# Patient Record
Sex: FEMALE | Race: WHITE | Employment: OTHER | URBAN - NONMETROPOLITAN AREA
[De-identification: names, ages, dates, MRNs, and addresses within clinical notes are randomized per-mention and may not be internally consistent; named-entity substitution may affect disease eponyms.]

---

## 2017-01-10 ENCOUNTER — OFFICE VISIT (OUTPATIENT)
Dept: FAMILY MEDICINE CLINIC | Age: 57
End: 2017-01-10

## 2017-01-10 VITALS
OXYGEN SATURATION: 98 % | HEART RATE: 67 BPM | HEIGHT: 66 IN | DIASTOLIC BLOOD PRESSURE: 74 MMHG | WEIGHT: 194.8 LBS | TEMPERATURE: 98.1 F | BODY MASS INDEX: 31.31 KG/M2 | SYSTOLIC BLOOD PRESSURE: 108 MMHG | RESPIRATION RATE: 12 BRPM

## 2017-01-10 DIAGNOSIS — N30.01 ACUTE CYSTITIS WITH HEMATURIA: Primary | ICD-10-CM

## 2017-01-10 DIAGNOSIS — G71.00 MD (MUSCULAR DYSTROPHY) (HCC): ICD-10-CM

## 2017-01-10 DIAGNOSIS — R10.9 FLANK PAIN: ICD-10-CM

## 2017-01-10 DIAGNOSIS — M06.9 RHEUMATOID ARTHRITIS, INVOLVING UNSPECIFIED SITE, UNSPECIFIED RHEUMATOID FACTOR PRESENCE: ICD-10-CM

## 2017-01-10 DIAGNOSIS — R30.0 DYSURIA: ICD-10-CM

## 2017-01-10 LAB
BILIRUBIN, POC: NEGATIVE
BLOOD URINE, POC: NORMAL
CLARITY, POC: NORMAL
COLOR, POC: NORMAL
GLUCOSE URINE, POC: NEGATIVE
KETONES, POC: NEGATIVE
LEUKOCYTE EST, POC: NORMAL
NITRITE, POC: NEGATIVE
PH, POC: 7
PROTEIN, POC: NEGATIVE
SPECIFIC GRAVITY, POC: 1.01
UROBILINOGEN, POC: 0.2

## 2017-01-10 PROCEDURE — 81003 URINALYSIS AUTO W/O SCOPE: CPT | Performed by: FAMILY MEDICINE

## 2017-01-10 PROCEDURE — 99213 OFFICE O/P EST LOW 20 MIN: CPT | Performed by: FAMILY MEDICINE

## 2017-01-10 RX ORDER — NITROFURANTOIN 25; 75 MG/1; MG/1
100 CAPSULE ORAL 2 TIMES DAILY
Qty: 20 CAPSULE | Refills: 0 | Status: SHIPPED | OUTPATIENT
Start: 2017-01-10 | End: 2017-01-20

## 2017-01-10 ASSESSMENT — ENCOUNTER SYMPTOMS
DIARRHEA: 0
SHORTNESS OF BREATH: 0
ABDOMINAL PAIN: 0
CONSTIPATION: 0
SORE THROAT: 0
BACK PAIN: 0
RHINORRHEA: 0
WHEEZING: 0
VOMITING: 0
CHEST TIGHTNESS: 0
BLOOD IN STOOL: 0
COUGH: 0
EYE PAIN: 0
NAUSEA: 0

## 2017-01-13 ENCOUNTER — TELEPHONE (OUTPATIENT)
Dept: FAMILY MEDICINE CLINIC | Age: 57
End: 2017-01-13

## 2017-01-13 LAB — URINE CULTURE, ROUTINE: ABNORMAL

## 2017-01-24 DIAGNOSIS — G71.00 MD (MUSCULAR DYSTROPHY) (HCC): ICD-10-CM

## 2017-01-24 RX ORDER — HYDROCODONE BITARTRATE AND ACETAMINOPHEN 5; 325 MG/1; MG/1
1 TABLET ORAL EVERY 6 HOURS PRN
Qty: 100 TABLET | Refills: 0 | Status: SHIPPED | OUTPATIENT
Start: 2017-01-24 | End: 2017-02-24 | Stop reason: SDUPTHER

## 2017-01-31 ENCOUNTER — TELEPHONE (OUTPATIENT)
Dept: FAMILY MEDICINE CLINIC | Age: 57
End: 2017-01-31

## 2017-02-24 DIAGNOSIS — G71.00 MD (MUSCULAR DYSTROPHY) (HCC): ICD-10-CM

## 2017-02-24 RX ORDER — HYDROCODONE BITARTRATE AND ACETAMINOPHEN 5; 325 MG/1; MG/1
1 TABLET ORAL EVERY 6 HOURS PRN
Qty: 100 TABLET | Refills: 0 | Status: SHIPPED | OUTPATIENT
Start: 2017-02-24 | End: 2017-03-28 | Stop reason: SDUPTHER

## 2017-03-28 DIAGNOSIS — G71.00 MD (MUSCULAR DYSTROPHY) (HCC): ICD-10-CM

## 2017-03-28 RX ORDER — HYDROCODONE BITARTRATE AND ACETAMINOPHEN 5; 325 MG/1; MG/1
1 TABLET ORAL EVERY 6 HOURS PRN
Qty: 100 TABLET | Refills: 0 | Status: SHIPPED | OUTPATIENT
Start: 2017-03-28 | End: 2017-04-28 | Stop reason: SDUPTHER

## 2017-04-28 DIAGNOSIS — G71.00 MD (MUSCULAR DYSTROPHY) (HCC): ICD-10-CM

## 2017-04-28 RX ORDER — HYDROCODONE BITARTRATE AND ACETAMINOPHEN 5; 325 MG/1; MG/1
1 TABLET ORAL EVERY 6 HOURS PRN
Qty: 100 TABLET | Refills: 0 | Status: SHIPPED | OUTPATIENT
Start: 2017-04-28 | End: 2017-05-31 | Stop reason: SDUPTHER

## 2017-05-31 DIAGNOSIS — G71.00 MD (MUSCULAR DYSTROPHY) (HCC): ICD-10-CM

## 2017-05-31 RX ORDER — HYDROCODONE BITARTRATE AND ACETAMINOPHEN 5; 325 MG/1; MG/1
1 TABLET ORAL EVERY 6 HOURS PRN
Qty: 100 TABLET | Refills: 0 | Status: SHIPPED | OUTPATIENT
Start: 2017-05-31 | End: 2017-06-30 | Stop reason: SDUPTHER

## 2017-06-01 ENCOUNTER — OFFICE VISIT (OUTPATIENT)
Dept: FAMILY MEDICINE CLINIC | Age: 57
End: 2017-06-01

## 2017-06-01 VITALS
DIASTOLIC BLOOD PRESSURE: 64 MMHG | SYSTOLIC BLOOD PRESSURE: 108 MMHG | WEIGHT: 193.2 LBS | RESPIRATION RATE: 16 BRPM | BODY MASS INDEX: 31.66 KG/M2 | HEART RATE: 80 BPM

## 2017-06-01 DIAGNOSIS — R19.7 DIARRHEA OF PRESUMED INFECTIOUS ORIGIN: Primary | ICD-10-CM

## 2017-06-01 PROCEDURE — G8427 DOCREV CUR MEDS BY ELIG CLIN: HCPCS | Performed by: FAMILY MEDICINE

## 2017-06-01 PROCEDURE — 3017F COLORECTAL CA SCREEN DOC REV: CPT | Performed by: FAMILY MEDICINE

## 2017-06-01 PROCEDURE — G8417 CALC BMI ABV UP PARAM F/U: HCPCS | Performed by: FAMILY MEDICINE

## 2017-06-01 PROCEDURE — 3014F SCREEN MAMMO DOC REV: CPT | Performed by: FAMILY MEDICINE

## 2017-06-01 PROCEDURE — 1036F TOBACCO NON-USER: CPT | Performed by: FAMILY MEDICINE

## 2017-06-01 PROCEDURE — 99213 OFFICE O/P EST LOW 20 MIN: CPT | Performed by: FAMILY MEDICINE

## 2017-06-01 RX ORDER — LOPERAMIDE HYDROCHLORIDE 2 MG/1
2 CAPSULE ORAL 4 TIMES DAILY PRN
Qty: 30 CAPSULE | Refills: 0 | Status: SHIPPED | OUTPATIENT
Start: 2017-06-01 | End: 2017-10-18 | Stop reason: ALTCHOICE

## 2017-06-01 ASSESSMENT — ENCOUNTER SYMPTOMS
BLOATING: 1
FLATUS: 0
EYE PAIN: 0
SHORTNESS OF BREATH: 0
SORE THROAT: 0
NAUSEA: 0
CHEST TIGHTNESS: 0
VOMITING: 0
DIARRHEA: 1
ABDOMINAL PAIN: 1
BACK PAIN: 0
WHEEZING: 0
CONSTIPATION: 0
COUGH: 0
BLOOD IN STOOL: 0
RHINORRHEA: 0

## 2017-06-15 DIAGNOSIS — G71.00 MUSCULAR DYSTROPHY (HCC): ICD-10-CM

## 2017-06-15 RX ORDER — GABAPENTIN 300 MG/1
300 CAPSULE ORAL 2 TIMES DAILY
Qty: 60 CAPSULE | Refills: 5 | Status: SHIPPED | OUTPATIENT
Start: 2017-06-15 | End: 2017-10-18 | Stop reason: SDUPTHER

## 2017-06-30 DIAGNOSIS — G71.00 MD (MUSCULAR DYSTROPHY) (HCC): ICD-10-CM

## 2017-06-30 RX ORDER — HYDROCODONE BITARTRATE AND ACETAMINOPHEN 5; 325 MG/1; MG/1
1 TABLET ORAL EVERY 6 HOURS PRN
Qty: 100 TABLET | Refills: 0 | Status: SHIPPED | OUTPATIENT
Start: 2017-06-30 | End: 2017-07-31 | Stop reason: SDUPTHER

## 2017-07-31 DIAGNOSIS — G71.00 MD (MUSCULAR DYSTROPHY) (HCC): ICD-10-CM

## 2017-07-31 RX ORDER — HYDROCODONE BITARTRATE AND ACETAMINOPHEN 5; 325 MG/1; MG/1
1 TABLET ORAL EVERY 6 HOURS PRN
Qty: 100 TABLET | Refills: 0 | Status: SHIPPED | OUTPATIENT
Start: 2017-07-31 | End: 2017-08-30 | Stop reason: SDUPTHER

## 2017-08-30 DIAGNOSIS — G71.00 MD (MUSCULAR DYSTROPHY) (HCC): ICD-10-CM

## 2017-08-30 RX ORDER — HYDROCODONE BITARTRATE AND ACETAMINOPHEN 5; 325 MG/1; MG/1
1 TABLET ORAL EVERY 6 HOURS PRN
Qty: 100 TABLET | Refills: 0 | Status: SHIPPED | OUTPATIENT
Start: 2017-08-30 | End: 2017-09-28 | Stop reason: SDUPTHER

## 2017-09-28 DIAGNOSIS — G71.00 MD (MUSCULAR DYSTROPHY) (HCC): ICD-10-CM

## 2017-09-28 RX ORDER — HYDROCODONE BITARTRATE AND ACETAMINOPHEN 5; 325 MG/1; MG/1
1 TABLET ORAL EVERY 6 HOURS PRN
Qty: 100 TABLET | Refills: 0 | Status: SHIPPED | OUTPATIENT
Start: 2017-09-29 | End: 2017-11-01 | Stop reason: SDUPTHER

## 2017-10-18 ENCOUNTER — OFFICE VISIT (OUTPATIENT)
Dept: FAMILY MEDICINE CLINIC | Age: 57
End: 2017-10-18
Payer: MEDICARE

## 2017-10-18 VITALS
WEIGHT: 190 LBS | RESPIRATION RATE: 12 BRPM | HEART RATE: 62 BPM | HEIGHT: 65 IN | DIASTOLIC BLOOD PRESSURE: 82 MMHG | SYSTOLIC BLOOD PRESSURE: 112 MMHG | BODY MASS INDEX: 31.65 KG/M2

## 2017-10-18 DIAGNOSIS — Z23 NEED FOR PNEUMOCOCCAL VACCINATION: ICD-10-CM

## 2017-10-18 DIAGNOSIS — G71.00 MUSCULAR DYSTROPHY (HCC): Primary | ICD-10-CM

## 2017-10-18 DIAGNOSIS — Z23 NEED FOR INFLUENZA VACCINATION: ICD-10-CM

## 2017-10-18 DIAGNOSIS — M06.9 RHEUMATOID ARTHRITIS, INVOLVING UNSPECIFIED SITE, UNSPECIFIED RHEUMATOID FACTOR PRESENCE: ICD-10-CM

## 2017-10-18 DIAGNOSIS — G62.9 NEUROPATHY: ICD-10-CM

## 2017-10-18 PROCEDURE — 99214 OFFICE O/P EST MOD 30 MIN: CPT | Performed by: FAMILY MEDICINE

## 2017-10-18 PROCEDURE — G8484 FLU IMMUNIZE NO ADMIN: HCPCS | Performed by: FAMILY MEDICINE

## 2017-10-18 PROCEDURE — G8417 CALC BMI ABV UP PARAM F/U: HCPCS | Performed by: FAMILY MEDICINE

## 2017-10-18 PROCEDURE — 3014F SCREEN MAMMO DOC REV: CPT | Performed by: FAMILY MEDICINE

## 2017-10-18 PROCEDURE — G8427 DOCREV CUR MEDS BY ELIG CLIN: HCPCS | Performed by: FAMILY MEDICINE

## 2017-10-18 PROCEDURE — 90732 PPSV23 VACC 2 YRS+ SUBQ/IM: CPT | Performed by: FAMILY MEDICINE

## 2017-10-18 PROCEDURE — G0009 ADMIN PNEUMOCOCCAL VACCINE: HCPCS | Performed by: FAMILY MEDICINE

## 2017-10-18 PROCEDURE — G0008 ADMIN INFLUENZA VIRUS VAC: HCPCS | Performed by: FAMILY MEDICINE

## 2017-10-18 PROCEDURE — 90688 IIV4 VACCINE SPLT 0.5 ML IM: CPT | Performed by: FAMILY MEDICINE

## 2017-10-18 PROCEDURE — 3017F COLORECTAL CA SCREEN DOC REV: CPT | Performed by: FAMILY MEDICINE

## 2017-10-18 PROCEDURE — 1036F TOBACCO NON-USER: CPT | Performed by: FAMILY MEDICINE

## 2017-10-18 RX ORDER — FOLIC ACID 1 MG/1
1 TABLET ORAL
COMMUNITY
Start: 2017-09-19

## 2017-10-18 RX ORDER — GABAPENTIN 300 MG/1
300 CAPSULE ORAL 2 TIMES DAILY
Qty: 60 CAPSULE | Refills: 5 | Status: SHIPPED | OUTPATIENT
Start: 2017-10-18 | End: 2018-05-30 | Stop reason: SDUPTHER

## 2017-10-18 ASSESSMENT — ENCOUNTER SYMPTOMS
SORE THROAT: 0
CONSTIPATION: 0
DIARRHEA: 0
EYE PAIN: 0
ABDOMINAL PAIN: 0
NAUSEA: 0
WHEEZING: 0
VOMITING: 0
RHINORRHEA: 0
BLOOD IN STOOL: 0
COUGH: 0
SHORTNESS OF BREATH: 0
BACK PAIN: 0
CHEST TIGHTNESS: 0

## 2017-10-18 ASSESSMENT — PATIENT HEALTH QUESTIONNAIRE - PHQ9
2. FEELING DOWN, DEPRESSED OR HOPELESS: 0
SUM OF ALL RESPONSES TO PHQ9 QUESTIONS 1 & 2: 0
SUM OF ALL RESPONSES TO PHQ QUESTIONS 1-9: 0
1. LITTLE INTEREST OR PLEASURE IN DOING THINGS: 0

## 2017-10-18 NOTE — PROGRESS NOTES
After obtaining consent, and per orders of Dr. Telma Baldwin, injection of Influenza Vaccine 0.5mL given IM left deltoid by Zayra Wahl. Patient tolerated well. After obtaining consent, and per orders of Dr. Telma Baldwin, injection of [id number]--7.5mL given in Right deltoid by Zayra Whal. Patient tolerated well.
All patient questions answered. Pt voiced understanding. Controlled Substances Monitoring:     Attestation: The Prescription Monitoring Report for this patient was reviewed today. Gomez Beach MD)  Documentation: Possible medication side effects, risk of tolerance and/or dependence, and alternative treatments discussed., Obtaining appropriate analgesic effect of treatment., No signs of potential drug abuse or diversion identified.  Gomez Beach MD)

## 2017-11-01 DIAGNOSIS — G71.00 MD (MUSCULAR DYSTROPHY) (HCC): ICD-10-CM

## 2017-11-01 RX ORDER — HYDROCODONE BITARTRATE AND ACETAMINOPHEN 5; 325 MG/1; MG/1
1 TABLET ORAL EVERY 6 HOURS PRN
Qty: 100 TABLET | Refills: 0 | Status: SHIPPED | OUTPATIENT
Start: 2017-11-01 | End: 2017-11-30 | Stop reason: SDUPTHER

## 2017-11-27 ENCOUNTER — TELEPHONE (OUTPATIENT)
Dept: FAMILY MEDICINE CLINIC | Age: 57
End: 2017-11-27

## 2017-11-27 NOTE — TELEPHONE ENCOUNTER
Patient states that her psychiatrist, Dr Lewis Zuniga is retiring. She went to [facility] and they are setting her up with Dr. Holli Jones, but it will be at least January before they can get her in. She says she has enough Depakote refills for a few months, but wants to make sure that you will give her a refill if she needs one before she gets in with Dr Holli Jones.   Call her back at [phone]

## 2017-11-30 DIAGNOSIS — G71.00 MD (MUSCULAR DYSTROPHY) (HCC): ICD-10-CM

## 2017-11-30 RX ORDER — HYDROCODONE BITARTRATE AND ACETAMINOPHEN 5; 325 MG/1; MG/1
1 TABLET ORAL EVERY 6 HOURS PRN
Qty: 100 TABLET | Refills: 0 | Status: SHIPPED | OUTPATIENT
Start: 2017-12-01 | End: 2018-01-02 | Stop reason: SDUPTHER

## 2017-11-30 NOTE — TELEPHONE ENCOUNTER
ep'ed norco to pharm requested    Controlled Substances Monitoring:     Attestation: The Prescription Monitoring Report for this patient was reviewed today. El Alfredo MD)  Documentation: No signs of potential drug abuse or diversion identified.  El Alfredo MD)

## 2017-12-11 ENCOUNTER — OFFICE VISIT (OUTPATIENT)
Dept: PSYCHIATRY | Age: 57
End: 2017-12-11
Payer: MEDICARE

## 2017-12-11 VITALS
BODY MASS INDEX: 32.49 KG/M2 | HEART RATE: 72 BPM | HEIGHT: 65 IN | WEIGHT: 195 LBS | SYSTOLIC BLOOD PRESSURE: 132 MMHG | DIASTOLIC BLOOD PRESSURE: 82 MMHG

## 2017-12-11 DIAGNOSIS — F34.0 CYCLOTHYMIC DISORDER: Primary | ICD-10-CM

## 2017-12-11 DIAGNOSIS — R53.83 FATIGUE, UNSPECIFIED TYPE: ICD-10-CM

## 2017-12-11 DIAGNOSIS — Z51.81 THERAPEUTIC DRUG MONITORING: ICD-10-CM

## 2017-12-11 PROCEDURE — 90792 PSYCH DIAG EVAL W/MED SRVCS: CPT | Performed by: NURSE PRACTITIONER

## 2017-12-11 PROCEDURE — 1036F TOBACCO NON-USER: CPT | Performed by: NURSE PRACTITIONER

## 2017-12-11 RX ORDER — DIVALPROEX SODIUM 500 MG/1
500 TABLET, EXTENDED RELEASE ORAL NIGHTLY
Qty: 30 TABLET | Refills: 2 | Status: SHIPPED | OUTPATIENT
Start: 2017-12-11 | End: 2018-03-05 | Stop reason: SDUPTHER

## 2017-12-11 ASSESSMENT — PATIENT HEALTH QUESTIONNAIRE - PHQ9
7. TROUBLE CONCENTRATING ON THINGS, SUCH AS READING THE NEWSPAPER OR WATCHING TELEVISION: 0
4. FEELING TIRED OR HAVING LITTLE ENERGY: 0
8. MOVING OR SPEAKING SO SLOWLY THAT OTHER PEOPLE COULD HAVE NOTICED. OR THE OPPOSITE, BEING SO FIGETY OR RESTLESS THAT YOU HAVE BEEN MOVING AROUND A LOT MORE THAN USUAL: 0
SUM OF ALL RESPONSES TO PHQ9 QUESTIONS 1 & 2: 1
6. FEELING BAD ABOUT YOURSELF - OR THAT YOU ARE A FAILURE OR HAVE LET YOURSELF OR YOUR FAMILY DOWN: 0
SUM OF ALL RESPONSES TO PHQ QUESTIONS 1-9: 2
10. IF YOU CHECKED OFF ANY PROBLEMS, HOW DIFFICULT HAVE THESE PROBLEMS MADE IT FOR YOU TO DO YOUR WORK, TAKE CARE OF THINGS AT HOME, OR GET ALONG WITH OTHER PEOPLE: 0
1. LITTLE INTEREST OR PLEASURE IN DOING THINGS: 1
3. TROUBLE FALLING OR STAYING ASLEEP: 1
9. THOUGHTS THAT YOU WOULD BE BETTER OFF DEAD, OR OF HURTING YOURSELF: 0
2. FEELING DOWN, DEPRESSED OR HOPELESS: 0
5. POOR APPETITE OR OVEREATING: 0

## 2017-12-11 ASSESSMENT — ANXIETY QUESTIONNAIRES
1. FEELING NERVOUS, ANXIOUS, OR ON EDGE: 2-OVER HALF THE DAYS
7. FEELING AFRAID AS IF SOMETHING AWFUL MIGHT HAPPEN: 0-NOT AT ALL SURE
6. BECOMING EASILY ANNOYED OR IRRITABLE: 0-NOT AT ALL SURE
GAD7 TOTAL SCORE: 8
4. TROUBLE RELAXING: 2-OVER HALF THE DAYS
3. WORRYING TOO MUCH ABOUT DIFFERENT THINGS: 0-NOT AT ALL SURE
5. BEING SO RESTLESS THAT IT IS HARD TO SIT STILL: 2-OVER HALF THE DAYS
2. NOT BEING ABLE TO STOP OR CONTROL WORRYING: 2-OVER HALF THE DAYS

## 2017-12-11 NOTE — PATIENT INSTRUCTIONS
Check with rheumatologist if safe to take melatonin over the counter as needed for problems sleeping (insomnia type symptoms). Patient has been instructed to seek emergency help via the emergency and/or calling 911 should symptoms become severe, worsen, or with other concerning symptoms. Patient instructed to go immediately to the emergency room and/or call 911 with any suicidal or homicidal ideations or if audio/visual hallucinations develop. Patient given crisis center information. Patient Education        Learning About Mood Disorders  What are mood disorders? Mood disorders are medical problems that affect how you feel. They can impact your moods, thoughts, and actions. Mood disorders include:  · Depression. This causes you to feel sad or hopeless for much of the time. · Bipolar disorder. This causes extreme mood changes from manic episodes of very high energy to extreme lows of depression. · Seasonal affective disorder (SAD). This is a type of depression that affects you during the same season each year. Most often people experience SAD during the fall and winter months when days are shorter and there is less light. What are the symptoms? Depression  You may:  · Feel sad or hopeless nearly every day. · Lose interest in or not get pleasure from most daily activities. You feel this way nearly every day. · Have low energy, changes in your appetite, or changes in how well you sleep. · Have trouble concentrating. · Think about death and suicide. Keep the numbers for these national suicide hotlines: 5-946-552-TALK (3-121.846.9101) and 2-726-OVMESCO (8-757.148.4376). If you or someone you know talks about suicide or feeling hopeless, get help right away. Bipolar disorder  Symptoms depend on your mood swings. You may:  · Feel very happy, energetic, or on edge. · Feel like you need very little sleep. · Feel overly self-confident. · Do impulsive things, such as spending a lot of money.   · Feel sad or school. · Depression, anxiety, and other mental or emotional conditions. · Changes in your sleep habits or surroundings. This includes changes that happen where you sleep, such as noise, light, or sleeping in a different bed. It also includes changes in your sleep pattern, such as having jet lag or working a late shift. · Health problems, such as pain, breathing problems, and restless legs syndrome. · Lack of regular exercise. How can you help yourself? Here are some tips that may help you sleep more soundly and wake up feeling more refreshed. Your sleeping area  · Use your bedroom only for sleeping and sex. A bit of light reading may help you fall asleep. But if it doesn't, do your reading elsewhere in the house. Don't watch TV in bed. · Be sure your bed is big enough to stretch out comfortably, especially if you have a sleep partner. · Keep your bedroom quiet, dark, and cool. Use curtains, blinds, or a sleep mask to block out light. To block out noise, use earplugs, soothing music, or a \"white noise\" machine. Your evening and bedtime routine  · Create a relaxing bedtime routine. You might want to take a warm shower or bath, listen to soothing music, or drink a cup of noncaffeinated tea. · Go to bed at the same time every night. And get up at the same time every morning, even if you feel tired. What to avoid  · Limit caffeine (coffee, tea, caffeinated sodas) during the day, and don't have any for at least 4 to 6 hours before bedtime. · Don't drink alcohol before bedtime. Alcohol can cause you to wake up more often during the night. · Don't smoke or use tobacco, especially in the evening. Nicotine can keep you awake. · Don't take naps during the day, especially close to bedtime. · Don't lie in bed awake for too long.  If you can't fall asleep, or if you wake up in the middle of the night and can't get back to sleep within 15 minutes or so, get out of bed and go to another room until you feel

## 2017-12-11 NOTE — PROGRESS NOTES
feeling very hopeless, and feeling very worthless. Patient states she was also experiencing a lot of racing thoughts and waking frequently through the night. She always felt tired and had no motivation. She reports she had been on a vacation just prior to her admission but recalls not feeling well or enjoying her vacation. She also recalls her siblings were \"going through a lot and I was trying to be the rock and it just became too much. \"  She reports she has always worried about her siblings more than she should and believes this influenced her movements at the time of her admission. She states prior to her admission she took 4 prescription pills because \"I wanted to end it all\" but then reports she was very worried about what would happen to her siblings if she committed suicide so she presented for admission to the psychiatric unit. Patient states during her hospitalization and during the period after she was \"on all kinds of stuff. I was pretty doped up. \"  She does not recall experiencing any hallucinations prior to or during her hospitalization. She was hospitalized for 2 weeks and was ultimately weaned off of everything except the lithium, which she took for approximately 17 years. Patient states while she was on the lithium she did not feel that the medication was providing significant relief of her symptoms. However, she recalls consuming a significant amount of alcohol on a regular occasion while she was on the lithium. She states she did not think about any of the possible consequences of drinking the alcohol while on the lithium. The alcohol contributed to the ineffectiveness she perceived from the lithium. She had been under the care of Dr. Paula Bailey and Dr. Syed Riojas before she began seeing Dr. Vania Wiggins. She states when she began seeing Dr. Vania Wiggins or shortly prior to her initial visit with him she was changed from the lithium to the Depakote.   She has felt that the Depakote has been very beneficial in father would often yell, holler, and throat things. Her mother \"did everything in the home. She was the slave and father was the ranjit. \"  Patient states \"my mother was a good mother. She was very easy going. \"  Patient states she fell loved by her mother more than she felt well from her father. She states she was much closer to her girlfriends parents than her own parents. She was also very close to her aunt, who was only 5years older than patient. Patient states she and her aunt remained very close at this time. Patient states she attended a [employer] and did well at school. She got along well with others and had many friends. She always worked during her lolis and senior years of high school. Patient states overall \"life was not bad when I was a child. \"    Recently, patient states she has had some irritability on occasion and has also had some increased familial stressors. She feels that she has been able to manage these stressors and irritability well. She tries to walk at least 35-45 minutes every morning because it helps reduce her stressors and anxiety. She reports she does have increased worry and nervousness, but does not feel it is bothersome. She has not noticed any increase in depression. Patient states she is sleeping well. She is able to initiate sleep with ease but will wake once or twice throughout the night. She states it will take up to 1 hour time before she is able to return to sleep. She reports there are times when she questions if she may need a medication to help her sleep, but also recognizes her caffeine intake likely influences her sleep patterns. Patient states she drinks a 2 L of diet Coke each day. Patient denies suicidal ideation, intent, plan. No homicidal ideations, tach, plan. No audio or visual hallucinations.     HPI      PSYCHIATRIC HISTORY:    Patient has had prior care with the following:    [x] Psychiatrist (Dr. Uriel Sevilla, Dr. Sonja De Dios, Dr. Gustavo Spencer)    [] by Does not apply route Request parking placard due to medical conditions. Duration of 5 years. HYDROXYCHLOROQUINE (PLAQUENIL) 200 MG TABLET    Take 200 mg by mouth 2 times daily. METHOTREXATE (RHEUMATREX) 2.5 MG CHEMO TABLET    Take 2.5 mg by mouth once a week. 6 tabs once weekly    OMEPRAZOLE (PRILOSEC) 40 MG CAPSULE    Take 1 capsule by mouth daily. ALLERGIES:    Oxycontin [oxycodone hcl]; Percocet [oxycodone-acetaminophen]; Bactrim; and Sulfa antibiotics    REVIEW OF SYSTEMS:    ROS    The patient sees Armando Gann MD as her primary care provider. SPECIALISTS: rheumatologist and Dr. Hilda Feliz for GI    OBJECTIVE DATA     /82 (Site: Left Arm, Position: Sitting)   Pulse 72   Ht 5' 4.5\" (1.638 m)   Wt 195 lb (88.5 kg)   BMI 32.95 kg/m²     Physical Exam    Mental Status Evaluation:   Orientation: Alert, oriented, thought content appropriate   Mood:.  Slight anxiety but generally euthymic      Affect:  mood-congruent      Appearance:  age appropriate and casually dressed   Activity:  Within Normal Limits   Gait/Posture: Normal   Speech:  normal pitch, normal volume and clear, age appropriate, linear, easy to understand   Thought Process:  within normal limits   Thought Content:  within normal limits   Cognition:  grossly intact   Memory: intact   Insight:  good   Judgment: good   Suicidal Ideations: denies suicidal ideation   Homicidal Ideations: Negative for homicidal ideation      Medication Side Effects: absent       Attention Span attention span and concentration were age appropriate     Screenings Completed in This Encounter:     Anxiety and Depression:      LOUISA-7  Feeling nervious, anxious, or on edge: 2-Over half the days  Not able to stop or control worryin-Over half the days  Worrying too much about different things: 0-Not at all sure  Trouble relaxin-Over half the days  Being so restless that it's hard to sit still: 2-Over half the days  Becoming easily annoyed or irritable: 0-Not at all sure  Feeling afraid as if something awful might happen: 0-Not at all sure  LOUISA-7 Total Score: 8    PHQ-2 Over the past 2 weeks, how often have you been bothered by any of the following problems? Little interest or pleasure in doing things: Several days  Feeling down, depressed, or hopeless: Not at all  PHQ-2 Score: 1  PHQ-9 Over the past 2 weeks, how often have you been bothered by any of the following problems? Trouble falling or staying asleep, or sleeping too much: Several days  Feeling tired or having little energy: Not at all  Poor appetite or overeating: Not at all  Feeling bad about yourself - or that you are a failure or have let yourself or your family down: Not at all  Trouble concentrating on things, such as reading the newspaper or watching television: Not at all  Moving or speaking so slowly that other people could have noticed. Or the opposite - being so fidgety or restless that you have been moving around a lot more than usual: Not at all  Thoughts that you would be better off dead, or of hurting yourself in some way: Not at all  If you checked off any problems, how difficult have these problems made it for you to do your work, take care of things at home, or get along with other people?: Not difficult at all  PHQ-9 Completed?: Complete  PHQ-9 Total Score: 2     DIAGNOSIS AND ASSESSMENT DATA     DIAGNOSIS:   1. Cyclothymic disorder    2. Therapeutic drug monitoring    3. Fatigue, unspecified type        PLAN   Follow-up:  Return in about 3 months (around 3/11/2018), or if symptoms worsen or fail to improve, for follow-up and medication management.     Prescriptions for this encounter:  New Prescriptions    DIVALPROEX (DEPAKOTE ER) 500 MG EXTENDED RELEASE TABLET    Take 1 tablet by mouth nightly       Orders Placed This Encounter   Medications    divalproex (DEPAKOTE ER) 500 MG extended release tablet     Sig: Take 1 tablet by mouth nightly     Dispense:  30 tablet

## 2017-12-20 ENCOUNTER — TELEPHONE (OUTPATIENT)
Dept: FAMILY MEDICINE CLINIC | Age: 57
End: 2017-12-20

## 2017-12-20 NOTE — TELEPHONE ENCOUNTER
T/C Jacqueline Singh - states she is using more pads for her incontinence than what she is getting. A couple years ago Dr Efren Gamboa needed to write a RX for extra to get her by. (Attached is copy of RX)  For Prevail Pads, over quanity DX Incontinance #264/11. Fax to [facility] at [phone].

## 2018-01-02 DIAGNOSIS — G71.00 MD (MUSCULAR DYSTROPHY) (HCC): ICD-10-CM

## 2018-01-02 RX ORDER — HYDROCODONE BITARTRATE AND ACETAMINOPHEN 5; 325 MG/1; MG/1
1 TABLET ORAL EVERY 6 HOURS PRN
Qty: 100 TABLET | Refills: 0 | Status: SHIPPED | OUTPATIENT
Start: 2018-01-02 | End: 2018-01-31 | Stop reason: SDUPTHER

## 2018-01-02 NOTE — TELEPHONE ENCOUNTER
T/C Marli Dougherty - requesting refill of the [place] to DDD.      [address]  [phone]  Last visit 10/18/17  Last filled 17

## 2018-01-16 ENCOUNTER — TELEPHONE (OUTPATIENT)
Dept: FAMILY MEDICINE CLINIC | Age: 58
End: 2018-01-16

## 2018-01-16 NOTE — LETTER
[facility]  [address]  [facility] [address]  Phone: [phone]  Fax: [phone]    Antolin Howard MD        January 17, 2018      To whom it may concern:    Jose Juan Wilson is currently paying out of pocket for the following items:    Poise pads - $13.00 for 39 pads(1 pack) and uses 6 packs per month. SW bladder pads - $9.50 for 39 pads(1 pack) and uses 6 packs per month. Protective underwear - $9.50 for 18 pairs(1 pack) of underwear and uses 4 packs per month. Jose Juan Wilson spends approximately $173.00 per month on the above items. She will need all these items indefinitely due to her diagnosis of muscular dystrophy.       Sincerely,        Antolin Howard MD

## 2018-01-31 DIAGNOSIS — G71.00 MD (MUSCULAR DYSTROPHY) (HCC): ICD-10-CM

## 2018-01-31 RX ORDER — HYDROCODONE BITARTRATE AND ACETAMINOPHEN 5; 325 MG/1; MG/1
1 TABLET ORAL EVERY 6 HOURS PRN
Qty: 100 TABLET | Refills: 0 | Status: SHIPPED | OUTPATIENT
Start: 2018-02-01 | End: 2018-03-01 | Stop reason: SDUPTHER

## 2018-02-12 ENCOUNTER — HOSPITAL ENCOUNTER (OUTPATIENT)
Age: 58
Discharge: HOME | End: 2018-02-12
Payer: MEDICARE

## 2018-02-12 DIAGNOSIS — R53.83 FATIGUE, UNSPECIFIED TYPE: ICD-10-CM

## 2018-02-12 DIAGNOSIS — F34.0 CYCLOTHYMIC DISORDER: ICD-10-CM

## 2018-02-12 DIAGNOSIS — Z51.81 THERAPEUTIC DRUG MONITORING: ICD-10-CM

## 2018-02-12 LAB
ALBUMIN SERPL-MCNC: 4.4 G/DL (ref 3.5–5.1)
ALP BLD-CCNC: 81 U/L (ref 38–126)
ALP BLD-CCNC: 85 U/L (ref 38–126)
ALT SERPL-CCNC: 21 U/L (ref 11–66)
ANION GAP SERPL CALCULATED.3IONS-SCNC: 14 MEQ/L (ref 8–16)
ANISOCYTOSIS: ABNORMAL
AST SERPL-CCNC: 30 U/L (ref 5–40)
BASOPHILS # BLD: 1 %
BASOPHILS ABSOLUTE: 0 THOU/MM3 (ref 0–0.1)
BILIRUB SERPL-MCNC: 0.4 MG/DL (ref 0.3–1.2)
BUN BLDV-MCNC: 12 MG/DL (ref 7–22)
C-REACTIVE PROTEIN: 0.18 MG/DL (ref 0–1)
CALCIUM SERPL-MCNC: 9.8 MG/DL (ref 8.5–10.5)
CHLORIDE BLD-SCNC: 101 MEQ/L (ref 98–111)
CO2: 27 MEQ/L (ref 23–33)
CREAT SERPL-MCNC: 0.8 MG/DL (ref 0.4–1.2)
EOSINOPHIL # BLD: 0 %
EOSINOPHILS ABSOLUTE: 0 THOU/MM3 (ref 0–0.4)
GFR SERPL CREATININE-BSD FRML MDRD: 73 ML/MIN/1.73M2
GLUCOSE BLD-MCNC: 86 MG/DL (ref 70–108)
HCT VFR BLD CALC: 39.4 % (ref 37–47)
HEMOGLOBIN: 13 GM/DL (ref 12–16)
LYMPHOCYTES # BLD: 26.2 %
LYMPHOCYTES ABSOLUTE: 0.9 THOU/MM3 (ref 1–4.8)
MCH RBC QN AUTO: 32.6 PG (ref 27–31)
MCHC RBC AUTO-ENTMCNC: 32.9 GM/DL (ref 33–37)
MCV RBC AUTO: 98.9 FL (ref 81–99)
MONOCYTES # BLD: 5.3 %
MONOCYTES ABSOLUTE: 0.2 THOU/MM3 (ref 0.4–1.3)
NUCLEATED RED BLOOD CELLS: 0 /100 WBC
PDW BLD-RTO: 17.1 % (ref 11.5–14.5)
PLATELET # BLD: 183 THOU/MM3 (ref 130–400)
PMV BLD AUTO: 9.7 FL (ref 7.4–10.4)
POTASSIUM SERPL-SCNC: 4.8 MEQ/L (ref 3.5–5.2)
RBC # BLD: 3.98 MILL/MM3 (ref 4.2–5.4)
SEDIMENTATION RATE, ERYTHROCYTE: 2 MM/HR (ref 0–20)
SEG NEUTROPHILS: 67.5 %
SEGMENTED NEUTROPHILS ABSOLUTE COUNT: 2.3 THOU/MM3 (ref 1.8–7.7)
SODIUM BLD-SCNC: 142 MEQ/L (ref 135–145)
TOTAL PROTEIN: 6.7 G/DL (ref 6.1–8)
VALPROIC ACID LEVEL: 62.7 UG/ML (ref 50–100)
WBC # BLD: 3.4 THOU/MM3 (ref 4.8–10.8)

## 2018-02-12 PROCEDURE — 36415 COLL VENOUS BLD VENIPUNCTURE: CPT

## 2018-02-12 PROCEDURE — 80164 ASSAY DIPROPYLACETIC ACD TOT: CPT

## 2018-02-12 PROCEDURE — 86140 C-REACTIVE PROTEIN: CPT

## 2018-02-12 PROCEDURE — 85651 RBC SED RATE NONAUTOMATED: CPT

## 2018-02-12 PROCEDURE — 84075 ASSAY ALKALINE PHOSPHATASE: CPT

## 2018-02-12 PROCEDURE — 85025 COMPLETE CBC W/AUTO DIFF WBC: CPT

## 2018-02-12 PROCEDURE — 80053 COMPREHEN METABOLIC PANEL: CPT

## 2018-02-13 ENCOUNTER — TELEPHONE (OUTPATIENT)
Dept: PSYCHIATRY | Age: 58
End: 2018-02-13

## 2018-02-13 NOTE — PROGRESS NOTES
Patient notified and verbalized understanding.   Patient will follow up with PCP, labs have been forwarded to Dr. Janny Fried office per patient request.

## 2018-02-21 ENCOUNTER — OFFICE VISIT (OUTPATIENT)
Dept: FAMILY MEDICINE CLINIC | Age: 58
End: 2018-02-21
Payer: MEDICARE

## 2018-02-21 VITALS
RESPIRATION RATE: 12 BRPM | DIASTOLIC BLOOD PRESSURE: 70 MMHG | BODY MASS INDEX: 32.28 KG/M2 | HEART RATE: 62 BPM | WEIGHT: 191 LBS | SYSTOLIC BLOOD PRESSURE: 120 MMHG

## 2018-02-21 DIAGNOSIS — M05.9 RHEUMATOID ARTHRITIS WITH POSITIVE RHEUMATOID FACTOR, INVOLVING UNSPECIFIED SITE (HCC): ICD-10-CM

## 2018-02-21 DIAGNOSIS — G71.00 MUSCULAR DYSTROPHY (HCC): ICD-10-CM

## 2018-02-21 DIAGNOSIS — E66.9 CLASS 1 OBESITY WITHOUT SERIOUS COMORBIDITY WITH BODY MASS INDEX (BMI) OF 32.0 TO 32.9 IN ADULT, UNSPECIFIED OBESITY TYPE: ICD-10-CM

## 2018-02-21 DIAGNOSIS — I80.01 THROMBOPHLEBITIS OF SUPERFICIAL VEINS OF RIGHT LOWER EXTREMITY: Primary | ICD-10-CM

## 2018-02-21 PROCEDURE — G8417 CALC BMI ABV UP PARAM F/U: HCPCS | Performed by: FAMILY MEDICINE

## 2018-02-21 PROCEDURE — G8427 DOCREV CUR MEDS BY ELIG CLIN: HCPCS | Performed by: FAMILY MEDICINE

## 2018-02-21 PROCEDURE — 3017F COLORECTAL CA SCREEN DOC REV: CPT | Performed by: FAMILY MEDICINE

## 2018-02-21 PROCEDURE — 1036F TOBACCO NON-USER: CPT | Performed by: FAMILY MEDICINE

## 2018-02-21 PROCEDURE — G8484 FLU IMMUNIZE NO ADMIN: HCPCS | Performed by: FAMILY MEDICINE

## 2018-02-21 PROCEDURE — 3014F SCREEN MAMMO DOC REV: CPT | Performed by: FAMILY MEDICINE

## 2018-02-21 PROCEDURE — 99214 OFFICE O/P EST MOD 30 MIN: CPT | Performed by: FAMILY MEDICINE

## 2018-02-21 RX ORDER — PREDNISONE 20 MG/1
TABLET ORAL
Qty: 15 TABLET | Refills: 0 | Status: SHIPPED | OUTPATIENT
Start: 2018-02-21 | End: 2018-03-05 | Stop reason: ALTCHOICE

## 2018-02-21 ASSESSMENT — ENCOUNTER SYMPTOMS
VOMITING: 0
CONSTIPATION: 0
ABDOMINAL PAIN: 0
EYE PAIN: 0
SHORTNESS OF BREATH: 0
CHEST TIGHTNESS: 0
DIARRHEA: 0
COUGH: 0
BLOOD IN STOOL: 0
WHEEZING: 0
RHINORRHEA: 0
NAUSEA: 0
SORE THROAT: 0
BACK PAIN: 0

## 2018-02-21 NOTE — PATIENT INSTRUCTIONS
meals using beans and peas. Add garbanzo or kidney beans to salads. Make burritos and tacos with mashed madrigal beans or black beans. Where can you learn more? Go to [email or web address]://[email or web address]. org and sign in to your [employer] account. Enter V155 in the [facility] box to learn more about \"DASH Diet: Care Instructions. \"     If you do not have an account, please click on the \"Sign Up Now\" link. Current as of: September 21, 2016  Content Version: 11.5  © 8409-8213 [employer]. Care instructions adapted under license by [facility] [employer] [facility] ([facility]). If you have questions about a medical condition or this instruction, always ask your healthcare professional. [address] any warranty or liability for your use of this information. Patient Education        Superficial Thrombophlebitis: Care Instructions  Your Care Instructions  Superficial thrombophlebitis is inflammation in a vein where a blood clot has formed close to the surface of the skin. You may be able to feel the clot as a firm lump under the skin. The skin over the clot can become red, tender, and warm to the touch. Blood clots in veins close to the skin's surface usually are not serious and often can be treated at home. Sometimes superficial thrombophlebitis spreads to a deeper vein (deep vein thrombosis, or DVT). These deeper clots can be serious, even life-threatening. It is very important that you follow your doctor's instructions, keep all follow-up appointments, and watch for new or worsening symptoms of a clot. Follow-up care is a key part of your treatment and safety. Be sure to make and go to all appointments, and call your doctor if you are having problems. It's also a good idea to know your test results and keep a list of the medicines you take. How can you care for yourself at home? · Take your medicines exactly as prescribed. Call your doctor if you think you are having a problem with your medicine.

## 2018-02-21 NOTE — PROGRESS NOTES
There is no tenderness. There is no rebound and no guarding. Musculoskeletal: Normal range of motion. She exhibits no edema. Legs:  Lymphadenopathy:     She has no cervical adenopathy. Neurological: She is alert and oriented to person, place, and time. She has normal reflexes. No cranial nerve deficit. Skin: Skin is warm and dry. No rash noted. Psychiatric: She has a normal mood and affect. Nursing note and vitals reviewed. Assessment:      Right leg pain--superficial thrombophlebitis  RA  MD  Obesity        Plan:      Prednisone 20 mg 2 tabs daily x 5 days, then 1 daily x 5 days in AM and with food  Encouraged diet, exercise and weight loss. Dash diet        Quality & Risk Score Accuracy - MEDICARE ADVANTAGE    Visit Dx:  Rheumatoid arthritis with positive rheumatoid factor, involving unspecified site ([facility]  Stable based upon symptoms and exam. Continue current treatment plan and follow up at least yearly. Last edited 02/21/18 14:24 EST by MD Beatriz Meléndez received counseling on the following healthy behaviors: nutrition, exercise and medication adherence    Patient given educational materials on Nutrition and Exercise    I have instructed Beatriz Aldridge to complete a self tracking handout on Weights and instructed them to bring it with them to her next appointment. Discussed use, benefit, and side effects of prescribed medications. Barriers to medication compliance addressed. All patient questions answered. Pt voiced understanding.

## 2018-03-01 DIAGNOSIS — G71.00 MD (MUSCULAR DYSTROPHY) (HCC): ICD-10-CM

## 2018-03-01 RX ORDER — HYDROCODONE BITARTRATE AND ACETAMINOPHEN 5; 325 MG/1; MG/1
1 TABLET ORAL EVERY 6 HOURS PRN
Qty: 100 TABLET | Refills: 0 | Status: SHIPPED | OUTPATIENT
Start: 2018-03-01 | End: 2018-03-29 | Stop reason: SDUPTHER

## 2018-03-01 NOTE — TELEPHONE ENCOUNTER
Pt Venus Stevens has #7 left of Norco and requests refill at [facility]. Pls call pt at [phone] only if probs.   Last ov: 2/21/18 thrombophlebitis (which she said is doing better with #1 left of steroid)  Zip: [phone]

## 2018-03-05 ENCOUNTER — OFFICE VISIT (OUTPATIENT)
Dept: PSYCHIATRY | Age: 58
End: 2018-03-05
Payer: MEDICARE

## 2018-03-05 VITALS
HEIGHT: 65 IN | WEIGHT: 190 LBS | SYSTOLIC BLOOD PRESSURE: 131 MMHG | DIASTOLIC BLOOD PRESSURE: 74 MMHG | HEART RATE: 75 BPM | BODY MASS INDEX: 31.65 KG/M2

## 2018-03-05 DIAGNOSIS — Z51.81 THERAPEUTIC DRUG MONITORING: ICD-10-CM

## 2018-03-05 DIAGNOSIS — M06.9 RHEUMATOID ARTHRITIS, INVOLVING UNSPECIFIED SITE, UNSPECIFIED RHEUMATOID FACTOR PRESENCE: ICD-10-CM

## 2018-03-05 DIAGNOSIS — F34.0 CYCLOTHYMIC DISORDER: ICD-10-CM

## 2018-03-05 DIAGNOSIS — F34.0 CYCLOTHYMIA: Primary | ICD-10-CM

## 2018-03-05 DIAGNOSIS — E66.9 OBESITY (BMI 30-39.9): ICD-10-CM

## 2018-03-05 DIAGNOSIS — R53.83 OTHER FATIGUE: ICD-10-CM

## 2018-03-05 PROCEDURE — G8417 CALC BMI ABV UP PARAM F/U: HCPCS | Performed by: NURSE PRACTITIONER

## 2018-03-05 PROCEDURE — 1036F TOBACCO NON-USER: CPT | Performed by: NURSE PRACTITIONER

## 2018-03-05 PROCEDURE — 3014F SCREEN MAMMO DOC REV: CPT | Performed by: NURSE PRACTITIONER

## 2018-03-05 PROCEDURE — 99213 OFFICE O/P EST LOW 20 MIN: CPT | Performed by: NURSE PRACTITIONER

## 2018-03-05 PROCEDURE — 3017F COLORECTAL CA SCREEN DOC REV: CPT | Performed by: NURSE PRACTITIONER

## 2018-03-05 PROCEDURE — G8427 DOCREV CUR MEDS BY ELIG CLIN: HCPCS | Performed by: NURSE PRACTITIONER

## 2018-03-05 PROCEDURE — G8484 FLU IMMUNIZE NO ADMIN: HCPCS | Performed by: NURSE PRACTITIONER

## 2018-03-05 RX ORDER — DIVALPROEX SODIUM 500 MG/1
500 TABLET, EXTENDED RELEASE ORAL NIGHTLY
Qty: 30 TABLET | Refills: 2 | Status: SHIPPED | OUTPATIENT
Start: 2018-03-05 | End: 2018-06-05 | Stop reason: SDUPTHER
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
## 2018-03-09 ENCOUNTER — TELEPHONE (OUTPATIENT)
Dept: FAMILY MEDICINE CLINIC | Age: 58
End: 2018-03-09

## 2018-03-09 ENCOUNTER — HOSPITAL ENCOUNTER (OUTPATIENT)
Dept: GENERAL RADIOLOGY | Age: 58
Discharge: HOME | End: 2018-03-09
Payer: MEDICARE

## 2018-03-09 ENCOUNTER — HOSPITAL ENCOUNTER (OUTPATIENT)
Age: 58
Discharge: HOME | End: 2018-03-09
Payer: MEDICARE

## 2018-03-09 DIAGNOSIS — M25.561 ACUTE PAIN OF RIGHT KNEE: Primary | ICD-10-CM

## 2018-03-09 DIAGNOSIS — M25.561 ACUTE PAIN OF RIGHT KNEE: ICD-10-CM

## 2018-03-09 PROCEDURE — 73564 X-RAY EXAM KNEE 4 OR MORE: CPT

## 2018-03-09 NOTE — TELEPHONE ENCOUNTER
Patient reports that her right knee is still warm to the touch, painful and a little swollen. She is asking if she could have an order for an X ray. She would like to have it done at [facility].   Please call her back at [phone]

## 2018-03-09 NOTE — TELEPHONE ENCOUNTER
----- Message from Erasto López MD sent at 3/9/2018 11:59 AM EST -----  right knee films are good, no prosthetic issues. Could consider rechecking with ortho.

## 2018-03-29 DIAGNOSIS — G71.00 MD (MUSCULAR DYSTROPHY) (HCC): ICD-10-CM

## 2018-03-29 RX ORDER — HYDROCODONE BITARTRATE AND ACETAMINOPHEN 5; 325 MG/1; MG/1
1 TABLET ORAL EVERY 6 HOURS PRN
Qty: 100 TABLET | Refills: 0 | Status: SHIPPED | OUTPATIENT
Start: 2018-03-30 | End: 2018-04-30 | Stop reason: SDUPTHER

## 2018-03-29 NOTE — TELEPHONE ENCOUNTER
ep'ed norco to pharm requested    Controlled Substances Monitoring: The Prescription Monitoring Report for this patient was reviewed today. Antolin Howard MD)    No signs of potential drug abuse or diversion identified.  Antolin Howard MD)

## 2018-04-30 DIAGNOSIS — G71.00 MD (MUSCULAR DYSTROPHY) (HCC): ICD-10-CM

## 2018-04-30 RX ORDER — HYDROCODONE BITARTRATE AND ACETAMINOPHEN 5; 325 MG/1; MG/1
1 TABLET ORAL EVERY 6 HOURS PRN
Qty: 100 TABLET | Refills: 0 | Status: SHIPPED | OUTPATIENT
Start: 2018-05-01 | End: 2018-05-31 | Stop reason: SDUPTHER

## 2018-05-22 ENCOUNTER — TELEPHONE (OUTPATIENT)
Dept: FAMILY MEDICINE CLINIC | Age: 58
End: 2018-05-22

## 2018-05-22 DIAGNOSIS — B07.9 VIRAL WARTS, UNSPECIFIED TYPE: Primary | ICD-10-CM

## 2018-05-30 DIAGNOSIS — G71.00 MUSCULAR DYSTROPHY (HCC): ICD-10-CM

## 2018-05-30 RX ORDER — GABAPENTIN 300 MG/1
300 CAPSULE ORAL 2 TIMES DAILY
Qty: 60 CAPSULE | Refills: 5 | Status: SHIPPED | OUTPATIENT
Start: 2018-05-30 | End: 2018-12-19 | Stop reason: SDUPTHER

## 2018-05-31 DIAGNOSIS — G71.00 MD (MUSCULAR DYSTROPHY) (HCC): ICD-10-CM

## 2018-05-31 RX ORDER — HYDROCODONE BITARTRATE AND ACETAMINOPHEN 5; 325 MG/1; MG/1
1 TABLET ORAL EVERY 6 HOURS PRN
Qty: 100 TABLET | Refills: 0 | Status: SHIPPED | OUTPATIENT
Start: 2018-05-31 | End: 2018-06-29 | Stop reason: SDUPTHER

## 2018-06-05 ENCOUNTER — OFFICE VISIT (OUTPATIENT)
Dept: PSYCHIATRY | Age: 58
End: 2018-06-05
Payer: MEDICARE

## 2018-06-05 VITALS
BODY MASS INDEX: 31.82 KG/M2 | SYSTOLIC BLOOD PRESSURE: 126 MMHG | HEIGHT: 65 IN | HEART RATE: 72 BPM | WEIGHT: 191 LBS | DIASTOLIC BLOOD PRESSURE: 74 MMHG

## 2018-06-05 DIAGNOSIS — Z51.81 THERAPEUTIC DRUG MONITORING: ICD-10-CM

## 2018-06-05 DIAGNOSIS — F34.0 CYCLOTHYMIC DISORDER: Primary | ICD-10-CM

## 2018-06-05 PROCEDURE — 1036F TOBACCO NON-USER: CPT | Performed by: NURSE PRACTITIONER

## 2018-06-05 PROCEDURE — 99213 OFFICE O/P EST LOW 20 MIN: CPT | Performed by: NURSE PRACTITIONER

## 2018-06-05 PROCEDURE — G8427 DOCREV CUR MEDS BY ELIG CLIN: HCPCS | Performed by: NURSE PRACTITIONER

## 2018-06-05 PROCEDURE — 3017F COLORECTAL CA SCREEN DOC REV: CPT | Performed by: NURSE PRACTITIONER

## 2018-06-05 PROCEDURE — G8417 CALC BMI ABV UP PARAM F/U: HCPCS | Performed by: NURSE PRACTITIONER

## 2018-06-05 RX ORDER — DIVALPROEX SODIUM 500 MG/1
500 TABLET, EXTENDED RELEASE ORAL NIGHTLY
Qty: 30 TABLET | Refills: 2 | Status: SHIPPED | OUTPATIENT
Start: 2018-06-05 | End: 2018-09-11 | Stop reason: SDUPTHER

## 2018-06-29 DIAGNOSIS — G71.00 MD (MUSCULAR DYSTROPHY) (HCC): ICD-10-CM

## 2018-06-29 RX ORDER — HYDROCODONE BITARTRATE AND ACETAMINOPHEN 5; 325 MG/1; MG/1
1 TABLET ORAL EVERY 6 HOURS PRN
Qty: 100 TABLET | Refills: 0 | Status: SHIPPED | OUTPATIENT
Start: 2018-06-29 | End: 2018-07-31 | Stop reason: SDUPTHER

## 2018-07-31 DIAGNOSIS — G71.00 MD (MUSCULAR DYSTROPHY) (HCC): ICD-10-CM

## 2018-07-31 RX ORDER — HYDROCODONE BITARTRATE AND ACETAMINOPHEN 5; 325 MG/1; MG/1
1 TABLET ORAL EVERY 6 HOURS PRN
Qty: 100 TABLET | Refills: 0 | Status: SHIPPED | OUTPATIENT
Start: 2018-07-31 | End: 2018-08-30 | Stop reason: SDUPTHER

## 2018-07-31 NOTE — TELEPHONE ENCOUNTER
ep'ed norco to pharm requested    Controlled Substances Monitoring:     RX Monitoring 7/31/2018   Attestation The Prescription Monitoring Report for this patient was reviewed today. Documentation No signs of potential drug abuse or diversion identified.

## 2018-07-31 NOTE — TELEPHONE ENCOUNTER
T/C America Shipman asking for refill of the [place] to DDD.      [address] [address]  Last visit 18 - will need appt for next refill  Last filled 18

## 2018-08-01 ENCOUNTER — HOSPITAL ENCOUNTER (OUTPATIENT)
Age: 58
Discharge: HOME | End: 2018-08-01
Payer: MEDICARE

## 2018-08-01 LAB
ALP BLD-CCNC: 82 U/L (ref 38–126)
ALT SERPL-CCNC: 16 U/L (ref 11–66)
AST SERPL-CCNC: 25 U/L (ref 5–40)
BASOPHILS # BLD: 0.7 %
BASOPHILS ABSOLUTE: 0 THOU/MM3 (ref 0–0.1)
BUN BLDV-MCNC: 12 MG/DL (ref 7–22)
C-REACTIVE PROTEIN: 0.16 MG/DL (ref 0–1)
CREAT SERPL-MCNC: 1.2 MG/DL (ref 0.4–1.2)
EOSINOPHIL # BLD: 2.4 %
EOSINOPHILS ABSOLUTE: 0.1 THOU/MM3 (ref 0–0.4)
ERYTHROCYTE [DISTWIDTH] IN BLOOD BY AUTOMATED COUNT: 14 % (ref 11.5–14.5)
ERYTHROCYTE [DISTWIDTH] IN BLOOD BY AUTOMATED COUNT: 50.4 FL (ref 35–45)
GFR SERPL CREATININE-BSD FRML MDRD: 46 ML/MIN/1.73M2
HCT VFR BLD CALC: 39.8 % (ref 37–47)
HEMOGLOBIN: 13.1 GM/DL (ref 12–16)
IMMATURE GRANS (ABS): 0.02 THOU/MM3 (ref 0–0.07)
IMMATURE GRANULOCYTES: 0.7 %
LYMPHOCYTES # BLD: 35.2 %
LYMPHOCYTES ABSOLUTE: 1 THOU/MM3 (ref 1–4.8)
MCH RBC QN AUTO: 32.7 PG (ref 26–33)
MCHC RBC AUTO-ENTMCNC: 32.9 GM/DL (ref 32.2–35.5)
MCV RBC AUTO: 99.3 FL (ref 81–99)
MONOCYTES # BLD: 9.6 %
MONOCYTES ABSOLUTE: 0.3 THOU/MM3 (ref 0.4–1.3)
NUCLEATED RED BLOOD CELLS: 0 /100 WBC
PLATELET # BLD: 146 THOU/MM3 (ref 130–400)
PMV BLD AUTO: 10.8 FL (ref 9.4–12.4)
RBC # BLD: 4.01 MILL/MM3 (ref 4.2–5.4)
SEDIMENTATION RATE, ERYTHROCYTE: 5 MM/HR (ref 0–20)
SEG NEUTROPHILS: 51.4 %
SEGMENTED NEUTROPHILS ABSOLUTE COUNT: 1.5 THOU/MM3 (ref 1.8–7.7)
WBC # BLD: 2.9 THOU/MM3 (ref 4.8–10.8)

## 2018-08-01 PROCEDURE — 84460 ALANINE AMINO (ALT) (SGPT): CPT

## 2018-08-01 PROCEDURE — 84075 ASSAY ALKALINE PHOSPHATASE: CPT

## 2018-08-01 PROCEDURE — 84450 TRANSFERASE (AST) (SGOT): CPT

## 2018-08-01 PROCEDURE — 82565 ASSAY OF CREATININE: CPT

## 2018-08-01 PROCEDURE — 85025 COMPLETE CBC W/AUTO DIFF WBC: CPT

## 2018-08-01 PROCEDURE — 36415 COLL VENOUS BLD VENIPUNCTURE: CPT

## 2018-08-01 PROCEDURE — 84520 ASSAY OF UREA NITROGEN: CPT

## 2018-08-01 PROCEDURE — 86140 C-REACTIVE PROTEIN: CPT

## 2018-08-01 PROCEDURE — 85651 RBC SED RATE NONAUTOMATED: CPT

## 2018-08-15 DIAGNOSIS — F34.0 CYCLOTHYMIC DISORDER: ICD-10-CM

## 2018-08-15 RX ORDER — DIVALPROEX SODIUM 500 MG/1
500 TABLET, EXTENDED RELEASE ORAL NIGHTLY
Qty: 30 TABLET | Refills: 2 | OUTPATIENT
Start: 2018-08-15

## 2018-08-21 ENCOUNTER — OFFICE VISIT (OUTPATIENT)
Dept: FAMILY MEDICINE CLINIC | Age: 58
End: 2018-08-21
Payer: MEDICARE

## 2018-08-21 VITALS
DIASTOLIC BLOOD PRESSURE: 74 MMHG | RESPIRATION RATE: 12 BRPM | HEART RATE: 68 BPM | SYSTOLIC BLOOD PRESSURE: 120 MMHG | WEIGHT: 194.4 LBS | BODY MASS INDEX: 32.85 KG/M2

## 2018-08-21 DIAGNOSIS — N30.01 ACUTE CYSTITIS WITH HEMATURIA: Primary | ICD-10-CM

## 2018-08-21 DIAGNOSIS — R30.0 DYSURIA: ICD-10-CM

## 2018-08-21 LAB
BILIRUBIN, POC: NEGATIVE
BLOOD URINE, POC: NEGATIVE
CLARITY, POC: CLEAR
COLOR, POC: YELLOW
GLUCOSE URINE, POC: NEGATIVE
KETONES, POC: NEGATIVE
LEUKOCYTE EST, POC: NORMAL
NITRITE, POC: NEGATIVE
PH, POC: 5.5
PROTEIN, POC: NEGATIVE
SPECIFIC GRAVITY, POC: <=1.005
UROBILINOGEN, POC: NORMAL

## 2018-08-21 PROCEDURE — G8417 CALC BMI ABV UP PARAM F/U: HCPCS | Performed by: FAMILY MEDICINE

## 2018-08-21 PROCEDURE — 3017F COLORECTAL CA SCREEN DOC REV: CPT | Performed by: FAMILY MEDICINE

## 2018-08-21 PROCEDURE — 1036F TOBACCO NON-USER: CPT | Performed by: FAMILY MEDICINE

## 2018-08-21 PROCEDURE — 81003 URINALYSIS AUTO W/O SCOPE: CPT | Performed by: FAMILY MEDICINE

## 2018-08-21 PROCEDURE — 99213 OFFICE O/P EST LOW 20 MIN: CPT | Performed by: FAMILY MEDICINE

## 2018-08-21 PROCEDURE — G8427 DOCREV CUR MEDS BY ELIG CLIN: HCPCS | Performed by: FAMILY MEDICINE

## 2018-08-21 RX ORDER — NITROFURANTOIN 25; 75 MG/1; MG/1
100 CAPSULE ORAL 2 TIMES DAILY
Qty: 20 CAPSULE | Refills: 0 | Status: SHIPPED | OUTPATIENT
Start: 2018-08-21 | End: 2018-08-31

## 2018-08-21 ASSESSMENT — ENCOUNTER SYMPTOMS
BLOOD IN STOOL: 0
WHEEZING: 0
CONSTIPATION: 0
VOMITING: 0
ABDOMINAL PAIN: 0
NAUSEA: 0
BACK PAIN: 0
EYE PAIN: 0
CHEST TIGHTNESS: 0
RHINORRHEA: 0
COUGH: 0
SHORTNESS OF BREATH: 0
SORE THROAT: 0
DIARRHEA: 0

## 2018-08-21 NOTE — PROGRESS NOTES
Pt provided urine sample in office
rate, regular rhythm and normal heart sounds. Pulmonary/Chest: Breath sounds normal. She has no wheezes. She has no rales. Abdominal: Soft. Bowel sounds are normal. There is tenderness in the suprapubic area. There is no rebound and no guarding. Musculoskeletal: Normal range of motion. She exhibits no edema. Lymphadenopathy:     She has no cervical adenopathy. Neurological: She is alert and oriented to person, place, and time. She has normal reflexes. No cranial nerve deficit. Skin: Skin is warm and dry. No rash noted. Psychiatric: She has a normal mood and affect. Nursing note and vitals reviewed.     Results for orders placed or performed in visit on 08/21/18   POCT Urinalysis No Micro (Auto)   Result Value Ref Range    Color, UA yellow     Clarity, UA clear     Glucose, UA POC negative     Bilirubin, UA negative     Ketones, UA negative     Spec Grav, UA <=1.005     Blood, UA POC negative     pH, UA 5.5     Protein, UA POC negative     Urobilinogen, UA 0.2 E.U./dL     Leukocytes, UA small     Nitrite, UA negative        Assessment:      UTI with hematuria      Plan:      Urine culture  Macrobid 100 mg BID x 10 days        Devora Morgan MD

## 2018-08-23 ENCOUNTER — TELEPHONE (OUTPATIENT)
Dept: FAMILY MEDICINE CLINIC | Age: 58
End: 2018-08-23

## 2018-08-23 LAB
ORGANISM: ABNORMAL
URINE CULTURE, ROUTINE: ABNORMAL

## 2018-08-27 ENCOUNTER — TELEPHONE (OUTPATIENT)
Dept: FAMILY MEDICINE CLINIC | Age: 58
End: 2018-08-27

## 2018-08-27 NOTE — TELEPHONE ENCOUNTER
Pt left voicemail wanting a returned phone call. Danielito Byers Tried calling pt but no answer - phone ringing busy .

## 2018-08-27 NOTE — TELEPHONE ENCOUNTER
T/C Devin Carroll [phone] - states that she has E coli and is doing OK. Her said got sick over weekend with diarrhea and vomiting. She wanted to make sure this isn't something she could have given her.

## 2018-08-28 NOTE — TELEPHONE ENCOUNTER
Spoke with Gab Monroe and gave her the information. She said thank you, and that her medication is helping and she is doing much better.

## 2018-08-30 DIAGNOSIS — G71.00 MD (MUSCULAR DYSTROPHY) (HCC): ICD-10-CM

## 2018-08-30 RX ORDER — HYDROCODONE BITARTRATE AND ACETAMINOPHEN 5; 325 MG/1; MG/1
1 TABLET ORAL EVERY 6 HOURS PRN
Qty: 100 TABLET | Refills: 0 | Status: SHIPPED | OUTPATIENT
Start: 2018-08-30 | End: 2018-10-01 | Stop reason: SDUPTHER

## 2018-08-30 NOTE — TELEPHONE ENCOUNTER
Hettie Mercury needs refill of   Requested Prescriptions     Pending Prescriptions Disp Refills    HYDROcodone-acetaminophen (NORCO) 5-325 MG per tablet 100 tablet 0     Sig: Take 1 tablet by mouth every 6 hours as needed for Pain for up to 30 days. G71.0. Earliest Fill Date: 8/30/18       Last Filled on:  7/31/2018    Last Visit Date:  8/21/2018    Next Visit Date:    Visit date not found      Pt left voicemail requesting [place] to DDD. Rx pending.

## 2018-08-30 NOTE — TELEPHONE ENCOUNTER
ep'ed norco to pharm requested    Controlled Substances Monitoring:     RX Monitoring 8/30/2018   Attestation The Prescription Monitoring Report for this patient was reviewed today. Documentation No signs of potential drug abuse or diversion identified.

## 2018-09-11 ENCOUNTER — OFFICE VISIT (OUTPATIENT)
Dept: PSYCHIATRY | Age: 58
End: 2018-09-11
Payer: MEDICARE

## 2018-09-11 VITALS
WEIGHT: 192 LBS | SYSTOLIC BLOOD PRESSURE: 121 MMHG | BODY MASS INDEX: 32.45 KG/M2 | HEART RATE: 66 BPM | DIASTOLIC BLOOD PRESSURE: 66 MMHG

## 2018-09-11 DIAGNOSIS — F34.0 CYCLOTHYMIC DISORDER: ICD-10-CM

## 2018-09-11 PROCEDURE — 1036F TOBACCO NON-USER: CPT | Performed by: NURSE PRACTITIONER

## 2018-09-11 PROCEDURE — 99213 OFFICE O/P EST LOW 20 MIN: CPT | Performed by: NURSE PRACTITIONER

## 2018-09-11 PROCEDURE — 3017F COLORECTAL CA SCREEN DOC REV: CPT | Performed by: NURSE PRACTITIONER

## 2018-09-11 PROCEDURE — G8427 DOCREV CUR MEDS BY ELIG CLIN: HCPCS | Performed by: NURSE PRACTITIONER

## 2018-09-11 PROCEDURE — G8417 CALC BMI ABV UP PARAM F/U: HCPCS | Performed by: NURSE PRACTITIONER

## 2018-09-11 RX ORDER — DIVALPROEX SODIUM 500 MG/1
500 TABLET, EXTENDED RELEASE ORAL NIGHTLY
Qty: 30 TABLET | Refills: 2 | Status: SHIPPED | OUTPATIENT
Start: 2018-09-11 | End: 2018-12-17 | Stop reason: SDUPTHER
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
## 2018-10-01 DIAGNOSIS — G71.00 MD (MUSCULAR DYSTROPHY) (HCC): ICD-10-CM

## 2018-10-01 RX ORDER — HYDROCODONE BITARTRATE AND ACETAMINOPHEN 5; 325 MG/1; MG/1
1 TABLET ORAL EVERY 6 HOURS PRN
Qty: 100 TABLET | Refills: 0 | Status: SHIPPED | OUTPATIENT
Start: 2018-10-01 | End: 2018-10-31 | Stop reason: SDUPTHER

## 2018-10-01 NOTE — TELEPHONE ENCOUNTER
Patient called to request a refill of her Norco 5/325 mg. She was last seen in the office on 8/21/18. She uses [facility] and will check with them tomorrow.   Please call her back at [phone] only if any problems

## 2018-10-15 ENCOUNTER — OFFICE VISIT (OUTPATIENT)
Dept: FAMILY MEDICINE CLINIC | Age: 58
End: 2018-10-15
Payer: MEDICARE

## 2018-10-15 VITALS
BODY MASS INDEX: 32.52 KG/M2 | SYSTOLIC BLOOD PRESSURE: 130 MMHG | HEART RATE: 64 BPM | DIASTOLIC BLOOD PRESSURE: 82 MMHG | WEIGHT: 192.4 LBS | RESPIRATION RATE: 12 BRPM

## 2018-10-15 DIAGNOSIS — G71.00 MD (MUSCULAR DYSTROPHY) (HCC): ICD-10-CM

## 2018-10-15 DIAGNOSIS — Z23 NEED FOR INFLUENZA VACCINATION: ICD-10-CM

## 2018-10-15 DIAGNOSIS — E66.9 CLASS 1 OBESITY WITHOUT SERIOUS COMORBIDITY WITH BODY MASS INDEX (BMI) OF 32.0 TO 32.9 IN ADULT, UNSPECIFIED OBESITY TYPE: ICD-10-CM

## 2018-10-15 DIAGNOSIS — B07.8 OTHER VIRAL WARTS: ICD-10-CM

## 2018-10-15 DIAGNOSIS — R30.0 DYSURIA: Primary | ICD-10-CM

## 2018-10-15 LAB
BILIRUBIN, POC: NEGATIVE
BLOOD URINE, POC: NEGATIVE
CLARITY, POC: CLEAR
COLOR, POC: YELLOW
GLUCOSE URINE, POC: NEGATIVE
KETONES, POC: NEGATIVE
LEUKOCYTE EST, POC: NEGATIVE
NITRITE, POC: NEGATIVE
PH, POC: 6
PROTEIN, POC: NEGATIVE
SPECIFIC GRAVITY, POC: 1.01
UROBILINOGEN, POC: NORMAL

## 2018-10-15 PROCEDURE — G8427 DOCREV CUR MEDS BY ELIG CLIN: HCPCS | Performed by: FAMILY MEDICINE

## 2018-10-15 PROCEDURE — 3017F COLORECTAL CA SCREEN DOC REV: CPT | Performed by: FAMILY MEDICINE

## 2018-10-15 PROCEDURE — G8417 CALC BMI ABV UP PARAM F/U: HCPCS | Performed by: FAMILY MEDICINE

## 2018-10-15 PROCEDURE — G8482 FLU IMMUNIZE ORDER/ADMIN: HCPCS | Performed by: FAMILY MEDICINE

## 2018-10-15 PROCEDURE — 90688 IIV4 VACCINE SPLT 0.5 ML IM: CPT | Performed by: FAMILY MEDICINE

## 2018-10-15 PROCEDURE — 1036F TOBACCO NON-USER: CPT | Performed by: FAMILY MEDICINE

## 2018-10-15 PROCEDURE — 99214 OFFICE O/P EST MOD 30 MIN: CPT | Performed by: FAMILY MEDICINE

## 2018-10-15 PROCEDURE — G0008 ADMIN INFLUENZA VIRUS VAC: HCPCS | Performed by: FAMILY MEDICINE

## 2018-10-15 PROCEDURE — 81003 URINALYSIS AUTO W/O SCOPE: CPT | Performed by: FAMILY MEDICINE

## 2018-10-15 ASSESSMENT — ENCOUNTER SYMPTOMS
BACK PAIN: 0
RHINORRHEA: 0
DIARRHEA: 0
EYE PAIN: 0
ABDOMINAL PAIN: 0
BLOOD IN STOOL: 0
VOMITING: 0
SHORTNESS OF BREATH: 0
CHEST TIGHTNESS: 0
NAUSEA: 0
WHEEZING: 0
SORE THROAT: 0
CONSTIPATION: 0
COUGH: 0

## 2018-10-15 ASSESSMENT — PATIENT HEALTH QUESTIONNAIRE - PHQ9
SUM OF ALL RESPONSES TO PHQ9 QUESTIONS 1 & 2: 0
1. LITTLE INTEREST OR PLEASURE IN DOING THINGS: 0
2. FEELING DOWN, DEPRESSED OR HOPELESS: 0
SUM OF ALL RESPONSES TO PHQ QUESTIONS 1-9: 0
SUM OF ALL RESPONSES TO PHQ QUESTIONS 1-9: 0

## 2018-10-15 NOTE — PATIENT INSTRUCTIONS
For example:  ¨ You have blood or pus in your urine. ¨ You have chills or body aches. ¨ It hurts worse to urinate. ¨ You have groin or belly pain. ¨ You have pain in your back just below your rib cage (the flank area).    Watch closely for changes in your health, and be sure to contact your doctor if you have any problems. Where can you learn more? Go to https://[employer].testhub. org and sign in to your [employer] account. Enter I517 in the [employer] box to learn more about \"Painful Urination (Dysuria): Care Instructions. \"     If you do not have an account, please click on the \"Sign Up Now\" link. Current as of: May 12, 2017  Content Version: 11.7  © 0826-9971 [employer]. Care instructions adapted under license by [facility] ([facility]). If you have questions about a medical condition or this instruction, always ask your healthcare professional. [address] any warranty or liability for your use of this information. Patient Education        DASH Diet: Care Instructions  Your Care Instructions    The DASH diet is an eating plan that can help lower your blood pressure. DASH stands for Dietary Approaches to Stop Hypertension. Hypertension is high blood pressure. The DASH diet focuses on eating foods that are high in calcium, potassium, and magnesium. These nutrients can lower blood pressure. The foods that are highest in these nutrients are fruits, vegetables, low-fat dairy products, nuts, seeds, and legumes. But taking calcium, potassium, and magnesium supplements instead of eating foods that are high in those nutrients does not have the same effect. The DASH diet also includes whole grains, fish, and poultry. The DASH diet is one of several lifestyle changes your doctor may recommend to lower your high blood pressure. Your doctor may also want you to decrease the amount of sodium in your diet.  Lowering sodium while following the DASH diet can lower blood

## 2018-10-15 NOTE — PROGRESS NOTES
After obtaining consent, and per orders of Dr. Gabriel Srivastava, injection of Influenza Vaccine 0.5mL given IM right deltoid by Elisha Stephens. Patient tolerated well.
She has no wheezes. She has no rales. Abdominal: Soft. Bowel sounds are normal. There is tenderness in the suprapubic area. There is no rebound and no guarding. Musculoskeletal: Normal range of motion. She exhibits no edema. Hands:  Lymphadenopathy:     She has no cervical adenopathy. Neurological: She is alert and oriented to person, place, and time. She has normal reflexes. No cranial nerve deficit. Skin: Skin is warm and dry. No rash noted. Psychiatric: She has a normal mood and affect. Nursing note and vitals reviewed. Results for orders placed or performed in visit on 10/15/18   POCT Urinalysis No Micro (Auto)   Result Value Ref Range    Color, UA yellow     Clarity, UA clear     Glucose, UA POC negative     Bilirubin, UA negative     Ketones, UA negative     Spec Grav, UA 1.010     Blood, UA POC negative     pH, UA 6.0     Protein, UA POC negative     Urobilinogen, UA 0.2 E.U./dL     Leukocytes, UA negative     Nitrite, UA negative        Assessment:      Dysuria  Wart of the hand  Obesity  MD      Plan:      Continue water and cranberry, no antibiotics are indicated. Refer to [facility] for the warts  Encouraged diet, exercise and weight loss. Dash diet  Reviewed health maintenance  Flu shot    Maryam Roland received counseling on the following healthy behaviors: nutrition, exercise and medication adherence    Patient given educational materials on Nutrition and Exercise    I have instructed Maryam Roland to complete a self tracking handout on [employer] and instructed them to bring it with them to her next appointment. Discussed use, benefit, and side effects of prescribed medications. Barriers to medication compliance addressed. All patient questions answered. Pt voiced understanding.            Juancarlos Jack MD

## 2018-10-25 DIAGNOSIS — M06.9 RHEUMATOID ARTHRITIS, INVOLVING UNSPECIFIED SITE, UNSPECIFIED RHEUMATOID FACTOR PRESENCE: Primary | ICD-10-CM

## 2018-10-25 RX ORDER — HYDROXYCHLOROQUINE SULFATE 200 MG/1
200 TABLET, FILM COATED ORAL 2 TIMES DAILY
Qty: 60 TABLET | Refills: 5 | Status: SHIPPED | OUTPATIENT
Start: 2018-10-25

## 2018-10-25 NOTE — TELEPHONE ENCOUNTER
Marilu Medal needs refill of   Requested Prescriptions     Pending Prescriptions Disp Refills    hydroxychloroquine (PLAQUENIL) 200 MG tablet       Sig: Take 1 tablet by mouth 2 times daily       Last Filled on:  Used to get from physician in [place], no longer with that physician    Last Visit Date:  10/15/2018    Next Visit Date:    Visit date not found

## 2018-10-31 DIAGNOSIS — G71.00 MD (MUSCULAR DYSTROPHY) (HCC): ICD-10-CM

## 2018-10-31 RX ORDER — HYDROCODONE BITARTRATE AND ACETAMINOPHEN 5; 325 MG/1; MG/1
1 TABLET ORAL EVERY 6 HOURS PRN
Qty: 100 TABLET | Refills: 0 | Status: SHIPPED | OUTPATIENT
Start: 2018-10-31 | End: 2018-11-29 | Stop reason: SDUPTHER

## 2018-10-31 NOTE — TELEPHONE ENCOUNTER
Patient called to request a refill of her Norco 5/325 mg. She was last seen here in the office on 10/15/18. She uses [facility].   Please call her back at [phone] only if any problems

## 2018-11-05 ENCOUNTER — OFFICE VISIT (OUTPATIENT)
Dept: FAMILY MEDICINE CLINIC | Age: 58
End: 2018-11-05
Payer: MEDICARE

## 2018-11-05 VITALS
RESPIRATION RATE: 14 BRPM | SYSTOLIC BLOOD PRESSURE: 98 MMHG | BODY MASS INDEX: 31.84 KG/M2 | DIASTOLIC BLOOD PRESSURE: 64 MMHG | WEIGHT: 188.4 LBS | HEART RATE: 68 BPM

## 2018-11-05 DIAGNOSIS — I80.01 SUPERFICIAL THROMBOPHLEBITIS OF RIGHT LEG: Primary | ICD-10-CM

## 2018-11-05 PROCEDURE — 99213 OFFICE O/P EST LOW 20 MIN: CPT | Performed by: FAMILY MEDICINE

## 2018-11-05 PROCEDURE — 1036F TOBACCO NON-USER: CPT | Performed by: FAMILY MEDICINE

## 2018-11-05 PROCEDURE — G8417 CALC BMI ABV UP PARAM F/U: HCPCS | Performed by: FAMILY MEDICINE

## 2018-11-05 PROCEDURE — G8427 DOCREV CUR MEDS BY ELIG CLIN: HCPCS | Performed by: FAMILY MEDICINE

## 2018-11-05 PROCEDURE — 3017F COLORECTAL CA SCREEN DOC REV: CPT | Performed by: FAMILY MEDICINE

## 2018-11-05 PROCEDURE — G8482 FLU IMMUNIZE ORDER/ADMIN: HCPCS | Performed by: FAMILY MEDICINE

## 2018-11-05 RX ORDER — KETOROLAC TROMETHAMINE 10 MG/1
10 TABLET, FILM COATED ORAL
Qty: 15 TABLET | Refills: 0 | Status: SHIPPED | OUTPATIENT
Start: 2018-11-05 | End: 2018-12-17 | Stop reason: ALTCHOICE

## 2018-11-05 ASSESSMENT — ENCOUNTER SYMPTOMS
RHINORRHEA: 0
BACK PAIN: 0
SHORTNESS OF BREATH: 0
BLOOD IN STOOL: 0
EYE PAIN: 0
ABDOMINAL PAIN: 0
SORE THROAT: 0
DIARRHEA: 0
COUGH: 0
CHEST TIGHTNESS: 0
CONSTIPATION: 0
VOMITING: 0
NAUSEA: 0
WHEEZING: 0

## 2018-11-14 ENCOUNTER — OFFICE VISIT (OUTPATIENT)
Dept: DERMATOLOGY | Age: 58
End: 2018-11-14
Payer: MEDICARE

## 2018-11-14 VITALS — WEIGHT: 196.6 LBS | BODY MASS INDEX: 33.23 KG/M2

## 2018-11-14 DIAGNOSIS — B97.89 LOCALIZED VIRAL SKIN INFECTION: ICD-10-CM

## 2018-11-14 DIAGNOSIS — B07.9 VIRAL WARTS, UNSPECIFIED TYPE: Primary | ICD-10-CM

## 2018-11-14 DIAGNOSIS — L08.9 LOCALIZED VIRAL SKIN INFECTION: ICD-10-CM

## 2018-11-14 PROCEDURE — 99202 OFFICE O/P NEW SF 15 MIN: CPT | Performed by: DERMATOLOGY

## 2018-11-14 NOTE — PROGRESS NOTES
Large solitary lesion; discussed wide spectrum of treatment options   Discused options not avail in office currenlty due to temporary space ([place] bleomycoin, DNCP/squaric acid)  Discussed options currently avail including shave debulking/Cryo and curettage-Discused high risk of scar and recurrence w this option  Recommend Wartpeel (5FU/Sal Acid) Propietary compound tx; risks benefits reviewd. Nightly application, then wash off in AM.  Would expect 2-3 mo of treatment. Explained medicaiton not covered by insurance ($70-80 from [employer] [address]). Pt elects to review wartpeel option. Literature provided to pt; Will call office if elects this treatment    F/u per pt readiness to start treatment. Clarisa Santoro MD FAAD   Board-Certified Dermatologist          I , Mary Gale, am scribing for and in the presence of Francisca Colvin MD, 11/14/2018 at 8:49 AM.    Dora Barbosa MD, personally performed the services described in this documentation as scribed by Mayra Kaur  in my presence, and it is both accurate and complete.        Clarisa Santoro MD FAAD   Board-Certified Dermatologist

## 2018-11-29 DIAGNOSIS — G71.00 MD (MUSCULAR DYSTROPHY) (HCC): ICD-10-CM

## 2018-11-29 RX ORDER — HYDROCODONE BITARTRATE AND ACETAMINOPHEN 5; 325 MG/1; MG/1
1 TABLET ORAL EVERY 6 HOURS PRN
Qty: 100 TABLET | Refills: 0 | Status: SHIPPED | OUTPATIENT
Start: 2018-11-29 | End: 2019-01-02 | Stop reason: SDUPTHER

## 2018-12-10 ENCOUNTER — HOSPITAL ENCOUNTER (OUTPATIENT)
Age: 58
Discharge: HOME | End: 2018-12-10
Payer: MEDICARE

## 2018-12-10 DIAGNOSIS — E66.9 OBESITY (BMI 30-39.9): ICD-10-CM

## 2018-12-10 DIAGNOSIS — R53.83 OTHER FATIGUE: ICD-10-CM

## 2018-12-10 DIAGNOSIS — Z51.81 THERAPEUTIC DRUG MONITORING: ICD-10-CM

## 2018-12-10 DIAGNOSIS — M06.9 RHEUMATOID ARTHRITIS, INVOLVING UNSPECIFIED SITE, UNSPECIFIED RHEUMATOID FACTOR PRESENCE: ICD-10-CM

## 2018-12-10 DIAGNOSIS — F34.0 CYCLOTHYMIA: ICD-10-CM

## 2018-12-10 LAB
T4 FREE: 1.47 NG/DL (ref 0.93–1.76)
TSH SERPL DL<=0.05 MIU/L-ACNC: 0.77 UIU/ML (ref 0.4–4.2)
VALPROIC ACID LEVEL: 44.3 UG/ML (ref 50–100)

## 2018-12-10 PROCEDURE — 84439 ASSAY OF FREE THYROXINE: CPT

## 2018-12-10 PROCEDURE — 36415 COLL VENOUS BLD VENIPUNCTURE: CPT

## 2018-12-10 PROCEDURE — 80164 ASSAY DIPROPYLACETIC ACD TOT: CPT

## 2018-12-10 PROCEDURE — 84443 ASSAY THYROID STIM HORMONE: CPT

## 2018-12-17 ENCOUNTER — OFFICE VISIT (OUTPATIENT)
Dept: PSYCHIATRY | Age: 58
End: 2018-12-17
Payer: MEDICARE

## 2018-12-17 VITALS
HEART RATE: 65 BPM | DIASTOLIC BLOOD PRESSURE: 70 MMHG | SYSTOLIC BLOOD PRESSURE: 113 MMHG | BODY MASS INDEX: 33.12 KG/M2 | HEIGHT: 64 IN | WEIGHT: 194 LBS

## 2018-12-17 DIAGNOSIS — F34.0 CYCLOTHYMIC DISORDER: Primary | ICD-10-CM

## 2018-12-17 DIAGNOSIS — E66.9 CLASS 1 OBESITY WITHOUT SERIOUS COMORBIDITY WITH BODY MASS INDEX (BMI) OF 33.0 TO 33.9 IN ADULT, UNSPECIFIED OBESITY TYPE: ICD-10-CM

## 2018-12-17 DIAGNOSIS — Z51.81 THERAPEUTIC DRUG MONITORING: ICD-10-CM

## 2018-12-17 PROCEDURE — 3017F COLORECTAL CA SCREEN DOC REV: CPT | Performed by: NURSE PRACTITIONER

## 2018-12-17 PROCEDURE — G8417 CALC BMI ABV UP PARAM F/U: HCPCS | Performed by: NURSE PRACTITIONER

## 2018-12-17 PROCEDURE — G8482 FLU IMMUNIZE ORDER/ADMIN: HCPCS | Performed by: NURSE PRACTITIONER

## 2018-12-17 PROCEDURE — 1036F TOBACCO NON-USER: CPT | Performed by: NURSE PRACTITIONER

## 2018-12-17 PROCEDURE — G8427 DOCREV CUR MEDS BY ELIG CLIN: HCPCS | Performed by: NURSE PRACTITIONER

## 2018-12-17 PROCEDURE — 99213 OFFICE O/P EST LOW 20 MIN: CPT | Performed by: NURSE PRACTITIONER

## 2018-12-17 RX ORDER — DIVALPROEX SODIUM 500 MG/1
500 TABLET, EXTENDED RELEASE ORAL NIGHTLY
Qty: 30 TABLET | Refills: 2 | Status: SHIPPED | OUTPATIENT
Start: 2018-12-17 | End: 2019-03-18 | Stop reason: SDUPTHER
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
## 2018-12-19 DIAGNOSIS — G71.00 MUSCULAR DYSTROPHY (HCC): ICD-10-CM

## 2018-12-19 RX ORDER — GABAPENTIN 300 MG/1
CAPSULE ORAL
Qty: 60 CAPSULE | Refills: 5 | Status: SHIPPED | OUTPATIENT
Start: 2018-12-19 | End: 2018-12-19 | Stop reason: SDUPTHER

## 2018-12-19 RX ORDER — GABAPENTIN 300 MG/1
CAPSULE ORAL
Qty: 60 CAPSULE | Refills: 5 | Status: SHIPPED | OUTPATIENT
Start: 2018-12-19 | End: 2020-01-30 | Stop reason: SDUPTHER

## 2019-01-02 DIAGNOSIS — G71.00 MD (MUSCULAR DYSTROPHY) (HCC): ICD-10-CM

## 2019-01-02 RX ORDER — HYDROCODONE BITARTRATE AND ACETAMINOPHEN 5; 325 MG/1; MG/1
1 TABLET ORAL EVERY 6 HOURS PRN
Qty: 100 TABLET | Refills: 0 | Status: SHIPPED | OUTPATIENT
Start: 2019-01-02 | End: 2019-01-31 | Stop reason: SDUPTHER

## 2019-01-31 DIAGNOSIS — G71.00 MD (MUSCULAR DYSTROPHY) (HCC): ICD-10-CM

## 2019-01-31 RX ORDER — HYDROCODONE BITARTRATE AND ACETAMINOPHEN 5; 325 MG/1; MG/1
1 TABLET ORAL EVERY 6 HOURS PRN
Qty: 100 TABLET | Refills: 0 | Status: SHIPPED | OUTPATIENT
Start: 2019-01-31 | End: 2019-03-05 | Stop reason: SDUPTHER

## 2019-02-27 ENCOUNTER — HOSPITAL ENCOUNTER (OUTPATIENT)
Age: 59
Discharge: HOME | End: 2019-02-27
Payer: MEDICARE

## 2019-02-27 DIAGNOSIS — Z51.81 THERAPEUTIC DRUG MONITORING: ICD-10-CM

## 2019-02-27 DIAGNOSIS — F34.0 CYCLOTHYMIC DISORDER: ICD-10-CM

## 2019-02-27 DIAGNOSIS — E66.9 CLASS 1 OBESITY WITHOUT SERIOUS COMORBIDITY WITH BODY MASS INDEX (BMI) OF 33.0 TO 33.9 IN ADULT, UNSPECIFIED OBESITY TYPE: ICD-10-CM

## 2019-02-27 LAB
ALBUMIN SERPL-MCNC: 4.3 G/DL (ref 3.5–5.1)
ALP BLD-CCNC: 88 U/L (ref 38–126)
ALT SERPL-CCNC: 18 U/L (ref 11–66)
ANION GAP SERPL CALCULATED.3IONS-SCNC: 10 MEQ/L (ref 8–16)
AST SERPL-CCNC: 31 U/L (ref 5–40)
BASOPHILS # BLD: 1 %
BASOPHILS ABSOLUTE: 0 THOU/MM3 (ref 0–0.1)
BILIRUB SERPL-MCNC: 0.3 MG/DL (ref 0.3–1.2)
BUN BLDV-MCNC: 13 MG/DL (ref 7–22)
CALCIUM SERPL-MCNC: 9.6 MG/DL (ref 8.5–10.5)
CHLORIDE BLD-SCNC: 99 MEQ/L (ref 98–111)
CHOLESTEROL, TOTAL: 165 MG/DL (ref 100–199)
CO2: 29 MEQ/L (ref 23–33)
CREAT SERPL-MCNC: 1 MG/DL (ref 0.4–1.2)
EOSINOPHIL # BLD: 2 %
EOSINOPHILS ABSOLUTE: 0.1 THOU/MM3 (ref 0–0.4)
ERYTHROCYTE [DISTWIDTH] IN BLOOD BY AUTOMATED COUNT: 13.6 % (ref 11.5–14.5)
ERYTHROCYTE [DISTWIDTH] IN BLOOD BY AUTOMATED COUNT: 48 FL (ref 35–45)
FOLATE: 8.6 NG/ML (ref 4.8–24.2)
GFR SERPL CREATININE-BSD FRML MDRD: 57 ML/MIN/1.73M2
GLUCOSE BLD-MCNC: 79 MG/DL (ref 70–108)
HCT VFR BLD CALC: 40.9 % (ref 37–47)
HDLC SERPL-MCNC: 74 MG/DL
HEMOGLOBIN: 12.9 GM/DL (ref 12–16)
IMMATURE GRANS (ABS): 0.01 THOU/MM3 (ref 0–0.07)
IMMATURE GRANULOCYTES: 0.2 %
LDL CHOLESTEROL CALCULATED: 79 MG/DL
LYMPHOCYTES # BLD: 31.9 %
LYMPHOCYTES ABSOLUTE: 1.3 THOU/MM3 (ref 1–4.8)
MCH RBC QN AUTO: 31.2 PG (ref 26–33)
MCHC RBC AUTO-ENTMCNC: 31.5 GM/DL (ref 32.2–35.5)
MCV RBC AUTO: 99 FL (ref 81–99)
MONOCYTES # BLD: 10.9 %
MONOCYTES ABSOLUTE: 0.4 THOU/MM3 (ref 0.4–1.3)
NUCLEATED RED BLOOD CELLS: 0 /100 WBC
PLATELET # BLD: 123 THOU/MM3 (ref 130–400)
PMV BLD AUTO: 11.8 FL (ref 9.4–12.4)
POTASSIUM SERPL-SCNC: 4.4 MEQ/L (ref 3.5–5.2)
RBC # BLD: 4.13 MILL/MM3 (ref 4.2–5.4)
SEG NEUTROPHILS: 54 %
SEGMENTED NEUTROPHILS ABSOLUTE COUNT: 2.2 THOU/MM3 (ref 1.8–7.7)
SODIUM BLD-SCNC: 138 MEQ/L (ref 135–145)
TOTAL PROTEIN: 6.8 G/DL (ref 6.1–8)
TRIGL SERPL-MCNC: 59 MG/DL (ref 0–199)
VALPROIC ACID LEVEL: 71 UG/ML (ref 50–100)
VITAMIN B-12: 259 PG/ML (ref 211–911)
WBC # BLD: 4 THOU/MM3 (ref 4.8–10.8)

## 2019-02-27 PROCEDURE — 80164 ASSAY DIPROPYLACETIC ACD TOT: CPT

## 2019-02-27 PROCEDURE — 36415 COLL VENOUS BLD VENIPUNCTURE: CPT

## 2019-02-27 PROCEDURE — 82746 ASSAY OF FOLIC ACID SERUM: CPT

## 2019-02-27 PROCEDURE — 85025 COMPLETE CBC W/AUTO DIFF WBC: CPT

## 2019-02-27 PROCEDURE — 82607 VITAMIN B-12: CPT

## 2019-02-27 PROCEDURE — 80053 COMPREHEN METABOLIC PANEL: CPT

## 2019-02-27 PROCEDURE — 80061 LIPID PANEL: CPT

## 2019-03-05 ENCOUNTER — OFFICE VISIT (OUTPATIENT)
Dept: FAMILY MEDICINE CLINIC | Age: 59
End: 2019-03-05
Payer: MEDICARE

## 2019-03-05 VITALS
WEIGHT: 191.8 LBS | DIASTOLIC BLOOD PRESSURE: 84 MMHG | RESPIRATION RATE: 16 BRPM | BODY MASS INDEX: 32.92 KG/M2 | SYSTOLIC BLOOD PRESSURE: 126 MMHG | HEART RATE: 80 BPM

## 2019-03-05 DIAGNOSIS — Z12.39 SCREENING FOR BREAST CANCER: ICD-10-CM

## 2019-03-05 DIAGNOSIS — N18.30 CHRONIC KIDNEY DISEASE, STAGE III (MODERATE) (HCC): ICD-10-CM

## 2019-03-05 DIAGNOSIS — M06.9 RHEUMATOID ARTHRITIS, INVOLVING UNSPECIFIED SITE, UNSPECIFIED RHEUMATOID FACTOR PRESENCE: ICD-10-CM

## 2019-03-05 DIAGNOSIS — G71.00 MD (MUSCULAR DYSTROPHY) (HCC): Primary | ICD-10-CM

## 2019-03-05 PROCEDURE — G8482 FLU IMMUNIZE ORDER/ADMIN: HCPCS | Performed by: FAMILY MEDICINE

## 2019-03-05 PROCEDURE — 3017F COLORECTAL CA SCREEN DOC REV: CPT | Performed by: FAMILY MEDICINE

## 2019-03-05 PROCEDURE — G8427 DOCREV CUR MEDS BY ELIG CLIN: HCPCS | Performed by: FAMILY MEDICINE

## 2019-03-05 PROCEDURE — 99213 OFFICE O/P EST LOW 20 MIN: CPT | Performed by: FAMILY MEDICINE

## 2019-03-05 PROCEDURE — G8417 CALC BMI ABV UP PARAM F/U: HCPCS | Performed by: FAMILY MEDICINE

## 2019-03-05 PROCEDURE — 1036F TOBACCO NON-USER: CPT | Performed by: FAMILY MEDICINE

## 2019-03-05 RX ORDER — HYDROCODONE BITARTRATE AND ACETAMINOPHEN 5; 325 MG/1; MG/1
1 TABLET ORAL EVERY 6 HOURS PRN
Qty: 100 TABLET | Refills: 0 | Status: SHIPPED | OUTPATIENT
Start: 2019-03-05 | End: 2019-04-02 | Stop reason: SDUPTHER

## 2019-03-05 RX ORDER — HYDROCODONE BITARTRATE AND ACETAMINOPHEN 5; 325 MG/1; MG/1
1 TABLET ORAL EVERY 6 HOURS PRN
COMMUNITY
End: 2019-03-05 | Stop reason: SDUPTHER

## 2019-03-05 ASSESSMENT — ENCOUNTER SYMPTOMS
BLOOD IN STOOL: 0
COUGH: 0
BACK PAIN: 0
RHINORRHEA: 0
EYE PAIN: 0
SORE THROAT: 0
ABDOMINAL PAIN: 0
DIARRHEA: 0
VOMITING: 0
CONSTIPATION: 0
CHEST TIGHTNESS: 0
SHORTNESS OF BREATH: 0
NAUSEA: 0
WHEEZING: 0

## 2019-03-11 ENCOUNTER — HOSPITAL ENCOUNTER (OUTPATIENT)
Dept: MAMMOGRAPHY | Age: 59
Discharge: HOME | End: 2019-03-11
Payer: MEDICARE

## 2019-03-11 DIAGNOSIS — Z12.39 SCREENING FOR BREAST CANCER: ICD-10-CM

## 2019-03-11 PROCEDURE — 77063 BREAST TOMOSYNTHESIS BI: CPT

## 2019-03-18 ENCOUNTER — OFFICE VISIT (OUTPATIENT)
Dept: PSYCHIATRY | Age: 59
End: 2019-03-18
Payer: MEDICARE

## 2019-03-18 VITALS — WEIGHT: 191 LBS | BODY MASS INDEX: 32.61 KG/M2 | HEIGHT: 64 IN

## 2019-03-18 DIAGNOSIS — F34.0 CYCLOTHYMIC DISORDER: Primary | ICD-10-CM

## 2019-03-18 DIAGNOSIS — Z51.81 THERAPEUTIC DRUG MONITORING: ICD-10-CM

## 2019-03-18 PROCEDURE — 99213 OFFICE O/P EST LOW 20 MIN: CPT | Performed by: NURSE PRACTITIONER

## 2019-03-18 PROCEDURE — G8427 DOCREV CUR MEDS BY ELIG CLIN: HCPCS | Performed by: NURSE PRACTITIONER

## 2019-03-18 PROCEDURE — G8482 FLU IMMUNIZE ORDER/ADMIN: HCPCS | Performed by: NURSE PRACTITIONER

## 2019-03-18 PROCEDURE — 3017F COLORECTAL CA SCREEN DOC REV: CPT | Performed by: NURSE PRACTITIONER

## 2019-03-18 PROCEDURE — 1036F TOBACCO NON-USER: CPT | Performed by: NURSE PRACTITIONER

## 2019-03-18 PROCEDURE — G8417 CALC BMI ABV UP PARAM F/U: HCPCS | Performed by: NURSE PRACTITIONER

## 2019-03-18 RX ORDER — DIVALPROEX SODIUM 500 MG/1
500 TABLET, EXTENDED RELEASE ORAL NIGHTLY
Qty: 30 TABLET | Refills: 2 | Status: SHIPPED | OUTPATIENT
Start: 2019-03-18 | End: 2019-06-25 | Stop reason: SDUPTHER
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
## 2019-03-26 ENCOUNTER — TELEPHONE (OUTPATIENT)
Dept: DERMATOLOGY | Age: 59
End: 2019-03-26

## 2019-04-02 DIAGNOSIS — G71.00 MD (MUSCULAR DYSTROPHY) (HCC): ICD-10-CM

## 2019-04-02 DIAGNOSIS — M06.9 RHEUMATOID ARTHRITIS, INVOLVING UNSPECIFIED SITE, UNSPECIFIED RHEUMATOID FACTOR PRESENCE: ICD-10-CM

## 2019-04-02 RX ORDER — HYDROCODONE BITARTRATE AND ACETAMINOPHEN 5; 325 MG/1; MG/1
1 TABLET ORAL EVERY 6 HOURS PRN
Qty: 100 TABLET | Refills: 0 | Status: SHIPPED | OUTPATIENT
Start: 2019-04-03 | End: 2019-05-02 | Stop reason: SDUPTHER

## 2019-04-02 NOTE — TELEPHONE ENCOUNTER
ep'ed norco to pharm requested      Controlled Substances Monitoring:     RX Monitoring 4/2/2019   Attestation The Prescription Monitoring Report for this patient was reviewed today.    Chronic Pain Routine Monitoring No signs of potential drug abuse or diversion identified: otherwise, see note documentation

## 2019-04-02 NOTE — TELEPHONE ENCOUNTER
T/C Jenny Doctor asking for refill of the [place] to [facility]. Will check pharm on Wed.     [address]  [address] [phone]  Last visit and refill 3/5/19

## 2019-04-05 ENCOUNTER — TELEPHONE (OUTPATIENT)
Dept: FAMILY MEDICINE CLINIC | Age: 59
End: 2019-04-05

## 2019-05-02 DIAGNOSIS — M06.9 RHEUMATOID ARTHRITIS, INVOLVING UNSPECIFIED SITE, UNSPECIFIED RHEUMATOID FACTOR PRESENCE: ICD-10-CM

## 2019-05-02 DIAGNOSIS — G71.00 MD (MUSCULAR DYSTROPHY) (HCC): ICD-10-CM

## 2019-05-02 RX ORDER — HYDROCODONE BITARTRATE AND ACETAMINOPHEN 5; 325 MG/1; MG/1
1 TABLET ORAL EVERY 6 HOURS PRN
Qty: 100 TABLET | Refills: 0 | Status: SHIPPED | OUTPATIENT
Start: 2019-05-02 | End: 2019-06-03 | Stop reason: SDUPTHER

## 2019-05-02 NOTE — TELEPHONE ENCOUNTER
ep'ed norco to pharm requested    Controlled Substances Monitoring:     RX Monitoring 5/2/2019   Attestation The Prescription Monitoring Report for this patient was reviewed today.    Chronic Pain Routine Monitoring No signs of potential drug abuse or diversion identified: otherwise, see note documentation

## 2019-05-02 NOTE — TELEPHONE ENCOUNTER
Pat Bias needs refill of   Requested Prescriptions     Pending Prescriptions Disp Refills    HYDROcodone-acetaminophen (NORCO) 5-325 MG per tablet 100 tablet 0     Sig: Take 1 tablet by mouth every 6 hours as needed for Pain for up to 30 days.  G71.0      requests rx to [facility]  Last Filled on:  4/3/19 #100/0    Last Visit Date:  3/5/2019    Next Visit Date:    Visit date not found

## 2019-05-29 ENCOUNTER — OFFICE VISIT (OUTPATIENT)
Dept: DERMATOLOGY | Age: 59
End: 2019-05-29
Payer: MEDICARE

## 2019-05-29 VITALS — BODY MASS INDEX: 32.61 KG/M2 | WEIGHT: 190 LBS

## 2019-05-29 DIAGNOSIS — B07.9 VIRAL WARTS, UNSPECIFIED TYPE: Primary | ICD-10-CM

## 2019-05-29 PROCEDURE — 17110 DESTRUCTION B9 LES UP TO 14: CPT | Performed by: DERMATOLOGY

## 2019-05-29 NOTE — PATIENT INSTRUCTIONS
After 1 week, nightly soak wart in warm water, file it down (with a nail file) (Use new file each time) until the dead white skin is off. Then apply medicine and wart tape.

## 2019-06-03 DIAGNOSIS — G71.00 MD (MUSCULAR DYSTROPHY) (HCC): ICD-10-CM

## 2019-06-03 DIAGNOSIS — M06.9 RHEUMATOID ARTHRITIS, INVOLVING UNSPECIFIED SITE, UNSPECIFIED RHEUMATOID FACTOR PRESENCE: ICD-10-CM

## 2019-06-03 RX ORDER — HYDROCODONE BITARTRATE AND ACETAMINOPHEN 5; 325 MG/1; MG/1
1 TABLET ORAL EVERY 6 HOURS PRN
Qty: 100 TABLET | Refills: 0 | Status: SHIPPED | OUTPATIENT
Start: 2019-06-03 | End: 2019-07-03 | Stop reason: SDUPTHER

## 2019-06-03 NOTE — TELEPHONE ENCOUNTER
Patient called to request a refill of her Norco 5/325 mg. She was last seen in the office on 3/05/19. She uses [facility].   Please call her back at [phone] only if any problems

## 2019-06-03 NOTE — TELEPHONE ENCOUNTER
ep'ed norco to pharm requested      Controlled Substances Monitoring:     RX Monitoring 6/3/2019   Attestation The Prescription Monitoring Report for this patient was reviewed today.    Chronic Pain Routine Monitoring No signs of potential drug abuse or diversion identified: otherwise, see note documentation

## 2019-06-06 ENCOUNTER — OFFICE VISIT (OUTPATIENT)
Dept: FAMILY MEDICINE CLINIC | Age: 59
End: 2019-06-06
Payer: MEDICARE

## 2019-06-06 VITALS
WEIGHT: 193.4 LBS | DIASTOLIC BLOOD PRESSURE: 68 MMHG | HEIGHT: 64 IN | BODY MASS INDEX: 33.02 KG/M2 | RESPIRATION RATE: 12 BRPM | HEART RATE: 60 BPM | SYSTOLIC BLOOD PRESSURE: 110 MMHG

## 2019-06-06 DIAGNOSIS — Z11.4 ENCOUNTER FOR SCREENING FOR HIV: ICD-10-CM

## 2019-06-06 DIAGNOSIS — Z11.4 SCREENING FOR HIV WITHOUT PRESENCE OF RISK FACTORS: ICD-10-CM

## 2019-06-06 DIAGNOSIS — Z11.59 ENCOUNTER FOR HEPATITIS C SCREENING TEST FOR LOW RISK PATIENT: ICD-10-CM

## 2019-06-06 DIAGNOSIS — Z00.00 ROUTINE GENERAL MEDICAL EXAMINATION AT A HEALTH CARE FACILITY: ICD-10-CM

## 2019-06-06 DIAGNOSIS — R30.0 DYSURIA: Primary | ICD-10-CM

## 2019-06-06 DIAGNOSIS — Z71.89 COUNSELING FOR LIVING WILL: ICD-10-CM

## 2019-06-06 LAB — HEPATITIS C ANTIBODY: NEGATIVE

## 2019-06-06 PROCEDURE — G8427 DOCREV CUR MEDS BY ELIG CLIN: HCPCS | Performed by: NURSE PRACTITIONER

## 2019-06-06 PROCEDURE — G0438 PPPS, INITIAL VISIT: HCPCS | Performed by: NURSE PRACTITIONER

## 2019-06-06 PROCEDURE — 99213 OFFICE O/P EST LOW 20 MIN: CPT | Performed by: NURSE PRACTITIONER

## 2019-06-06 PROCEDURE — 81003 URINALYSIS AUTO W/O SCOPE: CPT | Performed by: NURSE PRACTITIONER

## 2019-06-06 PROCEDURE — 1036F TOBACCO NON-USER: CPT | Performed by: NURSE PRACTITIONER

## 2019-06-06 PROCEDURE — 3017F COLORECTAL CA SCREEN DOC REV: CPT | Performed by: NURSE PRACTITIONER

## 2019-06-06 PROCEDURE — G8417 CALC BMI ABV UP PARAM F/U: HCPCS | Performed by: NURSE PRACTITIONER

## 2019-06-06 ASSESSMENT — ENCOUNTER SYMPTOMS
COLOR CHANGE: 0
SINUS PRESSURE: 0
EYE ITCHING: 0
DIARRHEA: 0
ABDOMINAL PAIN: 0
CONSTIPATION: 1
BACK PAIN: 1
SHORTNESS OF BREATH: 0
WHEEZING: 0
BLOOD IN STOOL: 0
EYE DISCHARGE: 0
EYE PAIN: 0
COUGH: 0

## 2019-06-06 ASSESSMENT — PATIENT HEALTH QUESTIONNAIRE - PHQ9
SUM OF ALL RESPONSES TO PHQ QUESTIONS 1-9: 0
SUM OF ALL RESPONSES TO PHQ QUESTIONS 1-9: 0

## 2019-06-06 ASSESSMENT — LIFESTYLE VARIABLES
HOW OFTEN DO YOU HAVE SIX OR MORE DRINKS ON ONE OCCASION: 0
HOW MANY STANDARD DRINKS CONTAINING ALCOHOL DO YOU HAVE ON A TYPICAL DAY: 0
AUDIT-C TOTAL SCORE: 1
HOW OFTEN DO YOU HAVE A DRINK CONTAINING ALCOHOL: 1

## 2019-06-06 ASSESSMENT — ANXIETY QUESTIONNAIRES: GAD7 TOTAL SCORE: 2

## 2019-06-06 NOTE — PROGRESS NOTES
Medicare Annual Wellness Visit  Name: Janette Krishna Date: 2019   MRN: [id number] Sex: Female   Age: 60 y.o. Ethnicity: Non-/Non    : 1960 Race: Guilherme Hood is here for Medicare AWV and Dysuria (frequency x1 week)    Screenings for behavioral, psychosocial and functional/safety risks, and cognitive dysfunction are all negative except as indicated below. These results, as well as other patient data from the [address] form, are documented in Flowsheets linked to this Encounter. Allergies   Allergen Reactions    Oxycontin [Oxycodone Hcl] Other (See Comments)     \"too strong - mental confusion\"    Percocet [Oxycodone-Acetaminophen] Other (See Comments)     \"too strong. I don't like them. \"    Bactrim Rash    Sulfa Antibiotics Rash   OT  Prior to Visit Medications    Medication Sig Taking? Authorizing Provider   HYDROcodone-acetaminophen (NORCO) 5-325 MG per tablet Take 1 tablet by mouth every 6 hours as needed for Pain for up to 30 days. G71.0 Yes Ralph Macias MD   divalproex (DEPAKOTE ER) 500 MG extended release tablet Take 1 tablet by mouth nightly Yes LUBA Diana - CNP   gabapentin (NEURONTIN) 300 MG capsule Take 1 capsule by mouth 2 times daily, G71.00. Yes Ralph Macias MD   hydroxychloroquine (PLAQUENIL) 200 MG tablet Take 1 tablet by mouth 2 times daily Yes Ralph Macias MD   folic acid (FOLVITE) 1 MG tablet Take 1 mg by mouth Yes Historical Provider, MD   Handicap Placard MISC by Does not apply route Request parking placard due to medical conditions. Duration of 5 years. Yes Ralph Macias MD   omeprazole (PRILOSEC) 40 MG capsule Take 1 capsule by mouth daily. Patient taking differently: Take 20 mg by mouth daily. Yes Ralph Macias MD   methotrexate (RHEUMATREX) 2.5 MG chemo tablet Take 2.5 mg by mouth once a week.  6 tabs once weekly Yes Historical Provider, MD AQUINO ER) 500 MG extended release tablet Take 1 tablet by mouth nightly Yes LUBA Sherman - CNP   gabapentin (NEURONTIN) 300 MG capsule Take 1 capsule by mouth 2 times daily, G71.00. Yes Kitty Carreon MD   hydroxychloroquine (PLAQUENIL) 200 MG tablet Take 1 tablet by mouth 2 times daily Yes Kitty Carreon MD   folic acid (FOLVITE) 1 MG tablet Take 1 mg by mouth Yes Historical Provider, MD   Handicap Placard MISC by Does not apply route Request parking placard due to medical conditions. Duration of 5 years. Yes Kitty Carreon MD   omeprazole (PRILOSEC) 40 MG capsule Take 1 capsule by mouth daily. Patient taking differently: Take 20 mg by mouth daily. Yes Kitty Carreon MD   methotrexate (RHEUMATREX) 2.5 MG chemo tablet Take 2.5 mg by mouth once a week. 6 tabs once weekly Yes Historical Provider, MD      Diagnosis Date    Depression     Lymphedema 2008    both legs    MD (muscular dystrophy) 2008    Dr. Antonella Correa at [facility] - no lung involvement per patient    Neuropathy     Obesity (BMI 30-39. 9)     Rheumatoid arthritis(714.0) 2008    Dr. Juana Bernheim [facility] Superficial thrombophlebitis 03/2018    right lower extremity    Venous insufficiency     h/o SVT - 3-4 in the past (has never been on Coumadin)     Past Surgical History:   Procedure Laterality Date    HYSTERECTOMY  2002    JOINT REPLACEMENT Left 2008    knee    JOINT REPLACEMENT Right 09/10/2013    right    TOTAL KNEE ARTHROPLASTY Left 6/08    TOTAL KNEE ARTHROPLASTY Right 09/10/2013    VARICOSE VEIN SURGERY Right 0    laser       Family History   Problem Relation Age of Onset    Cancer Mother         lung    Dementia Father     Other Father         vericose veins    Emphysema Father     Heart Disease Father     Bipolar Disorder Sister     Bipolar Disorder Brother        CareTeam (Including outside providers/suppliers regularly involved in providing care):   Patient Care Team:  Kitty Carreon MD as PCP - General (Family Medicine)  Kitty Carreon MD as PCP - [facility]    Hearing/Vision:  Hearing/Vision  Do you or your family notice any trouble with your hearing?: (!) Yes  Do you have difficulty driving, watching TV, or doing any of your daily activities because of your eyesight?: No  Have you had an eye exam within the past year?: Yes  Hearing/Vision Interventions:  · Hearing concerns:  patient declines any further evaluation/treatment for hearing issues    ADL:  ADLs  In the past 7 days, did you need help from others to perform any of the following everyday activities? Eating, dressing, grooming, bathing, toileting, or walking/balance?: (!) Grooming, Bathing  In the past 7 days, did you need help from others to take care of any of the following? Laundry, housekeeping, banking/finances, shopping, telephone use, food preparation, transportation, or taking medications?: [facility], Housekeeping, [employer], Shopping, Food Preparation, Transportation  ADL Interventions:  · Patient declines any further evaluation/treatment for this issue. Has home nurses 4 days per week. Personalized Preventive Plan   Current Health Maintenance Status  Immunization History   Administered Date(s) Administered    Influenza Virus Vaccine 10/29/2015    Influenza, Maureen Barn, 3 Years and older, IM (Fluzone 3 yrs and older or Afluria 5 yrs and older) 11/14/2016, 10/18/2017, 10/15/2018    Pneumococcal Polysaccharide ([id number]) 10/18/2017        Health Maintenance   Topic Date Due    Hepatitis C screen  1960    HIV screen  02/02/1974    DTaP/Tdap/Td vaccine (1 - Tdap) 02/02/1978    Cervical cancer screen  02/02/1980    Shingles Vaccine (1 of 2) 02/02/2009    Breast cancer screen  03/11/2021    Lipid screen  02/27/2024    Colon cancer screen colonoscopy  10/29/2024    Flu vaccine  Completed    Pneumococcal 0-64 years Vaccine  Aged Out     Recommendations for [employer] Due: see orders and patient instructions/AVS.  .   Recommended screening schedule for the next 5-10 years is provided to the patient in written form: see Patient Instructions/AVS.      Advance Care Planning   Advanced Care Planning: Discussed the patients choices for care and treatment in case of a health event that adversely affects decision-making abilities. Also discussed the patients long-term treatment options. Reviewed with the patient the [address] [place] [address] Declaration forms  Reviewed the process of designating a competent adult as an Agent (or -in-fact) that could take make health care decisions for the patient if incompetent. Patient was asked to complete the declaration forms, either acknowledge the forms by a public notary or an eligible witness and provide a signed copy to the practice office. Time spent (minutes): 15    Obesity Counseling: Assessed behavioral health risks and factors affecting choice of behavior. Suggested weight control approaches, including dietary changes behavioral modification and follow up plan. Provided educational and support documentation. Time spent (minutes): 15        [address]  [facility] [address]  Dept: [phone]  Dept Fax: [phone]: [phone]     Visit Date:  6/6/2019    Provider: LUBA Merritt CNP        Patient:  Nkechi Collins  YOB: 1960    HPI:     Chief Complaint   Patient presents with    Medicare AWV    Dysuria     frequency x1 week       HPI  Back pain and gaseous distention for 1 week. No dysuria, or painful urination. Reports frequency. No fever. Patient wears briefs, was concerned she had a uti. Reports regular bowel movements, soft but not diarrhea. Currently no blood seen in urine. Nkechi Collins is being seen today for urinary symptoms. Body mass index is 33.62 kg/m². reviewed with patient.     Medications    Current Outpatient Medications:     HYDROcodone-acetaminophen (NORCO) 5-325 MG per tablet, Take 1 tablet by mouth every 6 hours as needed for Pain for up to 30 days. G71.0, Disp: 100 tablet, Rfl: 0    divalproex (DEPAKOTE ER) 500 MG extended release tablet, Take 1 tablet by mouth nightly, Disp: 30 tablet, Rfl: 2    gabapentin (NEURONTIN) 300 MG capsule, Take 1 capsule by mouth 2 times daily, G71.00., Disp: 60 capsule, Rfl: 5    hydroxychloroquine (PLAQUENIL) 200 MG tablet, Take 1 tablet by mouth 2 times daily, Disp: 60 tablet, Rfl: 5    folic acid (FOLVITE) 1 MG tablet, Take 1 mg by mouth, Disp: , Rfl:     Handicap Placard MISC, by Does not apply route Request parking placard due to medical conditions. Duration of 5 years. , Disp: 1 each, Rfl: 0    omeprazole (PRILOSEC) 40 MG capsule, Take 1 capsule by mouth daily. (Patient taking differently: Take 20 mg by mouth daily.), Disp: 30 capsule, Rfl: 3    methotrexate (RHEUMATREX) 2.5 MG chemo tablet, Take 2.5 mg by mouth once a week. 6 tabs once weekly, Disp: , Rfl:     Allergies:  is allergic to oxycontin [oxycodone hcl]; percocet [oxycodone-acetaminophen]; bactrim; and sulfa antibiotics. Past Medical History   has a past medical history of Depression, Lymphedema, MD (muscular dystrophy), Neuropathy, Obesity (BMI 30-39.9), Rheumatoid arthritis(714.0), Superficial thrombophlebitis, and Venous insufficiency. Subjective:      Review of Systems   Constitutional: Positive for activity change and fatigue. Negative for chills and fever. HENT: Negative for congestion, ear pain, sinus pressure and sneezing. Eyes: Negative for pain, discharge and itching. Respiratory: Negative for cough, shortness of breath and wheezing. Cardiovascular: Negative for chest pain, palpitations and leg swelling. Gastrointestinal: Positive for constipation (at times). Negative for abdominal pain, blood in stool and diarrhea. Endocrine: Negative for cold intolerance, polydipsia, polyphagia and polyuria.    Genitourinary: Positive for flank pain (right side, at times) and urgency. Negative for difficulty urinating and hematuria. Musculoskeletal: Positive for back pain (lower back pain). Negative for arthralgias and neck pain. Skin: Negative for color change, pallor and rash. Allergic/Immunologic: Negative for environmental allergies, food allergies and immunocompromised state. Neurological: Positive for headaches (was having earlier in the week). Negative for dizziness, light-headedness and numbness. Hematological: Negative for adenopathy. Does not bruise/bleed easily. Psychiatric/Behavioral: Negative for agitation and confusion. The patient is not nervous/anxious. Objective:     /68   Pulse 60   Resp 12   Ht 5' 3.6\" (1.615 m)   Wt 193 lb 6.4 oz (87.7 kg)   BMI 33.62 kg/m²      Physical Exam   Constitutional: She is oriented to person, place, and time. She appears well-developed and well-nourished. HENT:   Head: Normocephalic. Right Ear: External ear normal.   Left Ear: External ear normal.   Eyes: Pupils are equal, round, and reactive to light. Conjunctivae are normal. Right eye exhibits no discharge. Left eye exhibits no discharge. Neck: Normal range of motion. No JVD present. Cardiovascular: Normal rate, regular rhythm, normal heart sounds and intact distal pulses. No murmur heard. Pulmonary/Chest: Effort normal and breath sounds normal. No stridor. She has no wheezes. Abdominal: Soft. Bowel sounds are normal. She exhibits no distension. There is no tenderness. Musculoskeletal: Normal range of motion. She exhibits edema (BLE). She exhibits no deformity. Right shoulder: Normal.        Left shoulder: Normal.   Neurological: She is alert and oriented to person, place, and time. Coordination normal.   Skin: Skin is warm and dry. Capillary refill takes less than 2 seconds. No rash noted. Psychiatric: She has a normal mood and affect.  Her behavior is normal. Judgment and thought content normal.   Nursing note and vitals reviewed. Assessment/Plan:      Linda Carter was seen today for medicare awv and dysuria. Diagnoses and all orders for this visit:    Dysuria  -     POCT Urinalysis No Micro (Auto)    Encounter for screening for HIV  -     Cancel: HIV-1 and HIV-2 Antibodies; Future    Encounter for hepatitis C screening test for low risk patient  -     Hepatitis C Antibody; Future  -     Hepatitis C Antibody    Counseling for living will  -     [address]    Screening for HIV without presence of risk factors  -     HIV Screen; Future  -     HIV Screen    Routine general medical examination at a health care facility          - Patient believes symptoms related to stress or recent increased activity        - If symptoms worsen or do not improve, patient to call office    Return if symptoms worsen or fail to improve, for Medicare Annual Wellness Visit in 1 year. Patient given educational materials - see patient instructions. Discussed use, benefit, and side effects of prescribed medications. All patient questions answered. Pt voiced understanding. Reviewed health maintenance.        Electronically signed by LUBA Martinez CNP on 6/6/2019 at 1:51 PM

## 2019-06-06 NOTE — PATIENT INSTRUCTIONS
Patient Education        Painful Urination (Dysuria): Care Instructions  Your Care Instructions  Burning pain with urination (dysuria) is a common symptom of a urinary tract infection or other urinary problems. The bladder may become inflamed. This can cause pain when the bladder fills and empties. You may also feel pain if the tube that carries urine from the bladder to the outside of the body (urethra) gets irritated or infected. Sexually transmitted infections (STIs) also may cause pain when you urinate. Sometimes the pain can be caused by things other than an infection. The urethra can be irritated by soaps, perfumes, or foreign objects in the urethra. Kidney stones can cause pain when they pass through the urethra. The cause may be hard to find. You may need tests. Treatment for painful urination depends on the cause. Follow-up care is a key part of your treatment and safety. Be sure to make and go to all appointments, and call your doctor if you are having problems. It's also a good idea to know your test results and keep a list of the medicines you take. How can you care for yourself at home? · Drink extra water for the next day or two. This will help make the urine less concentrated. (If you have kidney, heart, or liver disease and have to limit fluids, talk with your doctor before you increase the amount of fluids you drink.)  · Avoid drinks that are carbonated or have caffeine. They can irritate the bladder. · Urinate often. Try to empty your bladder each time. For women:  · Urinate right after you have sex. · After going to the bathroom, wipe from front to back. · Avoid douches, bubble baths, and feminine hygiene sprays. And avoid other feminine hygiene products that have deodorants. When should you call for help? Call your doctor now or seek immediate medical care if:    · You have new symptoms, such as fever, nausea, or vomiting.     · You have new or worse symptoms of a urinary problem. Everyday Guide\" from [employer]. Call 8-588.516.4200 or search [employer] online. · You need 3356-2693 mg of calcium and 1032-8968 IU of vitamin D per day. It is possible to meet your calcium requirement with diet alone, but a vitamin D supplement is usually necessary to meet this goal.  · When exposed to the sun, use a sunscreen that protects against both UVA and UVB radiation with an SPF of 30 or greater. Reapply every 2 to 3 hours or after sweating, drying off with a towel, or swimming. · Always wear a seat belt when traveling in a car. Always wear a helmet when riding a bicycle or motorcycle.

## 2019-06-08 LAB — HIV-2 AB: NEGATIVE

## 2019-06-10 ENCOUNTER — TELEPHONE (OUTPATIENT)
Dept: FAMILY MEDICINE CLINIC | Age: 59
End: 2019-06-10

## 2019-06-10 NOTE — TELEPHONE ENCOUNTER
----- Message from LUBA Cuba CNP sent at 6/7/2019 10:19 AM EDT -----  Hepatitis screening was negative. Still awaiting results of HIV screening which likely will not be back until Monday.

## 2019-06-12 ENCOUNTER — TELEPHONE (OUTPATIENT)
Dept: SPIRITUAL SERVICES | Facility: CLINIC | Age: 59
End: 2019-06-12

## 2019-06-12 NOTE — TELEPHONE ENCOUNTER
Patient was a referral from her CNP at [facility] for the completion of POA paperwork.  followed-up to schedule an appointment for the completion of documents, if desired. No answer. No voicemail.  will follow-up to schedule an appointment.

## 2019-06-17 ENCOUNTER — TELEPHONE (OUTPATIENT)
Dept: SPIRITUAL SERVICES | Facility: CLINIC | Age: 59
End: 2019-06-17

## 2019-06-18 ENCOUNTER — HOSPITAL ENCOUNTER (OUTPATIENT)
Age: 59
Discharge: HOME | End: 2019-06-18
Payer: MEDICARE

## 2019-06-18 ENCOUNTER — TELEPHONE (OUTPATIENT)
Dept: SPIRITUAL SERVICES | Facility: CLINIC | Age: 59
End: 2019-06-18

## 2019-06-18 LAB
ALP BLD-CCNC: 113 U/L (ref 38–126)
ALT SERPL-CCNC: 12 U/L (ref 11–66)
AST SERPL-CCNC: 22 U/L (ref 5–40)
BASOPHILS # BLD: 0.8 %
BASOPHILS ABSOLUTE: 0 THOU/MM3 (ref 0–0.1)
BUN BLDV-MCNC: 22 MG/DL (ref 7–22)
C-REACTIVE PROTEIN: 0.15 MG/DL (ref 0–1)
CREAT SERPL-MCNC: 1 MG/DL (ref 0.4–1.2)
EOSINOPHIL # BLD: 1.9 %
EOSINOPHILS ABSOLUTE: 0.1 THOU/MM3 (ref 0–0.4)
ERYTHROCYTE [DISTWIDTH] IN BLOOD BY AUTOMATED COUNT: 14.1 % (ref 11.5–14.5)
ERYTHROCYTE [DISTWIDTH] IN BLOOD BY AUTOMATED COUNT: 52.6 FL (ref 35–45)
GFR SERPL CREATININE-BSD FRML MDRD: 56 ML/MIN/1.73M2
HCT VFR BLD CALC: 39.4 % (ref 37–47)
HEMOGLOBIN: 12.4 GM/DL (ref 12–16)
IMMATURE GRANS (ABS): 0.02 THOU/MM3 (ref 0–0.07)
IMMATURE GRANULOCYTES: 0.6 %
LYMPHOCYTES # BLD: 25.9 %
LYMPHOCYTES ABSOLUTE: 0.9 THOU/MM3 (ref 1–4.8)
MCH RBC QN AUTO: 32 PG (ref 26–33)
MCHC RBC AUTO-ENTMCNC: 31.5 GM/DL (ref 32.2–35.5)
MCV RBC AUTO: 101.8 FL (ref 81–99)
MONOCYTES # BLD: 14.8 %
MONOCYTES ABSOLUTE: 0.5 THOU/MM3 (ref 0.4–1.3)
NUCLEATED RED BLOOD CELLS: 0 /100 WBC
PLATELET # BLD: 128 THOU/MM3 (ref 130–400)
PMV BLD AUTO: 12.1 FL (ref 9.4–12.4)
RBC # BLD: 3.87 MILL/MM3 (ref 4.2–5.4)
SEDIMENTATION RATE, ERYTHROCYTE: 3 MM/HR (ref 0–20)
SEG NEUTROPHILS: 56 %
SEGMENTED NEUTROPHILS ABSOLUTE COUNT: 2 THOU/MM3 (ref 1.8–7.7)
WBC # BLD: 3.6 THOU/MM3 (ref 4.8–10.8)

## 2019-06-18 PROCEDURE — 36415 COLL VENOUS BLD VENIPUNCTURE: CPT

## 2019-06-18 PROCEDURE — 84520 ASSAY OF UREA NITROGEN: CPT

## 2019-06-18 PROCEDURE — 85651 RBC SED RATE NONAUTOMATED: CPT

## 2019-06-18 PROCEDURE — 86140 C-REACTIVE PROTEIN: CPT

## 2019-06-18 PROCEDURE — 84460 ALANINE AMINO (ALT) (SGPT): CPT

## 2019-06-18 PROCEDURE — 85025 COMPLETE CBC W/AUTO DIFF WBC: CPT

## 2019-06-18 PROCEDURE — 82565 ASSAY OF CREATININE: CPT

## 2019-06-18 PROCEDURE — 84450 TRANSFERASE (AST) (SGOT): CPT

## 2019-06-18 PROCEDURE — 84075 ASSAY ALKALINE PHOSPHATASE: CPT

## 2019-06-18 NOTE — TELEPHONE ENCOUNTER
Patient was a referral from her PCP for the completion of Advance Directives documents.  provided a follow-up telephone call (3X attempt) to schedule an appointment for the completion of documents, if desired. Patient expressed her desire to speak with her friend, Nidhi Liao, who would serve as her Healthcare Power of , prior to scheduling an appointment and ask her to join her in a schedule appointment. She will call back to schedule an appointment after contacting Renee

## 2019-06-20 ENCOUNTER — TELEPHONE (OUTPATIENT)
Dept: SPIRITUAL SERVICES | Facility: CLINIC | Age: 59
End: 2019-06-20

## 2019-06-20 NOTE — TELEPHONE ENCOUNTER
Patient is a referral from her PCP for the completion of Advance Directives documents.  made a follow-up telephone call to schedule an appointment for the completion of her [address] [facility] document on 6/21 at [place] [address] at 10:00.

## 2019-06-25 ENCOUNTER — OFFICE VISIT (OUTPATIENT)
Dept: PSYCHIATRY | Age: 59
End: 2019-06-25
Payer: MEDICARE

## 2019-06-25 VITALS — BODY MASS INDEX: 34.31 KG/M2 | HEIGHT: 64 IN | WEIGHT: 201 LBS

## 2019-06-25 DIAGNOSIS — F34.0 CYCLOTHYMIC DISORDER: Primary | ICD-10-CM

## 2019-06-25 PROCEDURE — 99213 OFFICE O/P EST LOW 20 MIN: CPT | Performed by: NURSE PRACTITIONER

## 2019-06-25 PROCEDURE — G8417 CALC BMI ABV UP PARAM F/U: HCPCS | Performed by: NURSE PRACTITIONER

## 2019-06-25 PROCEDURE — 3017F COLORECTAL CA SCREEN DOC REV: CPT | Performed by: NURSE PRACTITIONER

## 2019-06-25 PROCEDURE — 1036F TOBACCO NON-USER: CPT | Performed by: NURSE PRACTITIONER

## 2019-06-25 PROCEDURE — G8428 CUR MEDS NOT DOCUMENT: HCPCS | Performed by: NURSE PRACTITIONER

## 2019-06-25 RX ORDER — DIVALPROEX SODIUM 500 MG/1
500 TABLET, EXTENDED RELEASE ORAL NIGHTLY
Qty: 30 TABLET | Refills: 2 | Status: SHIPPED | OUTPATIENT
Start: 2019-06-25 | End: 2019-09-23 | Stop reason: SDUPTHER
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
## 2019-07-03 DIAGNOSIS — M06.9 RHEUMATOID ARTHRITIS, INVOLVING UNSPECIFIED SITE, UNSPECIFIED RHEUMATOID FACTOR PRESENCE: ICD-10-CM

## 2019-07-03 DIAGNOSIS — G71.00 MD (MUSCULAR DYSTROPHY) (HCC): ICD-10-CM

## 2019-07-03 RX ORDER — HYDROCODONE BITARTRATE AND ACETAMINOPHEN 5; 325 MG/1; MG/1
1 TABLET ORAL EVERY 6 HOURS PRN
Qty: 100 TABLET | Refills: 0 | Status: SHIPPED | OUTPATIENT
Start: 2019-07-03 | End: 2019-08-05 | Stop reason: SDUPTHER

## 2019-07-15 ENCOUNTER — HOSPITAL ENCOUNTER (OUTPATIENT)
Age: 59
Discharge: HOME | End: 2019-07-15
Payer: MEDICARE

## 2019-07-15 ENCOUNTER — HOSPITAL ENCOUNTER (OUTPATIENT)
Dept: GENERAL RADIOLOGY | Age: 59
Discharge: HOME | End: 2019-07-15
Payer: MEDICARE

## 2019-07-15 DIAGNOSIS — L40.50 PSORIATIC ARTHRITIS (HCC): ICD-10-CM

## 2019-07-15 PROCEDURE — 73610 X-RAY EXAM OF ANKLE: CPT

## 2019-07-22 ENCOUNTER — HOSPITAL ENCOUNTER (OUTPATIENT)
Age: 59
Discharge: HOME | End: 2019-07-22
Payer: MEDICARE

## 2019-08-05 DIAGNOSIS — M06.9 RHEUMATOID ARTHRITIS, INVOLVING UNSPECIFIED SITE, UNSPECIFIED RHEUMATOID FACTOR PRESENCE: ICD-10-CM

## 2019-08-05 DIAGNOSIS — G71.00 MD (MUSCULAR DYSTROPHY) (HCC): ICD-10-CM

## 2019-08-05 RX ORDER — HYDROCODONE BITARTRATE AND ACETAMINOPHEN 5; 325 MG/1; MG/1
1 TABLET ORAL EVERY 6 HOURS PRN
Qty: 100 TABLET | Refills: 0 | Status: SHIPPED | OUTPATIENT
Start: 2019-08-05 | End: 2019-09-03 | Stop reason: SDUPTHER

## 2019-08-05 NOTE — TELEPHONE ENCOUNTER
ep'ed norco to pharm requested      Controlled Substance Monitoring:    Acute and Chronic Pain Monitoring:   RX Monitoring 8/5/2019   Attestation -   Periodic Controlled Substance Monitoring No signs of potential drug abuse or diversion identified.

## 2019-09-03 DIAGNOSIS — M06.9 RHEUMATOID ARTHRITIS, INVOLVING UNSPECIFIED SITE, UNSPECIFIED RHEUMATOID FACTOR PRESENCE: ICD-10-CM

## 2019-09-03 DIAGNOSIS — G71.00 MD (MUSCULAR DYSTROPHY) (HCC): ICD-10-CM

## 2019-09-03 RX ORDER — HYDROCODONE BITARTRATE AND ACETAMINOPHEN 5; 325 MG/1; MG/1
1 TABLET ORAL EVERY 6 HOURS PRN
Qty: 100 TABLET | Refills: 0 | Status: SHIPPED | OUTPATIENT
Start: 2019-09-03 | End: 2019-10-01 | Stop reason: SDUPTHER

## 2019-09-09 ENCOUNTER — HOSPITAL ENCOUNTER (OUTPATIENT)
Age: 59
Discharge: HOME | End: 2019-09-09
Payer: MEDICARE

## 2019-09-09 DIAGNOSIS — Z51.81 THERAPEUTIC DRUG MONITORING: ICD-10-CM

## 2019-09-09 LAB — VALPROIC ACID LEVEL: 59.9 UG/ML (ref 50–100)

## 2019-09-09 PROCEDURE — 80164 ASSAY DIPROPYLACETIC ACD TOT: CPT

## 2019-09-09 PROCEDURE — 36415 COLL VENOUS BLD VENIPUNCTURE: CPT

## 2019-09-23 ENCOUNTER — OFFICE VISIT (OUTPATIENT)
Dept: PSYCHIATRY | Age: 59
End: 2019-09-23
Payer: MEDICARE

## 2019-09-23 DIAGNOSIS — F34.0 CYCLOTHYMIC DISORDER: ICD-10-CM

## 2019-09-23 PROCEDURE — G8427 DOCREV CUR MEDS BY ELIG CLIN: HCPCS | Performed by: NURSE PRACTITIONER

## 2019-09-23 PROCEDURE — G8417 CALC BMI ABV UP PARAM F/U: HCPCS | Performed by: NURSE PRACTITIONER

## 2019-09-23 PROCEDURE — 1036F TOBACCO NON-USER: CPT | Performed by: NURSE PRACTITIONER

## 2019-09-23 PROCEDURE — 99213 OFFICE O/P EST LOW 20 MIN: CPT | Performed by: NURSE PRACTITIONER

## 2019-09-23 PROCEDURE — 3017F COLORECTAL CA SCREEN DOC REV: CPT | Performed by: NURSE PRACTITIONER

## 2019-09-23 RX ORDER — DIVALPROEX SODIUM 500 MG/1
500 TABLET, EXTENDED RELEASE ORAL NIGHTLY
Qty: 30 TABLET | Refills: 2 | Status: SHIPPED | OUTPATIENT
Start: 2019-09-23 | End: 2019-12-16 | Stop reason: SDUPTHER

## 2019-09-23 NOTE — PROGRESS NOTES
[facility] PROFESSIONAL SERVS  [facility]  [address] [facility]  [address]  Dept: [phone]  Dept Fax: [phone]  Loc: [phone]    Visit Date: 9/23/2019    SUBJECTIVE DATA     CHIEF COMPLAINT:    Chief Complaint   Patient presents with   [address] Problem    Medication Refill    Follow-up       History obtained from: patient    HISTORY OF PRESENT ILLNESS:    Jorje Reeder is a 60 y.o. female who presents to the office for follow-up on her reports of irritability, mood swings, and depression. Patient states she is doing very well. States she is sleeping much better. Reports her mood is stable and doing very well. She is tolerating her medication without any side effects. Patient states she has had a very busy and enjoyable summer. She has kept busy with various weddings, family activities, and parties. She reports she attended a parade in her hometown yesterday and had an enjoyable time. She and her sister getting along better. Patient reports she continues to have home health aides coming to her home on a regular basis. Finds her services to be very beneficial.  States she gets along well with the aids. Patient is requesting refills of her medication. Denies suicidal ideations, intent, plan. No homicidal ideations, intent, plan. No audiovisual hallucinations.     HPI    Adverse reactions from psychotropic medications:  None      Current Psychiatric Review of Systems         Joanne or Hypomania:  no     Panic Attacks:  no     Phobias:  no     Obsessions and Compulsions:  no     Body or Vocal Tics:  no     Hallucinations:  no     Delusions:  no    SOCIAL HISTORY:  Patient was born in [place], [place] and raised by her biological parents      Social History     Socioeconomic History    Marital status: Single     Spouse name: Not on file    Number of children: 0    Years of education: Not on file    Highest education level: Not on file   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 SUBSTANCE USE:    1. Marijuana: first use at age 11. Last use was around age 20. States she would use a couple of times per week. 2. Alcohol: first use at age 11. Patient states she still drinks on the weekends, but \"I really try to watch. \" Patient states her rheumatologist has recommended she limit her alcohol intake. States she was a \"social alcoholic. I don't think I was full blown because I always went to work. \" States at the max she was drinking every weekend. She would drink roughly a 12 pack of beer. She denies use of liquor. States at this time she will drink 1 or 2 beers (12 oz size) once on the weekend, but does not drink every weekend. FAMILY HISTORY:   Family History   Problem Relation Age of Onset   [employer] Cancer Mother         lung    Dementia Father     Other Father         vericose veins    Emphysema Father     Heart Disease Father     Bipolar Disorder Sister     Bipolar Disorder Brother        Psychiatric Family History  Bipolar disorder in a brother and sister; dementia in her father    PAST MEDICAL HISTORY:    Past Medical History:   Diagnosis Date    Depression     Lymphedema 2008    both legs    MD (muscular dystrophy) ([facility] [address] 2008    Dr. Shanti Guaman at [address] [place] [address] - no lung involvement per patient    Neuropathy     Obesity (BMI 30-39. 9)     Rheumatoid arthritis(714.0) 2008    Dr. Mortimer Gerlach [facility] Superficial thrombophlebitis 03/2018    right lower extremity    Venous insufficiency     h/o SVT - 3-4 in the past (has never been on Coumadin)       PAST SURGICAL HISTORY:    Past Surgical History:   Procedure Laterality Date    HYSTERECTOMY  2002    JOINT REPLACEMENT Left 2008    knee    JOINT REPLACEMENT Right 09/10/2013    right    TOTAL KNEE ARTHROPLASTY Left 6/08    TOTAL KNEE ARTHROPLASTY Right 09/10/2013    VARICOSE VEIN SURGERY Right 1998    laser       PREVIOUS MEDICATIONS:  Previous Medications    FOLIC ACID (FOLVITE) 1 MG TABLET    Take 1 mg by mouth GABAPENTIN (NEURONTIN) 300 MG CAPSULE    Take 1 capsule by mouth 2 times daily, G71.00. HANDICAP PLACARD MISC    by Does not apply route Request parking placard due to medical conditions. Duration of 5 years. HYDROCODONE-ACETAMINOPHEN (NORCO) 5-325 MG PER TABLET    Take 1 tablet by mouth every 6 hours as needed for Pain for up to 30 days. G71.0    HYDROXYCHLOROQUINE (PLAQUENIL) 200 MG TABLET    Take 1 tablet by mouth 2 times daily    METHOTREXATE (RHEUMATREX) 2.5 MG CHEMO TABLET    Take 2.5 mg by mouth once a week. 6 tabs once weekly    OMEPRAZOLE (PRILOSEC) 40 MG CAPSULE    Take 1 capsule by mouth daily. ALLERGIES:    Oxycontin [oxycodone hcl]; Percocet [oxycodone-acetaminophen]; Bactrim; and Sulfa antibiotics    REVIEW OF SYSTEMS:    ROS    The patient sees Cheryle Buck MD as her primary care provider. SPECIALISTS: rheumatologist and Dr. Katheryn Vinson for GI    OBJECTIVE DATA     There were no vitals taken for this visit. Results for orders placed or performed during the hospital encounter of 09/09/19   Valproic acid level, total   Result Value Ref Range    Valproic Acid Lvl 59.9 50.0 - 100.0 ug/mL       Physical Exam    Mental Status Evaluation:   Orientation: Alert, oriented, thought content appropriate   Mood:.  Euthymic      Affect:  mood-congruent      Appearance:  age appropriate and casually dressed   Activity:  Within Normal Limits   Gait/Posture: Normal posture; patient walks with wheeled walker   Speech:  normal pitch, normal volume and clear, age appropriate, linear, easy to understand   Thought Process:  within normal limits   Thought Content:  within normal limits   Cognition:  grossly intact   Memory: intact   Insight:  good   Judgment: good   Suicidal Ideations: denies suicidal ideation   Homicidal Ideations: Negative for homicidal ideation      Medication Side Effects: absent       Attention Span attention span and concentration were age appropriate     Screenings Completed in This

## 2019-10-01 ENCOUNTER — OFFICE VISIT (OUTPATIENT)
Dept: FAMILY MEDICINE CLINIC | Age: 59
End: 2019-10-01
Payer: MEDICARE

## 2019-10-01 VITALS
BODY MASS INDEX: 33.64 KG/M2 | HEART RATE: 76 BPM | RESPIRATION RATE: 14 BRPM | WEIGHT: 196 LBS | DIASTOLIC BLOOD PRESSURE: 68 MMHG | SYSTOLIC BLOOD PRESSURE: 120 MMHG

## 2019-10-01 DIAGNOSIS — G71.00 MD (MUSCULAR DYSTROPHY) (HCC): ICD-10-CM

## 2019-10-01 DIAGNOSIS — M06.9 RHEUMATOID ARTHRITIS, INVOLVING UNSPECIFIED SITE, UNSPECIFIED RHEUMATOID FACTOR PRESENCE: ICD-10-CM

## 2019-10-01 DIAGNOSIS — M54.6 ACUTE RIGHT-SIDED THORACIC BACK PAIN: Primary | ICD-10-CM

## 2019-10-01 DIAGNOSIS — Z23 NEED FOR INFLUENZA VACCINATION: ICD-10-CM

## 2019-10-01 PROBLEM — F32.A DEPRESSION: Status: ACTIVE | Noted: 2019-10-01

## 2019-10-01 PROBLEM — R60.0 EDEMA OF LOWER EXTREMITY: Status: ACTIVE | Noted: 2019-10-01

## 2019-10-01 PROBLEM — G71.09 CONGENITAL MUSCULAR DYSTROPHY (HCC): Status: ACTIVE | Noted: 2019-10-01

## 2019-10-01 PROBLEM — M21.41 ACQUIRED PES PLANUS OF BOTH FEET: Status: ACTIVE | Noted: 2019-10-01

## 2019-10-01 PROBLEM — M21.42 ACQUIRED PES PLANUS OF BOTH FEET: Status: ACTIVE | Noted: 2019-10-01

## 2019-10-01 PROBLEM — K21.9 GERD (GASTROESOPHAGEAL REFLUX DISEASE): Status: ACTIVE | Noted: 2019-10-01

## 2019-10-01 PROCEDURE — G8427 DOCREV CUR MEDS BY ELIG CLIN: HCPCS | Performed by: FAMILY MEDICINE

## 2019-10-01 PROCEDURE — G8417 CALC BMI ABV UP PARAM F/U: HCPCS | Performed by: FAMILY MEDICINE

## 2019-10-01 PROCEDURE — G8482 FLU IMMUNIZE ORDER/ADMIN: HCPCS | Performed by: FAMILY MEDICINE

## 2019-10-01 PROCEDURE — 99213 OFFICE O/P EST LOW 20 MIN: CPT | Performed by: FAMILY MEDICINE

## 2019-10-01 PROCEDURE — G0008 ADMIN INFLUENZA VIRUS VAC: HCPCS | Performed by: FAMILY MEDICINE

## 2019-10-01 PROCEDURE — 3017F COLORECTAL CA SCREEN DOC REV: CPT | Performed by: FAMILY MEDICINE

## 2019-10-01 PROCEDURE — 90686 IIV4 VACC NO PRSV 0.5 ML IM: CPT | Performed by: FAMILY MEDICINE

## 2019-10-01 PROCEDURE — 1036F TOBACCO NON-USER: CPT | Performed by: FAMILY MEDICINE

## 2019-10-01 RX ORDER — PREDNISONE 20 MG/1
TABLET ORAL
Qty: 15 TABLET | Refills: 0 | Status: SHIPPED | OUTPATIENT
Start: 2019-10-01 | End: 2019-12-16

## 2019-10-01 RX ORDER — HYDROCODONE BITARTRATE AND ACETAMINOPHEN 5; 325 MG/1; MG/1
1 TABLET ORAL EVERY 6 HOURS PRN
Qty: 100 TABLET | Refills: 0 | Status: SHIPPED | OUTPATIENT
Start: 2019-10-01 | End: 2019-11-01 | Stop reason: SDUPTHER

## 2019-10-01 ASSESSMENT — ENCOUNTER SYMPTOMS
ABDOMINAL PAIN: 0
COUGH: 0
SHORTNESS OF BREATH: 0
CHEST TIGHTNESS: 0
SORE THROAT: 0
EYE PAIN: 0
WHEEZING: 0
BLOOD IN STOOL: 0
CONSTIPATION: 0
BACK PAIN: 1
DIARRHEA: 0
VOMITING: 0
RHINORRHEA: 0
NAUSEA: 0

## 2019-10-12 ENCOUNTER — TELEPHONE (OUTPATIENT)
Dept: FAMILY MEDICINE CLINIC | Age: 59
End: 2019-10-12

## 2019-11-01 DIAGNOSIS — G71.00 MD (MUSCULAR DYSTROPHY) (HCC): ICD-10-CM

## 2019-11-01 DIAGNOSIS — M06.9 RHEUMATOID ARTHRITIS, INVOLVING UNSPECIFIED SITE, UNSPECIFIED RHEUMATOID FACTOR PRESENCE: ICD-10-CM

## 2019-11-01 RX ORDER — HYDROCODONE BITARTRATE AND ACETAMINOPHEN 5; 325 MG/1; MG/1
1 TABLET ORAL EVERY 6 HOURS PRN
Qty: 100 TABLET | Refills: 0 | Status: SHIPPED | OUTPATIENT
Start: 2019-11-01 | End: 2019-12-02 | Stop reason: SDUPTHER

## 2019-12-02 DIAGNOSIS — G71.00 MD (MUSCULAR DYSTROPHY) (HCC): ICD-10-CM

## 2019-12-02 DIAGNOSIS — M06.9 RHEUMATOID ARTHRITIS, INVOLVING UNSPECIFIED SITE, UNSPECIFIED RHEUMATOID FACTOR PRESENCE: ICD-10-CM

## 2019-12-02 RX ORDER — HYDROCODONE BITARTRATE AND ACETAMINOPHEN 5; 325 MG/1; MG/1
1 TABLET ORAL EVERY 6 HOURS PRN
Qty: 100 TABLET | Refills: 0 | Status: SHIPPED | OUTPATIENT
Start: 2019-12-02 | End: 2020-01-02 | Stop reason: SDUPTHER

## 2019-12-16 ENCOUNTER — OFFICE VISIT (OUTPATIENT)
Dept: PSYCHIATRY | Age: 59
End: 2019-12-16
Payer: MEDICARE

## 2019-12-16 DIAGNOSIS — Z51.81 THERAPEUTIC DRUG MONITORING: ICD-10-CM

## 2019-12-16 DIAGNOSIS — F34.0 CYCLOTHYMIC DISORDER: Primary | ICD-10-CM

## 2019-12-16 PROCEDURE — 99213 OFFICE O/P EST LOW 20 MIN: CPT | Performed by: NURSE PRACTITIONER

## 2019-12-16 PROCEDURE — 3017F COLORECTAL CA SCREEN DOC REV: CPT | Performed by: NURSE PRACTITIONER

## 2019-12-16 PROCEDURE — G8482 FLU IMMUNIZE ORDER/ADMIN: HCPCS | Performed by: NURSE PRACTITIONER

## 2019-12-16 PROCEDURE — 1036F TOBACCO NON-USER: CPT | Performed by: NURSE PRACTITIONER

## 2019-12-16 PROCEDURE — G8417 CALC BMI ABV UP PARAM F/U: HCPCS | Performed by: NURSE PRACTITIONER

## 2019-12-16 PROCEDURE — G8427 DOCREV CUR MEDS BY ELIG CLIN: HCPCS | Performed by: NURSE PRACTITIONER

## 2019-12-16 RX ORDER — DIVALPROEX SODIUM 500 MG/1
500 TABLET, EXTENDED RELEASE ORAL NIGHTLY
Qty: 30 TABLET | Refills: 2 | Status: SHIPPED | OUTPATIENT
Start: 2019-12-16 | End: 2020-04-15 | Stop reason: SDUPTHER

## 2020-01-02 RX ORDER — HYDROCODONE BITARTRATE AND ACETAMINOPHEN 5; 325 MG/1; MG/1
1 TABLET ORAL EVERY 6 HOURS PRN
Qty: 100 TABLET | Refills: 0 | Status: SHIPPED | OUTPATIENT
Start: 2020-01-02 | End: 2020-01-31 | Stop reason: SDUPTHER

## 2020-01-02 NOTE — TELEPHONE ENCOUNTER
Medication ep'ed to requested pharmacy. PDMP Monitoring:    Last PDMP Dawkins Coreen as Reviewed [facility]):  Review User Review Instant Review Result   FRANKSANDY STEVEN 1/2/2020  2:30 PM Reviewed PDMP [1]     [unfilled]  Urine Drug Screenings (1 yr)     No resulted procedures found. Medication Contract and Consent for Opioid Use Documents Filed      No documents found              Controlled Substance Monitoring:    Acute and Chronic Pain Monitoring:   RX Monitoring 1/2/2020   Attestation -   Periodic Controlled Substance Monitoring No signs of potential drug abuse or diversion identified.;Obtaining appropriate analgesic effect of treatment.

## 2020-01-20 ENCOUNTER — TELEPHONE (OUTPATIENT)
Dept: FAMILY MEDICINE CLINIC | Age: 60
End: 2020-01-20

## 2020-01-20 RX ORDER — AMOXICILLIN 500 MG/1
500 CAPSULE ORAL 3 TIMES DAILY
Qty: 30 CAPSULE | Refills: 0 | Status: SHIPPED | OUTPATIENT
Start: 2020-01-20 | End: 2020-01-30

## 2020-01-30 RX ORDER — GABAPENTIN 300 MG/1
CAPSULE ORAL
Qty: 60 CAPSULE | Refills: 0 | OUTPATIENT
Start: 2020-01-30

## 2020-01-30 RX ORDER — GABAPENTIN 300 MG/1
CAPSULE ORAL
Qty: 60 CAPSULE | Refills: 5 | Status: SHIPPED | OUTPATIENT
Start: 2020-01-30 | End: 2020-09-02 | Stop reason: SDUPTHER

## 2020-01-30 NOTE — TELEPHONE ENCOUNTER
ep'ed neurontin to pharm requested    Controlled Substance Monitoring:    Acute and Chronic Pain Monitoring:   RX Monitoring 1/30/2020   Attestation -   Periodic Controlled Substance Monitoring No signs of potential drug abuse or diversion identified.

## 2020-01-30 NOTE — TELEPHONE ENCOUNTER
Ed Sake needs refill of   Requested Prescriptions     Pending Prescriptions Disp Refills    gabapentin (NEURONTIN) 300 MG capsule [Pharmacy Med Name: GABAPENTIN 300 MG CAPSULE] 60 capsule 0     Sig: Take 1 capsule by mouth 2 times daily       Last Filled on:  12/19/18 #60/5    Last Visit Date:  10/1/2019    Next Visit Date:    Visit date not found

## 2020-01-31 RX ORDER — HYDROCODONE BITARTRATE AND ACETAMINOPHEN 5; 325 MG/1; MG/1
1 TABLET ORAL EVERY 6 HOURS PRN
Qty: 100 TABLET | Refills: 0 | Status: SHIPPED | OUTPATIENT
Start: 2020-02-01 | End: 2020-03-03 | Stop reason: SDUPTHER

## 2020-01-31 NOTE — TELEPHONE ENCOUNTER
ep'ed norco to pharm requested    Controlled Substance Monitoring:    Acute and Chronic Pain Monitoring:   RX Monitoring 1/31/2020   Attestation -   Periodic Controlled Substance Monitoring No signs of potential drug abuse or diversion identified.

## 2020-03-02 ENCOUNTER — HOSPITAL ENCOUNTER (OUTPATIENT)
Age: 60
Discharge: HOME | End: 2020-03-02
Payer: MEDICARE

## 2020-03-02 LAB
ALP BLD-CCNC: 80 U/L (ref 38–126)
ALT SERPL-CCNC: 15 U/L (ref 11–66)
AST SERPL-CCNC: 28 U/L (ref 5–40)
BASOPHILS # BLD: 0.8 %
BASOPHILS ABSOLUTE: 0 THOU/MM3 (ref 0–0.1)
BUN BLDV-MCNC: 11 MG/DL (ref 7–22)
C-REACTIVE PROTEIN: 0.07 MG/DL (ref 0–1)
CREAT SERPL-MCNC: 1.1 MG/DL (ref 0.4–1.2)
EOSINOPHIL # BLD: 1.5 %
EOSINOPHILS ABSOLUTE: 0.1 THOU/MM3 (ref 0–0.4)
ERYTHROCYTE [DISTWIDTH] IN BLOOD BY AUTOMATED COUNT: 14.2 % (ref 11.5–14.5)
ERYTHROCYTE [DISTWIDTH] IN BLOOD BY AUTOMATED COUNT: 51.8 FL (ref 35–45)
GFR SERPL CREATININE-BSD FRML MDRD: 50 ML/MIN/1.73M2
HCT VFR BLD CALC: 42.1 % (ref 37–47)
HEMOGLOBIN: 13.3 GM/DL (ref 12–16)
IMMATURE GRANS (ABS): 0.02 THOU/MM3 (ref 0–0.07)
IMMATURE GRANULOCYTES: 0.5 %
LYMPHOCYTES # BLD: 32.6 %
LYMPHOCYTES ABSOLUTE: 1.3 THOU/MM3 (ref 1–4.8)
MCH RBC QN AUTO: 31.7 PG (ref 26–33)
MCHC RBC AUTO-ENTMCNC: 31.6 GM/DL (ref 32.2–35.5)
MCV RBC AUTO: 100.2 FL (ref 81–99)
MONOCYTES # BLD: 8.8 %
MONOCYTES ABSOLUTE: 0.4 THOU/MM3 (ref 0.4–1.3)
NUCLEATED RED BLOOD CELLS: 0 /100 WBC
PLATELET # BLD: 166 THOU/MM3 (ref 130–400)
PMV BLD AUTO: 11.7 FL (ref 9.4–12.4)
RBC # BLD: 4.2 MILL/MM3 (ref 4.2–5.4)
SEDIMENTATION RATE, ERYTHROCYTE: 1 MM/HR (ref 0–20)
SEG NEUTROPHILS: 55.8 %
SEGMENTED NEUTROPHILS ABSOLUTE COUNT: 2.2 THOU/MM3 (ref 1.8–7.7)
WBC # BLD: 4 THOU/MM3 (ref 4.8–10.8)

## 2020-03-02 PROCEDURE — 84460 ALANINE AMINO (ALT) (SGPT): CPT

## 2020-03-02 PROCEDURE — 82565 ASSAY OF CREATININE: CPT

## 2020-03-02 PROCEDURE — 36415 COLL VENOUS BLD VENIPUNCTURE: CPT

## 2020-03-02 PROCEDURE — 85025 COMPLETE CBC W/AUTO DIFF WBC: CPT

## 2020-03-02 PROCEDURE — 86140 C-REACTIVE PROTEIN: CPT

## 2020-03-02 PROCEDURE — 84520 ASSAY OF UREA NITROGEN: CPT

## 2020-03-02 PROCEDURE — 84075 ASSAY ALKALINE PHOSPHATASE: CPT

## 2020-03-02 PROCEDURE — 85651 RBC SED RATE NONAUTOMATED: CPT

## 2020-03-02 PROCEDURE — 84450 TRANSFERASE (AST) (SGOT): CPT

## 2020-03-03 ENCOUNTER — OFFICE VISIT (OUTPATIENT)
Dept: FAMILY MEDICINE CLINIC | Age: 60
End: 2020-03-03
Payer: MEDICARE

## 2020-03-03 VITALS
DIASTOLIC BLOOD PRESSURE: 82 MMHG | BODY MASS INDEX: 33.13 KG/M2 | RESPIRATION RATE: 14 BRPM | SYSTOLIC BLOOD PRESSURE: 132 MMHG | WEIGHT: 193 LBS | HEART RATE: 74 BPM

## 2020-03-03 PROCEDURE — 1036F TOBACCO NON-USER: CPT | Performed by: FAMILY MEDICINE

## 2020-03-03 PROCEDURE — G8417 CALC BMI ABV UP PARAM F/U: HCPCS | Performed by: FAMILY MEDICINE

## 2020-03-03 PROCEDURE — G8427 DOCREV CUR MEDS BY ELIG CLIN: HCPCS | Performed by: FAMILY MEDICINE

## 2020-03-03 PROCEDURE — G8482 FLU IMMUNIZE ORDER/ADMIN: HCPCS | Performed by: FAMILY MEDICINE

## 2020-03-03 PROCEDURE — 99213 OFFICE O/P EST LOW 20 MIN: CPT | Performed by: FAMILY MEDICINE

## 2020-03-03 PROCEDURE — 3017F COLORECTAL CA SCREEN DOC REV: CPT | Performed by: FAMILY MEDICINE

## 2020-03-03 RX ORDER — HYDROCODONE BITARTRATE AND ACETAMINOPHEN 5; 325 MG/1; MG/1
1 TABLET ORAL EVERY 6 HOURS PRN
Qty: 100 TABLET | Refills: 0 | Status: SHIPPED | OUTPATIENT
Start: 2020-03-03 | End: 2020-03-31 | Stop reason: SDUPTHER

## 2020-03-03 ASSESSMENT — ENCOUNTER SYMPTOMS
CHEST TIGHTNESS: 0
VOMITING: 0
SORE THROAT: 0
COUGH: 0
WHEEZING: 0
DIARRHEA: 0
CONSTIPATION: 0
NAUSEA: 0
SHORTNESS OF BREATH: 0
RHINORRHEA: 0
EYE PAIN: 0
BACK PAIN: 0
ABDOMINAL PAIN: 0
BLOOD IN STOOL: 0

## 2020-03-03 NOTE — TELEPHONE ENCOUNTER
ep'ed norco to pharm requested      Controlled Substance Monitoring:    Acute and Chronic Pain Monitoring:   RX Monitoring 3/3/2020   Attestation -   Periodic Controlled Substance Monitoring No signs of potential drug abuse or diversion identified.

## 2020-03-03 NOTE — PROGRESS NOTES
Subjective:      Patient ID: Madelin Soto is a 63 y.o. female. HPI  Pt here for a check up. Reviewed BMI of 33. Encouraged diet, exercise and weight loss. Noticed today the left upper leg is bruised and tender. Used ice. Single, non smoker, pmh reviewed. Review of Systems   Constitutional: Negative for chills, fatigue, fever and unexpected weight change. HENT: Negative for congestion, ear pain, rhinorrhea and sore throat. Eyes: Negative for pain and visual disturbance. Respiratory: Negative for cough, chest tightness, shortness of breath and wheezing. Cardiovascular: Negative for chest pain and palpitations. Gastrointestinal: Negative for abdominal pain, blood in stool, constipation, diarrhea, nausea and vomiting. Genitourinary: Negative for difficulty urinating, frequency, hematuria and urgency. Musculoskeletal: Negative for back pain, joint swelling, myalgias and neck pain. Left leg pain   Skin: Negative for rash. Neurological: Negative for dizziness and headaches. Hematological: Negative for adenopathy. Does not bruise/bleed easily. Psychiatric/Behavioral: Negative for behavioral problems and sleep disturbance. The patient is not nervous/anxious. Objective:   Physical Exam  Vitals signs and nursing note reviewed. Constitutional:       Appearance: She is well-developed. HENT:      Head: Normocephalic and atraumatic. Right Ear: External ear normal.      Left Ear: External ear normal.      Nose: Nose normal.      Mouth/Throat:      Mouth: Mucous membranes are moist.   Eyes:      Pupils: Pupils are equal, round, and reactive to light. Neck:      Musculoskeletal: Neck supple. Thyroid: No thyromegaly. Cardiovascular:      Rate and Rhythm: Normal rate and regular rhythm. Heart sounds: Normal heart sounds. Pulmonary:      Breath sounds: Normal breath sounds. No wheezing or rales.    Abdominal:      General: Bowel sounds are normal.      Palpations:

## 2020-03-03 NOTE — PATIENT INSTRUCTIONS
Patient Education        Superficial Thrombophlebitis: Care Instructions  Your Care Instructions  Superficial thrombophlebitis is inflammation in a vein where a blood clot has formed close to the surface of the skin. You may be able to feel the clot as a firm lump under the skin. The skin over the clot can become red, tender, and warm to the touch. Blood clots in veins close to the skin's surface usually are not serious and often can be treated at home. Sometimes superficial thrombophlebitis spreads to a deeper vein (deep vein thrombosis, or DVT). These deeper clots can be serious, even life-threatening. It is very important that you follow your doctor's instructions, keep all follow-up appointments, and watch for new or worsening symptoms of a clot. Follow-up care is a key part of your treatment and safety. Be sure to make and go to all appointments, and call your doctor if you are having problems. It's also a good idea to know your test results and keep a list of the medicines you take. How can you care for yourself at home? · Take your medicines exactly as prescribed. Call your doctor if you think you are having a problem with your medicine. You will get more details on the specific medicines your doctor prescribes. · Prop up the sore leg or arm on a pillow anytime you sit or lie down. Try to keep it above the level of your heart. To prevent thrombophlebitis  · Exercise. Keep blood moving in your legs to keep new clots from forming. · Get up out of bed as soon as possible after an illness or surgery. · Do not smoke. If you need help quitting, talk to your doctor about stop-smoking programs and medicines. These can increase your chances of quitting for good. · Ask your doctor about compression stockings. These may help prevent blood clots from forming in your legs. But there are different types of stockings, and they need to fit right. So your doctor will recommend what you need.   When should you call for help? Call 911 anytime you think you may need emergency care. For example, call if:    · You have sudden chest pain and shortness of breath, or you cough up blood.     · You vomit blood or what looks like coffee grounds.     · You pass maroon or very bloody stools.     · You passed out (lost consciousness).    Call your doctor now or seek immediate medical care if:    · You have signs of a blood clot, such as:  ? Pain in your calf, back of the knee, thigh, or groin. ? Redness and swelling in your leg or groin.     · You notice a new hard, red, or tender area in your leg.     · Your stools are black and tarlike or have streaks of blood.    Watch closely for changes in your health, and be sure to contact your doctor if:    · You do not get better as expected. Where can you learn more? Go to https://Zelnaspecatieeb.[employer]. org and sign in to your hybris account. Enter [phone] in the [employer] box to learn more about \"Superficial Thrombophlebitis: Care Instructions. \"     If you do not have an account, please click on the \"Sign Up Now\" link. Current as of: July 14, 2019  Content Version: 12.3  © 5063-6219 Healthwise, Incorporated. Care instructions adapted under license by [facility] ([facility]). If you have questions about a medical condition or this instruction, always ask your healthcare professional. [place] [address] any warranty or liability for your use of this information.

## 2020-03-31 RX ORDER — HYDROCODONE BITARTRATE AND ACETAMINOPHEN 5; 325 MG/1; MG/1
1 TABLET ORAL EVERY 6 HOURS PRN
Qty: 100 TABLET | Refills: 0 | Status: SHIPPED | OUTPATIENT
Start: 2020-04-01 | End: 2020-04-30 | Stop reason: SDUPTHER

## 2020-03-31 NOTE — TELEPHONE ENCOUNTER
Ephriam Erp called requesting a refill on the following medications:  Requested Prescriptions     Pending Prescriptions Disp Refills    HYDROcodone-acetaminophen (NORCO) 5-325 MG per tablet 100 tablet 0     Sig: Take 1 tablet by mouth every 6 hours as needed for Pain for up to 30 days.  G71.0     Pharmacy verified:  [facility]      Date of last visit:  3/3/2020  Date of next visit (if applicable): Visit date not found

## 2020-03-31 NOTE — TELEPHONE ENCOUNTER
ep'ed norco to pharm requested      Controlled Substance Monitoring:    Acute and Chronic Pain Monitoring:   RX Monitoring 3/31/2020   Attestation -   Periodic Controlled Substance Monitoring No signs of potential drug abuse or diversion identified.

## 2020-04-15 ENCOUNTER — VIRTUAL VISIT (OUTPATIENT)
Dept: PSYCHIATRY | Age: 60
End: 2020-04-15
Payer: MEDICARE

## 2020-04-15 PROCEDURE — 99443 PR PHYS/QHP TELEPHONE EVALUATION 21-30 MIN: CPT | Performed by: NURSE PRACTITIONER

## 2020-04-15 RX ORDER — DIVALPROEX SODIUM 500 MG/1
500 TABLET, EXTENDED RELEASE ORAL NIGHTLY
Qty: 30 TABLET | Refills: 2 | Status: SHIPPED | OUTPATIENT
Start: 2020-04-15 | End: 2020-07-13 | Stop reason: SDUPTHER
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
## 2020-04-17 ENCOUNTER — TELEPHONE (OUTPATIENT)
Dept: FAMILY MEDICINE CLINIC | Age: 60
End: 2020-04-17

## 2020-04-17 NOTE — TELEPHONE ENCOUNTER
Overall sounds like viral illness unless she has UTI. Can she get a sample to [facility]? ? Looks like did a VV with psychiatry on the 04/15? ?

## 2020-04-17 NOTE — TELEPHONE ENCOUNTER
Patient called and stated that she has not felt well since Tuesday. She has headaches off & on, she does have a light headache today, she has diarrhea but it comes and goes, she feels bloated, crampy and has gas, she feels hot/chilled. No cough, shortness of breath or fever. She has frequent urination and some burning when she urinates. She's wondering if she has a UTI. She cannot do a VV or evisit and does not drive. Please advise. [facility] if needed.

## 2020-04-18 ENCOUNTER — HOSPITAL ENCOUNTER (EMERGENCY)
Age: 60
Discharge: HOME | End: 2020-04-18
Payer: MEDICARE

## 2020-04-18 VITALS
HEIGHT: 64 IN | OXYGEN SATURATION: 97 % | DIASTOLIC BLOOD PRESSURE: 84 MMHG | BODY MASS INDEX: 32.44 KG/M2 | TEMPERATURE: 97.7 F | WEIGHT: 190 LBS | HEART RATE: 77 BPM | RESPIRATION RATE: 16 BRPM | SYSTOLIC BLOOD PRESSURE: 153 MMHG

## 2020-04-18 LAB
BILIRUBIN URINE: NEGATIVE
BLOOD, URINE: NEGATIVE
CHARACTER, URINE: CLEAR
COLOR: YELLOW
GLUCOSE, URINE: NEGATIVE MG/DL
KETONES, URINE: NEGATIVE
LEUKOCYTES, UA: ABNORMAL
NITRITE, URINE: POSITIVE
PH UA: 6.5 (ref 5–9)
PROTEIN UA: NEGATIVE MG/DL
REFLEX TO URINE C & S: ABNORMAL
SPECIFIC GRAVITY UA: 1.01 (ref 1–1.03)
UROBILINOGEN, URINE: 0.2 EU/DL (ref 0–1)

## 2020-04-18 PROCEDURE — 87086 URINE CULTURE/COLONY COUNT: CPT

## 2020-04-18 PROCEDURE — 81003 URINALYSIS AUTO W/O SCOPE: CPT

## 2020-04-18 PROCEDURE — 99214 OFFICE O/P EST MOD 30 MIN: CPT | Performed by: NURSE PRACTITIONER

## 2020-04-18 PROCEDURE — 87186 SC STD MICRODIL/AGAR DIL: CPT

## 2020-04-18 PROCEDURE — 99215 OFFICE O/P EST HI 40 MIN: CPT

## 2020-04-18 PROCEDURE — 87077 CULTURE AEROBIC IDENTIFY: CPT

## 2020-04-18 RX ORDER — CEFDINIR 300 MG/1
300 CAPSULE ORAL 2 TIMES DAILY
Qty: 14 CAPSULE | Refills: 0 | Status: SHIPPED | OUTPATIENT
Start: 2020-04-18 | End: 2020-04-25

## 2020-04-18 RX ORDER — GREEN TEA/HOODIA GORDONII 315-12.5MG
1 CAPSULE ORAL DAILY
Qty: 30 TABLET | Refills: 0 | Status: SHIPPED | OUTPATIENT
Start: 2020-04-18 | End: 2020-05-18

## 2020-04-18 ASSESSMENT — ENCOUNTER SYMPTOMS
NAUSEA: 0
SORE THROAT: 0
DIARRHEA: 1
VOMITING: 0
CONSTIPATION: 0
COUGH: 0
ABDOMINAL PAIN: 0
SHORTNESS OF BREATH: 0

## 2020-04-18 ASSESSMENT — PAIN DESCRIPTION - LOCATION: LOCATION: ABDOMEN

## 2020-04-18 ASSESSMENT — PAIN SCALES - GENERAL: PAINLEVEL_OUTOF10: 4

## 2020-04-18 NOTE — ED PROVIDER NOTES
Behavior: Behavior normal.         DIAGNOSTIC RESULTS     Labs:  Results for orders placed or performed during the hospital encounter of 04/18/20   UA without Microscopic Reflex C&S   Result Value Ref Range    Glucose, Urine Negative NEGATIVE mg/dl    Bilirubin Urine Negative NEGATIVE    Ketones, Urine Negative NEGATIVE    Specific Gravity, UA 1.010 1.002 - 1.03    Blood, Urine Negative NEGATIVE    pH, UA 6.50 5.0 - 9.0    Protein, UA Negative NEGATIVE mg/dl    Urobilinogen, Urine 0.20 0.0 - 1.0 eu/dl    Nitrite, Urine Positive (A) NEGATIVE    LEUKOCYTES, UA Moderate (A) NEGATIVE    Color, UA Yellow STRAW-YELL    Character, Urine Clear CLEAR-SL C    REFLEX TO URINE C & S INDICATED        IMAGING:    No orders to display         EKG:  none    URGENT CARE COURSE:     Vitals:    04/18/20 0958 04/18/20 0959   BP:  (!) 153/84   Pulse: 77    Resp: 16    Temp: 97.7 °F (36.5 °C)    TempSrc: Temporal    SpO2: 97%    Weight: 190 lb (86.2 kg)    Height: 5' 4\" (1.626 m)        Medications - No data to display         PROCEDURES:  None    FINAL IMPRESSION      1. Acute cystitis without hematuria    2. Sinus congestion    3. Diarrhea, unspecified type          DISPOSITION/ PLAN       Urine sample is consistent with a urinary tract infection at this time. I discussed with the patient that she may have some mild sinus congestion and she is advised use over-the-counter Coricidin as needed. She will be placed on oral antibiotics for the urinary tract infection is advised to drink plenty of fluids and remain hydrated. Patient will be given a prescription of probiotics for the diarrhea . Did discuss with the patient that if her diarrhea does not improve over the next 5 to 7 days she must contact her PCP for follow-up and she is agreeable to plan as discussed.     PATIENT REFERRED TO:  Galo Briseno MD  [address] [facility] [address]      DISCHARGE MEDICATIONS:  New Prescriptions    CEFDINIR (OMNICEF) 300 MG CAPSULE Take 1 capsule by mouth 2 times daily for 7 days    PROBIOTIC ACIDOPHILUS (FLORANEX) TABS    Take 1 tablet by mouth daily       Discontinued Medications    No medications on file       Current Discharge Medication List          LUBA Montiel CNP    (Please note that portions of this note were completed with a voice recognition program. Efforts were made to edit the dictations but occasionally words are mis-transcribed.)          LUBA Montiel CNP  04/18/20 1028

## 2020-04-20 ENCOUNTER — TELEPHONE (OUTPATIENT)
Dept: FAMILY MEDICINE CLINIC | Age: 60
End: 2020-04-20

## 2020-04-20 LAB
ORGANISM: ABNORMAL
URINE CULTURE REFLEX: ABNORMAL

## 2020-04-30 RX ORDER — HYDROCODONE BITARTRATE AND ACETAMINOPHEN 5; 325 MG/1; MG/1
1 TABLET ORAL EVERY 6 HOURS PRN
Qty: 100 TABLET | Refills: 0 | Status: SHIPPED | OUTPATIENT
Start: 2020-04-30 | End: 2020-06-01 | Stop reason: SDUPTHER

## 2020-04-30 NOTE — TELEPHONE ENCOUNTER
Keven Fitzpatrick called requesting a refill on the following medications:  Requested Prescriptions     Pending Prescriptions Disp Refills    HYDROcodone-acetaminophen (NORCO) 5-325 MG per tablet 100 tablet 0     Sig: Take 1 tablet by mouth every 6 hours as needed for Pain for up to 30 days. G71.0     Pharmacy verified: ddd  . pv      Date of last visit: 3/3  Date of next visit (if applicable): Visit date not found

## 2020-06-01 RX ORDER — HYDROCODONE BITARTRATE AND ACETAMINOPHEN 5; 325 MG/1; MG/1
1 TABLET ORAL EVERY 6 HOURS PRN
Qty: 100 TABLET | Refills: 0 | Status: SHIPPED | OUTPATIENT
Start: 2020-06-01 | End: 2020-07-02 | Stop reason: SDUPTHER

## 2020-06-01 NOTE — TELEPHONE ENCOUNTER
Patient called to request a refill of her Norco 5/325 mg. She was last seen in the office on 3/03/20. She uses [facility].   Call her back at [phone] only if any problems

## 2020-07-02 RX ORDER — HYDROCODONE BITARTRATE AND ACETAMINOPHEN 5; 325 MG/1; MG/1
1 TABLET ORAL EVERY 6 HOURS PRN
Qty: 100 TABLET | Refills: 0 | Status: SHIPPED | OUTPATIENT
Start: 2020-07-02 | End: 2020-07-31 | Stop reason: SDUPTHER

## 2020-07-02 NOTE — TELEPHONE ENCOUNTER
ep'ed norco to pharm requested    Controlled Substance Monitoring:    Acute and Chronic Pain Monitoring:   RX Monitoring 7/2/2020   Attestation -   Periodic Controlled Substance Monitoring No signs of potential drug abuse or diversion identified.

## 2020-07-02 NOTE — TELEPHONE ENCOUNTER
Hansel Fer needs refill of   Requested Prescriptions     Pending Prescriptions Disp Refills    HYDROcodone-acetaminophen (NORCO) 5-325 MG per tablet 100 tablet 0     Sig: Take 1 tablet by mouth every 6 hours as needed for Pain for up to 30 days. G71.0       Last Filled on:  6/1/20    Last Visit Date: 3/3/2020 thrombophlebitis    Next Visit Date:   Visit date not found    Pt has #4 left. Pls call pt at [phone] only if probs.     Zip: [phone]

## 2020-07-13 ENCOUNTER — VIRTUAL VISIT (OUTPATIENT)
Dept: PSYCHIATRY | Age: 60
End: 2020-07-13
Payer: MEDICARE

## 2020-07-13 PROCEDURE — 99442 PR PHYS/QHP TELEPHONE EVALUATION 11-20 MIN: CPT | Performed by: NURSE PRACTITIONER

## 2020-07-13 RX ORDER — DIVALPROEX SODIUM 500 MG/1
500 TABLET, EXTENDED RELEASE ORAL NIGHTLY
Qty: 30 TABLET | Refills: 2 | Status: SHIPPED | OUTPATIENT
Start: 2020-07-13 | End: 2020-09-29 | Stop reason: SDUPTHER
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
## 2020-07-29 ENCOUNTER — HOSPITAL ENCOUNTER (OUTPATIENT)
Age: 60
Discharge: HOME | End: 2020-07-29
Payer: MEDICARE

## 2020-07-29 DIAGNOSIS — F34.0 CYCLOTHYMIC DISORDER: ICD-10-CM

## 2020-07-29 DIAGNOSIS — Z51.81 THERAPEUTIC DRUG MONITORING: ICD-10-CM

## 2020-07-29 LAB — VALPROIC ACID LEVEL: 61.5 UG/ML (ref 50–100)

## 2020-07-29 PROCEDURE — 80164 ASSAY DIPROPYLACETIC ACD TOT: CPT

## 2020-07-29 PROCEDURE — 36415 COLL VENOUS BLD VENIPUNCTURE: CPT

## 2020-07-31 RX ORDER — HYDROCODONE BITARTRATE AND ACETAMINOPHEN 5; 325 MG/1; MG/1
1 TABLET ORAL EVERY 6 HOURS PRN
Qty: 100 TABLET | Refills: 0 | Status: SHIPPED | OUTPATIENT
Start: 2020-07-31 | End: 2020-09-02 | Stop reason: SDUPTHER

## 2020-07-31 NOTE — TELEPHONE ENCOUNTER
Patient called to request a refill of Norco 5/325 mg. She was last seen on 3/03/20. She uses [facility].   Call her back at [phone] only if any problems

## 2020-08-17 ENCOUNTER — TELEPHONE (OUTPATIENT)
Dept: PSYCHIATRY | Age: 60
End: 2020-08-17

## 2020-08-17 NOTE — TELEPHONE ENCOUNTER
Freida Samayoa called back into the office stating that she has not tried the Hydroxyzine (Vistaril); she was concerned about the warning with not taking it with her Norco; in which she said that she would try it and if it did not work that she would call back into the office. She had a friend with her at the time of her phone conversation in which she was given the information as well directed by Freida Samayoa. She said that she would try to get the medication at [address] [place] [address] either tomorrow or on Wednesday when she was told that this provider was not in the office for the remainder of the day. Please advise on dosing for Hydroxyzine?

## 2020-08-18 RX ORDER — HYDROXYZINE PAMOATE 25 MG/1
25 CAPSULE ORAL 3 TIMES DAILY PRN
Qty: 30 CAPSULE | Refills: 0 | Status: SHIPPED | OUTPATIENT
Start: 2020-08-18 | End: 2020-09-10 | Stop reason: SDUPTHER

## 2020-09-02 ENCOUNTER — OFFICE VISIT (OUTPATIENT)
Dept: FAMILY MEDICINE CLINIC | Age: 60
End: 2020-09-02
Payer: MEDICARE

## 2020-09-02 VITALS
HEART RATE: 64 BPM | SYSTOLIC BLOOD PRESSURE: 132 MMHG | WEIGHT: 184.6 LBS | DIASTOLIC BLOOD PRESSURE: 80 MMHG | RESPIRATION RATE: 12 BRPM | BODY MASS INDEX: 30.75 KG/M2 | TEMPERATURE: 97.7 F | HEIGHT: 65 IN

## 2020-09-02 PROCEDURE — G8427 DOCREV CUR MEDS BY ELIG CLIN: HCPCS | Performed by: FAMILY MEDICINE

## 2020-09-02 PROCEDURE — 99213 OFFICE O/P EST LOW 20 MIN: CPT | Performed by: FAMILY MEDICINE

## 2020-09-02 PROCEDURE — 1036F TOBACCO NON-USER: CPT | Performed by: FAMILY MEDICINE

## 2020-09-02 PROCEDURE — G8417 CALC BMI ABV UP PARAM F/U: HCPCS | Performed by: FAMILY MEDICINE

## 2020-09-02 PROCEDURE — G0439 PPPS, SUBSEQ VISIT: HCPCS | Performed by: FAMILY MEDICINE

## 2020-09-02 PROCEDURE — 3017F COLORECTAL CA SCREEN DOC REV: CPT | Performed by: FAMILY MEDICINE

## 2020-09-02 RX ORDER — HYDROCODONE BITARTRATE AND ACETAMINOPHEN 5; 325 MG/1; MG/1
1 TABLET ORAL EVERY 6 HOURS PRN
Qty: 100 TABLET | Refills: 0 | Status: SHIPPED | OUTPATIENT
Start: 2020-09-02 | End: 2020-10-05 | Stop reason: SDUPTHER

## 2020-09-02 RX ORDER — GABAPENTIN 300 MG/1
CAPSULE ORAL
Qty: 60 CAPSULE | Refills: 11 | Status: SHIPPED | OUTPATIENT
Start: 2020-09-02 | End: 2021-09-30

## 2020-09-02 ASSESSMENT — ENCOUNTER SYMPTOMS
EYE PAIN: 0
DIARRHEA: 0
BACK PAIN: 0
NAUSEA: 0
VOMITING: 0
CHEST TIGHTNESS: 0
SHORTNESS OF BREATH: 0
SORE THROAT: 0
COUGH: 0
CONSTIPATION: 0
BLOOD IN STOOL: 0
WHEEZING: 0
ABDOMINAL PAIN: 0
RHINORRHEA: 0

## 2020-09-02 ASSESSMENT — PATIENT HEALTH QUESTIONNAIRE - PHQ9
SUM OF ALL RESPONSES TO PHQ QUESTIONS 1-9: 2
SUM OF ALL RESPONSES TO PHQ QUESTIONS 1-9: 2

## 2020-09-02 NOTE — PATIENT INSTRUCTIONS
Personalized Preventive Plan for Calin Munroe - 9/2/2020  Medicare offers a range of preventive health benefits. Some of the tests and screenings are paid in full while other may be subject to a deductible, co-insurance, and/or copay. Some of these benefits include a comprehensive review of your medical history including lifestyle, illnesses that may run in your family, and various assessments and screenings as appropriate. After reviewing your medical record and screening and assessments performed today your provider may have ordered immunizations, labs, imaging, and/or referrals for you. A list of these orders (if applicable) as well as your Preventive Care list are included within your After Visit Summary for your review. Other Preventive Recommendations:    · A preventive eye exam performed by an eye specialist is recommended every 1-2 years to screen for glaucoma; cataracts, macular degeneration, and other eye disorders. · A preventive dental visit is recommended every 6 months. · Try to get at least 150 minutes of exercise per week or 10,000 steps per day on a pedometer . · Order or download the FREE \"Exercise & Physical Activity: Your Everyday Guide\" from [employer]. Call 8-391.121.1659 or search [employer] online. · You need 6210-5112 mg of calcium and 7466-1264 IU of vitamin D per day. It is possible to meet your calcium requirement with diet alone, but a vitamin D supplement is usually necessary to meet this goal.  · When exposed to the sun, use a sunscreen that protects against both UVA and UVB radiation with an SPF of 30 or greater. Reapply every 2 to 3 hours or after sweating, drying off with a towel, or swimming. · Always wear a seat belt when traveling in a car. Always wear a helmet when riding a bicycle or motorcycle.   Patient Education        Well Visit, Women 48 to 72: Care Instructions  Your Care Instructions     Physical exams can help you stay healthy. Your doctor has checked your overall health and may have suggested ways to take good care of yourself. He or she also may have recommended tests. At home, you can help prevent illness with healthy eating, regular exercise, and other steps. Follow-up care is a key part of your treatment and safety. Be sure to make and go to all appointments, and call your doctor if you are having problems. It's also a good idea to know your test results and keep a list of the medicines you take. How can you care for yourself at home? Reach and stay at a healthy weight. This will lower your risk for many problems, such as obesity, diabetes, heart disease, and high blood pressure. Get at least 30 minutes of exercise on most days of the week. Walking is a good choice. You also may want to do other activities, such as running, swimming, cycling, or playing tennis or team sports. Do not smoke. Smoking can make health problems worse. If you need help quitting, talk to your doctor about stop-smoking programs and medicines. These can increase your chances of quitting for good. Protect your skin from too much sun. When you're outdoors from 10 a.m. to 4 p.m., stay in the shade or cover up with clothing and a hat with a wide brim. Wear sunglasses that block UV rays. Even when it's cloudy, put broad-spectrum sunscreen (SPF 30 or higher) on any exposed skin. See a dentist one or two times a year for checkups and to have your teeth cleaned. Wear a seat belt in the car. Follow your doctor's advice about when to have certain tests. These tests can spot problems early. Cholesterol. Your doctor will tell you how often to have this done based on your age, family history, or other things that can increase your risk for heart attack and stroke. Blood pressure. Have your blood pressure checked during a routine doctor visit.  Your doctor will tell you how often to check your blood pressure based on your age, your blood pressure results, and other factors. Mammogram. Ask your doctor how often you should have a mammogram, which is an X-ray of your breasts. A mammogram can spot breast cancer before it can be felt and when it is easiest to treat. Pap test and pelvic exam. Ask your doctor how often you should have a Pap test. You may not need to have a Pap test as often as you used to. Vision. Have your eyes checked every year or two or as often as your doctor suggests. Some experts recommend that you have yearly exams for glaucoma and other age-related eye problems starting at age 48. Hearing. Tell your doctor if you notice any change in your hearing. You can have tests to find out how well you hear. Diabetes. Ask your doctor whether you should have tests for diabetes. Colorectal cancer. Your risk for colorectal cancer gets higher as you get older. Some experts say that adults should start regular screening at age 48 and stop at age 76. Others say to start before age 48 or continue after age 76. Talk with your doctor about your risk and when to start and stop screening. Thyroid disease. Talk to your doctor about whether to have your thyroid checked as part of a regular physical exam. Women have an increased chance of a thyroid problem. Osteoporosis. You should begin tests for bone density at age 72. If you are younger than 72, ask your doctor whether you have factors that may increase your risk for this disease. You may want to have this test before age 72. Heart attack and stroke risk. At least every 4 to 6 years, you should have your risk for heart attack and stroke assessed. Your doctor uses factors such as your age, blood pressure, cholesterol, and whether you smoke or have diabetes to show what your risk for a heart attack or stroke is over the next 10 years. When should you call for help? Watch closely for changes in your health, and be sure to contact your doctor if you have any problems or symptoms that concern you.   Where can you learn more? Go to https://chpepiceweb.healthTheravance. org and sign in to your [employer] account. Enter V853 in the InterValve box to learn more about \"Well Visit, Women 50 to 72: Care Instructions. \"     If you do not have an account, please click on the \"Sign Up Now\" link. Current as of: August 22, 2019               Content Version: 12.5  © 2006-2020 [employer]. Care instructions adapted under license by [facility] ([facility]). If you have questions about a medical condition or this instruction, always ask your healthcare professional. [place] [address] any warranty or liability for your use of this information. Patient Education        Depression Treatment: Care Instructions  Your Care Instructions     Depression is a condition that affects the way you feel, think, and act. It causes symptoms such as low energy, loss of interest in daily activities, and sadness or grouchiness that goes on for a long time. Depression is very common and affects men and women of all ages. Depression is a medical illness caused by changes in the natural chemicals in your brain. It is not a character flaw, and it does not mean that you are a bad or weak person. It does not mean that you are going crazy. It is important to know that depression can be treated. Medicines, counseling, and self-care can all help. Many people do not get help because they are embarrassed or think that they will get over the depression on their own. But some people do not get better without treatment. Follow-up care is a key part of your treatment and safety. Be sure to make and go to all appointments, and call your doctor if you are having problems. It's also a good idea to know your test results and keep a list of the medicines you take. How can you care for yourself at home? Learn about antidepressant medicines  Antidepressant medicines can improve or end the symptoms of depression.  You may need to take the medicine for at least 6 months, and often longer. Keep taking your medicine even if you feel better. If you stop taking it too soon, your symptoms may come back or get worse. You may start to feel better within 1 to 3 weeks of taking antidepressant medicine. But it can take as many as 6 to 8 weeks to see more improvement. Talk to your doctor if you have problems with your medicine or if you do not notice any improvement after 3 weeks. Antidepressants can make you feel tired, dizzy, or nervous. Some people have dry mouth, constipation, headaches, sexual problems, an upset stomach, or diarrhea. Many of these side effects are mild and go away on their own after you take the medicine for a few weeks. Some may last longer. Talk to your doctor if side effects bother you too much. You might be able to try a different medicine. If you are pregnant or breastfeeding, talk to your doctor about what medicines you can take. Learn about counseling  In many cases, counseling can work as well as medicines to treat mild to moderate depression. Counseling is done by licensed mental health providers, such as psychologists, social workers, and some types of nurses. It can be done in one-on-one sessions or in a group setting. Many people find group sessions helpful. Cognitive-behavioral therapy is a type of counseling. In this treatment therapy, you learn how to see and change unhelpful thinking styles that may be adding to your depression. Counseling and medicines often work well when used together. To manage depression  Be physically active. Getting 30 minutes of exercise each day is good for your body and your mind. Begin slowly if it is hard for you to get started. If you already exercise, keep it up. Plan something pleasant for yourself every day. Include activities that you have enjoyed in the past.  Get enough sleep. Talk to your doctor if you have problems sleeping. Eat a balanced diet.  If you do not feel hungry, eat small snacks rather than large meals. Do not drink alcohol, use illegal drugs, or take medicines that your doctor has not prescribed for you. They may interfere with your treatment. Spend time with family and friends. It may help to speak openly about your depression with people you trust.  Take your medicines exactly as prescribed. Call your doctor if you think you are having a problem with your medicine. Do not make major life decisions while you are depressed. Depression may change the way you think. You will be able to make better decisions after you feel better. Think positively. Challenge negative thoughts with statements such as \"I am hopeful\"; \"Things will get better\"; and \"I can ask for the help I need. \" Write down these statements and read them often, even if you don't believe them yet. Be patient with yourself. It took time for your depression to develop, and it will take time for your symptoms to improve. Do not take on too much or be too hard on yourself. Learn all you can about depression from written and online materials. Check out behavioral health classes to learn more about dealing with depression. Keep the numbers for these national suicide hotlines: 8-085-141-TALK (7-460.169.7951) and 9-313-HFHHMMM (2-250.347.8230). If you or someone you know talks about suicide or feeling hopeless, get help right away. When should you call for help? [id number] anytime you think you may need emergency care. For example, call if:  You feel you cannot stop from hurting yourself or someone else. Call your doctor now or seek immediate medical care if:  You hear voices. You feel much more depressed. Watch closely for changes in your health, and be sure to contact your doctor if:  You are having problems with your depression medicine. You are not getting better as expected. Where can you learn more? Go to https://grant.Nallatech. org and sign in to your WoowUp account.  Enter N409 in the They can increase your anxiety level and cause sleep problems. Learn and do relaxation techniques. See below for more about these techniques. Engage your mind. Get out and do something you enjoy. Go to a funny movie, or take a walk or hike. Plan your day. Having too much or too little to do can make you anxious. Keep a record of your symptoms. Discuss your fears with a good friend or family member, or join a support group for people with similar problems. Talking to others sometimes relieves stress. Get involved in social groups, or volunteer to help others. Being alone sometimes makes things seem worse than they are. Get at least 30 minutes of exercise on most days of the week to relieve stress. Walking is a good choice. You also may want to do other activities, such as running, swimming, cycling, or playing tennis or team sports. Relaxation techniques  Do relaxation exercises 10 to 20 minutes a day. You can play soothing, relaxing music while you do them, if you wish. Tell others in your house that you are going to do your relaxation exercises. Ask them not to disturb you. Find a comfortable place, away from all distractions and noise. Lie down on your back, or sit with your back straight. Focus on your breathing. Make it slow and steady. Breathe in through your nose. Breathe out through either your nose or mouth. Breathe deeply, filling up the area between your navel and your rib cage. Breathe so that your belly goes up and down. Do not hold your breath. Breathe like this for 5 to 10 minutes. Notice the feeling of calmness throughout your whole body. As you continue to breathe slowly and deeply, relax by doing the following for another 5 to 10 minutes:  Tighten and relax each muscle group in your body. You can begin at your toes and work your way up to your head. Imagine your muscle groups relaxing and becoming heavy. Empty your mind of all thoughts.   Let yourself relax more and more deeply. Become aware of the state of calmness that surrounds you. When your relaxation time is over, you can bring yourself back to alertness by moving your fingers and toes and then your hands and feet and then stretching and moving your entire body. Sometimes people fall asleep during relaxation, but they usually wake up shortly afterward. Always give yourself time to return to full alertness before you drive a car or do anything that might cause an accident if you are not fully alert. Never play a relaxation tape while you drive a car. When should you call for help? OIAU817 anytime you think you may need emergency care. For example, call if:  You feel you cannot stop from hurting yourself or someone else. Keep the numbers for these national suicide hotlines: 3-710-623-TALK (3-750.623.4679) and 2-615-XRUBPJJ (7-953.981.1457). If you or someone you know talks about suicide or feeling hopeless, get help right away. Watch closely for changes in your health, and be sure to contact your doctor if:  You have anxiety or fear that affects your life. You have symptoms of anxiety that are new or different from those you had before. Where can you learn more? Go to https://Zoomeb.Kaai. org and sign in to your Infusionsoft account. Enter P754 in the beSUCCESS box to learn more about \"Anxiety Disorder: Care Instructions. \"     If you do not have an account, please click on the \"Sign Up Now\" link. Current as of: January 31, 2020               Content Version: 12.5  © 2006-2020 Healthwise, Incorporated. Care instructions adapted under license by [facility] ([facility]). If you have questions about a medical condition or this instruction, always ask your healthcare professional. [place] [address] any warranty or liability for your use of this information.

## 2020-09-02 NOTE — PROGRESS NOTES
rales.   Abdominal:      General: Bowel sounds are normal.      Palpations: Abdomen is soft. Tenderness: There is no abdominal tenderness. There is no guarding or rebound. Musculoskeletal: Normal range of motion. Lymphadenopathy:      Cervical: No cervical adenopathy. Skin:     General: Skin is warm and dry. Findings: No rash. Neurological:      Mental Status: She is alert and oriented to person, place, and time. Cranial Nerves: No cranial nerve deficit. Deep Tendon Reflexes: Reflexes are normal and symmetric. Health Maintenance   Topic Date Due    DTaP/Tdap/Td vaccine (1 - Tdap) 1978    Cervical cancer screen  1980    Shingles Vaccine (1 of 2) 2009    Annual Wellness Visit (AWV)  2019    Flu vaccine (1) 2020    Breast cancer screen  2021    Lipid screen  2024    Colon cancer screen colonoscopy  10/29/2024    Hepatitis C screen  Completed    HIV screen  Completed    Hepatitis A vaccine  Aged Out    Hepatitis B vaccine  Aged Out    Hib vaccine  Aged Out    Meningococcal (ACWY) vaccine  Aged Out    Pneumococcal 0-64 years Vaccine  Aged Out       Assessment:      Depression  Anxiety  MD      Plan:      Refill meds  Monitor depression, sees psychiatry, may need meds adjusted. Encouraged diet, exercise and weight loss. Healthy diet  Reviewed health maintenance  Mammogram      Controlled Substance Monitoring:    Acute and Chronic Pain Monitoring:   RX Monitoring 2020   Attestation -   Periodic Controlled Substance Monitoring Possible medication side effects, risk of tolerance/dependence & alternative treatments discussed. ;No signs of potential drug abuse or diversion identified.;Obtaining appropriate analgesic effect of treatment. Gini Taylor MD   Medicare Annual Wellness Visit  Name: Yasmine Course Date: 2020   MRN: [id number] Sex: Female   Age: 63 y.o.  Ethnicity: Non-/Non    : 1960 Race: Nellie Alexandre is here for Medicare AWV (medication refill)    Screenings for behavioral, psychosocial and functional/safety risks, and cognitive dysfunction are all negative except as indicated below. These results, as well as other patient data from the [address] form, are documented in Flowsheets linked to this Encounter. Allergies   Allergen Reactions    Oxycontin [Oxycodone Hcl] Other (See Comments)     \"too strong - mental confusion\"    Percocet [Oxycodone-Acetaminophen] Other (See Comments)     \"too strong. I don't like them. \"    Bactrim Rash    Sulfa Antibiotics Rash       Prior to Visit Medications    Medication Sig Taking? Authorizing Provider   hydrOXYzine (VISTARIL) 25 MG capsule Take 1 capsule by mouth 3 times daily as needed for Anxiety Yes LUBA Tejeda CNP   divalproex (DEPAKOTE ER) 500 MG extended release tablet Take 1 tablet by mouth nightly Yes LUBA Tejeda CNP   gabapentin (NEURONTIN) 300 MG capsule Take 1 capsule by mouth 2 times daily, G71.00 Yes Farooq Garzon MD   hydroxychloroquine (PLAQUENIL) 200 MG tablet Take 1 tablet by mouth 2 times daily Yes Farooq Garzon MD   folic acid (FOLVITE) 1 MG tablet Take 1 mg by mouth Yes Historical Provider, MD   Handicap Placard MISC by Does not apply route Request parking placard due to medical conditions. Duration of 5 years. Yes Farooq Garzon MD   omeprazole (PRILOSEC) 40 MG capsule Take 1 capsule by mouth daily. Patient taking differently: Take 20 mg by mouth daily. Yes Farooq Garzon MD   methotrexate (RHEUMATREX) 2.5 MG chemo tablet Take 2.5 mg by mouth once a week. 6 tabs once weekly Yes Historical Provider, MD       Past Medical History:   Diagnosis Date    Depression     Lymphedema 2008    both legs    MD (muscular dystrophy) ([facility] [address] 2008    Dr. Skyler Matos at [facility] - no lung involvement per patient    Neuropathy     Obesity (BMI 30-39. 9)     Rheumatoid arthritis(714.0) 2008    Dr. Jamil Charles Superficial thrombophlebitis 03/2018    right lower extremity    Venous insufficiency     h/o SVT - 3-4 in the past (has never been on Coumadin)       Past Surgical History:   Procedure Laterality Date    COLONOSCOPY      HYSTERECTOMY  2002    JOINT REPLACEMENT Left 2008    knee    JOINT REPLACEMENT Right 09/10/2013    right    TOTAL KNEE ARTHROPLASTY Left 6/08    TOTAL KNEE ARTHROPLASTY Right 09/10/2013    VARICOSE VEIN SURGERY Right 0    laser       Family History   Problem Relation Age of Onset    Cancer Mother         lung    Dementia Father     Other Father         vericose veins    Emphysema Father     Heart Disease Father     Bipolar Disorder Sister     Bipolar Disorder Brother        CareTeam (Including outside providers/suppliers regularly involved in providing care):   Patient Care Team:  Misty Lemon MD as PCP - General (Family Medicine)  Misty Lemon MD as PCP - [facility] Empaneled Provider    Wt Readings from Last 3 Encounters:   09/02/20 184 lb 9.6 oz (83.7 kg)   04/18/20 190 lb (86.2 kg)   03/03/20 193 lb (87.5 kg)     Vitals:    09/02/20 1505   BP: 132/80   Pulse: 64   Resp: 12   Temp: 97.7 °F (36.5 °C)   TempSrc: Infrared   Weight: 184 lb 9.6 oz (83.7 kg)   Height: 5' 5.1\" (1.654 m)     Body mass index is 30.62 kg/m². Based upon direct observation of the patient, evaluation of cognition reveals recent and remote memory intact. Patient's complete Health Risk Assessment and screening values have been reviewed and are found in Flowsheets. The following problems were reviewed today and where indicated follow up appointments were made and/or referrals ordered. Positive Risk Factor Screenings with Interventions:     General Health:  General  In general, how would you say your health is?: Good  In the past 7 days, have you experienced any of the following?  New or Increased Pain, New or Increased Fatigue, Loneliness, Social Isolation, Stress or Anger?: (!) Loneliness  Do you get the social and emotional support that you need?: Yes  Do you have a Living Will?: Yes  General Health Risk Interventions:  · Loneliness: recent relationship breakup    Health Habits/Nutrition:  Health Habits/Nutrition  Do you exercise for at least 20 minutes 2-3 times per week?: Yes  Have you lost any weight without trying in the past 3 months?: (!) Yes  Do you eat fewer than 2 meals per day?: No  Have you seen a dentist within the past year?: (!) No  Body mass index is 30.62 kg/m². Health Habits/Nutrition Interventions:  · Nutritional issues:  educational materials for healthy, well-balanced diet provided  · Dental exam overdue:  patient encouraged to make appointment with his/her dentist    ADL:  ADLs  In the past 7 days, did you need help from others to perform any of the following everyday activities? Eating, dressing, grooming, bathing, toileting, or walking/balance?: (!) Bathing  In the past 7 days, did you need help from others to take care of any of the following? Laundry, housekeeping, banking/finances, shopping, telephone use, food preparation, transportation, or taking medications?: [employer], Housekeeping, Shopping, Transportation  ADL Interventions:  · home health aide 12 hours per week already established.     Personalized Preventive Plan   Current Health Maintenance Status  Immunization History   Administered Date(s) Administered    Influenza Virus Vaccine 10/29/2015    Influenza, [place] Race, IM, (6 mo and older Fluzone, Flulaval, Fluarix and 3 yrs and older Afluria) 11/14/2016, 10/18/2017, 10/15/2018    Influenza, Quadv, IM, PF (6 mo and older Fluzone, Flulaval, Fluarix, and 3 yrs and older Afluria) 10/01/2019    Pneumococcal Polysaccharide ([id number]) 10/18/2017        Health Maintenance   Topic Date Due    DTaP/Tdap/Td vaccine (1 - Tdap) 02/02/1978    Cervical cancer screen  02/02/1980    Shingles Vaccine (1 of 2) 02/02/2009    Annual Wellness Visit (AWV)  05/29/2019    Flu vaccine (1) 09/01/2020    Breast cancer screen  03/11/2021    Lipid screen  02/27/2024    Colon cancer screen colonoscopy  10/29/2024    Hepatitis C screen  Completed    HIV screen  Completed    Hepatitis A vaccine  Aged Out    Hepatitis B vaccine  Aged Out    Hib vaccine  Aged Out    Meningococcal (ACWY) vaccine  Aged Out    Pneumococcal 0-64 years Vaccine  Aged Out     Recommendations for Tele[employer]ner Due: see orders and patient instructions/AVS.  . Recommended screening schedule for the next 5-10 years is provided to the patient in written form: see Patient Instructions/AVS.    There are no diagnoses linked to this encounter.

## 2020-09-08 NOTE — TELEPHONE ENCOUNTER
Eleazar called into the office stating that she needs a refill on her Hydroxyzine 25mg;#30 with 0 refills;last with a start date of 08/18/20; records indicate that the prescription states that she can take up to 3 times daily. She states that she has about 5 left. She states that she is going camping with some of her girlfriends and she is leaving tomorrow; she is aware that this provider is out of the office this week. Her last completed appt was on 07/13/20 and she is scheduled to return on 09/29/20. Medication is pending your approval for the #30 with 0 refills.

## 2020-09-10 RX ORDER — HYDROXYZINE PAMOATE 25 MG/1
25 CAPSULE ORAL 3 TIMES DAILY PRN
Qty: 30 CAPSULE | Refills: 0 | Status: SHIPPED | OUTPATIENT
Start: 2020-09-10 | End: 2020-09-29 | Stop reason: SDUPTHER

## 2020-09-28 ENCOUNTER — HOSPITAL ENCOUNTER (OUTPATIENT)
Age: 60
Discharge: HOME | End: 2020-09-28
Payer: MEDICARE

## 2020-09-28 LAB
ALP BLD-CCNC: 65 U/L (ref 38–126)
ALT SERPL-CCNC: 13 U/L (ref 11–66)
AST SERPL-CCNC: 22 U/L (ref 5–40)
BASOPHILS # BLD: 0.8 %
BASOPHILS ABSOLUTE: 0 THOU/MM3 (ref 0–0.1)
BUN BLDV-MCNC: 11 MG/DL (ref 7–22)
C-REACTIVE PROTEIN: 0.09 MG/DL (ref 0–1)
CREAT SERPL-MCNC: 1 MG/DL (ref 0.4–1.2)
EOSINOPHIL # BLD: 1.8 %
EOSINOPHILS ABSOLUTE: 0.1 THOU/MM3 (ref 0–0.4)
ERYTHROCYTE [DISTWIDTH] IN BLOOD BY AUTOMATED COUNT: 13.2 % (ref 11.5–14.5)
ERYTHROCYTE [DISTWIDTH] IN BLOOD BY AUTOMATED COUNT: 48.7 FL (ref 35–45)
GFR SERPL CREATININE-BSD FRML MDRD: 56 ML/MIN/1.73M2
HCT VFR BLD CALC: 41.5 % (ref 37–47)
HEMOGLOBIN: 13.1 GM/DL (ref 12–16)
IMMATURE GRANS (ABS): 0.02 THOU/MM3 (ref 0–0.07)
IMMATURE GRANULOCYTES: 0.5 %
LYMPHOCYTES # BLD: 27 %
LYMPHOCYTES ABSOLUTE: 1.1 THOU/MM3 (ref 1–4.8)
MCH RBC QN AUTO: 31.6 PG (ref 26–33)
MCHC RBC AUTO-ENTMCNC: 31.6 GM/DL (ref 32.2–35.5)
MCV RBC AUTO: 100 FL (ref 81–99)
MONOCYTES # BLD: 12.5 %
MONOCYTES ABSOLUTE: 0.5 THOU/MM3 (ref 0.4–1.3)
NUCLEATED RED BLOOD CELLS: 0 /100 WBC
PLATELET # BLD: 143 THOU/MM3 (ref 130–400)
PMV BLD AUTO: 11.3 FL (ref 9.4–12.4)
RBC # BLD: 4.15 MILL/MM3 (ref 4.2–5.4)
SEDIMENTATION RATE, ERYTHROCYTE: 4 MM/HR (ref 0–20)
SEG NEUTROPHILS: 57.4 %
SEGMENTED NEUTROPHILS ABSOLUTE COUNT: 2.2 THOU/MM3 (ref 1.8–7.7)
WBC # BLD: 3.9 THOU/MM3 (ref 4.8–10.8)

## 2020-09-28 PROCEDURE — 86140 C-REACTIVE PROTEIN: CPT

## 2020-09-28 PROCEDURE — 84520 ASSAY OF UREA NITROGEN: CPT

## 2020-09-28 PROCEDURE — 84460 ALANINE AMINO (ALT) (SGPT): CPT

## 2020-09-28 PROCEDURE — 85651 RBC SED RATE NONAUTOMATED: CPT

## 2020-09-28 PROCEDURE — 82565 ASSAY OF CREATININE: CPT

## 2020-09-28 PROCEDURE — 36415 COLL VENOUS BLD VENIPUNCTURE: CPT

## 2020-09-28 PROCEDURE — 84450 TRANSFERASE (AST) (SGOT): CPT

## 2020-09-28 PROCEDURE — 84075 ASSAY ALKALINE PHOSPHATASE: CPT

## 2020-09-28 PROCEDURE — 85025 COMPLETE CBC W/AUTO DIFF WBC: CPT

## 2020-09-29 ENCOUNTER — OFFICE VISIT (OUTPATIENT)
Dept: PSYCHIATRY | Age: 60
End: 2020-09-29
Payer: MEDICARE

## 2020-09-29 VITALS — HEIGHT: 65 IN | WEIGHT: 187 LBS | BODY MASS INDEX: 31.16 KG/M2

## 2020-09-29 PROCEDURE — G8427 DOCREV CUR MEDS BY ELIG CLIN: HCPCS | Performed by: NURSE PRACTITIONER

## 2020-09-29 PROCEDURE — 3017F COLORECTAL CA SCREEN DOC REV: CPT | Performed by: NURSE PRACTITIONER

## 2020-09-29 PROCEDURE — G8417 CALC BMI ABV UP PARAM F/U: HCPCS | Performed by: NURSE PRACTITIONER

## 2020-09-29 PROCEDURE — 99213 OFFICE O/P EST LOW 20 MIN: CPT | Performed by: NURSE PRACTITIONER

## 2020-09-29 PROCEDURE — 90833 PSYTX W PT W E/M 30 MIN: CPT | Performed by: NURSE PRACTITIONER

## 2020-09-29 PROCEDURE — 1036F TOBACCO NON-USER: CPT | Performed by: NURSE PRACTITIONER

## 2020-09-29 RX ORDER — HYDROXYZINE PAMOATE 25 MG/1
25 CAPSULE ORAL 3 TIMES DAILY PRN
Qty: 30 CAPSULE | Refills: 2 | Status: SHIPPED | OUTPATIENT
Start: 2020-09-29 | End: 2020-12-14 | Stop reason: SDUPTHER

## 2020-09-29 RX ORDER — DIVALPROEX SODIUM 500 MG/1
500 TABLET, EXTENDED RELEASE ORAL NIGHTLY
Qty: 30 TABLET | Refills: 2 | Status: SHIPPED | OUTPATIENT
Start: 2020-09-29 | End: 2020-12-14 | Stop reason: SDUPTHER
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
## 2020-10-05 RX ORDER — HYDROCODONE BITARTRATE AND ACETAMINOPHEN 5; 325 MG/1; MG/1
1 TABLET ORAL EVERY 6 HOURS PRN
Qty: 100 TABLET | Refills: 0 | Status: SHIPPED | OUTPATIENT
Start: 2020-10-05 | End: 2020-11-03 | Stop reason: SDUPTHER

## 2020-10-05 NOTE — TELEPHONE ENCOUNTER
Patient called to request a refill of Norco. I could not pend the medication because the diagnosis needs changed. She was last seen in the office on 9/02/20 for a Medicare wellness. She uses [facility].   Please call her back at [phone] of any problems

## 2020-10-05 NOTE — TELEPHONE ENCOUNTER
ep'ed norco to pharm requested    Controlled Substance Monitoring:    Acute and Chronic Pain Monitoring:   RX Monitoring 10/5/2020   Attestation -   Periodic Controlled Substance Monitoring No signs of potential drug abuse or diversion identified.

## 2020-11-03 RX ORDER — HYDROCODONE BITARTRATE AND ACETAMINOPHEN 5; 325 MG/1; MG/1
1 TABLET ORAL EVERY 6 HOURS PRN
Qty: 100 TABLET | Refills: 0 | Status: SHIPPED | OUTPATIENT
Start: 2020-11-04 | End: 2020-12-02 | Stop reason: SDUPTHER

## 2020-11-03 NOTE — TELEPHONE ENCOUNTER
ep'ed norco to pharm requested      Controlled Substance Monitoring:    Acute and Chronic Pain Monitoring:   RX Monitoring 11/3/2020   Attestation -   Periodic Controlled Substance Monitoring No signs of potential drug abuse or diversion identified.

## 2020-11-23 ENCOUNTER — HOSPITAL ENCOUNTER (OUTPATIENT)
Age: 60
Discharge: HOME | End: 2020-11-23
Payer: MEDICARE

## 2020-11-23 DIAGNOSIS — Z51.81 THERAPEUTIC DRUG MONITORING: ICD-10-CM

## 2020-11-23 DIAGNOSIS — F34.0 CYCLOTHYMIC DISORDER: ICD-10-CM

## 2020-11-23 LAB
ALBUMIN SERPL-MCNC: 4.4 G/DL (ref 3.5–5.1)
ALP BLD-CCNC: 60 U/L (ref 38–126)
ALT SERPL-CCNC: 11 U/L (ref 11–66)
ANION GAP SERPL CALCULATED.3IONS-SCNC: 11 MEQ/L (ref 8–16)
AST SERPL-CCNC: 23 U/L (ref 5–40)
BASOPHILS # BLD: 1.3 %
BASOPHILS ABSOLUTE: 0 THOU/MM3 (ref 0–0.1)
BILIRUB SERPL-MCNC: 0.4 MG/DL (ref 0.3–1.2)
BUN BLDV-MCNC: 12 MG/DL (ref 7–22)
CALCIUM SERPL-MCNC: 10.1 MG/DL (ref 8.5–10.5)
CHLORIDE BLD-SCNC: 99 MEQ/L (ref 98–111)
CO2: 28 MEQ/L (ref 23–33)
CREAT SERPL-MCNC: 1.1 MG/DL (ref 0.4–1.2)
EOSINOPHIL # BLD: 1.3 %
EOSINOPHILS ABSOLUTE: 0 THOU/MM3 (ref 0–0.4)
ERYTHROCYTE [DISTWIDTH] IN BLOOD BY AUTOMATED COUNT: 14 % (ref 11.5–14.5)
ERYTHROCYTE [DISTWIDTH] IN BLOOD BY AUTOMATED COUNT: 50.3 FL (ref 35–45)
FOLATE: > 20 NG/ML (ref 4.8–24.2)
GFR SERPL CREATININE-BSD FRML MDRD: 50 ML/MIN/1.73M2
GLUCOSE BLD-MCNC: 84 MG/DL (ref 70–108)
HCT VFR BLD CALC: 41.8 % (ref 37–47)
HEMOGLOBIN: 13.1 GM/DL (ref 12–16)
IMMATURE GRANS (ABS): 0 THOU/MM3 (ref 0–0.07)
IMMATURE GRANULOCYTES: 0 %
LYMPHOCYTES # BLD: 31.3 %
LYMPHOCYTES ABSOLUTE: 0.9 THOU/MM3 (ref 1–4.8)
MCH RBC QN AUTO: 31.6 PG (ref 26–33)
MCHC RBC AUTO-ENTMCNC: 31.3 GM/DL (ref 32.2–35.5)
MCV RBC AUTO: 100.7 FL (ref 81–99)
MONOCYTES # BLD: 11.4 %
MONOCYTES ABSOLUTE: 0.3 THOU/MM3 (ref 0.4–1.3)
NUCLEATED RED BLOOD CELLS: 0 /100 WBC
PLATELET # BLD: 154 THOU/MM3 (ref 130–400)
PMV BLD AUTO: 11.5 FL (ref 9.4–12.4)
POTASSIUM SERPL-SCNC: 4.6 MEQ/L (ref 3.5–5.2)
RBC # BLD: 4.15 MILL/MM3 (ref 4.2–5.4)
SEG NEUTROPHILS: 54.7 %
SEGMENTED NEUTROPHILS ABSOLUTE COUNT: 1.6 THOU/MM3 (ref 1.8–7.7)
SODIUM BLD-SCNC: 138 MEQ/L (ref 135–145)
TOTAL PROTEIN: 6.7 G/DL (ref 6.1–8)
VALPROIC ACID LEVEL: 56.6 UG/ML (ref 50–100)
VITAMIN B-12: 209 PG/ML (ref 211–911)
WBC # BLD: 3 THOU/MM3 (ref 4.8–10.8)

## 2020-11-23 PROCEDURE — 82607 VITAMIN B-12: CPT

## 2020-11-23 PROCEDURE — 80164 ASSAY DIPROPYLACETIC ACD TOT: CPT

## 2020-11-23 PROCEDURE — 36415 COLL VENOUS BLD VENIPUNCTURE: CPT

## 2020-11-23 PROCEDURE — 82746 ASSAY OF FOLIC ACID SERUM: CPT

## 2020-11-23 PROCEDURE — 80053 COMPREHEN METABOLIC PANEL: CPT

## 2020-11-23 PROCEDURE — 85025 COMPLETE CBC W/AUTO DIFF WBC: CPT

## 2020-12-02 RX ORDER — HYDROCODONE BITARTRATE AND ACETAMINOPHEN 5; 325 MG/1; MG/1
1 TABLET ORAL EVERY 6 HOURS PRN
Qty: 100 TABLET | Refills: 0 | Status: SHIPPED | OUTPATIENT
Start: 2020-12-02 | End: 2021-01-04 | Stop reason: SDUPTHER

## 2020-12-02 NOTE — TELEPHONE ENCOUNTER
Ep'ed norco to pharm requested      Controlled Substance Monitoring:    Acute and Chronic Pain Monitoring:   RX Monitoring 12/2/2020   Attestation -   Periodic Controlled Substance Monitoring No signs of potential drug abuse or diversion identified.

## 2020-12-07 ENCOUNTER — OFFICE VISIT (OUTPATIENT)
Dept: FAMILY MEDICINE CLINIC | Age: 60
End: 2020-12-07
Payer: MEDICARE

## 2020-12-07 VITALS
WEIGHT: 188 LBS | HEART RATE: 88 BPM | RESPIRATION RATE: 14 BRPM | DIASTOLIC BLOOD PRESSURE: 74 MMHG | SYSTOLIC BLOOD PRESSURE: 124 MMHG | TEMPERATURE: 97 F | BODY MASS INDEX: 31.19 KG/M2

## 2020-12-07 LAB
BILIRUBIN URINE: NEGATIVE
BLOOD URINE, POC: NEGATIVE
CHARACTER, URINE: CLEAR
COLOR, URINE: YELLOW
GLUCOSE URINE: NEGATIVE MG/DL
KETONES, URINE: NEGATIVE
LEUKOCYTE CLUMPS, URINE: NEGATIVE
NITRITE, URINE: NEGATIVE
PH, URINE: 5.5 (ref 5–9)
PROTEIN, URINE: NEGATIVE MG/DL
SPECIFIC GRAVITY, URINE: <= 1.005 (ref 1–1.03)
UROBILINOGEN, URINE: 0.2 EU/DL (ref 0–1)

## 2020-12-07 PROCEDURE — 81003 URINALYSIS AUTO W/O SCOPE: CPT | Performed by: FAMILY MEDICINE

## 2020-12-07 PROCEDURE — 99214 OFFICE O/P EST MOD 30 MIN: CPT | Performed by: FAMILY MEDICINE

## 2020-12-07 PROCEDURE — G8427 DOCREV CUR MEDS BY ELIG CLIN: HCPCS | Performed by: FAMILY MEDICINE

## 2020-12-07 PROCEDURE — G8484 FLU IMMUNIZE NO ADMIN: HCPCS | Performed by: FAMILY MEDICINE

## 2020-12-07 PROCEDURE — 1036F TOBACCO NON-USER: CPT | Performed by: FAMILY MEDICINE

## 2020-12-07 PROCEDURE — G8417 CALC BMI ABV UP PARAM F/U: HCPCS | Performed by: FAMILY MEDICINE

## 2020-12-07 PROCEDURE — 3017F COLORECTAL CA SCREEN DOC REV: CPT | Performed by: FAMILY MEDICINE

## 2020-12-07 RX ORDER — AMOXICILLIN AND CLAVULANATE POTASSIUM 875; 125 MG/1; MG/1
1 TABLET, FILM COATED ORAL 2 TIMES DAILY
Qty: 20 TABLET | Refills: 0 | Status: SHIPPED | OUTPATIENT
Start: 2020-12-07 | End: 2020-12-17

## 2020-12-07 ASSESSMENT — ENCOUNTER SYMPTOMS
DIARRHEA: 0
BACK PAIN: 0
SINUS PRESSURE: 1
SORE THROAT: 0
NAUSEA: 0
COUGH: 0
ABDOMINAL PAIN: 0
CHEST TIGHTNESS: 0
EYE PAIN: 0
VOMITING: 0
WHEEZING: 0
BLOOD IN STOOL: 0
RHINORRHEA: 0
CONSTIPATION: 0
SHORTNESS OF BREATH: 0

## 2020-12-07 NOTE — PROGRESS NOTES
Subjective:      Patient ID: Sesar Motley is a 63 y.o. female. HPI  Pt here for a check up. Reviewed BMI of 31. Encouraged diet, exercise and weight loss. Reviewed health maintenance. Reviewed recent labs. Mild low B12, rest labs are stable. Headaches, dizziness, wonders about sinus. Single, non smoker, pmh reviewed. Review of Systems   Constitutional: Negative for chills, fatigue, fever and unexpected weight change. HENT: Positive for sinus pressure. Negative for congestion, ear pain, rhinorrhea and sore throat. Eyes: Negative for pain and visual disturbance. Respiratory: Negative for cough, chest tightness, shortness of breath and wheezing. Cardiovascular: Negative for chest pain and palpitations. Gastrointestinal: Negative for abdominal pain, blood in stool, constipation, diarrhea, nausea and vomiting. Genitourinary: Negative for difficulty urinating, frequency, hematuria and urgency. Musculoskeletal: Negative for back pain, joint swelling, myalgias and neck pain. Skin: Negative for rash. Neurological: Positive for dizziness and headaches. Hematological: Negative for adenopathy. Does not bruise/bleed easily. Psychiatric/Behavioral: Negative for behavioral problems and sleep disturbance. The patient is not nervous/anxious. Objective:   Physical Exam  Vitals signs and nursing note reviewed. Constitutional:       Appearance: She is well-developed. HENT:      Head: Normocephalic and atraumatic. Right Ear: External ear normal.      Left Ear: External ear normal.      Nose: Nose normal.      Mouth/Throat:      Mouth: Mucous membranes are moist.   Eyes:      Pupils: Pupils are equal, round, and reactive to light. Neck:      Musculoskeletal: Neck supple. Thyroid: No thyromegaly. Vascular: No carotid bruit. Cardiovascular:      Rate and Rhythm: Normal rate and regular rhythm. Heart sounds: Normal heart sounds.    Pulmonary:      Breath sounds: Normal breath sounds. No wheezing or rales. Abdominal:      General: Bowel sounds are normal.      Palpations: Abdomen is soft. Tenderness: There is no abdominal tenderness. There is no guarding or rebound. Musculoskeletal: Normal range of motion. Lymphadenopathy:      Cervical: No cervical adenopathy. Skin:     General: Skin is warm and dry. Findings: No rash. Neurological:      Mental Status: She is alert and oriented to person, place, and time. Cranial Nerves: No cranial nerve deficit. Deep Tendon Reflexes: Reflexes are normal and symmetric. Health Maintenance   Topic Date Due    DTaP/Tdap/Td vaccine (1 - Tdap) 02/02/1978    Cervical cancer screen  02/02/1980    Shingles Vaccine (1 of 2) 02/02/2009    Flu vaccine (1) 09/01/2020    Breast cancer screen  03/11/2021    Annual Wellness Visit (AWV)  09/03/2021    Lipid screen  02/27/2024    Colon cancer screen colonoscopy  10/29/2024    Hepatitis C screen  Completed    HIV screen  Completed    Hepatitis A vaccine  Aged Out    Hepatitis B vaccine  Aged Out    Hib vaccine  Aged Out    Meningococcal (ACWY) vaccine  Aged Out    Pneumococcal 0-64 years Vaccine  Aged Out     Results for orders placed or performed in visit on 12/07/20   POCT Urinalysis No Micro (Auto)   Result Value Ref Range    Glucose, Ur Negative NEGATIVE mg/dl    Bilirubin Urine Negative     Ketones, Urine Negative NEGATIVE    Specific Gravity, Urine <= 1.005 1.002 - 1.030    Blood, UA POC Negative NEGATIVE    pH, Urine 5.50 5.0 - 9.0    Protein, Urine Negative NEGATIVE mg/dl    Urobilinogen, Urine 0.20 0.0 - 1.0 eu/dl    Nitrite, Urine Negative NEGATIVE    Leukocyte Clumps, Urine Negative NEGATIVE    Color, Urine Yellow YELLOW-STRAW    Character, Urine Clear [id number]       Assessment:      B12 deficiency  MD  Dysuria  Acute bacterial sinusitis      Plan:      Add 1000 mcg daily  Continue present meds  Augmentin 875 mg BID x 10 days  Urine is clear.

## 2020-12-09 NOTE — TELEPHONE ENCOUNTER
Spoke with Chaparrita Banuelos who requested a letter so she could have a  dog in her apartment complex.  Letter completed and sent to [address]
97.4

## 2020-12-14 ENCOUNTER — VIRTUAL VISIT (OUTPATIENT)
Dept: PSYCHIATRY | Age: 60
End: 2020-12-14
Payer: MEDICARE

## 2020-12-14 PROCEDURE — 99443 PR PHYS/QHP TELEPHONE EVALUATION 21-30 MIN: CPT | Performed by: NURSE PRACTITIONER

## 2020-12-14 RX ORDER — HYDROXYZINE PAMOATE 25 MG/1
25 CAPSULE ORAL 3 TIMES DAILY PRN
Qty: 30 CAPSULE | Refills: 2 | Status: SHIPPED | OUTPATIENT
Start: 2020-12-14 | End: 2021-03-08 | Stop reason: SDUPTHER

## 2020-12-14 RX ORDER — DIVALPROEX SODIUM 500 MG/1
500 TABLET, EXTENDED RELEASE ORAL NIGHTLY
Qty: 30 TABLET | Refills: 2 | Status: SHIPPED | OUTPATIENT
Start: 2020-12-14 | End: 2021-03-08 | Stop reason: SDUPTHER

## 2020-12-14 NOTE — PROGRESS NOTES
[facility] PROFESSIONAL SERVS  [facility]  [address] [facility]  [address]  Dept: [phone]  Dept Fax: [phone]: [phone]    Visit Date: 12/14/2020    TELEPSYCHIATRY VISIT -- Audio ONLY (During GPQUA- public health emergency)     This session was conducted as a telepsychiatry visit due to one or more of the following COVID-19 risk factors being present in this patient:  · The patient is aged 61 or older  · The patient reports chronic health problems  · The patient reports immune suppressed or immune compromised status  · The patient reports being at risk or potentially exposed to the virus     Sesar Motley is a 63 y.o. female being evaluated by a Virtual Visit (audio only visit) encounter to address concerns as mentioned  below. A caregiver was present when appropriate. Pursuant to the emergency declaration under the [address] [place] [address] and the [employer] Act, this Virtual Visit was conducted with patient's (and/or legal guardian's) consent, to reduce the patient's risk of exposure to COVID-19 and provide necessary medical care. The patient (and/or legal guardian) has also been advised to contact this office for worsening conditions or problems, and seek emergency medical treatment and/or call 911 if deemed necessary. Services were provided through a telephone discussion virtually to substitute for in-person clinic visit. Patient and provider were located at their individual homes. SUBJECTIVE DATA     CHIEF COMPLAINT:    Chief Complaint   Patient presents with   [address] Problem    Follow-up       History obtained from: patient    HISTORY OF PRESENT ILLNESS:    Sesar Motley is a 63 y.o. female who presents via telephone call for follow-up on her reports of irritability, mood swings, and depression.  She requested telephone call in place of office visit or virtual video visit due to inability to access video visit. Doing well overall    Her boyfriend passed away on Oct. 31, 2020  -he had cancer and was very ill  -she sent a sympathy card to his family  -she did call him and leave him a message but she was unable to see him before his death  -states \"that was pretty sad\"    Misses walking  -states she is afraid to go outside because of the weather    Watching a lot of movies  -enjoys watching the movies  -states \"I'm a movie buff. I enjoy it. \"    Has some good friends she relies on    Followed up with PCP regarding abnormal labs  -he suggested no changes with her depression medication per her report    Denies suicidal ideations, intent, plan. No homicidal ideations, intent, plan. No audiovisual hallucinations.       HPI    Adverse reactions from psychotropic medications:  None      Current Psychiatric Review of Systems         Joanne or Hypomania:  no     Panic Attacks:  no     Phobias:  no     Obsessions and Compulsions:  no     Body or Vocal Tics:  no     Hallucinations:  no     Delusions:  no    SOCIAL HISTORY:  Patient was born in [facility] and raised by her biological parents      Social History     Socioeconomic History    Marital status: Single     Spouse name: Not on file    Number of children: 0    Years of education: Not on file    Highest education level: Not on file   Occupational History    Not on file   Social Needs    Financial resource strain: Not on file    Food insecurity     Worry: Not on file     Inability: Not on file   [employer] needs     Medical: Not on file     Non-medical: Not on file   Tobacco Use    Smoking status: Never Smoker    Smokeless tobacco: Never Used   Substance and Sexual Activity    Alcohol use: No    Drug use: No    Sexual activity: Never   Lifestyle    Physical activity     Days per week: Not on file     Minutes per session: Not on file    Stress: Not on file   Relationships    Social Reason    divalproex (DEPAKOTE ER) 500 MG extended release tablet REORDER    hydrOXYzine (VISTARIL) 25 MG capsule REORDER       Additional orders:  Orders Placed This Encounter   Procedures    CBC Auto Differential     Patient is doing very well with her medications. Reporting mood is well controlled and denies any side effects. Patient will continue her current medications without changes. Have labs completed as ordered. Patient encouraged to actively participate in individual psychotherapy. Patient is encouraged to use deep breathing, meditation, guided imagery, muscle relaxation, calming music, and journaling. Risks, potential side effects, possible drug-drug interactions, benefits and alternate treatments discussed in detail. All questions answered. Patient stated understanding and is agreeable to treatment plan. Patient has been instructed to seek emergency help via the emergency and/or calling 911 should symptoms become severe, worsen, or with other concerning symptoms. Patient instructed to go immediately to the emergency room and/or call 911 with any suicidal or homicidal ideations or if audio/visual hallucinations develop  Patient stated understanding and agrees. Patient given crisis center information. I spent 25 minutes with patient on the telephone. Provider Signature:  Electronically signed by BENJA Hester      **This report has been created using voice recognition software. It may contain minor errors which are inherent in voice recognition technology. **

## 2021-01-04 DIAGNOSIS — G71.00 MD (MUSCULAR DYSTROPHY) (HCC): ICD-10-CM

## 2021-01-04 RX ORDER — HYDROCODONE BITARTRATE AND ACETAMINOPHEN 5; 325 MG/1; MG/1
1 TABLET ORAL EVERY 6 HOURS PRN
Qty: 100 TABLET | Refills: 0 | Status: SHIPPED | OUTPATIENT
Start: 2021-01-04 | End: 2021-02-03 | Stop reason: SDUPTHER

## 2021-01-04 NOTE — TELEPHONE ENCOUNTER
Patient called to request a refill of Norco.  She was last seen in the office on 12/07/20. She uses [facility].   Call her back at [phone] only if any problems

## 2021-01-04 NOTE — TELEPHONE ENCOUNTER
ep'ed norco to pharm requested      Controlled Substance Monitoring:    Acute and Chronic Pain Monitoring:   RX Monitoring 1/4/2021   Attestation -   Periodic Controlled Substance Monitoring No signs of potential drug abuse or diversion identified.

## 2021-01-13 ENCOUNTER — HOSPITAL ENCOUNTER (OUTPATIENT)
Age: 61
Discharge: HOME | End: 2021-01-13
Payer: MEDICARE

## 2021-01-13 LAB
ALP BLD-CCNC: 66 U/L (ref 38–126)
ALT SERPL-CCNC: 15 U/L (ref 11–66)
AST SERPL-CCNC: 22 U/L (ref 5–40)
BASOPHILS # BLD: 0.8 %
BASOPHILS ABSOLUTE: 0 THOU/MM3 (ref 0–0.1)
BUN BLDV-MCNC: 15 MG/DL (ref 7–22)
C-REACTIVE PROTEIN: < 0.03 MG/DL (ref 0–1)
CREAT SERPL-MCNC: 0.9 MG/DL (ref 0.4–1.2)
EOSINOPHIL # BLD: 1.4 %
EOSINOPHILS ABSOLUTE: 0.1 THOU/MM3 (ref 0–0.4)
ERYTHROCYTE [DISTWIDTH] IN BLOOD BY AUTOMATED COUNT: 14.1 % (ref 11.5–14.5)
ERYTHROCYTE [DISTWIDTH] IN BLOOD BY AUTOMATED COUNT: 53.2 FL (ref 35–45)
GFR SERPL CREATININE-BSD FRML MDRD: 63 ML/MIN/1.73M2
HCT VFR BLD CALC: 42.3 % (ref 37–47)
HEMOGLOBIN: 13.4 GM/DL (ref 12–16)
IMMATURE GRANS (ABS): 0.02 THOU/MM3 (ref 0–0.07)
IMMATURE GRANULOCYTES: 0.4 %
LYMPHOCYTES # BLD: 26.9 %
LYMPHOCYTES ABSOLUTE: 1.3 THOU/MM3 (ref 1–4.8)
MCH RBC QN AUTO: 32.2 PG (ref 26–33)
MCHC RBC AUTO-ENTMCNC: 31.7 GM/DL (ref 32.2–35.5)
MCV RBC AUTO: 101.7 FL (ref 81–99)
MONOCYTES # BLD: 7.9 %
MONOCYTES ABSOLUTE: 0.4 THOU/MM3 (ref 0.4–1.3)
NUCLEATED RED BLOOD CELLS: 0 /100 WBC
PLATELET # BLD: 148 THOU/MM3 (ref 130–400)
PMV BLD AUTO: 12.1 FL (ref 9.4–12.4)
RBC # BLD: 4.16 MILL/MM3 (ref 4.2–5.4)
SEDIMENTATION RATE, ERYTHROCYTE: 5 MM/HR (ref 0–20)
SEG NEUTROPHILS: 62.6 %
SEGMENTED NEUTROPHILS ABSOLUTE COUNT: 3.1 THOU/MM3 (ref 1.8–7.7)
WBC # BLD: 4.9 THOU/MM3 (ref 4.8–10.8)

## 2021-01-13 PROCEDURE — 85651 RBC SED RATE NONAUTOMATED: CPT

## 2021-01-13 PROCEDURE — 36415 COLL VENOUS BLD VENIPUNCTURE: CPT

## 2021-01-13 PROCEDURE — 84520 ASSAY OF UREA NITROGEN: CPT

## 2021-01-13 PROCEDURE — 85025 COMPLETE CBC W/AUTO DIFF WBC: CPT

## 2021-01-13 PROCEDURE — 82565 ASSAY OF CREATININE: CPT

## 2021-01-13 PROCEDURE — 84460 ALANINE AMINO (ALT) (SGPT): CPT

## 2021-01-13 PROCEDURE — 86140 C-REACTIVE PROTEIN: CPT

## 2021-01-13 PROCEDURE — 84075 ASSAY ALKALINE PHOSPHATASE: CPT

## 2021-01-13 PROCEDURE — 84450 TRANSFERASE (AST) (SGOT): CPT

## 2021-01-20 ENCOUNTER — TELEPHONE (OUTPATIENT)
Dept: FAMILY MEDICINE CLINIC | Age: 61
End: 2021-01-20

## 2021-01-20 NOTE — LETTER
Getachew Schreiber MD  [address] [facility] [address]  Phone: [phone]  Fax: [phone]        January 20, 2021     [address] [facility] [address] [place] [address]      Dear Ron Oh:    Our records indicate that you are due for a mammogram.    In the [place], one in nine women will develop breast cancer during their lifetime. While there is no way to prevent breast cancer, early detection provides the best opportunity for curing it. For women over the age of 36, the [address] [place] [address] Ave recommends a yearly clinical breast exam and a yearly mammogram. These practices have saved thousands of lives. We need your help to ensure that you are receiving optimal medical care. Please make an appointment for a mammogram at your earliest convenience.     Sincerely,        Getachew Schreiber MD

## 2021-02-03 DIAGNOSIS — G71.00 MD (MUSCULAR DYSTROPHY) (HCC): ICD-10-CM

## 2021-02-03 RX ORDER — HYDROCODONE BITARTRATE AND ACETAMINOPHEN 5; 325 MG/1; MG/1
1 TABLET ORAL EVERY 6 HOURS PRN
Qty: 100 TABLET | Refills: 0 | Status: SHIPPED | OUTPATIENT
Start: 2021-02-03 | End: 2021-03-01 | Stop reason: SDUPTHER

## 2021-02-03 NOTE — TELEPHONE ENCOUNTER
Patient called to request a refill of Norco .  Her last appt was 12/07/20. She uses [facility].   Call her back at [phone] if any problems

## 2021-02-03 NOTE — TELEPHONE ENCOUNTER
ep'ed norco to pharm requested      Controlled Substance Monitoring:    Acute and Chronic Pain Monitoring:   RX Monitoring 2/3/2021   Attestation -   Periodic Controlled Substance Monitoring No signs of potential drug abuse or diversion identified.

## 2021-03-01 ENCOUNTER — OFFICE VISIT (OUTPATIENT)
Dept: FAMILY MEDICINE CLINIC | Age: 61
End: 2021-03-01
Payer: MEDICARE

## 2021-03-01 VITALS
HEART RATE: 74 BPM | TEMPERATURE: 96 F | SYSTOLIC BLOOD PRESSURE: 132 MMHG | RESPIRATION RATE: 14 BRPM | WEIGHT: 186 LBS | BODY MASS INDEX: 30.86 KG/M2 | DIASTOLIC BLOOD PRESSURE: 70 MMHG

## 2021-03-01 DIAGNOSIS — N30.00 ACUTE CYSTITIS WITHOUT HEMATURIA: Primary | ICD-10-CM

## 2021-03-01 DIAGNOSIS — G71.00 MD (MUSCULAR DYSTROPHY) (HCC): ICD-10-CM

## 2021-03-01 DIAGNOSIS — R39.9 UTI SYMPTOMS: ICD-10-CM

## 2021-03-01 DIAGNOSIS — M06.9 RHEUMATOID ARTHRITIS, INVOLVING UNSPECIFIED SITE, UNSPECIFIED WHETHER RHEUMATOID FACTOR PRESENT (HCC): ICD-10-CM

## 2021-03-01 DIAGNOSIS — F32.1 CURRENT MODERATE EPISODE OF MAJOR DEPRESSIVE DISORDER WITHOUT PRIOR EPISODE (HCC): ICD-10-CM

## 2021-03-01 LAB
BILIRUBIN, POC: NEGATIVE
BLOOD URINE, POC: NEGATIVE
CLARITY, POC: CLEAR
COLOR, POC: YELLOW
GLUCOSE URINE, POC: NEGATIVE
KETONES, POC: NEGATIVE
LEUKOCYTE EST, POC: NEGATIVE
NITRITE, POC: NORMAL
PH, POC: 5.5
PROTEIN, POC: NEGATIVE
SPECIFIC GRAVITY, POC: 1.01
UROBILINOGEN, POC: NORMAL

## 2021-03-01 PROCEDURE — G8417 CALC BMI ABV UP PARAM F/U: HCPCS | Performed by: FAMILY MEDICINE

## 2021-03-01 PROCEDURE — 81003 URINALYSIS AUTO W/O SCOPE: CPT | Performed by: FAMILY MEDICINE

## 2021-03-01 PROCEDURE — 1036F TOBACCO NON-USER: CPT | Performed by: FAMILY MEDICINE

## 2021-03-01 PROCEDURE — G8427 DOCREV CUR MEDS BY ELIG CLIN: HCPCS | Performed by: FAMILY MEDICINE

## 2021-03-01 PROCEDURE — 3017F COLORECTAL CA SCREEN DOC REV: CPT | Performed by: FAMILY MEDICINE

## 2021-03-01 PROCEDURE — G8484 FLU IMMUNIZE NO ADMIN: HCPCS | Performed by: FAMILY MEDICINE

## 2021-03-01 PROCEDURE — 99213 OFFICE O/P EST LOW 20 MIN: CPT | Performed by: FAMILY MEDICINE

## 2021-03-01 RX ORDER — HYDROCODONE BITARTRATE AND ACETAMINOPHEN 5; 325 MG/1; MG/1
1 TABLET ORAL EVERY 6 HOURS PRN
Qty: 100 TABLET | Refills: 0 | Status: SHIPPED | OUTPATIENT
Start: 2021-03-05 | End: 2021-04-05 | Stop reason: SDUPTHER

## 2021-03-01 RX ORDER — NITROFURANTOIN 25; 75 MG/1; MG/1
100 CAPSULE ORAL 2 TIMES DAILY
Qty: 20 CAPSULE | Refills: 0 | Status: SHIPPED
Start: 2021-03-01 | End: 2021-03-03 | Stop reason: ALTCHOICE

## 2021-03-01 ASSESSMENT — ENCOUNTER SYMPTOMS
NAUSEA: 0
ABDOMINAL PAIN: 1
EYE PAIN: 0
RHINORRHEA: 0
BLOOD IN STOOL: 0
VOMITING: 0
DIARRHEA: 0
WHEEZING: 0
BACK PAIN: 0
CHEST TIGHTNESS: 0
SHORTNESS OF BREATH: 0
CONSTIPATION: 0
COUGH: 0
SORE THROAT: 0

## 2021-03-01 NOTE — PROGRESS NOTES
Av Lincoln (:  1960) is a 57 y.o. female,Established patient, here for evaluation of the following chief complaint(s):  Urinary Tract Infection (burning, bloating )      ASSESSMENT/PLAN:  1. Acute cystitis without hematuria  -     Culture, Urine  -     nitrofurantoin, macrocrystal-monohydrate, (MACROBID) 100 MG capsule; Take 1 capsule by mouth 2 times daily for 10 days, Disp-20 capsule, R-0Normal  2. UTI symptoms  -     POCT Urinalysis No Micro (Auto)  -     Culture, Urine  -     nitrofurantoin, macrocrystal-monohydrate, (MACROBID) 100 MG capsule; Take 1 capsule by mouth 2 times daily for 10 days, Disp-20 capsule, R-0Normal  3. MD (muscular dystrophy) ([facility]  -     HYDROcodone-acetaminophen (NORCO) 5-325 MG per tablet; Take 1 tablet by mouth every 6 hours as needed for Pain for up to 30 days. G71.0, Disp-100 tablet, R-0Normal  4. Rheumatoid arthritis, involving unspecified site, unspecified whether rheumatoid factor present ([facility] [address] [facility]   -stable, sees rheumatology  5. Current moderate episode of major depressive disorder without prior episode ([address]   -stable on depakote.  -call if not improving    No follow-ups on file. SUBJECTIVE/OBJECTIVE:  HPI  Pt here for a check up. Reviewed BMI of 31. Encouraged diet, exercise and weight loss. 5 days of UTI sxs. Progressively worsening. Suprapubic pressure. Single, non smoker, pmh reviewed. Review of Systems   Constitutional: Negative for chills, fatigue, fever and unexpected weight change. HENT: Negative for congestion, ear pain, rhinorrhea and sore throat. Eyes: Negative for pain and visual disturbance. Respiratory: Negative for cough, chest tightness, shortness of breath and wheezing. Cardiovascular: Negative for chest pain and palpitations. Gastrointestinal: Positive for abdominal pain. Negative for blood in stool, constipation, diarrhea, nausea and vomiting. Genitourinary: Positive for difficulty urinating.  Negative for frequency, hematuria and urgency. Musculoskeletal: Negative for back pain, joint swelling, myalgias and neck pain. Skin: Negative for rash. Neurological: Negative for dizziness and headaches. Hematological: Negative for adenopathy. Does not bruise/bleed easily. Psychiatric/Behavioral: Negative for behavioral problems and sleep disturbance. The patient is not nervous/anxious. Physical Exam  Vitals signs and nursing note reviewed. Constitutional:       Appearance: She is well-developed. HENT:      Head: Normocephalic and atraumatic. Right Ear: External ear normal.      Left Ear: External ear normal.      Nose: Nose normal.      Mouth/Throat:      Mouth: Mucous membranes are moist.   Eyes:      Pupils: Pupils are equal, round, and reactive to light. Neck:      Musculoskeletal: Neck supple. Thyroid: No thyromegaly. Cardiovascular:      Rate and Rhythm: Normal rate and regular rhythm. Heart sounds: Normal heart sounds. Pulmonary:      Breath sounds: Normal breath sounds. No wheezing or rales. Abdominal:      General: Bowel sounds are normal.      Palpations: Abdomen is soft. Tenderness: There is abdominal tenderness in the suprapubic area. There is no guarding or rebound. Musculoskeletal: Normal range of motion. Lymphadenopathy:      Cervical: No cervical adenopathy. Skin:     General: Skin is warm and dry. Findings: No rash. Neurological:      Mental Status: She is alert and oriented to person, place, and time. Cranial Nerves: No cranial nerve deficit. Deep Tendon Reflexes: Reflexes are normal and symmetric.        Results for orders placed or performed in visit on 03/01/21   POCT Urinalysis No Micro (Auto)   Result Value Ref Range    Color, UA yellow     Clarity, UA clear     Glucose, UA POC negative     Bilirubin, UA negative     Ketones, UA negative     Spec Grav, UA 1.010     Blood, UA POC negative     pH, UA 5.5     Protein, UA POC negative     Urobilinogen, UA 0.2E.U./dL     Leukocytes, UA negative     Nitrite, UA moderate                  An electronic signature was used to authenticate this note.     --Amber Barros MD

## 2021-03-01 NOTE — PATIENT INSTRUCTIONS
Patient Education        Urinary Tract Infection in Women: Care Instructions  Your Care Instructions     A urinary tract infection, or UTI, is a general term for an infection anywhere between the kidneys and the urethra (where urine comes out). Most UTIs are bladder infections. They often cause pain or burning when you urinate. UTIs are caused by bacteria and can be cured with antibiotics. Be sure to complete your treatment so that the infection goes away. Follow-up care is a key part of your treatment and safety. Be sure to make and go to all appointments, and call your doctor if you are having problems. It's also a good idea to know your test results and keep a list of the medicines you take. How can you care for yourself at home? · Take your antibiotics as directed. Do not stop taking them just because you feel better. You need to take the full course of antibiotics. · Drink extra water and other fluids for the next day or two. This may help wash out the bacteria that are causing the infection. (If you have kidney, heart, or liver disease and have to limit fluids, talk with your doctor before you increase your fluid intake.)  · Avoid drinks that are carbonated or have caffeine. They can irritate the bladder. · Urinate often. Try to empty your bladder each time. · To relieve pain, take a hot bath or lay a heating pad set on low over your lower belly or genital area. Never go to sleep with a heating pad in place. To prevent UTIs  · Drink plenty of water each day. This helps you urinate often, which clears bacteria from your system. (If you have kidney, heart, or liver disease and have to limit fluids, talk with your doctor before you increase your fluid intake.)  · Urinate when you need to. · Urinate right after you have sex. · Change sanitary pads often. · Avoid douches, bubble baths, feminine hygiene sprays, and other feminine hygiene products that have deodorants.   · After going to the bathroom, wipe from front to back. When should you call for help? Call your doctor now or seek immediate medical care if:    · Symptoms such as fever, chills, nausea, or vomiting get worse or appear for the first time.     · You have new pain in your back just below your rib cage. This is called flank pain.     · There is new blood or pus in your urine.     · You have any problems with your antibiotic medicine. Watch closely for changes in your health, and be sure to contact your doctor if:    · You are not getting better after taking an antibiotic for 2 days.     · Your symptoms go away but then come back. Where can you learn more? Go to https://Key [email or web address] [email or web address].Telller. org and sign in to your YapStone account. Enter N015 in the Nivela box to learn more about \"Urinary Tract Infection in Women: Care Instructions. \"     If you do not have an account, please click on the \"Sign Up Now\" link. Current as of: June 29, 2020               Content Version: 12.6  © 2006-2020 [employer]. Care instructions adapted under license by [facility] ([facility]). If you have questions about a medical condition or this instruction, always ask your healthcare professional. [place] [address] any warranty or liability for your use of this information. 100% of the time

## 2021-03-02 LAB
ORGANISM: ABNORMAL
URINE CULTURE, ROUTINE: ABNORMAL

## 2021-03-03 ENCOUNTER — TELEPHONE (OUTPATIENT)
Dept: FAMILY MEDICINE CLINIC | Age: 61
End: 2021-03-03

## 2021-03-03 DIAGNOSIS — B96.89 UTI DUE TO KLEBSIELLA SPECIES: Primary | ICD-10-CM

## 2021-03-03 DIAGNOSIS — N39.0 UTI DUE TO KLEBSIELLA SPECIES: Primary | ICD-10-CM

## 2021-03-03 RX ORDER — AMOXICILLIN AND CLAVULANATE POTASSIUM 875; 125 MG/1; MG/1
1 TABLET, FILM COATED ORAL 2 TIMES DAILY
Qty: 20 TABLET | Refills: 0 | Status: SHIPPED | OUTPATIENT
Start: 2021-03-03 | End: 2021-03-13

## 2021-03-03 RX ORDER — CIPROFLOXACIN 500 MG/1
500 TABLET, FILM COATED ORAL 2 TIMES DAILY
Qty: 20 TABLET | Refills: 0 | Status: SHIPPED | OUTPATIENT
Start: 2021-03-03 | End: 2021-03-03 | Stop reason: ALTCHOICE

## 2021-03-03 NOTE — TELEPHONE ENCOUNTER
----- Message from Mackey Duane, MD sent at 3/3/2021  6:56 AM EST -----  Urine is growing klebsiella. Macrobid is intermediate. Recommend stop macrobid, most sensitive to cipro,  Will switch to that and send in. To [facility].

## 2021-03-03 NOTE — TELEPHONE ENCOUNTER
Venkatesh Hester from [facility] left VM regarding cipro rx, it is triggering a contraindication with her plaquenil, called to ask if still ok to fill.

## 2021-03-08 ENCOUNTER — VIRTUAL VISIT (OUTPATIENT)
Dept: PSYCHIATRY | Age: 61
End: 2021-03-08
Payer: MEDICARE

## 2021-03-08 ENCOUNTER — TELEPHONE (OUTPATIENT)
Dept: FAMILY MEDICINE CLINIC | Age: 61
End: 2021-03-08

## 2021-03-08 DIAGNOSIS — B37.31 VAGINAL CANDIDIASIS: Primary | ICD-10-CM

## 2021-03-08 DIAGNOSIS — F34.0 CYCLOTHYMIC DISORDER: Primary | ICD-10-CM

## 2021-03-08 DIAGNOSIS — Z51.81 THERAPEUTIC DRUG MONITORING: ICD-10-CM

## 2021-03-08 PROCEDURE — 99214 OFFICE O/P EST MOD 30 MIN: CPT | Performed by: NURSE PRACTITIONER

## 2021-03-08 PROCEDURE — 90833 PSYTX W PT W E/M 30 MIN: CPT | Performed by: NURSE PRACTITIONER

## 2021-03-08 PROCEDURE — G8417 CALC BMI ABV UP PARAM F/U: HCPCS | Performed by: NURSE PRACTITIONER

## 2021-03-08 PROCEDURE — G8427 DOCREV CUR MEDS BY ELIG CLIN: HCPCS | Performed by: NURSE PRACTITIONER

## 2021-03-08 PROCEDURE — G8484 FLU IMMUNIZE NO ADMIN: HCPCS | Performed by: NURSE PRACTITIONER

## 2021-03-08 PROCEDURE — 3017F COLORECTAL CA SCREEN DOC REV: CPT | Performed by: NURSE PRACTITIONER

## 2021-03-08 PROCEDURE — 1036F TOBACCO NON-USER: CPT | Performed by: NURSE PRACTITIONER

## 2021-03-08 RX ORDER — FLUCONAZOLE 150 MG/1
TABLET ORAL
Qty: 2 TABLET | Refills: 0 | Status: SHIPPED | OUTPATIENT
Start: 2021-03-08 | End: 2021-03-09

## 2021-03-08 RX ORDER — HYDROXYZINE PAMOATE 25 MG/1
25 CAPSULE ORAL 3 TIMES DAILY PRN
Qty: 30 CAPSULE | Refills: 2 | Status: SHIPPED | OUTPATIENT
Start: 2021-03-08 | End: 2021-06-10 | Stop reason: SDUPTHER

## 2021-03-08 RX ORDER — DIVALPROEX SODIUM 500 MG/1
500 TABLET, EXTENDED RELEASE ORAL NIGHTLY
Qty: 30 TABLET | Refills: 2 | Status: SHIPPED | OUTPATIENT
Start: 2021-03-08 | End: 2021-06-10 | Stop reason: SDUPTHER
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
## 2021-03-08 NOTE — TELEPHONE ENCOUNTER
UTI symptoms clearing well but thinks antibiotic given at 3/1/21 visit for cystitis is causing yeast vaginitis because she has been itching there all weekend (has been using OTC Miconazole 7 cream which is making it worse). Wants Rx oral med for the yeast at [facility]. Pls advise. Pls call pt at [phone] to advise.

## 2021-03-31 ENCOUNTER — OFFICE VISIT (OUTPATIENT)
Dept: FAMILY MEDICINE CLINIC | Age: 61
End: 2021-03-31
Payer: MEDICARE

## 2021-03-31 VITALS
DIASTOLIC BLOOD PRESSURE: 66 MMHG | OXYGEN SATURATION: 98 % | BODY MASS INDEX: 30.82 KG/M2 | WEIGHT: 185.8 LBS | SYSTOLIC BLOOD PRESSURE: 112 MMHG | TEMPERATURE: 97.2 F | HEART RATE: 68 BPM | RESPIRATION RATE: 16 BRPM

## 2021-03-31 DIAGNOSIS — B37.31 VAGINAL CANDIDIASIS: ICD-10-CM

## 2021-03-31 DIAGNOSIS — N30.00 ACUTE CYSTITIS WITHOUT HEMATURIA: Primary | ICD-10-CM

## 2021-03-31 DIAGNOSIS — R39.9 UTI SYMPTOMS: ICD-10-CM

## 2021-03-31 LAB
BILIRUBIN URINE: NEGATIVE
BLOOD URINE, POC: NEGATIVE
CHARACTER, URINE: CLEAR
COLOR, URINE: ABNORMAL
GLUCOSE URINE: NEGATIVE MG/DL
KETONES, URINE: NEGATIVE
LEUKOCYTE CLUMPS, URINE: ABNORMAL
NITRITE, URINE: NEGATIVE
PH, URINE: 5 (ref 5–9)
PROTEIN, URINE: NEGATIVE MG/DL
SPECIFIC GRAVITY, URINE: <= 1.005 (ref 1–1.03)
UROBILINOGEN, URINE: 0.2 EU/DL (ref 0–1)

## 2021-03-31 PROCEDURE — G8427 DOCREV CUR MEDS BY ELIG CLIN: HCPCS | Performed by: FAMILY MEDICINE

## 2021-03-31 PROCEDURE — 1036F TOBACCO NON-USER: CPT | Performed by: FAMILY MEDICINE

## 2021-03-31 PROCEDURE — 99213 OFFICE O/P EST LOW 20 MIN: CPT | Performed by: FAMILY MEDICINE

## 2021-03-31 PROCEDURE — 81003 URINALYSIS AUTO W/O SCOPE: CPT | Performed by: FAMILY MEDICINE

## 2021-03-31 PROCEDURE — G8484 FLU IMMUNIZE NO ADMIN: HCPCS | Performed by: FAMILY MEDICINE

## 2021-03-31 PROCEDURE — G8417 CALC BMI ABV UP PARAM F/U: HCPCS | Performed by: FAMILY MEDICINE

## 2021-03-31 PROCEDURE — 3017F COLORECTAL CA SCREEN DOC REV: CPT | Performed by: FAMILY MEDICINE

## 2021-03-31 RX ORDER — FLUCONAZOLE 150 MG/1
TABLET ORAL
Qty: 2 TABLET | Refills: 0 | Status: SHIPPED | OUTPATIENT
Start: 2021-03-31 | End: 2021-04-01

## 2021-03-31 RX ORDER — CEFUROXIME AXETIL 500 MG/1
500 TABLET ORAL 2 TIMES DAILY
Qty: 20 TABLET | Refills: 0 | Status: SHIPPED | OUTPATIENT
Start: 2021-03-31 | End: 2021-04-10

## 2021-03-31 ASSESSMENT — ENCOUNTER SYMPTOMS
BLOOD IN STOOL: 0
SORE THROAT: 0
NAUSEA: 0
VOMITING: 0
BACK PAIN: 1
SHORTNESS OF BREATH: 0
COUGH: 0
WHEEZING: 0
ABDOMINAL PAIN: 1
CONSTIPATION: 0
DIARRHEA: 0
CHEST TIGHTNESS: 0
RHINORRHEA: 0
EYE PAIN: 0
ABDOMINAL DISTENTION: 1

## 2021-03-31 NOTE — PROGRESS NOTES
Fausto Lambertolic (:  1960) is a 57 y.o. female,Established patient, here for evaluation of the following chief complaint(s):  Urinary Tract Infection (bloated, cramping, back pain, headache, chills)      ASSESSMENT/PLAN:  1. Acute cystitis without hematuria  -     cefUROXime (CEFTIN) 500 MG tablet; Take 1 tablet by mouth 2 times daily for 10 days, Disp-20 tablet, R-0Normal  2. UTI symptoms  -     POCT Urinalysis No Micro (Auto)  -     cefUROXime (CEFTIN) 500 MG tablet; Take 1 tablet by mouth 2 times daily for 10 days, Disp-20 tablet, R-0Normal  3. Vaginal candidiasis  -     fluconazole (DIFLUCAN) 150 MG tablet; Take 1 tablet weekly x 2., Disp-2 tablet, R-0Normal  -call if not improving. No follow-ups on file. SUBJECTIVE/OBJECTIVE:  HPI  Pt here for a check up. Reviewed BMI of 31. Encouraged diet, exercise and weight loss. 3 days of bloated, cramping, back pain, headaches, chills. Finished augmentin. Had klebsiella UTI beginning of March. Single, non smoker, pmh reviewed. Review of Systems   Constitutional: Positive for chills. Negative for fatigue, fever and unexpected weight change. HENT: Negative for congestion, ear pain, rhinorrhea and sore throat. Eyes: Negative for pain and visual disturbance. Respiratory: Negative for cough, chest tightness, shortness of breath and wheezing. Cardiovascular: Negative for chest pain and palpitations. Gastrointestinal: Positive for abdominal distention and abdominal pain. Negative for blood in stool, constipation, diarrhea, nausea and vomiting. Genitourinary: Negative for difficulty urinating, frequency, hematuria and urgency. Musculoskeletal: Positive for back pain. Negative for joint swelling, myalgias and neck pain. Skin: Negative for rash. Neurological: Positive for headaches. Negative for dizziness. Hematological: Negative for adenopathy. Does not bruise/bleed easily.    Psychiatric/Behavioral: Negative for behavioral problems and sleep disturbance. The patient is not nervous/anxious. Physical Exam  Vitals signs and nursing note reviewed. Constitutional:       Appearance: She is well-developed. HENT:      Head: Normocephalic and atraumatic. Right Ear: External ear normal.      Left Ear: External ear normal.      Nose: Nose normal.      Mouth/Throat:      Mouth: Mucous membranes are moist.   Eyes:      Pupils: Pupils are equal, round, and reactive to light. Neck:      Musculoskeletal: Neck supple. Thyroid: No thyromegaly. Cardiovascular:      Rate and Rhythm: Normal rate and regular rhythm. Heart sounds: Normal heart sounds. Pulmonary:      Breath sounds: Normal breath sounds. No wheezing or rales. Abdominal:      General: Bowel sounds are normal.      Palpations: Abdomen is soft. Tenderness: There is abdominal tenderness in the suprapubic area. There is no right CVA tenderness, left CVA tenderness, guarding or rebound. Musculoskeletal: Normal range of motion. Lymphadenopathy:      Cervical: No cervical adenopathy. Skin:     General: Skin is warm and dry. Findings: No rash. Neurological:      Mental Status: She is alert and oriented to person, place, and time. Cranial Nerves: No cranial nerve deficit. Deep Tendon Reflexes: Reflexes are normal and symmetric.        Results for orders placed or performed in visit on 03/31/21   POCT Urinalysis No Micro (Auto)   Result Value Ref Range    Glucose, Ur Negative NEGATIVE mg/dl    Bilirubin Urine Negative     Ketones, Urine Negative NEGATIVE    Specific Gravity, Urine <= 1.005 1.002 - 1.030    Blood, UA POC Negative NEGATIVE    pH, Urine 5.00 5.0 - 9.0    Protein, Urine Negative NEGATIVE mg/dl    Urobilinogen, Urine 0.20 0.0 - 1.0 eu/dl    Nitrite, Urine Negative NEGATIVE    Leukocyte Clumps, Urine Trace (A) NEGATIVE    Color, Urine Light yellow [id number]    Character, Urine Clear [id number]               An electronic signature was used to authenticate this note.     --Meliton Walter MD

## 2021-03-31 NOTE — PATIENT INSTRUCTIONS
live cultures. It has bacteria called lactobacillus. It may help prevent some types of yeast infections. · Keep your skin clean and dry. Put powder on moist places. · If you are using a cream or suppository to treat a vaginal yeast infection, don't use condoms or a diaphragm. Use a different type of birth control. · Eat a healthy diet and get regular exercise. This will help keep your immune system strong. When should you call for help? Watch closely for changes in your health, and be sure to contact your doctor if:    · You do not get better as expected. Where can you learn more? Go to [email or web address]://[email or web address].[email or web address] [email or web address] and sign in to your [employer]omatrica account. Enter S027 in the "[employer] box to learn more about \"Candidiasis: Care Instructions. \"     If you do not have an account, please click on the \"Sign Up Now\" link. Current as of: July 17, 2020               Content Version: 12.8  © 2006-2021 Healthwise, [facility]. Care instructions adapted under license by [facility] ([facility]). If you have questions about a medical condition or this instruction, always ask your healthcare professional. [place] [address] any warranty or liability for your use of this information.

## 2021-04-05 DIAGNOSIS — G71.00 MD (MUSCULAR DYSTROPHY) (HCC): ICD-10-CM

## 2021-04-05 RX ORDER — HYDROCODONE BITARTRATE AND ACETAMINOPHEN 5; 325 MG/1; MG/1
1 TABLET ORAL EVERY 6 HOURS PRN
Qty: 100 TABLET | Refills: 0 | Status: SHIPPED | OUTPATIENT
Start: 2021-04-05 | End: 2021-05-04 | Stop reason: SDUPTHER

## 2021-04-05 NOTE — TELEPHONE ENCOUNTER
ep'ed norco to pharm requested    Controlled Substance Monitoring:    Acute and Chronic Pain Monitoring:   RX Monitoring 4/5/2021   Attestation -   Periodic Controlled Substance Monitoring No signs of potential drug abuse or diversion identified.

## 2021-04-09 ENCOUNTER — TELEPHONE (OUTPATIENT)
Dept: FAMILY MEDICINE CLINIC | Age: 61
End: 2021-04-09

## 2021-04-09 DIAGNOSIS — G71.00 MD (MUSCULAR DYSTROPHY) (HCC): ICD-10-CM

## 2021-04-09 NOTE — TELEPHONE ENCOUNTER
Patient called to request a Rx for a new parking placard. She would like to have it faxed to [facility] [address] [facility] at [phone].   Call Amy Cardona back at [phone] to let her know

## 2021-04-12 NOTE — TELEPHONE ENCOUNTER
Pt called back. Staff at [facility] is telling her she needs a new one this month. Radha Harris

## 2021-04-14 ENCOUNTER — TELEPHONE (OUTPATIENT)
Dept: FAMILY MEDICINE CLINIC | Age: 61
End: 2021-04-14

## 2021-04-26 ENCOUNTER — HOSPITAL ENCOUNTER (OUTPATIENT)
Dept: MAMMOGRAPHY | Age: 61
Discharge: HOME | End: 2021-04-26
Payer: MEDICARE

## 2021-04-26 DIAGNOSIS — Z12.39 BREAST SCREENING: ICD-10-CM

## 2021-04-26 PROCEDURE — 77067 SCR MAMMO BI INCL CAD: CPT

## 2021-05-04 DIAGNOSIS — G71.00 MD (MUSCULAR DYSTROPHY) (HCC): ICD-10-CM

## 2021-05-04 RX ORDER — HYDROCODONE BITARTRATE AND ACETAMINOPHEN 5; 325 MG/1; MG/1
1 TABLET ORAL EVERY 6 HOURS PRN
Qty: 100 TABLET | Refills: 0 | Status: SHIPPED | OUTPATIENT
Start: 2021-05-05 | End: 2021-06-02 | Stop reason: SDUPTHER

## 2021-05-04 NOTE — TELEPHONE ENCOUNTER
ep'ed norco to pharm requested      Controlled Substance Monitoring:    Acute and Chronic Pain Monitoring:   RX Monitoring 5/4/2021   Attestation -   Periodic Controlled Substance Monitoring No signs of potential drug abuse or diversion identified.

## 2021-05-04 NOTE — TELEPHONE ENCOUNTER
Patient called to request a refill of Norco.  She was last seen in the office on 3/31/21. She uses [facility].   Call her back at [phone] if any problems

## 2021-06-02 DIAGNOSIS — G71.00 MD (MUSCULAR DYSTROPHY) (HCC): ICD-10-CM

## 2021-06-02 RX ORDER — HYDROCODONE BITARTRATE AND ACETAMINOPHEN 5; 325 MG/1; MG/1
1 TABLET ORAL EVERY 6 HOURS PRN
Qty: 100 TABLET | Refills: 0 | Status: SHIPPED | OUTPATIENT
Start: 2021-06-02 | End: 2021-07-01 | Stop reason: SDUPTHER

## 2021-06-02 NOTE — TELEPHONE ENCOUNTER
ep'ed norco to pharm requested      Controlled Substance Monitoring:    Acute and Chronic Pain Monitoring:   RX Monitoring 6/2/2021   Attestation -   Periodic Controlled Substance Monitoring No signs of potential drug abuse or diversion identified.

## 2021-06-02 NOTE — TELEPHONE ENCOUNTER
Tally Kind needs refill of   Requested Prescriptions     Pending Prescriptions Disp Refills    HYDROcodone-acetaminophen (NORCO) 5-325 MG per tablet 100 tablet 0     Sig: Take 1 tablet by mouth every 6 hours as needed for Pain for up to 30 days.  G71.0       Last Filled on:  5/5/21     Last Visit Date:  3/31/2021    Next Visit Date:    Visit date not found

## 2021-06-07 ENCOUNTER — HOSPITAL ENCOUNTER (OUTPATIENT)
Age: 61
Discharge: HOME | End: 2021-06-07
Payer: MEDICARE

## 2021-06-07 DIAGNOSIS — F34.0 CYCLOTHYMIC DISORDER: ICD-10-CM

## 2021-06-07 DIAGNOSIS — Z51.81 THERAPEUTIC DRUG MONITORING: ICD-10-CM

## 2021-06-07 LAB — VALPROIC ACID LEVEL: 54.3 UG/ML (ref 50–100)

## 2021-06-07 PROCEDURE — 80164 ASSAY DIPROPYLACETIC ACD TOT: CPT

## 2021-06-07 PROCEDURE — 36415 COLL VENOUS BLD VENIPUNCTURE: CPT

## 2021-06-10 ENCOUNTER — OFFICE VISIT (OUTPATIENT)
Dept: PSYCHIATRY | Age: 61
End: 2021-06-10
Payer: MEDICARE

## 2021-06-10 DIAGNOSIS — F34.0 CYCLOTHYMIC DISORDER: Primary | ICD-10-CM

## 2021-06-10 PROCEDURE — G8417 CALC BMI ABV UP PARAM F/U: HCPCS | Performed by: NURSE PRACTITIONER

## 2021-06-10 PROCEDURE — 1036F TOBACCO NON-USER: CPT | Performed by: NURSE PRACTITIONER

## 2021-06-10 PROCEDURE — G8427 DOCREV CUR MEDS BY ELIG CLIN: HCPCS | Performed by: NURSE PRACTITIONER

## 2021-06-10 PROCEDURE — 3017F COLORECTAL CA SCREEN DOC REV: CPT | Performed by: NURSE PRACTITIONER

## 2021-06-10 PROCEDURE — 99214 OFFICE O/P EST MOD 30 MIN: CPT | Performed by: NURSE PRACTITIONER

## 2021-06-10 RX ORDER — DIVALPROEX SODIUM 500 MG/1
500 TABLET, EXTENDED RELEASE ORAL NIGHTLY
Qty: 30 TABLET | Refills: 2 | Status: SHIPPED | OUTPATIENT
Start: 2021-06-10 | End: 2021-09-09 | Stop reason: SDUPTHER

## 2021-06-10 RX ORDER — HYDROXYZINE PAMOATE 25 MG/1
25 CAPSULE ORAL 3 TIMES DAILY PRN
Qty: 30 CAPSULE | Refills: 2 | Status: SHIPPED | OUTPATIENT
Start: 2021-06-10 | End: 2021-09-09 | Stop reason: SDUPTHER
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
## 2021-07-01 DIAGNOSIS — G71.00 MD (MUSCULAR DYSTROPHY) (HCC): ICD-10-CM

## 2021-07-01 RX ORDER — HYDROCODONE BITARTRATE AND ACETAMINOPHEN 5; 325 MG/1; MG/1
1 TABLET ORAL EVERY 6 HOURS PRN
Qty: 100 TABLET | Refills: 0 | Status: SHIPPED | OUTPATIENT
Start: 2021-07-01 | End: 2021-08-02 | Stop reason: SDUPTHER

## 2021-07-01 NOTE — TELEPHONE ENCOUNTER
Medication ep'ed to requested pharmacy. PDMP Monitoring:    Last PDMP Anupam Neighbours as Reviewed [facility]):  Review User Review Instant Review Result   FRANKSANDY STEVEN 7/1/2021  9:30 AM Reviewed PDMP [1]     [unfilled]  Urine Drug Screenings (1 yr)    No resulted procedures found.        Medication Contract and Consent for Opioid Use Documents Filed      No documents found

## 2021-07-01 NOTE — TELEPHONE ENCOUNTER
Deepthi Marie needs refill of   Requested Prescriptions     Pending Prescriptions Disp Refills    HYDROcodone-acetaminophen (NORCO) 5-325 MG per tablet 100 tablet 0     Sig: Take 1 tablet by mouth every 6 hours as needed for Pain for up to 30 days.  G71.0       Last Filled on:  06/02/2021    Last Visit Date:  3/31/2021    Next Visit Date:    Visit date not found

## 2021-08-02 DIAGNOSIS — G71.00 MD (MUSCULAR DYSTROPHY) (HCC): ICD-10-CM

## 2021-08-02 RX ORDER — HYDROCODONE BITARTRATE AND ACETAMINOPHEN 5; 325 MG/1; MG/1
1 TABLET ORAL EVERY 6 HOURS PRN
Qty: 100 TABLET | Refills: 0 | Status: SHIPPED | OUTPATIENT
Start: 2021-08-02 | End: 2021-09-02 | Stop reason: SDUPTHER

## 2021-08-02 NOTE — TELEPHONE ENCOUNTER
Jose Peabody needs refill of   Requested Prescriptions     Pending Prescriptions Disp Refills    HYDROcodone-acetaminophen (NORCO) 5-325 MG per tablet 100 tablet 0     Sig: Take 1 tablet by mouth every 6 hours as needed for Pain for up to 30 days. G71.0       Last Filled on:  7/1/21    Last Visit Date: 3/31/2021 UTI    Next Visit Date:  Visit date not found    Pt has #2-3 left.   Pls call pt at [place] [phone]    Zip: [phone]

## 2021-08-02 NOTE — TELEPHONE ENCOUNTER
ep'ed norco to pharm requested    Controlled Substance Monitoring:    Acute and Chronic Pain Monitoring:   RX Monitoring 8/2/2021   Attestation -   Periodic Controlled Substance Monitoring No signs of potential drug abuse or diversion identified.

## 2021-08-14 NOTE — TELEPHONE ENCOUNTER
Jason Roth called into the office stating that she is experienced some increased anxiety for the past 3 weeks she said that \"something happened and I have a lot going on in my life. \" She would not go into detail about the things going on. She reports that she has been doing well for a long time and that she just feels she needs a medication to help with her anxiety at this time. She said that she only takes 1 pill from this office and its Depakote; she said that she takes it to manage her depression. She repeated mutliple times that she has been doing well for a long time; but she just needs something a little extra to help her during this time; she also requested to speak with this provider directly to talk her through something if she is unable to prescribe a anxiety medication. She said that she is not in active counseling but she does not a  whom she has had for 3 years; she enjoys her. Please advise. She does not return until 09/29/20. Speaking Coherently

## 2021-09-02 DIAGNOSIS — G71.00 MD (MUSCULAR DYSTROPHY) (HCC): ICD-10-CM

## 2021-09-02 RX ORDER — HYDROCODONE BITARTRATE AND ACETAMINOPHEN 5; 325 MG/1; MG/1
1 TABLET ORAL EVERY 6 HOURS PRN
Qty: 100 TABLET | Refills: 0 | Status: SHIPPED | OUTPATIENT
Start: 2021-09-02 | End: 2021-09-30 | Stop reason: SDUPTHER

## 2021-09-02 NOTE — TELEPHONE ENCOUNTER
ep'ed norco to pharm requested      Controlled Substance Monitoring:    Acute and Chronic Pain Monitoring:   RX Monitoring 9/2/2021   Attestation -   Periodic Controlled Substance Monitoring No signs of potential drug abuse or diversion identified.

## 2021-09-02 NOTE — TELEPHONE ENCOUNTER
Gilmar Rousseau needs refill of   Requested Prescriptions     Pending Prescriptions Disp Refills    HYDROcodone-acetaminophen (NORCO) 5-325 MG per tablet 100 tablet 0     Sig: Take 1 tablet by mouth every 6 hours as needed for Pain for up to 30 days. G71.0       Last Filled on:  8/2/21    Last Visit Date: 3/31/2021 cystitis    Next Visit Date:  Visit date not found    Pt has #3 left. Pls call pt at [phone] only if probs.     Zip: [phone]

## 2021-09-09 ENCOUNTER — OFFICE VISIT (OUTPATIENT)
Dept: PSYCHIATRY | Age: 61
End: 2021-09-09
Payer: MEDICARE

## 2021-09-09 DIAGNOSIS — F34.0 CYCLOTHYMIC DISORDER: Primary | ICD-10-CM

## 2021-09-09 DIAGNOSIS — Z51.81 THERAPEUTIC DRUG MONITORING: ICD-10-CM

## 2021-09-09 PROCEDURE — G8417 CALC BMI ABV UP PARAM F/U: HCPCS | Performed by: NURSE PRACTITIONER

## 2021-09-09 PROCEDURE — 1036F TOBACCO NON-USER: CPT | Performed by: NURSE PRACTITIONER

## 2021-09-09 PROCEDURE — 99214 OFFICE O/P EST MOD 30 MIN: CPT | Performed by: NURSE PRACTITIONER

## 2021-09-09 PROCEDURE — 3017F COLORECTAL CA SCREEN DOC REV: CPT | Performed by: NURSE PRACTITIONER

## 2021-09-09 PROCEDURE — G8427 DOCREV CUR MEDS BY ELIG CLIN: HCPCS | Performed by: NURSE PRACTITIONER

## 2021-09-09 RX ORDER — DIVALPROEX SODIUM 500 MG/1
500 TABLET, EXTENDED RELEASE ORAL NIGHTLY
Qty: 30 TABLET | Refills: 2 | Status: SHIPPED | OUTPATIENT
Start: 2021-09-09 | End: 2021-12-09 | Stop reason: SDUPTHER

## 2021-09-09 RX ORDER — HYDROXYZINE PAMOATE 25 MG/1
25 CAPSULE ORAL 3 TIMES DAILY PRN
Qty: 30 CAPSULE | Refills: 2 | Status: SHIPPED | OUTPATIENT
Start: 2021-09-09 | End: 2021-12-09 | Stop reason: SDUPTHER
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
## 2021-09-30 DIAGNOSIS — G71.00 MUSCULAR DYSTROPHY (HCC): ICD-10-CM

## 2021-09-30 DIAGNOSIS — G71.00 MD (MUSCULAR DYSTROPHY) (HCC): ICD-10-CM

## 2021-09-30 RX ORDER — GABAPENTIN 300 MG/1
CAPSULE ORAL
Qty: 60 CAPSULE | Refills: 0 | Status: SHIPPED | OUTPATIENT
Start: 2021-09-30 | End: 2021-10-04 | Stop reason: SDUPTHER

## 2021-09-30 RX ORDER — HYDROCODONE BITARTRATE AND ACETAMINOPHEN 5; 325 MG/1; MG/1
1 TABLET ORAL EVERY 6 HOURS PRN
Qty: 100 TABLET | Refills: 0 | Status: SHIPPED | OUTPATIENT
Start: 2021-09-30 | End: 2021-11-01 | Stop reason: SDUPTHER

## 2021-09-30 NOTE — TELEPHONE ENCOUNTER
Patient has an appt for her AWV on Monday 10/04/21, but she will be out of Norco and Gabapentin before then and can't come in tomorrow for her appt. Can she get a short term supply? She uses [facility].   Call her back at [phone] only if any problems

## 2021-09-30 NOTE — TELEPHONE ENCOUNTER
ep'ed requested meds to pharm requested      Controlled Substance Monitoring:    Acute and Chronic Pain Monitoring:   RX Monitoring 9/30/2021   Attestation -   Periodic Controlled Substance Monitoring No signs of potential drug abuse or diversion identified.

## 2021-10-04 ENCOUNTER — OFFICE VISIT (OUTPATIENT)
Dept: FAMILY MEDICINE CLINIC | Age: 61
End: 2021-10-04
Payer: MEDICARE

## 2021-10-04 VITALS
BODY MASS INDEX: 31.16 KG/M2 | TEMPERATURE: 97 F | SYSTOLIC BLOOD PRESSURE: 120 MMHG | DIASTOLIC BLOOD PRESSURE: 70 MMHG | HEART RATE: 71 BPM | HEIGHT: 65 IN | OXYGEN SATURATION: 95 % | WEIGHT: 187 LBS | RESPIRATION RATE: 18 BRPM

## 2021-10-04 DIAGNOSIS — Z00.00 ROUTINE GENERAL MEDICAL EXAMINATION AT A HEALTH CARE FACILITY: Primary | ICD-10-CM

## 2021-10-04 DIAGNOSIS — Z23 NEED FOR INFLUENZA VACCINATION: ICD-10-CM

## 2021-10-04 DIAGNOSIS — G71.00 MUSCULAR DYSTROPHY (HCC): ICD-10-CM

## 2021-10-04 PROCEDURE — G0439 PPPS, SUBSEQ VISIT: HCPCS | Performed by: FAMILY MEDICINE

## 2021-10-04 PROCEDURE — 90674 CCIIV4 VAC NO PRSV 0.5 ML IM: CPT | Performed by: FAMILY MEDICINE

## 2021-10-04 PROCEDURE — G8482 FLU IMMUNIZE ORDER/ADMIN: HCPCS | Performed by: FAMILY MEDICINE

## 2021-10-04 PROCEDURE — G0008 ADMIN INFLUENZA VIRUS VAC: HCPCS | Performed by: FAMILY MEDICINE

## 2021-10-04 PROCEDURE — 3017F COLORECTAL CA SCREEN DOC REV: CPT | Performed by: FAMILY MEDICINE

## 2021-10-04 RX ORDER — GABAPENTIN 300 MG/1
CAPSULE ORAL
Qty: 60 CAPSULE | Refills: 11 | Status: SHIPPED | OUTPATIENT
Start: 2021-10-04 | End: 2022-10-26 | Stop reason: SDUPTHER

## 2021-10-04 SDOH — ECONOMIC STABILITY: FOOD INSECURITY: WITHIN THE PAST 12 MONTHS, YOU WORRIED THAT YOUR FOOD WOULD RUN OUT BEFORE YOU GOT MONEY TO BUY MORE.: NEVER TRUE

## 2021-10-04 SDOH — ECONOMIC STABILITY: FOOD INSECURITY: WITHIN THE PAST 12 MONTHS, THE FOOD YOU BOUGHT JUST DIDN'T LAST AND YOU DIDN'T HAVE MONEY TO GET MORE.: NEVER TRUE

## 2021-10-04 ASSESSMENT — PATIENT HEALTH QUESTIONNAIRE - PHQ9
SUM OF ALL RESPONSES TO PHQ QUESTIONS 1-9: 0
SUM OF ALL RESPONSES TO PHQ9 QUESTIONS 1 & 2: 0
2. FEELING DOWN, DEPRESSED OR HOPELESS: 0
1. LITTLE INTEREST OR PLEASURE IN DOING THINGS: 0

## 2021-10-04 ASSESSMENT — SOCIAL DETERMINANTS OF HEALTH (SDOH): HOW HARD IS IT FOR YOU TO PAY FOR THE VERY BASICS LIKE FOOD, HOUSING, MEDICAL CARE, AND HEATING?: NOT HARD AT ALL

## 2021-10-04 ASSESSMENT — LIFESTYLE VARIABLES: HOW OFTEN DO YOU HAVE A DRINK CONTAINING ALCOHOL: 0

## 2021-10-04 NOTE — PROGRESS NOTES
Medicare Annual Wellness Visit  Name: Ladi Hancock Date: 10/4/2021   MRN: [id number] Sex: Female   Age: 57 y.o. Ethnicity: Non- / Non    : 1960 Race: White (non-)      Nano Valdes is here for Medicare AWV    Screenings for behavioral, psychosocial and functional/safety risks, and cognitive dysfunction are all negative except as indicated below. These results, as well as other patient data from the [address] form, are documented in Flowsheets linked to this Encounter. Allergies   Allergen Reactions    Oxycontin [Oxycodone Hcl] Other (See Comments)     \"too strong - mental confusion\"    Percocet [Oxycodone-Acetaminophen] Other (See Comments)     \"too strong. I don't like them. \"    Bactrim Rash    Sulfa Antibiotics Rash         Prior to Visit Medications    Medication Sig Taking? Authorizing Provider   gabapentin (NEURONTIN) 300 MG capsule Take 1 capsule by mouth 2 times daily, G71.00 Yes Ciro Bravo MD   [facility]. Devices (WALKER) MISC Rollator, rolling walker with seats, brakes,  Basket. G71.00 Yes Ciro Bravo MD   HYDROcodone-acetaminophen (NORCO) 5-325 MG per tablet Take 1 tablet by mouth every 6 hours as needed for Pain for up to 30 days. G71.00 Yes Ciro Bravo MD   divalproex (DEPAKOTE ER) 500 MG extended release tablet Take 1 tablet by mouth nightly Yes LUBA Hahn CNP   hydrOXYzine (VISTARIL) 25 MG capsule Take 1 capsule by mouth 3 times daily as needed for Anxiety Yes LUBA Hahn CNP   Handicap Placard MISC by Does not apply route Request parking placard due to medical conditions. Duration of 5 years. Yes Ciro Bravo MD   hydroxychloroquine (PLAQUENIL) 200 MG tablet Take 1 tablet by mouth 2 times daily Yes Ciro Bravo MD   folic acid (FOLVITE) 1 MG tablet Take 1 mg by mouth Yes Historical Provider, MD   omeprazole (PRILOSEC) 40 MG capsule Take 1 capsule by mouth daily.   Patient taking differently: Take 20 mg by mouth daily. Yes Toi Miller MD   methotrexate (RHEUMATREX) 2.5 MG chemo tablet Take 2.5 mg by mouth once a week. 6 tabs once weekly Yes Historical Provider, MD         Past Medical History:   Diagnosis Date    B12 deficiency 12/2020    Depression     Lymphedema 2008    both legs    MD (muscular dystrophy) ([facility] [address] 2008    [facility]re [facility] - no lung involvement per patient    Neuropathy     Obesity (BMI 30-39. 9)     Rheumatoid arthritis(714.0) 2008    Dr. Renee Munroe [facility] Superficial thrombophlebitis 03/2018    right lower extremity    Venous insufficiency     h/o SVT - 3-4 in the past (has never been on Coumadin)       Past Surgical History:   Procedure Laterality Date    COLONOSCOPY      HYSTERECTOMY  2002    JOINT REPLACEMENT Left 2008    knee    JOINT REPLACEMENT Right 09/10/2013    right    TOTAL KNEE ARTHROPLASTY Left 6/08    TOTAL KNEE ARTHROPLASTY Right 09/10/2013    VARICOSE VEIN SURGERY Right 0    laser         Family History   Problem Relation Age of Onset    Cancer Mother         lung    Dementia Father     Other Father         vericose veins    Emphysema Father     Heart Disease Father     Bipolar Disorder Sister     Bipolar Disorder Brother        CareTeam (Including outside providers/suppliers regularly involved in providing care):   Patient Care Team:  Toi Miller MD as PCP - General (Family Medicine)  Toi Miller MD as PCP - [facility] Empaneled Provider    Wt Readings from Last 3 Encounters:   10/04/21 187 lb (84.8 kg)   03/31/21 185 lb 12.8 oz (84.3 kg)   03/01/21 186 lb (84.4 kg)     Vitals:    10/04/21 1523   BP: 120/70   Pulse: 71   Resp: 18   Temp: 97 °F (36.1 °C)   TempSrc: Infrared   SpO2: 95%   Weight: 187 lb (84.8 kg)   Height: 5' 4.8\" (1.646 m)     Body mass index is 31.31 kg/m². Based upon direct observation of the patient, evaluation of cognition reveals recent and remote memory intact.     General Appearance: alert and oriented to person, place and time, well developed and well- nourished, in no acute distress  Skin: warm and dry, no rash or erythema  Head: normocephalic and atraumatic  Eyes: pupils equal, round, and reactive to light, extraocular eye movements intact, conjunctivae normal  ENT: tympanic membrane, external ear and ear canal normal bilaterally, nose without deformity, nasal mucosa and turbinates normal without polyps  Neck: supple and non-tender without mass, no thyromegaly or thyroid nodules, no cervical lymphadenopathy  Pulmonary/Chest: clear to auscultation bilaterally- no wheezes, rales or rhonchi, normal air movement, no respiratory distress  Cardiovascular: normal rate, regular rhythm, normal S1 and S2, no murmurs, rubs, clicks, or gallops, distal pulses intact, no carotid bruits  Abdomen: soft, non-tender, non-distended, normal bowel sounds, no masses or organomegaly  Extremities: no cyanosis, clubbing or edema  Musculoskeletal: normal range of motion, no joint swelling, deformity or tenderness  Neurologic: reflexes normal and symmetric, no cranial nerve deficit, gait, coordination and speech normal    Patient's complete Health Risk Assessment and screening values have been reviewed and are found in Flowsheets. The following problems were reviewed today and where indicated follow up appointments were made and/or referrals ordered.     Positive Risk Factor Screenings with Interventions:           Health Habits/Nutrition:  Health Habits/Nutrition  Do you exercise for at least 20 minutes 2-3 times per week?: Yes  Have you lost any weight without trying in the past 3 months?: No  Do you eat only one meal per day?: No  Have you seen the dentist within the past year?: (!) No  Body mass index: (!) 31.31  Health Habits/Nutrition Interventions:  · Nutritional issues:  educational materials for healthy, well-balanced diet provided  · Dental exam overdue:  patient encouraged to make appointment with his/her dentist    Hearing/Vision:  No exam data present  Hearing/Vision  Do you or your family notice any trouble with your hearing that hasn't been managed with hearing aids?: No  Do you have difficulty driving, watching TV, or doing any of your daily activities because of your eyesight?: (!) Yes  Have you had an eye exam within the past year?: (!) No  Hearing/Vision Interventions:  · Vision concerns:  patient encouraged to make appointment with his/her eye specialist    Safety:  Safety  Do you have working smoke detectors?: Yes  Have all throw rugs been removed or fastened?: Yes  Do you have non-slip mats or surfaces in all bathtubs/showers?: Yes  Do all of your stairways have a railing or banister?: (!) No (one floor home)  Are your doorways, halls and stairs free of clutter?: Yes  Do you always fasten your seatbelt when you are in a car?: Yes  Safety Interventions:  · Home safety tips provided    ADL:  ADLs  In the past 7 days, did you need help from others to perform any of the following everyday activities? Eating, dressing, grooming, bathing, toileting, or walking/balance?: (!) Bathing, Dressing, Walking/Balance, Grooming  In the past 7 days, did you need help from others to take care of any of the following?  Laundry, housekeeping, banking/finances, shopping, telephone use, food preparation, transportation, or taking medications?: [employer], Housekeeping, [employer], Shopping, Food Preparation, Transportation  ADL Interventions:  · [facility] services    Personalized Preventive Plan   Current Health Maintenance Status  Immunization History   Administered Date(s) Administered    COVID-19, Moderna, PF, 100mcg/0.5mL 05/24/2021    Influenza Virus Vaccine 10/29/2015    Influenza, MDCK Quadv, IM, PF (Flucelvax 2 yrs and older) 10/04/2021    Influenza, Quadv, IM, (6 mo and older Fluzone, Flulaval, Fluarix and 3 yrs and older Afluria) 11/14/2016, 10/18/2017, 10/15/2018    Influenza, Quadv, IM, PF (6 mo and older Fluzone, Flulaval, Fluarix, and 3 yrs and older Afluria) 10/01/2019    Pneumococcal Polysaccharide ([id number]) 10/18/2017        Health Maintenance   Topic Date Due    DTaP/Tdap/Td vaccine (1 - Tdap) Never done    Shingles Vaccine (1 of 2) Never done   [employer] Visit (AWV)  Never done    COVID-19 Vaccine (2 - Moderna 2-dose series) 06/21/2021    Breast cancer screen  04/26/2023    Lipid screen  02/27/2024    Colon cancer screen colonoscopy  10/29/2024    Flu vaccine  Completed    Hepatitis C screen  Completed    HIV screen  Completed    Hepatitis A vaccine  Aged Out    Hepatitis B vaccine  Aged Out    Hib vaccine  Aged Out    Meningococcal (ACWY) vaccine  Aged Out    Pneumococcal 0-64 years Vaccine  Aged Out     Recommendations for [employer] Due: see orders and patient instructions/AVS.  . Recommended screening schedule for the next 5-10 years is provided to the patient in written form: see Patient Instructions/AVS.    [facility] was seen today for medicare awv. Diagnoses and all orders for this visit:    Routine general medical examination at a health care facility    Muscular dystrophy ([facility] [address]  -     gabapentin (NEURONTIN) 300 MG capsule; Take 1 capsule by mouth 2 times daily, G71.00  -     Misc. Devices (WALKER) MISC; Rollator, rolling walker with seats, brakes,  Basket. G71.00    Need for influenza vaccination  -     INFLUENZA, MDCK QUADV, 2 YRS AND OLDER, IM, PF, PREFILL SYR OR SDV, 0.5ML (FLUCELVAX QUADV, PF)            Controlled Substance Monitoring:    Acute and Chronic Pain Monitoring:   RX Monitoring 10/4/2021   Attestation -   Periodic Controlled Substance Monitoring Possible medication side effects, risk of tolerance/dependence & alternative treatments discussed. ;No signs of potential drug abuse or diversion identified.;Obtaining appropriate analgesic effect of treatment.            Bhargav Headley requires the assistance of a standard walker to successfully complete daily living tasks such as: bathing, toileting, dressing and grooming. A standard walker is necessary due to the patient's impaired ambulation and mobility restrictions, and can ambulate only by using a walker instead of a lesser assistive device, such as a cane or crutch.       Electronically signed by Asya Putnam MD on 10/4/2021 at 4:57 PM

## 2021-10-04 NOTE — PATIENT INSTRUCTIONS
Personalized Preventive Plan for Ernestine Amaya - 10/4/2021  Medicare offers a range of preventive health benefits. Some of the tests and screenings are paid in full while other may be subject to a deductible, co-insurance, and/or copay. Some of these benefits include a comprehensive review of your medical history including lifestyle, illnesses that may run in your family, and various assessments and screenings as appropriate. After reviewing your medical record and screening and assessments performed today your provider may have ordered immunizations, labs, imaging, and/or referrals for you. A list of these orders (if applicable) as well as your Preventive Care list are included within your After Visit Summary for your review. Other Preventive Recommendations:    · A preventive eye exam performed by an eye specialist is recommended every 1-2 years to screen for glaucoma; cataracts, macular degeneration, and other eye disorders. · A preventive dental visit is recommended every 6 months. · Try to get at least 150 minutes of exercise per week or 10,000 steps per day on a pedometer . · Order or download the FREE \"Exercise & Physical Activity: Your Everyday Guide\" from [employer]. Call 5-233.755.5254 or search [employer] online. · You need 5423-5477 mg of calcium and 3033-3586 IU of vitamin D per day. It is possible to meet your calcium requirement with diet alone, but a vitamin D supplement is usually necessary to meet this goal.  · When exposed to the sun, use a sunscreen that protects against both UVA and UVB radiation with an SPF of 30 or greater. Reapply every 2 to 3 hours or after sweating, drying off with a towel, or swimming. · Always wear a seat belt when traveling in a car. Always wear a helmet when riding a bicycle or motorcycle. Patient Education        Well Visit, Women 48 to 72: Care Instructions  Overview     Well visits can help you stay healthy.

## 2021-10-19 ENCOUNTER — TELEPHONE (OUTPATIENT)
Dept: FAMILY MEDICINE CLINIC | Age: 61
End: 2021-10-19

## 2021-10-19 NOTE — TELEPHONE ENCOUNTER
Received certificate of medical necessity for incontinence supplies. Pt states she uses the protective underwear at night, 3-4 per night. She changes frequently to avoid UTI. Pt uses pads, 5-7 daily, changes frequently to avoid UTI. Pt uses chux under herself at night and while sitting glo to leaks.     Form faxed to [address] [phone]

## 2021-11-01 DIAGNOSIS — G71.00 MD (MUSCULAR DYSTROPHY) (HCC): ICD-10-CM

## 2021-11-01 RX ORDER — HYDROCODONE BITARTRATE AND ACETAMINOPHEN 5; 325 MG/1; MG/1
1 TABLET ORAL EVERY 6 HOURS PRN
Qty: 100 TABLET | Refills: 0 | Status: SHIPPED | OUTPATIENT
Start: 2021-11-01 | End: 2021-12-01 | Stop reason: SDUPTHER

## 2021-11-01 NOTE — TELEPHONE ENCOUNTER
ep'ed norco to pharm requested    Controlled Substance Monitoring:    Acute and Chronic Pain Monitoring:   RX Monitoring 11/1/2021   Attestation -   Periodic Controlled Substance Monitoring No signs of potential drug abuse or diversion identified.

## 2021-12-01 DIAGNOSIS — G71.00 MD (MUSCULAR DYSTROPHY) (HCC): ICD-10-CM

## 2021-12-01 RX ORDER — HYDROCODONE BITARTRATE AND ACETAMINOPHEN 5; 325 MG/1; MG/1
1 TABLET ORAL EVERY 6 HOURS PRN
Qty: 100 TABLET | Refills: 0 | Status: SHIPPED | OUTPATIENT
Start: 2021-12-01 | End: 2021-12-30 | Stop reason: SDUPTHER

## 2021-12-01 NOTE — TELEPHONE ENCOUNTER
ep'ed norco to pharm requested      Controlled Substance Monitoring:    Acute and Chronic Pain Monitoring:   RX Monitoring 12/1/2021   Attestation -   Periodic Controlled Substance Monitoring No signs of potential drug abuse or diversion identified.

## 2021-12-09 ENCOUNTER — OFFICE VISIT (OUTPATIENT)
Dept: PSYCHIATRY | Age: 61
End: 2021-12-09
Payer: MEDICARE

## 2021-12-09 DIAGNOSIS — F34.0 CYCLOTHYMIC DISORDER: ICD-10-CM

## 2021-12-09 PROCEDURE — 1036F TOBACCO NON-USER: CPT | Performed by: NURSE PRACTITIONER

## 2021-12-09 PROCEDURE — 99213 OFFICE O/P EST LOW 20 MIN: CPT | Performed by: NURSE PRACTITIONER

## 2021-12-09 PROCEDURE — G8427 DOCREV CUR MEDS BY ELIG CLIN: HCPCS | Performed by: NURSE PRACTITIONER

## 2021-12-09 PROCEDURE — 3017F COLORECTAL CA SCREEN DOC REV: CPT | Performed by: NURSE PRACTITIONER

## 2021-12-09 PROCEDURE — G8482 FLU IMMUNIZE ORDER/ADMIN: HCPCS | Performed by: NURSE PRACTITIONER

## 2021-12-09 PROCEDURE — G8417 CALC BMI ABV UP PARAM F/U: HCPCS | Performed by: NURSE PRACTITIONER

## 2021-12-09 RX ORDER — HYDROXYZINE PAMOATE 25 MG/1
25 CAPSULE ORAL 3 TIMES DAILY PRN
Qty: 30 CAPSULE | Refills: 5 | Status: SHIPPED | OUTPATIENT
Start: 2021-12-09 | End: 2022-01-02

## 2021-12-09 RX ORDER — DIVALPROEX SODIUM 500 MG/1
500 TABLET, EXTENDED RELEASE ORAL NIGHTLY
Qty: 30 TABLET | Refills: 5 | Status: SHIPPED | OUTPATIENT
Start: 2021-12-09 | End: 2022-06-09 | Stop reason: SDUPTHER
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
## 2021-12-13 ENCOUNTER — TELEPHONE (OUTPATIENT)
Dept: FAMILY MEDICINE CLINIC | Age: 61
End: 2021-12-13

## 2021-12-13 NOTE — TELEPHONE ENCOUNTER
Pt is requesting a letter be faxed as she is having issues as she states the receipts have to be separate and she was having issues with other items on the receipts as she has to send someone else to buy them. The fax # is [phone]. The patient can be reached at [phone].

## 2021-12-13 NOTE — TELEPHONE ENCOUNTER
Received fax requesting new letter stating how much incontinent supplies cost Pt. She now has medicaid and we completed form in October so her insurance will cover them. How would she like us to proceed?

## 2021-12-14 ENCOUNTER — HOSPITAL ENCOUNTER (OUTPATIENT)
Age: 61
Discharge: HOME | End: 2021-12-14
Payer: MEDICARE

## 2021-12-14 ENCOUNTER — VIRTUAL VISIT (OUTPATIENT)
Dept: FAMILY MEDICINE CLINIC | Age: 61
End: 2021-12-14
Payer: MEDICARE

## 2021-12-14 DIAGNOSIS — U07.1 COVID-19: Primary | ICD-10-CM

## 2021-12-14 DIAGNOSIS — N18.30 STAGE 3 CHRONIC KIDNEY DISEASE, UNSPECIFIED WHETHER STAGE 3A OR 3B CKD (HCC): ICD-10-CM

## 2021-12-14 DIAGNOSIS — R68.83 CHILLS: ICD-10-CM

## 2021-12-14 DIAGNOSIS — U07.1 COVID-19: ICD-10-CM

## 2021-12-14 PROCEDURE — 99442 PR PHYS/QHP TELEPHONE EVALUATION 11-20 MIN: CPT | Performed by: FAMILY MEDICINE

## 2021-12-14 PROCEDURE — 87636 SARSCOV2 & INF A&B AMP PRB: CPT

## 2021-12-14 ASSESSMENT — PATIENT HEALTH QUESTIONNAIRE - PHQ9
1. LITTLE INTEREST OR PLEASURE IN DOING THINGS: 0
SUM OF ALL RESPONSES TO PHQ9 QUESTIONS 1 & 2: 0
SUM OF ALL RESPONSES TO PHQ QUESTIONS 1-9: 0
SUM OF ALL RESPONSES TO PHQ QUESTIONS 1-9: 0
2. FEELING DOWN, DEPRESSED OR HOPELESS: 0
SUM OF ALL RESPONSES TO PHQ QUESTIONS 1-9: 0

## 2021-12-14 NOTE — PROGRESS NOTES
Toño Garg is a 57 y.o. female evaluated via telephone on 12/14/2021. Consent:  She and/or health care decision maker is aware that that she may receive a bill for this telephone service, depending on her insurance coverage, and has provided verbal consent to proceed: Yes      Documentation:  I communicated with the patient and/or health care decision maker about positive home test for covid. Details of this discussion including any medical advice provided: Seen today due to what she thought was a sinus infection. Fatigue and chills. Her HHA gave her a home test and was positive. She is questioning this and wants to do a PCR test.  Recommend quarantine til results are known. I affirm this is a Patient Initiated Episode with a Patient who has not had a related appointment within my department in the past 7 days or scheduled within the next 24 hours. Patient identification was verified at the start of the visit: Yes    Total Time: minutes: 11-20 minutes    1. COVID-19  -quarantine til results are known, can use vit c, d, zinc  - COVID-19; Future    2. Stage 3 chronic kidney disease, unspecified whether stage 3a or 3b CKD (HCC)  -stable,   Lab Results   Component Value Date     11/23/2020    K 4.6 11/23/2020    CL 99 11/23/2020    CO2 28 11/23/2020    BUN 15 01/13/2021    CREATININE 0.9 01/13/2021    GLUCOSE 84 11/23/2020    CALCIUM 10.1 11/23/2020    PROT 6.7 11/23/2020    LABALBU 4.4 11/23/2020    BILITOT 0.4 11/23/2020    ALKPHOS 66 01/13/2021    AST 22 01/13/2021    ALT 15 01/13/2021    LABGLOM 63 (A) 01/13/2021           3. Chills    - COVID-19; Future      The visit was conducted pursuant to the emergency declaration under the [address] [place] [address], [address] [place] [address] authority and the [employer] General Act. Patient identification was verified, and a caregiver was present when appropriate.  The patient was located in a

## 2021-12-14 NOTE — PATIENT INSTRUCTIONS
Patient Education        Learning About Coronavirus [phone])  What is coronavirus (COVID-19)? COVID-19 is a disease caused by a type of coronavirus. This illness was first found in December 2019. It has since spread worldwide. Coronaviruses are a large group of viruses. They cause the common cold. They also cause more serious illnesses like Middle East respiratory syndrome (MERS) and severe acute respiratory syndrome (SARS). COVID-19 is caused by a novel coronavirus. That means it's a new type that has not been seen in people before. What are the symptoms? COVID-19 symptoms may include:  · Fever. · Cough. · Trouble breathing. · Chills or repeated shaking with chills. · Muscle and body aches. · Headache. · Sore throat. · New loss of taste or smell. · Vomiting. · Diarrhea. In severe cases, COVID-19 can cause pneumonia and make it hard to breathe without help from a machine. It can cause death. How is it diagnosed? COVID-19 is diagnosed with a viral test. This may also be called a PCR test or antigen test. It looks for evidence of the virus in your breathing passages or lungs (respiratory system). The test is most often done on a sample from the nose, throat, or lungs. It's sometimes done on a sample of saliva. One way a sample is collected is by putting a long swab into the back of your nose. How is it treated? Mild cases of COVID-19 can be treated at home. Serious cases need treatment in the hospital. Treatment may include medicines to reduce symptoms, plus breathing support such as oxygen therapy or a ventilator. Some people may be placed on their belly to help their oxygen levels. Treatments that may help people who have COVID-19 include:  Antiviral medicines. These medicines treat viral infections. Remdesivir is an example. Immune-based therapy. These medicines help the immune system fight COVID-19. Examples include monoclonal antibodies. Blood thinners.    These medicines help prevent blood clots. People with severe illness are at risk for blood clots. How can you protect yourself and others? The best way to protect yourself from getting sick is to:  · Get vaccinated. · Avoid sick people. · If you are not fully vaccinated:  ? Wear a mask if you have to go to public areas. ? Avoid crowds and try to stay at least 6 feet away from other people. · Cover your mouth with a tissue when you cough or sneeze. · Wash your hands often, especially after you cough or sneeze. Use soap and water, and scrub for at least 20 seconds. If soap and water aren't available, use an alcohol-based hand . · Avoid touching your mouth, nose, and eyes. To help avoid spreading the virus to others:  · Get vaccinated. · Cover your mouth with a tissue when you cough or sneeze. · Wash your hands often, especially after you cough or sneeze. Use soap and water, and scrub for at least 20 seconds. If soap and water aren't available, use an alcohol-based hand . · If you have been exposed to the virus and are not fully vaccinated:  ? Stay home. Don't go to school, work, or public areas. And don't use public transportation, ride-shares, or taxis unless you have no choice. ? Wear a mask if you have to go to public areas, like the pharmacy. · If you're sick:  ? Leave your home only if you need to get medical care. But call the doctor's office first so they know you're coming. And wear a mask. ? Wear a mask whenever you're around other people. ? Limit contact with pets and people in your home. If possible, stay in a separate bedroom and use a separate bathroom. ? Clean and disinfect your home every day. Use household  and disinfectant wipes or sprays. Take special care to clean things that you touch with your hands. How can you self-isolate when you have COVID-19? If you have COVID-19, there are things you can do to help avoid spreading the virus to others.   · Limit contact with people in your home. If possible, stay in a separate bedroom and use a separate bathroom. · Wear a mask when you are around other people. · If you have to leave home, avoid crowds and try to stay at least 6 feet away from other people. · Avoid contact with pets and other animals. · Cover your mouth and nose with a tissue when you cough or sneeze. Then throw it in the trash right away. · Wash your hands often, especially after you cough or sneeze. Use soap and water, and scrub for at least 20 seconds. If soap and water aren't available, use an alcohol-based hand . · Don't share personal household items. These include bedding, towels, cups and glasses, and eating utensils. · [address] in the warmest water allowed for the fabric type, and dry it completely. It's okay to wash other people's laundry with yours. · Clean and disinfect your home. Use household  and disinfectant wipes or sprays. When should you call for help? Call 911 anytime you think you may need emergency care. For example, call if you have life-threatening symptoms, such as:    · You have severe trouble breathing. (You can't talk at all.)     · You have constant chest pain or pressure.     · You are severely dizzy or lightheaded.     · You are confused or can't think clearly.     · You have pale, gray, or blue-colored skin or lips.     · You pass out (lose consciousness) or are very hard to wake up. Call your doctor now or seek immediate medical care if:    · You have moderate trouble breathing. (You can't speak a full sentence.)     · You are coughing up blood (more than about 1 teaspoon).     · You have signs of low blood pressure. These include feeling lightheaded; being too weak to stand; and having cold, pale, clammy skin.    Watch closely for changes in your health, and be sure to contact your doctor if:    · Your symptoms get worse.     · You are not getting better as expected.     · You have new or worse symptoms of anxiety, depression, nightmares, or flashbacks. Call before you go to the doctor's office. Follow their instructions. And wear a mask. Current as of: July 1, 2021               Content Version: 13.0  © 2006-2021 Healthwise, Incorporated. Care instructions adapted under license by [facility] [place] ([facility]). If you have questions about a medical condition or this instruction, always ask your healthcare professional. [place] [address] any warranty or liability for your use of this information.

## 2021-12-15 ENCOUNTER — TELEPHONE (OUTPATIENT)
Dept: FAMILY MEDICINE CLINIC | Age: 61
End: 2021-12-15

## 2021-12-15 LAB
INFLUENZA A: NOT DETECTED
INFLUENZA B: NOT DETECTED
SARS-COV-2 RNA, RT PCR: DETECTED

## 2021-12-15 NOTE — TELEPHONE ENCOUNTER
----- Message from Aleksandra Browne MD sent at 12/15/2021  1:04 PM EST -----  Notify covid is positive. Quarantine for 10 days from start of sxs. How is she doing?

## 2021-12-15 NOTE — TELEPHONE ENCOUNTER
Patient notified of results. She is feeling ok, \"head is plugged up\". Advised her to take vit , d and zinc for immune boosters and rest and fluids. Notified her to go to ED if sx worsen or has any sob.  Pt verbalized understanding

## 2021-12-30 DIAGNOSIS — G71.00 MD (MUSCULAR DYSTROPHY) (HCC): ICD-10-CM

## 2021-12-30 RX ORDER — HYDROCODONE BITARTRATE AND ACETAMINOPHEN 5; 325 MG/1; MG/1
1 TABLET ORAL EVERY 6 HOURS PRN
Qty: 100 TABLET | Refills: 0 | Status: SHIPPED | OUTPATIENT
Start: 2021-12-30 | End: 2022-01-29

## 2021-12-30 NOTE — TELEPHONE ENCOUNTER
ep'ed norco to pharm requested    Controlled Substance Monitoring:    Acute and Chronic Pain Monitoring:   RX Monitoring 12/30/2021   Attestation -   Periodic Controlled Substance Monitoring No signs of potential drug abuse or diversion identified.

## 2021-12-30 NOTE — TELEPHONE ENCOUNTER
----- Message from Peyton Martinez sent at 12/30/2021  4:07 PM EST -----  Subject: Refill Request    QUESTIONS  Name of Medication? HYDROcodone-acetaminophen (NORCO) 5-325 MG per tablet  Patient-reported dosage and instructions? 5-325 mg take 1 tablet every 6   hrs for pain as needed  How many days do you have left? 6  Preferred Pharmacy? [facility] phone number (if available)? [phone]  ---------------------------------------------------------------------------  --------------  CALL BACK INFO  What is the best way for the office to contact you? OK to leave message on   voicemail  Preferred Call Back Phone Number?  [phone]

## 2021-12-30 NOTE — TELEPHONE ENCOUNTER
Armando Aaron needs refill of   Requested Prescriptions      No prescriptions requested or ordered in this encounter       Last Filled on:  12/1/2021    Last Visit Date:  12/14/2021    Next Visit Date:    Visit date not found

## 2022-01-02 ENCOUNTER — HOSPITAL ENCOUNTER (EMERGENCY)
Age: 62
Discharge: HOME | End: 2022-01-02
Attending: EMERGENCY MEDICINE
Payer: MEDICARE

## 2022-01-02 ENCOUNTER — HOSPITAL ENCOUNTER (OUTPATIENT)
Dept: INTERVENTIONAL RADIOLOGY/VASCULAR | Age: 62
Discharge: HOME | End: 2022-01-02
Payer: MEDICARE

## 2022-01-02 VITALS
DIASTOLIC BLOOD PRESSURE: 72 MMHG | SYSTOLIC BLOOD PRESSURE: 158 MMHG | TEMPERATURE: 96.3 F | BODY MASS INDEX: 31.92 KG/M2 | RESPIRATION RATE: 16 BRPM | OXYGEN SATURATION: 97 % | HEART RATE: 75 BPM | HEIGHT: 64 IN | WEIGHT: 187 LBS

## 2022-01-02 DIAGNOSIS — M79.604 RIGHT LEG PAIN: ICD-10-CM

## 2022-01-02 DIAGNOSIS — I80.01 THROMBOPHLEBITIS OF SUPERFICIAL VEINS OF RIGHT LOWER EXTREMITY: ICD-10-CM

## 2022-01-02 DIAGNOSIS — M79.604 RIGHT LEG PAIN: Primary | ICD-10-CM

## 2022-01-02 LAB
ALBUMIN SERPL-MCNC: 3.7 GM/DL (ref 3.4–5)
ALP BLD-CCNC: 72 U/L (ref 46–116)
ALT SERPL-CCNC: 21 U/L (ref 14–63)
ANION GAP: 7 MEQ/L (ref 8–16)
AST SERPL-CCNC: 27 U/L (ref 15–37)
BASOPHILS # BLD: 0.4 % (ref 0–3)
BILIRUB SERPL-MCNC: 0.3 MG/DL (ref 0.2–1)
BUN BLDV-MCNC: 17 MG/DL (ref 7–18)
CHLORIDE BLD-SCNC: 103 MEQ/L (ref 98–107)
CO2: 31 MEQ/L (ref 21–32)
CREAT SERPL-MCNC: 1.2 MG/DL (ref 0.6–1.3)
EOSINOPHILS RELATIVE PERCENT: 0.8 % (ref 0–4)
GFR, ESTIMATED: 48 ML/MIN/1.73M2
GLUCOSE BLD-MCNC: 92 MG/DL (ref 74–106)
HCT VFR BLD CALC: 37.7 % (ref 37–47)
HEMOGLOBIN: 12.3 GM/DL (ref 12–16)
LYMPHOCYTES # BLD: 25.8 % (ref 15–47)
MCH RBC QN AUTO: 32.5 PG (ref 27–31)
MCHC RBC AUTO-ENTMCNC: 32.7 GM/DL (ref 33–37)
MCV RBC AUTO: 99.4 FL (ref 81–99)
MONOCYTES: 5.2 % (ref 0–12)
PDW BLD-RTO: 13.3 % (ref 11.5–14.5)
PLATELET # BLD: 177 THOU/MM3 (ref 130–400)
PMV BLD AUTO: 8.1 FL (ref 7.4–10.4)
POC CALCIUM: 9.3 MG/DL (ref 8.5–10.1)
POTASSIUM SERPL-SCNC: 4.7 MEQ/L (ref 3.5–5.1)
RBC # BLD: 3.79 MILL/MM3 (ref 4.2–5.4)
SEGS: 67.8 % (ref 43–75)
SODIUM BLD-SCNC: 141 MEQ/L (ref 136–145)
TOTAL PROTEIN: 6.8 GM/DL (ref 6.4–8.2)
WBC # BLD: 4.8 THOU/MM3 (ref 4.8–10.8)

## 2022-01-02 PROCEDURE — 93971 EXTREMITY STUDY: CPT

## 2022-01-02 PROCEDURE — 85025 COMPLETE CBC W/AUTO DIFF WBC: CPT

## 2022-01-02 PROCEDURE — 80053 COMPREHEN METABOLIC PANEL: CPT

## 2022-01-02 PROCEDURE — 99284 EMERGENCY DEPT VISIT MOD MDM: CPT

## 2022-01-02 PROCEDURE — 36415 COLL VENOUS BLD VENIPUNCTURE: CPT

## 2022-01-02 RX ORDER — DOXYCYCLINE HYCLATE 100 MG
100 TABLET ORAL 2 TIMES DAILY
Qty: 20 TABLET | Refills: 0 | Status: SHIPPED | OUTPATIENT
Start: 2022-01-02 | End: 2022-01-12

## 2022-01-02 RX ORDER — MULTIVIT WITH MINERALS/LUTEIN
250 TABLET ORAL DAILY
COMMUNITY
End: 2022-05-13 | Stop reason: ALTCHOICE

## 2022-01-02 RX ORDER — ASPIRIN 81 MG/1
81 TABLET, CHEWABLE ORAL DAILY
Qty: 30 TABLET | Refills: 0 | Status: SHIPPED | OUTPATIENT
Start: 2022-01-02 | End: 2022-10-06

## 2022-01-02 ASSESSMENT — PAIN DESCRIPTION - ORIENTATION
ORIENTATION: RIGHT;LOWER
ORIENTATION: RIGHT;LOWER

## 2022-01-02 ASSESSMENT — PAIN DESCRIPTION - LOCATION
LOCATION: LEG
LOCATION: LEG

## 2022-01-02 ASSESSMENT — PAIN SCALES - GENERAL
PAINLEVEL_OUTOF10: 5
PAINLEVEL_OUTOF10: 8

## 2022-01-02 NOTE — ED NOTES
Pt alert and oriented. Respirations regular and easy. Pt to go to [facility] for vascular study Negin Hew from vascular notified and  at main ED notified that the pt will be coming in and that vascular tech will need notified). Discharge instructions reviewed. States understanding. Pt discharged in satisfactory condition. Friend w/ pt will drive her and stay with her until resulted.         Chapin Car RN  01/02/22 3086

## 2022-01-03 ENCOUNTER — TELEPHONE (OUTPATIENT)
Dept: FAMILY MEDICINE CLINIC | Age: 62
End: 2022-01-03

## 2022-01-03 ASSESSMENT — ENCOUNTER SYMPTOMS
RHINORRHEA: 0
ABDOMINAL PAIN: 0
SHORTNESS OF BREATH: 0
COUGH: 0
NAUSEA: 0

## 2022-01-03 NOTE — LETTER
.. [address]  Phone: [phone]  Fax: [phone]    January 3, 2022    [address] [facility] [address]    Dear Harley Sequeira,    Thank you for choosing our [facility] on 1/2/22. Dr. Keyshawn Valdes wanted to make sure that you understand your discharge instructions and that you were able to fill any prescriptions that may have been ordered for you. Please contact the office at the above phone number if advised you to follow up with Dr. Keyshawn Valdes, or if you have any further questions or needs. Also, did you know -                             [facility]) practices can often offer you an appointment on the same day that you call for acute issues. *We have some [facility] offices that offer Walk-in appointments; check our website for availability in your community, www. [email or web address].      *Evisits are now available for patients through [address] If you do not have MyChart and are interested, please contact the office and a staff member may assist you or go to [email or web address].     Sincerely,     Keyshawn Valdes MD and your [facility]
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
## 2022-01-10 ENCOUNTER — OFFICE VISIT (OUTPATIENT)
Dept: FAMILY MEDICINE CLINIC | Age: 62
End: 2022-01-10
Payer: MEDICARE

## 2022-01-10 VITALS
BODY MASS INDEX: 31.65 KG/M2 | HEART RATE: 78 BPM | DIASTOLIC BLOOD PRESSURE: 78 MMHG | WEIGHT: 184.4 LBS | OXYGEN SATURATION: 94 % | RESPIRATION RATE: 18 BRPM | TEMPERATURE: 97.1 F | SYSTOLIC BLOOD PRESSURE: 126 MMHG

## 2022-01-10 DIAGNOSIS — M06.9 RHEUMATOID ARTHRITIS, INVOLVING UNSPECIFIED SITE, UNSPECIFIED WHETHER RHEUMATOID FACTOR PRESENT (HCC): ICD-10-CM

## 2022-01-10 DIAGNOSIS — I80.01 THROMBOPHLEBITIS OF LEG, RIGHT, SUPERFICIAL: Primary | ICD-10-CM

## 2022-01-10 DIAGNOSIS — G71.00 MUSCULAR DYSTROPHY (HCC): ICD-10-CM

## 2022-01-10 DIAGNOSIS — N18.30 STAGE 3 CHRONIC KIDNEY DISEASE, UNSPECIFIED WHETHER STAGE 3A OR 3B CKD (HCC): ICD-10-CM

## 2022-01-10 DIAGNOSIS — F32.1 CURRENT MODERATE EPISODE OF MAJOR DEPRESSIVE DISORDER WITHOUT PRIOR EPISODE (HCC): ICD-10-CM

## 2022-01-10 PROCEDURE — 3017F COLORECTAL CA SCREEN DOC REV: CPT | Performed by: FAMILY MEDICINE

## 2022-01-10 PROCEDURE — 99214 OFFICE O/P EST MOD 30 MIN: CPT | Performed by: FAMILY MEDICINE

## 2022-01-10 PROCEDURE — G8482 FLU IMMUNIZE ORDER/ADMIN: HCPCS | Performed by: FAMILY MEDICINE

## 2022-01-10 PROCEDURE — G8417 CALC BMI ABV UP PARAM F/U: HCPCS | Performed by: FAMILY MEDICINE

## 2022-01-10 PROCEDURE — 1036F TOBACCO NON-USER: CPT | Performed by: FAMILY MEDICINE

## 2022-01-10 PROCEDURE — G8427 DOCREV CUR MEDS BY ELIG CLIN: HCPCS | Performed by: FAMILY MEDICINE

## 2022-01-10 RX ORDER — CHOLECALCIFEROL (VITAMIN D3) 125 MCG
CAPSULE ORAL
COMMUNITY
End: 2022-08-08 | Stop reason: ALTCHOICE

## 2022-01-10 ASSESSMENT — ENCOUNTER SYMPTOMS
SHORTNESS OF BREATH: 0
EYE PAIN: 0
BLOOD IN STOOL: 0
COUGH: 0
SORE THROAT: 0
NAUSEA: 0
BACK PAIN: 0
DIARRHEA: 0
VOMITING: 0
WHEEZING: 0
RHINORRHEA: 0
ABDOMINAL PAIN: 0
CHEST TIGHTNESS: 0
CONSTIPATION: 0

## 2022-01-10 NOTE — PATIENT INSTRUCTIONS
Patient Education        Superficial Thrombophlebitis: Care Instructions  Your Care Instructions  Superficial thrombophlebitis is inflammation in a vein where a blood clot has formed close to the surface of the skin. You may be able to feel the clot as a firm lump under the skin. The skin over the clot can become red, tender, and warm to the touch. Blood clots in veins close to the skin's surface usually are not serious and often can be treated at home. Sometimes superficial thrombophlebitis spreads to a deeper vein (deep vein thrombosis, or DVT). These deeper clots can be serious, even life-threatening. It is very important that you follow your doctor's instructions, keep all follow-up appointments, and watch for new or worsening symptoms of a clot. Follow-up care is a key part of your treatment and safety. Be sure to make and go to all appointments, and call your doctor if you are having problems. It's also a good idea to know your test results and keep a list of the medicines you take. How can you care for yourself at home? · Take your medicines exactly as prescribed. Call your doctor if you think you are having a problem with your medicine. You will get more details on the specific medicines your doctor prescribes. · Prop up the sore leg or arm on a pillow anytime you sit or lie down. Try to keep it above the level of your heart. To prevent thrombophlebitis  · Exercise. Keep blood moving in your legs to keep new clots from forming. · Get up out of bed as soon as possible after an illness or surgery. · Do not smoke. If you need help quitting, talk to your doctor about stop-smoking programs and medicines. These can increase your chances of quitting for good. · Ask your doctor about compression stockings. These may help prevent blood clots from forming in your legs. But there are different types of stockings, and they need to fit right. So your doctor will recommend what you need.   When should you call for help? Call 911 anytime you think you may need emergency care. For example, call if:    · You have sudden chest pain and shortness of breath, or you cough up blood.     · You vomit blood or what looks like coffee grounds.     · You pass maroon or very bloody stools.     · You passed out (lost consciousness). Call your doctor now or seek immediate medical care if:    · You have signs of a blood clot, such as:  ? Pain in your calf, back of the knee, thigh, or groin. ? Redness and swelling in your leg or groin.     · You notice a new hard, red, or tender area in your leg.     · Your stools are black and tarlike or have streaks of blood. Watch closely for changes in your health, and be sure to contact your doctor if:    · You do not get better as expected. Where can you learn more? Go to https://[employer]eb.TrialPay. org and sign in to your [employer] account. Enter [phone] in the [employer] box to learn more about \"Superficial Thrombophlebitis: Care Instructions. \"     If you do not have an account, please click on the \"Sign Up Now\" link. Current as of: July 6, 2021               Content Version: 13.1  © 2006-2021 Healthwise, Incorporated. Care instructions adapted under license by [facility] ([facility]). If you have questions about a medical condition or this instruction, always ask your healthcare professional. [place] [address] any warranty or liability for your use of this information.

## 2022-01-10 NOTE — PROGRESS NOTES
Flo Goldmann (:  1960) is a 57 y.o. female,Established patient, here for evaluation of the following chief complaint(s):  Leg Pain (blot clot follow up, ear clogged up)         ASSESSMENT/PLAN:  1. Thrombophlebitis of leg, right, superficial  -improving, finish doxy, add moist heat 20 minutes 2-3 times daily  2. Muscular dystrophy ([facility] [address]  -stable, uses walker  3. Rheumatoid arthritis, involving unspecified site, unspecified whether rheumatoid factor present ([facility] [address]  -stable, sees rheumatology  4. Current moderate episode of major depressive disorder without prior episode (HCC)  -stable, sees psychiatry  5. Stage 3 chronic kidney disease, unspecified whether stage 3a or 3b CKD ([facility])  -stable,   Lab Results   Component Value Date     2022    K 4.7 2022     2022    CO2 31 2022    BUN 17 2022    CREATININE 1.2 2022    GLUCOSE 92 2022    CALCIUM 10.1 2020    PROT 6.8 2022    LABALBU 3.7 2022    BILITOT 0.3 2022    ALKPHOS 72 2022    AST 27 2022    ALT 21 2022    LABGLOM 63 (A) 2021           No follow-ups on file. Subjective   SUBJECTIVE/OBJECTIVE:  HPI   Patient here today for a check up. Reviewed BMI of 32. Encouraged diet, exercise and weight loss. Follow up UC for right leg pain. Superficial right leg thrombosis. Negative doppler for DVT. On doxycycline. Also left ear clogged up. Single, non smoker, pmh reviewed. Review of Systems   Constitutional: Negative for chills, fatigue, fever and unexpected weight change. HENT: Positive for hearing loss. Negative for congestion, ear pain, rhinorrhea and sore throat. Eyes: Negative for pain and visual disturbance. Respiratory: Negative for cough, chest tightness, shortness of breath and wheezing. Cardiovascular: Negative for chest pain and palpitations.    Gastrointestinal: Negative for abdominal pain, blood in stool, constipation, diarrhea,

## 2022-01-13 NOTE — PROGRESS NOTES
w 15 Blade and then tx with Cryotherapy x2 (Ln2x2)    Wait one week, then perform nightly warm soaks, filing w nail file, Apply wart peel, and occlude w tape. Recommend additional 1-2 week treatments. No further f/u needed at present time. Pt to f/u Dr. Joya Duggan for all future derm needs. Calixto Salinas MD FAAD   Board-Certified Dermatologist          I Veronica CMA, am scribing for and in the presence of Hermila Hernández MD, 5/29/2019 at 9:36 AM.    ICalixto MD, personally performed the services described in this documentation as scribed by Adelia Meigs  in my presence, and it is both accurate and complete.
11-Jan-2022 14:39

## 2022-01-31 DIAGNOSIS — G71.00 MD (MUSCULAR DYSTROPHY) (HCC): ICD-10-CM

## 2022-01-31 RX ORDER — HYDROCODONE BITARTRATE AND ACETAMINOPHEN 5; 325 MG/1; MG/1
1 TABLET ORAL EVERY 6 HOURS PRN
Qty: 100 TABLET | Refills: 0 | Status: SHIPPED | OUTPATIENT
Start: 2022-01-31 | End: 2022-03-02 | Stop reason: SDUPTHER

## 2022-01-31 NOTE — TELEPHONE ENCOUNTER
ep'ed norco to pharm requested      Controlled Substance Monitoring:    Acute and Chronic Pain Monitoring:   RX Monitoring 1/31/2022   Attestation -   Periodic Controlled Substance Monitoring No signs of potential drug abuse or diversion identified.

## 2022-01-31 NOTE — TELEPHONE ENCOUNTER
Kaylee Reusing needs refill of   Requested Prescriptions     Pending Prescriptions Disp Refills    HYDROcodone-acetaminophen (NORCO) 5-325 MG per tablet 100 tablet 0     Sig: Take 1 tablet by mouth every 6 hours as needed for Pain for up to 30 days.  G71.00       Last Filled on:  12/1/21    Last Visit Date:  1/10/2022    Next Visit Date:    Visit date not found

## 2022-03-02 DIAGNOSIS — G71.00 MD (MUSCULAR DYSTROPHY) (HCC): ICD-10-CM

## 2022-03-02 RX ORDER — HYDROCODONE BITARTRATE AND ACETAMINOPHEN 5; 325 MG/1; MG/1
1 TABLET ORAL EVERY 6 HOURS PRN
Qty: 100 TABLET | Refills: 0 | Status: SHIPPED | OUTPATIENT
Start: 2022-03-02 | End: 2022-03-31 | Stop reason: SDUPTHER

## 2022-03-02 NOTE — TELEPHONE ENCOUNTER
Alka Mark needs refill of   Requested Prescriptions     Pending Prescriptions Disp Refills    HYDROcodone-acetaminophen (NORCO) 5-325 MG per tablet 100 tablet 0     Sig: Take 1 tablet by mouth every 6 hours as needed for Pain for up to 30 days. G71.00       Last Filled on: 1/31/22    Last Visit Date: 1/10/2022 R leg thrombophlebitis    Next Visit Date:  Visit date not found    Pt has #4 left. Pls call pt at [phone] only if probs.     Zip: [id number]

## 2022-03-02 NOTE — TELEPHONE ENCOUNTER
ep'ed norco to pharm requested    Controlled Substance Monitoring:    Acute and Chronic Pain Monitoring:   RX Monitoring 3/2/2022   Attestation -   Periodic Controlled Substance Monitoring No signs of potential drug abuse or diversion identified.

## 2022-03-31 DIAGNOSIS — G71.00 MD (MUSCULAR DYSTROPHY) (HCC): ICD-10-CM

## 2022-03-31 RX ORDER — HYDROCODONE BITARTRATE AND ACETAMINOPHEN 5; 325 MG/1; MG/1
1 TABLET ORAL EVERY 6 HOURS PRN
Qty: 100 TABLET | Refills: 0 | Status: SHIPPED | OUTPATIENT
Start: 2022-03-31 | End: 2022-05-03 | Stop reason: SDUPTHER

## 2022-03-31 NOTE — TELEPHONE ENCOUNTER
Adrianna Mendieta needs refill of   Requested Prescriptions     Pending Prescriptions Disp Refills    HYDROcodone-acetaminophen (NORCO) 5-325 MG per tablet 100 tablet 0     Sig: Take 1 tablet by mouth every 6 hours as needed for Pain for up to 30 days.  G71.00       Last Filled on:  03/02/2022    Last Visit Date:  1/10/2022    Next Visit Date:    Visit date not found

## 2022-03-31 NOTE — TELEPHONE ENCOUNTER
ep'ed norco to pharm requested    Controlled Substance Monitoring:    Acute and Chronic Pain Monitoring:   RX Monitoring 3/31/2022   Attestation -   Periodic Controlled Substance Monitoring No signs of potential drug abuse or diversion identified.

## 2022-04-25 ENCOUNTER — OFFICE VISIT (OUTPATIENT)
Dept: FAMILY MEDICINE CLINIC | Age: 62
End: 2022-04-25
Payer: MEDICARE

## 2022-04-25 VITALS
RESPIRATION RATE: 18 BRPM | TEMPERATURE: 97.4 F | WEIGHT: 179.4 LBS | DIASTOLIC BLOOD PRESSURE: 66 MMHG | OXYGEN SATURATION: 95 % | SYSTOLIC BLOOD PRESSURE: 124 MMHG | BODY MASS INDEX: 30.79 KG/M2 | HEART RATE: 73 BPM

## 2022-04-25 DIAGNOSIS — E53.8 VITAMIN B 12 DEFICIENCY: ICD-10-CM

## 2022-04-25 DIAGNOSIS — E55.9 VITAMIN D DEFICIENCY: ICD-10-CM

## 2022-04-25 DIAGNOSIS — L03.116 LEFT LEG CELLULITIS: Primary | ICD-10-CM

## 2022-04-25 DIAGNOSIS — N18.31 STAGE 3A CHRONIC KIDNEY DISEASE (HCC): ICD-10-CM

## 2022-04-25 PROBLEM — N18.30 CHRONIC RENAL DISEASE, STAGE III (HCC): Status: ACTIVE | Noted: 2022-04-25

## 2022-04-25 LAB
FOLATE: > 20 NG/ML (ref 4.8–24.2)
VITAMIN B-12: 352 PG/ML (ref 211–911)
VITAMIN D 25-HYDROXY: 56 NG/ML (ref 30–100)

## 2022-04-25 PROCEDURE — G8417 CALC BMI ABV UP PARAM F/U: HCPCS | Performed by: FAMILY MEDICINE

## 2022-04-25 PROCEDURE — 99214 OFFICE O/P EST MOD 30 MIN: CPT | Performed by: FAMILY MEDICINE

## 2022-04-25 PROCEDURE — 1036F TOBACCO NON-USER: CPT | Performed by: FAMILY MEDICINE

## 2022-04-25 PROCEDURE — G8427 DOCREV CUR MEDS BY ELIG CLIN: HCPCS | Performed by: FAMILY MEDICINE

## 2022-04-25 PROCEDURE — 3017F COLORECTAL CA SCREEN DOC REV: CPT | Performed by: FAMILY MEDICINE

## 2022-04-25 RX ORDER — DOXYCYCLINE HYCLATE 100 MG
100 TABLET ORAL 2 TIMES DAILY
Qty: 20 TABLET | Refills: 0 | Status: SHIPPED | OUTPATIENT
Start: 2022-04-25 | End: 2022-05-05

## 2022-04-25 ASSESSMENT — ENCOUNTER SYMPTOMS
RHINORRHEA: 0
SHORTNESS OF BREATH: 0
SORE THROAT: 0
CHEST TIGHTNESS: 0
ABDOMINAL PAIN: 0
WHEEZING: 0
NAUSEA: 0
BLOOD IN STOOL: 0
EYE PAIN: 0
DIARRHEA: 0
VOMITING: 0
BACK PAIN: 0
COUGH: 0
CONSTIPATION: 0

## 2022-04-25 NOTE — PROGRESS NOTES
Deepthi Marie (:  1960) is a 60 y.o. female,Established patient, here for evaluation of the following chief complaint(s): Ankle Pain (left ankle, fatigue, feels very tired)         ASSESSMENT/PLAN:  1. Left leg cellulitis  -     doxycycline hyclate (VIBRA-TABS) 100 MG tablet; Take 1 tablet by mouth 2 times daily for 10 days, Disp-20 tablet, R-0Normal  -monitor sxs, call if not improving    2. Stage 3a chronic kidney disease (HCC)  -stable, avoid nephrotoxic meds,   Lab Results   Component Value Date     2022    K 4.7 2022     2022    CO2 31 2022    BUN 17 2022    CREATININE 1.2 2022    GLUCOSE 92 2022    CALCIUM 10.1 2020    PROT 6.8 2022    LABALBU 3.7 2022    BILITOT 0.3 2022    ALKPHOS 72 2022    AST 27 2022    ALT 21 2022    LABGLOM 63 (A) 2021         3. Vitamin D deficiency  -     Vitamin D 25 Hydroxy; Future  -stable on replacement, recheck level  4. Vitamin B 12 deficiency  -     Vitamin B12 & Folate; Future  -stable on replacement, recheck level    No follow-ups on file. Subjective   SUBJECTIVE/OBJECTIVE:  HPI   Patient here today for a check up. Reviewed BMI of 31. Encouraged diet, exercise and weight loss. 4 to 5 days of left lower leg pain. Bumped it on a jeep. Painful, red, swollen. Single, non smoker, pmh reviewed. Review of Systems   Constitutional: Negative for chills, fatigue, fever and unexpected weight change. HENT: Negative for congestion, ear pain, rhinorrhea and sore throat. Eyes: Negative for pain and visual disturbance. Respiratory: Negative for cough, chest tightness, shortness of breath and wheezing. Cardiovascular: Negative for chest pain and palpitations. Gastrointestinal: Negative for abdominal pain, blood in stool, constipation, diarrhea, nausea and vomiting. Genitourinary: Negative for difficulty urinating, frequency, hematuria and urgency. Musculoskeletal: Negative for back pain, joint swelling, myalgias and neck pain. Left ankle pain, lower leg pain   Skin: Negative for rash. Neurological: Negative for dizziness and headaches. Hematological: Negative for adenopathy. Does not bruise/bleed easily. Psychiatric/Behavioral: Negative for behavioral problems and sleep disturbance. The patient is not nervous/anxious. Objective   Physical Exam  Vitals and nursing note reviewed. Constitutional:       Appearance: She is well-developed. HENT:      Head: Normocephalic and atraumatic. Right Ear: External ear normal.      Left Ear: External ear normal.      Nose: Nose normal.      Mouth/Throat:      Mouth: Mucous membranes are moist.   Eyes:      Pupils: Pupils are equal, round, and reactive to light. Neck:      Thyroid: No thyromegaly. Cardiovascular:      Rate and Rhythm: Normal rate and regular rhythm. Heart sounds: Normal heart sounds. Pulmonary:      Breath sounds: Normal breath sounds. No wheezing or rales. Abdominal:      General: Bowel sounds are normal.      Palpations: Abdomen is soft. Tenderness: There is no abdominal tenderness. There is no guarding or rebound. Musculoskeletal:         General: Normal range of motion. Cervical back: Neck supple. Legs:    Lymphadenopathy:      Cervical: No cervical adenopathy. Skin:     General: Skin is warm and dry. Findings: No rash. Neurological:      Mental Status: She is alert and oriented to person, place, and time. Cranial Nerves: No cranial nerve deficit. Deep Tendon Reflexes: Reflexes are normal and symmetric. An electronic signature was used to authenticate this note.     --Jenny Silverio MD

## 2022-04-25 NOTE — PATIENT INSTRUCTIONS
Patient Education        Cellulitis: Care Instructions  Your Care Instructions     Cellulitis is a skin infection caused by bacteria, most often strep or staph. It often occurs after a break in the skin from a scrape, cut, bite, orpuncture, or after a rash. Cellulitis may be treated without doing tests to find out what caused it. But your doctor may do tests, if needed, to look for a specific bacteria, like methicillin-resistant Staphylococcus aureus (MRSA). The doctor has checked you carefully, but problems can develop later. If you notice any problems or new symptoms, get medical treatment right away. Follow-up care is a key part of your treatment and safety. Be sure to make and go to all appointments, and call your doctor if you are having problems. It's also a good idea to know your test results and keep alist of the medicines you take. How can you care for yourself at home?  Take your antibiotics as directed. Do not stop taking them just because you feel better. You need to take the full course of antibiotics.  Prop up the infected area on pillows to reduce pain and swelling. Try to keep the area above the level of your heart as often as you can.  If your doctor told you how to care for your wound, follow your doctor's instructions. If you did not get instructions, follow this general advice:  ? Wash the wound with clean water 2 times a day. Don't use hydrogen peroxide or alcohol, which can slow healing. ? You may cover the wound with a thin layer of petroleum jelly, such as Vaseline, and a nonstick bandage. ? Apply more petroleum jelly and replace the bandage as needed.  Be safe with medicines. Take pain medicines exactly as directed. ? If the doctor gave you a prescription medicine for pain, take it as prescribed. ? If you are not taking a prescription pain medicine, ask your doctor if you can take an over-the-counter medicine.   To prevent cellulitis in the future   Try to prevent cuts, scrapes, or other injuries to your skin. Cellulitis most often occurs where there is a break in the skin.  If you get a scrape, cut, mild burn, or bite, wash the wound with clean water as soon as you can to help avoid infection. Don't use hydrogen peroxide or alcohol, which can slow healing.  If you have swelling in your legs (edema), support stockings and good skin care may help prevent leg sores and cellulitis.  Take care of your feet, especially if you have diabetes or other conditions that increase the risk of infection. Wear shoes and socks. Do not go barefoot. If you have athlete's foot or other skin problems on your feet, talk to your doctor about how to treat them. When should you call for help? Call your doctor now or seek immediate medical care if:     You have signs that your infection is getting worse, such as:  ? Increased pain, swelling, warmth, or redness. ? Red streaks leading from the area. ? Pus draining from the area. ? A fever.      You get a rash. Watch closely for changes in your health, and be sure to contact your doctor if:     You do not get better as expected. Where can you learn more? Go to https://[employer].Covercake. org and sign in to your [employer] account. Enter H437 in the [facility] box to learn more about \"Cellulitis: Care Instructions. \"     If you do not have an account, please click on the \"Sign Up Now\" link. Current as of: November 15, 2021               Content Version: 13.2  © 2006-2022 Healthwise, Incorporated. Care instructions adapted under license by [facility] ([facility]). If you have questions about a medical condition or this instruction, always ask your healthcare professional. [place] [address] any warranty or liability for your use of this information.

## 2022-04-26 ENCOUNTER — TELEPHONE (OUTPATIENT)
Dept: FAMILY MEDICINE CLINIC | Age: 62
End: 2022-04-26

## 2022-04-26 NOTE — TELEPHONE ENCOUNTER
Aroldo Matthews, nurse from [facility] professionals was out to see patient today. She is concerned about the patient having a DVT. She stated the patient told her that she hit that leg around 1 month ago and her symptoms didn't show up until 4-5 days ago. Aroldo Matthews states she can see the vein bulging. She stated it doesn't seem to be presenting as cellulitis. She went over signs and symptoms of a DVT with patient and patient is in agreement to go to ER if her symptoms change. Nurse Aroldo Matthews would like to know if patient should be sent for DVT workup, blood work or go to ER? Patient's symptoms have not changed from yesterdays appt. Please advise.

## 2022-04-26 NOTE — TELEPHONE ENCOUNTER
Pt notified, she states she doesn't feel like it is DVT, it doesn't feel the same as last one. She is going to wait and see how she feels, if symptoms worsen she will go to ED otherwise she will call the office with an update later this week.

## 2022-04-26 NOTE — TELEPHONE ENCOUNTER
----- Message from Antonina Kramer MD sent at 4/26/2022  6:51 AM EDT -----  B12 level is back to normal range. Vit D level is also good.

## 2022-04-29 ENCOUNTER — HOSPITAL ENCOUNTER (OUTPATIENT)
Dept: MAMMOGRAPHY | Age: 62
Discharge: HOME | End: 2022-04-29
Payer: MEDICARE

## 2022-04-29 DIAGNOSIS — Z12.39 BREAST SCREENING: ICD-10-CM

## 2022-04-29 PROCEDURE — 77063 BREAST TOMOSYNTHESIS BI: CPT

## 2022-05-01 ENCOUNTER — APPOINTMENT (OUTPATIENT)
Dept: INTERVENTIONAL RADIOLOGY/VASCULAR | Age: 62
End: 2022-05-01
Payer: MEDICARE

## 2022-05-01 ENCOUNTER — HOSPITAL ENCOUNTER (EMERGENCY)
Age: 62
Discharge: HOME | End: 2022-05-01
Payer: MEDICARE

## 2022-05-01 VITALS
DIASTOLIC BLOOD PRESSURE: 64 MMHG | OXYGEN SATURATION: 97 % | TEMPERATURE: 97.9 F | BODY MASS INDEX: 30.56 KG/M2 | RESPIRATION RATE: 18 BRPM | HEART RATE: 78 BPM | SYSTOLIC BLOOD PRESSURE: 133 MMHG | HEIGHT: 64 IN | WEIGHT: 179 LBS

## 2022-05-01 DIAGNOSIS — M79.605 LEFT LEG PAIN: Primary | ICD-10-CM

## 2022-05-01 DIAGNOSIS — I82.452 ACUTE DEEP VEIN THROMBOSIS (DVT) OF LEFT PERONEAL VEIN (HCC): ICD-10-CM

## 2022-05-01 DIAGNOSIS — N17.9 ACUTE KIDNEY INJURY (HCC): ICD-10-CM

## 2022-05-01 LAB
ALBUMIN SERPL-MCNC: 3.3 G/DL (ref 3.5–5.1)
ALP BLD-CCNC: 69 U/L (ref 38–126)
ALT SERPL-CCNC: < 5 U/L (ref 11–66)
ANION GAP SERPL CALCULATED.3IONS-SCNC: 12 MEQ/L (ref 8–16)
AST SERPL-CCNC: 11 U/L (ref 5–40)
ATYPICAL LYMPHOCYTES: ABNORMAL %
BASOPHILS # BLD: 0.4 %
BASOPHILS ABSOLUTE: 0 THOU/MM3 (ref 0–0.1)
BILIRUB SERPL-MCNC: 0.2 MG/DL (ref 0.3–1.2)
BILIRUBIN DIRECT: < 0.2 MG/DL (ref 0–0.3)
BUN BLDV-MCNC: 26 MG/DL (ref 7–22)
CALCIUM SERPL-MCNC: 9.7 MG/DL (ref 8.5–10.5)
CHLORIDE BLD-SCNC: 99 MEQ/L (ref 98–111)
CO2: 26 MEQ/L (ref 23–33)
CREAT SERPL-MCNC: 1.8 MG/DL (ref 0.4–1.2)
EOSINOPHIL # BLD: 2.5 %
EOSINOPHILS ABSOLUTE: 0.2 THOU/MM3 (ref 0–0.4)
ERYTHROCYTE [DISTWIDTH] IN BLOOD BY AUTOMATED COUNT: 14.9 % (ref 11.5–14.5)
ERYTHROCYTE [DISTWIDTH] IN BLOOD BY AUTOMATED COUNT: 53.1 FL (ref 35–45)
GFR SERPL CREATININE-BSD FRML MDRD: 28 ML/MIN/1.73M2
GLUCOSE BLD-MCNC: 119 MG/DL (ref 70–108)
HCT VFR BLD CALC: 33 % (ref 37–47)
HEMOGLOBIN: 10.2 GM/DL (ref 12–16)
IMMATURE GRANS (ABS): 0.09 THOU/MM3 (ref 0–0.07)
IMMATURE GRANULOCYTES: 1.2 %
LYMPHOCYTES # BLD: 19.9 %
LYMPHOCYTES ABSOLUTE: 1.5 THOU/MM3 (ref 1–4.8)
MCH RBC QN AUTO: 30 PG (ref 26–33)
MCHC RBC AUTO-ENTMCNC: 30.9 GM/DL (ref 32.2–35.5)
MCV RBC AUTO: 97.1 FL (ref 81–99)
MONOCYTES # BLD: 13.1 %
MONOCYTES ABSOLUTE: 1 THOU/MM3 (ref 0.4–1.3)
NUCLEATED RED BLOOD CELLS: 0 /100 WBC
OSMOLALITY CALCULATION: 279.7 MOSMOL/KG (ref 275–300)
PLATELET # BLD: 268 THOU/MM3 (ref 130–400)
PLATELET ESTIMATE: ADEQUATE
PMV BLD AUTO: 9.7 FL (ref 9.4–12.4)
POTASSIUM REFLEX MAGNESIUM: 4.3 MEQ/L (ref 3.5–5.2)
RBC # BLD: 3.4 MILL/MM3 (ref 4.2–5.4)
SCAN OF BLOOD SMEAR: NORMAL
SEG NEUTROPHILS: 62.9 %
SEGMENTED NEUTROPHILS ABSOLUTE COUNT: 4.6 THOU/MM3 (ref 1.8–7.7)
SODIUM BLD-SCNC: 137 MEQ/L (ref 135–145)
TOTAL PROTEIN: 7 G/DL (ref 6.1–8)
TROPONIN T: < 0.01 NG/ML
WBC # BLD: 7.3 THOU/MM3 (ref 4.8–10.8)

## 2022-05-01 PROCEDURE — 85025 COMPLETE CBC W/AUTO DIFF WBC: CPT

## 2022-05-01 PROCEDURE — 84484 ASSAY OF TROPONIN QUANT: CPT

## 2022-05-01 PROCEDURE — 93971 EXTREMITY STUDY: CPT

## 2022-05-01 PROCEDURE — 99284 EMERGENCY DEPT VISIT MOD MDM: CPT

## 2022-05-01 PROCEDURE — 80048 BASIC METABOLIC PNL TOTAL CA: CPT

## 2022-05-01 PROCEDURE — 36415 COLL VENOUS BLD VENIPUNCTURE: CPT

## 2022-05-01 PROCEDURE — 80076 HEPATIC FUNCTION PANEL: CPT

## 2022-05-01 ASSESSMENT — PAIN SCALES - GENERAL: PAINLEVEL_OUTOF10: 6

## 2022-05-01 ASSESSMENT — ENCOUNTER SYMPTOMS
RHINORRHEA: 0
CONSTIPATION: 0
NAUSEA: 0
WHEEZING: 0
EYE PAIN: 0
BLOOD IN STOOL: 0
SHORTNESS OF BREATH: 0
VOMITING: 0
DIARRHEA: 0
COUGH: 0
ABDOMINAL PAIN: 0

## 2022-05-01 ASSESSMENT — PAIN DESCRIPTION - ORIENTATION: ORIENTATION: LEFT

## 2022-05-01 ASSESSMENT — PAIN DESCRIPTION - LOCATION: LOCATION: LEG

## 2022-05-01 ASSESSMENT — PAIN - FUNCTIONAL ASSESSMENT: PAIN_FUNCTIONAL_ASSESSMENT: 0-10
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
## 2022-05-01 NOTE — ED TRIAGE NOTES
Presents to ED with c/o left lower leg pain, redness, and a \"lump\". Patient came for an ultrasound.

## 2022-05-02 ENCOUNTER — TELEPHONE (OUTPATIENT)
Dept: FAMILY MEDICINE CLINIC | Age: 62
End: 2022-05-02

## 2022-05-02 NOTE — TELEPHONE ENCOUNTER
Yolanda called for Debbie-on [facility]  Pt was in ED yesterday for DVT. Was going to be put on coumadin but was told her kidney function was not good so they put her on Eliquis. They gave her orders to recheck the bmp in 1 week (5/8/22) but advised her to call PCP to see if you want repeat labs sooner.

## 2022-05-02 NOTE — TELEPHONE ENCOUNTER
Was just done yesterday so 1 week is fine. Whenever is convenient for her, has transportation issues sometimes.

## 2022-05-03 ENCOUNTER — TELEPHONE (OUTPATIENT)
Dept: FAMILY MEDICINE CLINIC | Age: 62
End: 2022-05-03

## 2022-05-03 DIAGNOSIS — G71.00 MD (MUSCULAR DYSTROPHY) (HCC): ICD-10-CM

## 2022-05-03 RX ORDER — HYDROCODONE BITARTRATE AND ACETAMINOPHEN 5; 325 MG/1; MG/1
1 TABLET ORAL EVERY 6 HOURS PRN
Qty: 100 TABLET | Refills: 0 | Status: SHIPPED | OUTPATIENT
Start: 2022-05-03 | End: 2022-05-31 | Stop reason: SDUPTHER

## 2022-05-03 NOTE — TELEPHONE ENCOUNTER
ep'ed norco to pharm requested    Controlled Substance Monitoring:    Acute and Chronic Pain Monitoring:   RX Monitoring 5/3/2022   Attestation -   Periodic Controlled Substance Monitoring No signs of potential drug abuse or diversion identified.

## 2022-05-03 NOTE — LETTER
.. [address]  [address] [place] [address] [address], [address]  Phone: [phone]  Fax: [phone]    May 3, 2022    [address] [facility] [address] [facility]    Dear Emilee Escobar,    Thank you for choosing our [facility] on 5/1/22. Dr. Jenny Silverio wanted to make sure that you understand your discharge instructions and that you were able to fill any prescriptions that may have been ordered for you. Please contact the office at the above phone number if advised you to follow up with Dr. Jenny Silverio, or if you have any further questions or needs. Also, did you know -                             [facility]) practices can often offer you an appointment on the same day that you call for acute issues. *We have some [facility] offices that offer Walk-in appointments; check our website for availability in your community, [email or web address].      *Evisits are now available for patients through [address] If you do not have MyChart and are interested, please contact the office and a staff member may assist you or go to [email or web address].     Sincerely,     Jenny Silverio MD and your [facility]

## 2022-05-03 NOTE — TELEPHONE ENCOUNTER
Yas Bearden needs refill of   Requested Prescriptions     Pending Prescriptions Disp Refills    HYDROcodone-acetaminophen (NORCO) 5-325 MG per tablet 100 tablet 0     Sig: Take 1 tablet by mouth every 6 hours as needed for Pain for up to 30 days. G71.00       Last Filled on:  3/31/2022    Last Visit Date:  4/25/2022    Next Visit Date:    Visit date not found    Patient called in today and stated she had spoke with Victorina Kaba on 5/2/2022 and asked for a refill to be sent into [facility] in [place].

## 2022-05-09 ENCOUNTER — HOSPITAL ENCOUNTER (OUTPATIENT)
Age: 62
Discharge: HOME | End: 2022-05-09
Payer: MEDICARE

## 2022-05-09 LAB
ANION GAP SERPL CALCULATED.3IONS-SCNC: 16 MEQ/L (ref 8–16)
BUN BLDV-MCNC: 21 MG/DL (ref 7–22)
CALCIUM SERPL-MCNC: 10 MG/DL (ref 8.5–10.5)
CHLORIDE BLD-SCNC: 95 MEQ/L (ref 98–111)
CO2: 27 MEQ/L (ref 23–33)
CREAT SERPL-MCNC: 1.9 MG/DL (ref 0.4–1.2)
GFR SERPL CREATININE-BSD FRML MDRD: 27 ML/MIN/1.73M2
GLUCOSE BLD-MCNC: 90 MG/DL (ref 70–108)
POTASSIUM SERPL-SCNC: 4.5 MEQ/L (ref 3.5–5.2)
SODIUM BLD-SCNC: 138 MEQ/L (ref 135–145)

## 2022-05-09 PROCEDURE — 80048 BASIC METABOLIC PNL TOTAL CA: CPT

## 2022-05-09 PROCEDURE — 36415 COLL VENOUS BLD VENIPUNCTURE: CPT

## 2022-05-10 ENCOUNTER — TELEPHONE (OUTPATIENT)
Dept: FAMILY MEDICINE CLINIC | Age: 62
End: 2022-05-10

## 2022-05-10 NOTE — TELEPHONE ENCOUNTER
Yolanda called, she is concerned about a lump above Pt ankle and BW results.  Appointment scheduled 05/13/2022

## 2022-05-10 NOTE — TELEPHONE ENCOUNTER
Pt called, states she was treated for DVT, left leg, 05/01/2022. She is currently on eliquis. She would like to know if she can start using her VALERIE hose on her left leg. Please Advise.

## 2022-05-10 NOTE — TELEPHONE ENCOUNTER
Pt notified, states she would like to come in to discuss results with RAR and possible nephrology referral. She will call back to schedule.

## 2022-05-13 ENCOUNTER — OFFICE VISIT (OUTPATIENT)
Dept: FAMILY MEDICINE CLINIC | Age: 62
End: 2022-05-13
Payer: MEDICARE

## 2022-05-13 VITALS
BODY MASS INDEX: 31.48 KG/M2 | DIASTOLIC BLOOD PRESSURE: 74 MMHG | SYSTOLIC BLOOD PRESSURE: 124 MMHG | RESPIRATION RATE: 18 BRPM | TEMPERATURE: 98.1 F | WEIGHT: 183.4 LBS | HEART RATE: 75 BPM | OXYGEN SATURATION: 93 %

## 2022-05-13 DIAGNOSIS — N18.4 CKD (CHRONIC KIDNEY DISEASE) STAGE 4, GFR 15-29 ML/MIN (HCC): ICD-10-CM

## 2022-05-13 DIAGNOSIS — Z09 HOSPITAL DISCHARGE FOLLOW-UP: ICD-10-CM

## 2022-05-13 DIAGNOSIS — M79.605 LEFT LEG PAIN: ICD-10-CM

## 2022-05-13 DIAGNOSIS — I82.452 ACUTE DEEP VEIN THROMBOSIS (DVT) OF LEFT PERONEAL VEIN (HCC): Primary | ICD-10-CM

## 2022-05-13 PROCEDURE — G8427 DOCREV CUR MEDS BY ELIG CLIN: HCPCS | Performed by: FAMILY MEDICINE

## 2022-05-13 PROCEDURE — 99214 OFFICE O/P EST MOD 30 MIN: CPT | Performed by: FAMILY MEDICINE

## 2022-05-13 PROCEDURE — 3017F COLORECTAL CA SCREEN DOC REV: CPT | Performed by: FAMILY MEDICINE

## 2022-05-13 PROCEDURE — G8417 CALC BMI ABV UP PARAM F/U: HCPCS | Performed by: FAMILY MEDICINE

## 2022-05-13 PROCEDURE — 1036F TOBACCO NON-USER: CPT | Performed by: FAMILY MEDICINE

## 2022-05-13 PROCEDURE — 1111F DSCHRG MED/CURRENT MED MERGE: CPT | Performed by: FAMILY MEDICINE

## 2022-05-13 ASSESSMENT — ENCOUNTER SYMPTOMS
DIARRHEA: 0
NAUSEA: 0
WHEEZING: 0
RHINORRHEA: 0
BACK PAIN: 0
ABDOMINAL PAIN: 0
COUGH: 1
SHORTNESS OF BREATH: 0
BLOOD IN STOOL: 0
CONSTIPATION: 0
VOMITING: 0
EYE PAIN: 0
CHEST TIGHTNESS: 0
SORE THROAT: 0

## 2022-05-13 NOTE — PATIENT INSTRUCTIONS
Patient Education        Leg Pain: Care Instructions  Your Care Instructions  Many things can cause leg pain. Too much exercise or overuse can cause a muscle cramp (or charley horse). You can get leg cramps from not eating a balanced diet that has enough potassium, calcium, and other minerals. If you do not drink enough fluids or are taking certain medicines, you may develop leg cramps. Other causes of leg pain include injuries, blood flow problems, nervedamage, and twisted and enlarged veins (varicose veins). You can usually ease pain with self-care. Your doctor may recommend that yourest your leg and keep it elevated. Follow-up care is a key part of your treatment and safety. Be sure to make and go to all appointments, and call your doctor if you are having problems. It's also a good idea to know your test results and keep alist of the medicines you take. How can you care for yourself at home?  Take pain medicines exactly as directed. ? If the doctor gave you a prescription medicine for pain, take it as prescribed. ? If you are not taking a prescription pain medicine, ask your doctor if you can take an over-the-counter medicine.  Take any other medicines exactly as prescribed. Call your doctor if you think you are having a problem with your medicine.  Rest your leg while you have pain, and avoid standing for long periods of time.  Prop up your leg at or above the level of your heart when possible.  Make sure you are eating a balanced diet that is rich in calcium, potassium, and magnesium, especially if you are pregnant.  If directed by your doctor, put ice or a cold pack on the area for 10 to 20 minutes at a time. Put a thin cloth between the ice and your skin.  Your leg may be in a splint, a brace, or an elastic bandage, and you may have crutches to help you walk. Follow your doctor's directions about how long to wear supports and how to use the crutches. When should you call for help? Call 911 anytime you think you may need emergency care. For example, call if:     You have sudden chest pain and shortness of breath, or you cough up blood.      Your leg is cool or pale or changes color. Call your doctor now or seek immediate medical care if:     You have increasing or severe pain.      Your leg suddenly feels weak and you cannot move it.      You have signs of a blood clot, such as:  ? Pain in your calf, back of the knee, thigh, or groin. ? Redness and swelling in your leg or groin.      You have signs of infection, such as:  ? Increased pain, swelling, warmth, or redness. ? Red streaks leading from the sore area. ? Pus draining from a place on your leg. ? A fever.      You cannot bear weight on your leg. Watch closely for changes in your health, and be sure to contact your doctor if:     You do not get better as expected. Where can you learn more? Go to https://Innography.Sparktrend. org and sign in to your StandardNine account. Enter S377 in the SurgeonKidz box to learn more about \"Leg Pain: Care Instructions. \"     If you do not have an account, please click on the \"Sign Up Now\" link. Current as of: July 1, 2021               Content Version: 13.2  © 2006-2022 [employer]. Care instructions adapted under license by [facility] ([place] [facility]). If you have questions about a medical condition or this instruction, always ask your healthcare professional. [place] [address] any warranty or liability for your use of this information. Patient Education        Deep Vein Thrombosis: Care Instructions  Overview     A deep vein thrombosis (DVT) is a blood clot in certain veins, usually in the legs, pelvis, or arms. Blood clots in these veins need to be treated because they can get bigger, break loose, and travel through the bloodstream to thelungs. A blood clot in a lung can be life-threatening.   The doctor may have given you a blood thinner (anticoagulant). A blood thinner can stop the blood clot from growing larger and prevent new clots from forming. You will need to take a blood thinner for at least 3 months. The doctor has checked you carefully, but problems can develop later. If you notice any problems or new symptoms, get medical treatment right away. Follow-up care is a key part of your treatment and safety. Be sure to make and go to all appointments, and call your doctor if you are having problems. It's also a good idea to know your test results and keep alist of the medicines you take. How can you care for yourself at home?  Take your medicines exactly as prescribed. Call your doctor if you think you are having a problem with your medicine.  If you are taking a blood thinner, be sure you get instructions about how to take your medicine safely. Blood thinners can cause serious bleeding problems.  Try to walk several times a day.  Wear compression stockings if your doctor recommends them. These stockings are tighter at the feet than on the legs. They may reduce pain and swelling in your legs. But there are different types of stockings, and they need to fit right. So your doctor will recommend what you need.  When you sit, use a pillow to raise the arm or leg that has the blood clot. Try to keep it above the level of your heart. When should you call for help? Call 911 anytime you think you may need emergency care. For example, call if:     You passed out (lost consciousness).      You have symptoms of a blood clot in your lung (called a pulmonary embolism). These include:  ? Sudden chest pain. ? Trouble breathing. ? Coughing up blood. Call your doctor now or seek immediate medical care if:     You have new or worse trouble breathing.      You are dizzy or lightheaded, or you feel like you may faint.      You have symptoms of a blood clot in your arm or leg.  These may include:  ? Pain in the arm, calf, back of the knee, thigh, or groin. ? Redness and swelling in the arm, leg, or groin. Watch closely for changes in your health, and be sure to contact your doctor if:     You do not get better as expected. Where can you learn more? Go to https://chemelyeb.UpMo. org and sign in to your [employer] account. Enter Y273 in the Mytrus box to learn more about \"Deep Vein Thrombosis: Care Instructions. \"     If you do not have an account, please click on the \"Sign Up Now\" link. Current as of: July 6, 2021               Content Version: 13.2  © 8481-3858 [employer], [employer]. Care instructions adapted under license by [facility] ([facility]). If you have questions about a medical condition or this instruction, always ask your healthcare professional. [address] any warranty or liability for your use of this information.

## 2022-05-13 NOTE — PROGRESS NOTES
Post-Discharge Transitional Care Follow Up      Vandana Rowe   YOB: 1960    Date of Office Visit:  5/13/2022  Date of Hospital Admission: 5/1/22  Date of Hospital Discharge: 5/1/22  Readmission Risk Score (high >=14%. Medium >=10%):No data recorded    Care management risk score Rising risk (score 2-5) and Complex Care (Scores >=6): 6     Non face to face  following discharge, date last encounter closed (first attempt may have been earlier): *No documented post hospital discharge outreach found in the last 14 days     Call initiated 2 business days of discharge: *No response recorded in the last 14 days     Acute deep vein thrombosis (DVT) of left peroneal vein (HCC)  -     apixaban (ELIQUIS) 2.5 MG TABS tablet; Take 1 tablet by mouth 2 times daily, Disp-60 tablet, R-1Normal  -new problem, first clot, recommend 3 months of therapy  CKD (chronic kidney disease) stage 4, GFR 15-29 ml/min ([facility])  -     Basic Metabolic Panel; Future  -chronic condition, exacerbated, push fluids, serial labs, avoid nephrotoxic meds  Left leg pain  -     Ambulatory referral to Vascular Center  -     XR TIBIA FIBULA LEFT (2 VIEWS); Future  --new problem, unstable, get xray due to trauma. Medical Decision Making: moderate complexity  No follow-ups on file. Subjective:   HPI  Presented to  [address] [place] [address] with left leg pain    Inpatient course: Discharge summary reviewed- see chart. Interval history/Current status: Diagnosed with left peroneal DVT. Started on eliquis. Also RABIA with Cr of 1.8, recheck ed at 1.9. Nagging cough, question PND. Single, non smoker, pmh reviewed. Patient Active Problem List   Diagnosis    Venous insufficiency    Rheumatoid arthritis ([address]    Lymphedema    Obesity (BMI 30-39. 9)    Muscular dystrophy ([address]    Peripheral neuropathy    Acquired pes planus of both feet    Cervical spondylosis    DJD (degenerative joint disease), cervical    Congenital muscular dystrophy ([address]    Depression    Edema of lower extremity    ESR raised    GERD (gastroesophageal reflux disease)    Muscle weakness    History of right knee joint replacement    Current moderate episode of major depressive disorder without [facility] episode [facility]    Overflow incontinence    Stage 3 chronic kidney disease, unspecified whether stage 3a or 3b CKD ([facility]    [facility] disease, stage [facility] ([facility] [127647]    CKD (chronic kidney disease) stage 4, GFR 15-29 ml/min ([facility])       Medications listed as ordered at the time of discharge from hospital     Medication List          Accurate as of May 13, 2022  8:58 AM. If you have any questions, ask your nurse or doctor. CHANGE how you take these medications    omeprazole 40 MG delayed release capsule  Commonly known as: PRILOSEC  Take 1 capsule by mouth daily. What changed: how much to take        CONTINUE taking these medications    apixaban 2.5 MG Tabs tablet  Commonly known as: Eliquis  Take 1 tablet by mouth 2 times daily     aspirin 81 MG chewable tablet  Commonly known as: Aspirin Childrens  Take 1 tablet by mouth daily     CRANBERRY PO     divalproex 500 MG extended release tablet  Commonly known as: Depakote ER  Take 1 tablet by mouth nightly     folic acid 1 MG tablet  Commonly known as: FOLVITE     gabapentin 300 MG capsule  Commonly known as: NEURONTIN  Take 1 capsule by mouth 2 times daily, G71.00     Handicap Placard Misc  by Does not apply route Request parking placard due to medical conditions. Duration of 5 years. HYDROcodone-acetaminophen 5-325 MG per tablet  Commonly known as: Norco  Take 1 tablet by mouth every 6 hours as needed for Pain for up to 30 days.  G71.00     hydroxychloroquine 200 MG tablet  Commonly known as: PLAQUENIL  Take 1 tablet by mouth 2 times daily     methotrexate 2.5 MG chemo tablet  Commonly known as: RHEUMATREX     PRO-BIOTIC BLEND PO     Vitamin D3 50 MCG (2000 UT) Tabs     Walker Misc  Rollator, rolling walker with seats, brakes,  Basket. G71.00        STOP taking these medications    vitamin C 250 MG tablet  Stopped by: Christine Fontenot MD     ZINC 15 PO  Stopped by: Christine Fontenot MD           Where to Get Your Medications      Information about where to get these medications is not yet available    Ask your nurse or doctor about these medications  · apixaban 2.5 MG Tabs tablet          Medications marked \"taking\" at this time  Outpatient Medications Marked as Taking for the 5/13/22 encounter (Office Visit) with Christine Fontenot MD   Medication Sig Dispense Refill    CRANBERRY PO Take by mouth daily      Probiotic Product (PRO-BIOTIC BLEND PO) Take by mouth daily      apixaban (ELIQUIS) 2.5 MG TABS tablet Take 1 tablet by mouth 2 times daily 60 tablet 1    HYDROcodone-acetaminophen (NORCO) 5-325 MG per tablet Take 1 tablet by mouth every 6 hours as needed for Pain for up to 30 days. G71.00 100 tablet 0    Cholecalciferol (VITAMIN D3) 50 MCG (2000 UT) TABS Take by mouth 5000 IU 2 daily      divalproex (DEPAKOTE ER) 500 MG extended release tablet Take 1 tablet by mouth nightly 30 tablet 5    gabapentin (NEURONTIN) 300 MG capsule Take 1 capsule by mouth 2 times daily, G71.00 60 capsule 11    Misc. Devices (WALKER) MISC Rollator, rolling walker with seats, brakes,  Basket. G71.00 1 each 0    Handicap Placard MISC by Does not apply route Request parking placard due to medical conditions. Duration of 5 years. 1 each 0    hydroxychloroquine (PLAQUENIL) 200 MG tablet Take 1 tablet by mouth 2 times daily 60 tablet 5    folic acid (FOLVITE) 1 MG tablet Take 1 mg by mouth      omeprazole (PRILOSEC) 40 MG capsule Take 1 capsule by mouth daily. (Patient taking differently: Take 20 mg by mouth daily.) 30 capsule 3    methotrexate (RHEUMATREX) 2.5 MG chemo tablet Take 2.5 mg by mouth once a week.  6 tabs once weekly          Medications patient taking as of now reconciled against medications ordered at time of hospital discharge: Yes    Review of Systems   Constitutional: Negative for chills, fatigue, fever and unexpected weight change. HENT: Negative for congestion, ear pain, rhinorrhea and sore throat. Eyes: Negative for pain and visual disturbance. Respiratory: Positive for cough. Negative for chest tightness, shortness of breath and wheezing. Cardiovascular: Negative for chest pain and palpitations. Gastrointestinal: Negative for abdominal pain, blood in stool, constipation, diarrhea, nausea and vomiting. Genitourinary: Negative for difficulty urinating, frequency, hematuria and urgency. Musculoskeletal: Negative for back pain, joint swelling, myalgias and neck pain. Left leg pain   Skin: Negative for rash. Neurological: Negative for dizziness and headaches. Hematological: Negative for adenopathy. Does not bruise/bleed easily. Psychiatric/Behavioral: Negative for behavioral problems and sleep disturbance. The patient is not nervous/anxious. Objective:    /74   Pulse 75   Temp 98.1 °F (36.7 °C) (Infrared)   Resp 18   Wt 183 lb 6.4 oz (83.2 kg)   SpO2 93%   BMI 31.48 kg/m²   Physical Exam  Vitals and nursing note reviewed. Constitutional:       Appearance: She is well-developed. HENT:      Head: Normocephalic and atraumatic. Right Ear: External ear normal.      Left Ear: External ear normal.      Nose: Nose normal.      Mouth/Throat:      Mouth: Mucous membranes are moist.   Eyes:      Pupils: Pupils are equal, round, and reactive to light. Neck:      Thyroid: No thyromegaly. Cardiovascular:      Rate and Rhythm: Normal rate and regular rhythm. Heart sounds: Normal heart sounds. Pulmonary:      Breath sounds: Normal breath sounds. No wheezing or rales. Abdominal:      General: Bowel sounds are normal.      Palpations: Abdomen is soft. Tenderness: There is no abdominal tenderness. There is no guarding or rebound.    Musculoskeletal: General: Normal range of motion. Cervical back: Neck supple. Legs:    Lymphadenopathy:      Cervical: No cervical adenopathy. Skin:     General: Skin is warm and dry. Findings: No rash. Neurological:      Mental Status: She is alert and oriented to person, place, and time. Cranial Nerves: No cranial nerve deficit. Deep Tendon Reflexes: Reflexes are normal and symmetric. An electronic signature was used to authenticate this note.   --Ava Morataya MD

## 2022-05-16 ENCOUNTER — TELEPHONE (OUTPATIENT)
Dept: FAMILY MEDICINE CLINIC | Age: 62
End: 2022-05-16

## 2022-05-16 ENCOUNTER — HOSPITAL ENCOUNTER (OUTPATIENT)
Age: 62
Discharge: HOME | End: 2022-05-16
Payer: MEDICARE

## 2022-05-16 ENCOUNTER — HOSPITAL ENCOUNTER (OUTPATIENT)
Dept: GENERAL RADIOLOGY | Age: 62
Discharge: HOME | End: 2022-05-16
Payer: MEDICARE

## 2022-05-16 DIAGNOSIS — M79.605 LEFT LEG PAIN: ICD-10-CM

## 2022-05-16 PROCEDURE — 73590 X-RAY EXAM OF LOWER LEG: CPT

## 2022-05-16 NOTE — TELEPHONE ENCOUNTER
Left a detailed message on answering machine with normal results. Asked patient to call our office with questions or concerns.

## 2022-05-17 ENCOUNTER — TELEPHONE (OUTPATIENT)
Dept: FAMILY MEDICINE CLINIC | Age: 62
End: 2022-05-17

## 2022-05-17 NOTE — TELEPHONE ENCOUNTER
Yolanda left VM, she is concerned about how long the vein clinic appointment is scheduled out. I called her back to see when it is scheduled. I had to leave a message.

## 2022-05-17 NOTE — TELEPHONE ENCOUNTER
I called and spoke with the [facility], they had an opening Monday 05/23/2022 @ 8:15, arrive 8:00. They will mail forms to to be completed and brought to appointment. I called and  Informed Yolanda, she states she is her only  and she is having surgery so the appointment will need to be cancelled and rescheduled. I informed Yolanda to call and cancel.

## 2022-05-25 ENCOUNTER — TELEPHONE (OUTPATIENT)
Dept: FAMILY MEDICINE CLINIC | Age: 62
End: 2022-05-25

## 2022-05-25 ENCOUNTER — HOSPITAL ENCOUNTER (OUTPATIENT)
Age: 62
Discharge: HOME | End: 2022-05-25
Payer: MEDICARE

## 2022-05-25 DIAGNOSIS — N18.4 CKD (CHRONIC KIDNEY DISEASE) STAGE 4, GFR 15-29 ML/MIN (HCC): ICD-10-CM

## 2022-05-25 LAB
ANION GAP SERPL CALCULATED.3IONS-SCNC: 9 MEQ/L (ref 8–16)
BASOPHILS # BLD: 1.1 %
BASOPHILS ABSOLUTE: 0.1 THOU/MM3 (ref 0–0.1)
BUN BLDV-MCNC: 22 MG/DL (ref 7–22)
CALCIUM SERPL-MCNC: 9.4 MG/DL (ref 8.5–10.5)
CHLORIDE BLD-SCNC: 98 MEQ/L (ref 98–111)
CO2: 28 MEQ/L (ref 23–33)
CREAT SERPL-MCNC: 1.7 MG/DL (ref 0.4–1.2)
EOSINOPHIL # BLD: 0.5 %
EOSINOPHILS ABSOLUTE: 0 THOU/MM3 (ref 0–0.4)
ERYTHROCYTE [DISTWIDTH] IN BLOOD BY AUTOMATED COUNT: 15.1 % (ref 11.5–14.5)
ERYTHROCYTE [DISTWIDTH] IN BLOOD BY AUTOMATED COUNT: 54 FL (ref 35–45)
FOLATE: > 20 NG/ML (ref 4.8–24.2)
GFR SERPL CREATININE-BSD FRML MDRD: 30 ML/MIN/1.73M2
GLUCOSE BLD-MCNC: 96 MG/DL (ref 70–108)
HCT VFR BLD CALC: 34.5 % (ref 37–47)
HEMOGLOBIN: 10.2 GM/DL (ref 12–16)
IMMATURE GRANS (ABS): 0.06 THOU/MM3 (ref 0–0.07)
IMMATURE GRANULOCYTES: 0.9 %
LYMPHOCYTES # BLD: 17.8 %
LYMPHOCYTES ABSOLUTE: 1.1 THOU/MM3 (ref 1–4.8)
MCH RBC QN AUTO: 29.1 PG (ref 26–33)
MCHC RBC AUTO-ENTMCNC: 29.6 GM/DL (ref 32.2–35.5)
MCV RBC AUTO: 98.3 FL (ref 81–99)
MONOCYTES # BLD: 11.7 %
MONOCYTES ABSOLUTE: 0.7 THOU/MM3 (ref 0.4–1.3)
NUCLEATED RED BLOOD CELLS: 0 /100 WBC
PLATELET # BLD: 340 THOU/MM3 (ref 130–400)
PMV BLD AUTO: 10.1 FL (ref 9.4–12.4)
POTASSIUM SERPL-SCNC: 4.7 MEQ/L (ref 3.5–5.2)
RBC # BLD: 3.51 MILL/MM3 (ref 4.2–5.4)
SEG NEUTROPHILS: 68 %
SEGMENTED NEUTROPHILS ABSOLUTE COUNT: 4.4 THOU/MM3 (ref 1.8–7.7)
SODIUM BLD-SCNC: 135 MEQ/L (ref 135–145)
TSH SERPL DL<=0.05 MIU/L-ACNC: 0.66 UIU/ML (ref 0.4–4.2)
VITAMIN B-12: 265 PG/ML (ref 211–911)
WBC # BLD: 6.4 THOU/MM3 (ref 4.8–10.8)

## 2022-05-25 PROCEDURE — 36415 COLL VENOUS BLD VENIPUNCTURE: CPT

## 2022-05-25 PROCEDURE — 84443 ASSAY THYROID STIM HORMONE: CPT

## 2022-05-25 PROCEDURE — 82746 ASSAY OF FOLIC ACID SERUM: CPT

## 2022-05-25 PROCEDURE — 80048 BASIC METABOLIC PNL TOTAL CA: CPT

## 2022-05-25 PROCEDURE — 85025 COMPLETE CBC W/AUTO DIFF WBC: CPT

## 2022-05-25 PROCEDURE — 82607 VITAMIN B-12: CPT

## 2022-05-25 NOTE — TELEPHONE ENCOUNTER
----- Message from Fredy Wallis MD sent at 5/25/2022  4:18 PM EDT -----  Kidney function is better 1.9 to 1.7. Continue present.

## 2022-05-31 DIAGNOSIS — G71.00 MD (MUSCULAR DYSTROPHY) (HCC): ICD-10-CM

## 2022-05-31 RX ORDER — HYDROCODONE BITARTRATE AND ACETAMINOPHEN 5; 325 MG/1; MG/1
1 TABLET ORAL EVERY 6 HOURS PRN
Qty: 100 TABLET | Refills: 0 | Status: SHIPPED | OUTPATIENT
Start: 2022-05-31 | End: 2022-06-30 | Stop reason: SDUPTHER

## 2022-05-31 NOTE — TELEPHONE ENCOUNTER
ep'ed norco to pharm requested    Controlled Substance Monitoring:    Acute and Chronic Pain Monitoring:   RX Monitoring 5/31/2022   Attestation -   Periodic Controlled Substance Monitoring No signs of potential drug abuse or diversion identified.

## 2022-05-31 NOTE — TELEPHONE ENCOUNTER
Erik Sainz needs refill of   Requested Prescriptions     Pending Prescriptions Disp Refills    HYDROcodone-acetaminophen (NORCO) 5-325 MG per tablet 100 tablet 0     Sig: Take 1 tablet by mouth every 6 hours as needed for Pain for up to 30 days.  G71.00       Last Filled on:  5/3/22    Last Visit Date:  5/13/2022    Next Visit Date:    6/1/2022

## 2022-06-01 ENCOUNTER — OFFICE VISIT (OUTPATIENT)
Dept: FAMILY MEDICINE CLINIC | Age: 62
End: 2022-06-01
Payer: MEDICARE

## 2022-06-01 VITALS
WEIGHT: 183.4 LBS | OXYGEN SATURATION: 96 % | BODY MASS INDEX: 31.48 KG/M2 | RESPIRATION RATE: 18 BRPM | DIASTOLIC BLOOD PRESSURE: 78 MMHG | SYSTOLIC BLOOD PRESSURE: 124 MMHG | TEMPERATURE: 97.5 F | HEART RATE: 70 BPM

## 2022-06-01 DIAGNOSIS — D63.1 ANEMIA DUE TO STAGE 3B CHRONIC KIDNEY DISEASE (HCC): ICD-10-CM

## 2022-06-01 DIAGNOSIS — N30.01 ACUTE CYSTITIS WITH HEMATURIA: Primary | ICD-10-CM

## 2022-06-01 DIAGNOSIS — N18.32 STAGE 3B CHRONIC KIDNEY DISEASE (HCC): ICD-10-CM

## 2022-06-01 DIAGNOSIS — M54.50 LOW BACK PAIN, UNSPECIFIED BACK PAIN LATERALITY, UNSPECIFIED CHRONICITY, UNSPECIFIED WHETHER SCIATICA PRESENT: ICD-10-CM

## 2022-06-01 DIAGNOSIS — N18.32 ANEMIA DUE TO STAGE 3B CHRONIC KIDNEY DISEASE (HCC): ICD-10-CM

## 2022-06-01 LAB
BASOPHILS # BLD: 1.1 %
BASOPHILS ABSOLUTE: 0.1 THOU/MM3 (ref 0–0.1)
EOSINOPHIL # BLD: 0 %
EOSINOPHILS ABSOLUTE: 0 THOU/MM3 (ref 0–0.4)
ERYTHROCYTE [DISTWIDTH] IN BLOOD BY AUTOMATED COUNT: 15.2 % (ref 11.5–14.5)
ERYTHROCYTE [DISTWIDTH] IN BLOOD BY AUTOMATED COUNT: 54.3 FL (ref 35–45)
HCT VFR BLD CALC: 32.4 % (ref 37–47)
HEMOGLOBIN: 9.7 GM/DL (ref 12–16)
IMMATURE GRANS (ABS): 0.06 THOU/MM3 (ref 0–0.07)
IMMATURE GRANULOCYTES: 0.8 %
LYMPHOCYTES # BLD: 24.1 %
LYMPHOCYTES ABSOLUTE: 1.8 THOU/MM3 (ref 1–4.8)
MCH RBC QN AUTO: 28.9 PG (ref 26–33)
MCHC RBC AUTO-ENTMCNC: 29.9 GM/DL (ref 32.2–35.5)
MCV RBC AUTO: 96.4 FL (ref 81–99)
MONOCYTES # BLD: 8 %
MONOCYTES ABSOLUTE: 0.6 THOU/MM3 (ref 0.4–1.3)
NUCLEATED RED BLOOD CELLS: 0 /100 WBC
PLATELET # BLD: 257 THOU/MM3 (ref 130–400)
PMV BLD AUTO: 10.7 FL (ref 9.4–12.4)
RBC # BLD: 3.36 MILL/MM3 (ref 4.2–5.4)
SEG NEUTROPHILS: 66 %
SEGMENTED NEUTROPHILS ABSOLUTE COUNT: 4.8 THOU/MM3 (ref 1.8–7.7)
WBC # BLD: 7.3 THOU/MM3 (ref 4.8–10.8)

## 2022-06-01 PROCEDURE — G8427 DOCREV CUR MEDS BY ELIG CLIN: HCPCS | Performed by: FAMILY MEDICINE

## 2022-06-01 PROCEDURE — 1036F TOBACCO NON-USER: CPT | Performed by: FAMILY MEDICINE

## 2022-06-01 PROCEDURE — G8417 CALC BMI ABV UP PARAM F/U: HCPCS | Performed by: FAMILY MEDICINE

## 2022-06-01 PROCEDURE — 99214 OFFICE O/P EST MOD 30 MIN: CPT | Performed by: FAMILY MEDICINE

## 2022-06-01 PROCEDURE — 81003 URINALYSIS AUTO W/O SCOPE: CPT | Performed by: FAMILY MEDICINE

## 2022-06-01 PROCEDURE — 3017F COLORECTAL CA SCREEN DOC REV: CPT | Performed by: FAMILY MEDICINE

## 2022-06-01 RX ORDER — DOXYCYCLINE HYCLATE 100 MG
100 TABLET ORAL 2 TIMES DAILY
Qty: 20 TABLET | Refills: 0 | Status: SHIPPED | OUTPATIENT
Start: 2022-06-01 | End: 2022-06-11

## 2022-06-01 RX ORDER — VITAMIN B COMPLEX
1 CAPSULE ORAL DAILY
COMMUNITY

## 2022-06-01 ASSESSMENT — PATIENT HEALTH QUESTIONNAIRE - PHQ9
5. POOR APPETITE OR OVEREATING: 0
SUM OF ALL RESPONSES TO PHQ QUESTIONS 1-9: 1
SUM OF ALL RESPONSES TO PHQ QUESTIONS 1-9: 1
1. LITTLE INTEREST OR PLEASURE IN DOING THINGS: 0
6. FEELING BAD ABOUT YOURSELF - OR THAT YOU ARE A FAILURE OR HAVE LET YOURSELF OR YOUR FAMILY DOWN: 0
9. THOUGHTS THAT YOU WOULD BE BETTER OFF DEAD, OR OF HURTING YOURSELF: 0
SUM OF ALL RESPONSES TO PHQ QUESTIONS 1-9: 1
3. TROUBLE FALLING OR STAYING ASLEEP: 0
8. MOVING OR SPEAKING SO SLOWLY THAT OTHER PEOPLE COULD HAVE NOTICED. OR THE OPPOSITE, BEING SO FIGETY OR RESTLESS THAT YOU HAVE BEEN MOVING AROUND A LOT MORE THAN USUAL: 0
7. TROUBLE CONCENTRATING ON THINGS, SUCH AS READING THE NEWSPAPER OR WATCHING TELEVISION: 0
SUM OF ALL RESPONSES TO PHQ9 QUESTIONS 1 & 2: 0
SUM OF ALL RESPONSES TO PHQ QUESTIONS 1-9: 1
2. FEELING DOWN, DEPRESSED OR HOPELESS: 0
10. IF YOU CHECKED OFF ANY PROBLEMS, HOW DIFFICULT HAVE THESE PROBLEMS MADE IT FOR YOU TO DO YOUR WORK, TAKE CARE OF THINGS AT HOME, OR GET ALONG WITH OTHER PEOPLE: 0
4. FEELING TIRED OR HAVING LITTLE ENERGY: 1

## 2022-06-01 ASSESSMENT — ENCOUNTER SYMPTOMS
VOMITING: 0
NAUSEA: 0
DIARRHEA: 0
RHINORRHEA: 0
CONSTIPATION: 0
BLOOD IN STOOL: 0
BACK PAIN: 0
SORE THROAT: 0
EYE PAIN: 0
COUGH: 0
WHEEZING: 0
CHEST TIGHTNESS: 0
SHORTNESS OF BREATH: 0
ABDOMINAL PAIN: 0

## 2022-06-01 NOTE — PROGRESS NOTES
Jamaal Brooks (:  1960) is a 60 y.o. female,Established patient, here for evaluation of the following chief complaint(s):  Anemia (discuss blood work) and Urinary Tract Infection (low back pain)         ASSESSMENT/PLAN:  1. Acute cystitis with hematuria  -     doxycycline hyclate (VIBRA-TABS) 100 MG tablet; Take 1 tablet by mouth 2 times daily for 10 days, Disp-20 tablet, R-0Normal  -     Culture, Urine  -new problem, trial of doxy, culture. 2. Low back pain, unspecified back pain laterality, unspecified chronicity, unspecified whether sciatica present  -     POCT Urinalysis No Micro (Auto)  -     doxycycline hyclate (VIBRA-TABS) 100 MG tablet; Take 1 tablet by mouth 2 times daily for 10 days, Disp-20 tablet, R-0Normal  -     Culture, Urine  3. Stage 3b chronic kidney disease ([facility]  -     Basic Metabolic Panel; Future  -stable, serial labs, avoid nephrotoxic meds, consider nephrology consult. 4. Anemia due to stage 3b chronic kidney disease (HCC)  -     CBC with Auto Differential; Future  -     Ferritin; Future  -     Iron and TIBC; Future  -stable, check labs, check stool for blood. Consider hematology consult. No follow-ups on file. Subjective   SUBJECTIVE/OBJECTIVE:  HPI  Patient here today for a check up. Reviewed BMI of 31. Encouraged diet, exercise and weight loss. Follow up anemia, decreased kidney function. Yesterday started with low back pain and frequency. Single, nonsmoker, pmh reviewed. Review of Systems   Constitutional: Positive for fatigue. Negative for chills, fever and unexpected weight change. HENT: Negative for congestion, ear pain, rhinorrhea and sore throat. Eyes: Negative for pain and visual disturbance. Respiratory: Negative for cough, chest tightness, shortness of breath and wheezing. Cardiovascular: Negative for chest pain and palpitations.    Gastrointestinal: Negative for abdominal pain, blood in stool, constipation, diarrhea, nausea and vomiting. Genitourinary: Positive for dysuria and frequency. Negative for difficulty urinating, hematuria and urgency. Musculoskeletal: Negative for back pain, joint swelling, myalgias and neck pain. Skin: Negative for rash. Neurological: Negative for dizziness and headaches. Hematological: Negative for adenopathy. Does not bruise/bleed easily. Psychiatric/Behavioral: Negative for behavioral problems and sleep disturbance. The patient is not nervous/anxious. Objective   Physical Exam  Vitals and nursing note reviewed. Constitutional:       Appearance: She is well-developed. HENT:      Head: Normocephalic and atraumatic. Right Ear: External ear normal.      Left Ear: External ear normal.      Nose: Nose normal.      Mouth/Throat:      Mouth: Mucous membranes are moist.   Eyes:      Pupils: Pupils are equal, round, and reactive to light. Neck:      Thyroid: No thyromegaly. Cardiovascular:      Rate and Rhythm: Normal rate and regular rhythm. Heart sounds: Normal heart sounds. Pulmonary:      Breath sounds: Normal breath sounds. No wheezing or rales. Abdominal:      General: Bowel sounds are normal.      Palpations: Abdomen is soft. Tenderness: There is no abdominal tenderness. There is no guarding or rebound. Musculoskeletal:         General: Normal range of motion. Cervical back: Neck supple. Lymphadenopathy:      Cervical: No cervical adenopathy. Skin:     General: Skin is warm and dry. Findings: No rash. Neurological:      Mental Status: She is alert and oriented to person, place, and time. Cranial Nerves: No cranial nerve deficit. Deep Tendon Reflexes: Reflexes are normal and symmetric. No results found for this visit on 06/01/22. An electronic signature was used to authenticate this note.     --Jenny Silverio MD

## 2022-06-01 NOTE — PATIENT INSTRUCTIONS
Patient Education        Anemia From Chronic Disease: Care Instructions  Your Care Instructions     Anemia is a low level of red blood cells. Red blood cells carry oxygen from your lungs to the rest of your body. Sometimes when you have a long-term (chronic) disease, such as kidney disease, arthritis, diabetes, cancer, or aninfection, your body does not make enough red blood cells. Follow-up care is a key part of your treatment and safety. Be sure to make and go to all appointments, and call your doctor if you are having problems. It's also a good idea to know your test results and keep alist of the medicines you take. How can you care for yourself at home?  Follow your doctor's instructions to treat the chronic condition that is causing the anemia.  Be safe with medicines. Take your medicine to treat your chronic condition exactly as prescribed. Call your doctor if you think you are having a problem with your medicine.  Take your medicine for anemia exactly as prescribed. Call your doctor if you think you are having a problem with your medicine. Medicines to increase the number of red blood cells (such as epoetin or darbepoetin) may be given as an injection. ? If you miss a dose, take it as soon as you can, unless it is almost time for your next dose. In that case, get back on your regular schedule and take only one dose. ? Do not freeze this medicine. Store it in the refrigerator. Do not shake the bottle before you prepare the shot.  Keep all your appointments for blood tests to check on your hemoglobin levels. When should you call for help? Call 911 anytime you think you may need emergency care. For example, call if:     You passed out (lost consciousness). Call your doctor now or seek immediate medical care if:     You are short of breath.      You are dizzy or light-headed, or you feel like you may faint.      You have new or worse bleeding.    Watch closely for changes in your health, and be sure to contact your doctor if:     You feel weaker or more tired than usual.      You do not get better as expected. Where can you learn more? Go to https://chpepiceweb.Altrec.com. org and sign in to your [employer] account. Enter E502 in the [facility] box to learn more about \"Anemia From Chronic Disease: Care Instructions. \"     If you do not have an account, please click on the \"Sign Up Now\" link. Current as of: November 29, 2021               Content Version: 13.2  © 2006-2022 [employer]. Care instructions adapted under license by [facility] ([facility]). If you have questions about a medical condition or this instruction, always ask your healthcare professional. [address] any warranty or liability for your use of this information. Patient Education        Urinary Tract Infection (UTI) in Women: Care Instructions  Overview     A urinary tract infection, or UTI, is a general term for an infection anywhere between the kidneys and the urethra (where urine comes out). Most UTIs arebladder infections. They often cause pain or burning when you urinate. UTIs are caused by bacteria and can be cured with antibiotics. Be sure tocomplete your treatment so that the infection does not get worse. Follow-up care is a key part of your treatment and safety. Be sure to make and go to all appointments, and call your doctor if you are having problems. It's also a good idea to know your test results and keep alist of the medicines you take. How can you care for yourself at home?  Take your antibiotics as directed. Do not stop taking them just because you feel better. You need to take the full course of antibiotics.  Drink extra water and other fluids for the next day or two. This will help make the urine less concentrated and help wash out the bacteria that are causing the infection.  (If you have kidney, heart, or liver disease and have to limit fluids, talk with your doctor before you increase the amount of fluids you drink.)   Avoid drinks that are carbonated or have caffeine. They can irritate the bladder.  Urinate often. Try to empty your bladder each time.  To relieve pain, take a hot bath or lay a heating pad set on low over your lower belly or genital area. Never go to sleep with a heating pad in place. To prevent UTIs   Drink plenty of water each day. This helps you urinate often, which clears bacteria from your system. (If you have kidney, heart, or liver disease and have to limit fluids, talk with your doctor before you increase the amount of fluids you drink.)   Urinate when you need to.  If you are sexually active, urinate right after you have sex.  Change sanitary pads often.  Avoid douches, bubble baths, feminine hygiene sprays, and other feminine hygiene products that have deodorants.  After going to the bathroom, wipe from front to back. When should you call for help? Call your doctor now or seek immediate medical care if:     Symptoms such as fever, chills, nausea, or vomiting get worse or appear for the first time.      You have new pain in your back just below your rib cage. This is called flank pain.      There is new blood or pus in your urine.      You have any problems with your antibiotic medicine. Watch closely for changes in your health, and be sure to contact your doctor if:     You are not getting better after taking an antibiotic for 2 days.      Your symptoms go away but then come back. Where can you learn more? Go to https://"[employer]ejr.healthNewLink [employer]etics. org and sign in to your [employer] account. Enter U711 in the [facility] box to learn more about \"Urinary Tract Infection (UTI) in Women: Care Instructions. \"     If you do not have an account, please click on the \"Sign Up Now\" link. Current as of: October 18, 2021               Content Version: 13.2  © 3191-8911 Healthwise, [facility].    Care instructions adapted under license by [facility] ([facility]). If you have questions about a medical condition or this instruction, always ask your healthcare professional. [address] any warranty or liability for your use of this information.

## 2022-06-02 ENCOUNTER — TELEPHONE (OUTPATIENT)
Dept: FAMILY MEDICINE CLINIC | Age: 62
End: 2022-06-02

## 2022-06-02 DIAGNOSIS — N18.32 STAGE 3B CHRONIC KIDNEY DISEASE (HCC): ICD-10-CM

## 2022-06-02 DIAGNOSIS — D63.1 ANEMIA DUE TO STAGE 3B CHRONIC KIDNEY DISEASE (HCC): Primary | ICD-10-CM

## 2022-06-02 DIAGNOSIS — N18.32 ANEMIA DUE TO STAGE 3B CHRONIC KIDNEY DISEASE (HCC): Primary | ICD-10-CM

## 2022-06-02 LAB
ANION GAP SERPL CALCULATED.3IONS-SCNC: 11 MEQ/L (ref 8–16)
BUN BLDV-MCNC: 14 MG/DL (ref 7–22)
CALCIUM SERPL-MCNC: 9.2 MG/DL (ref 8.5–10.5)
CHLORIDE BLD-SCNC: 98 MEQ/L (ref 98–111)
CO2: 25 MEQ/L (ref 23–33)
CREAT SERPL-MCNC: 1.7 MG/DL (ref 0.4–1.2)
FERRITIN: 202 NG/ML (ref 10–291)
GFR SERPL CREATININE-BSD FRML MDRD: 30 ML/MIN/1.73M2
GLUCOSE BLD-MCNC: 110 MG/DL (ref 70–108)
IRON: 20 UG/DL (ref 50–170)
POTASSIUM SERPL-SCNC: 5 MEQ/L (ref 3.5–5.2)
SODIUM BLD-SCNC: 134 MEQ/L (ref 135–145)
TOTAL IRON BINDING CAPACITY: 245 UG/DL (ref 171–450)

## 2022-06-02 RX ORDER — ASCORBIC ACID 500 MG
500 TABLET ORAL DAILY
Qty: 30 TABLET | Refills: 2 | Status: SHIPPED | OUTPATIENT
Start: 2022-06-02 | End: 2022-09-22

## 2022-06-02 RX ORDER — LANOLIN ALCOHOL/MO/W.PET/CERES
325 CREAM (GRAM) TOPICAL
Qty: 30 TABLET | Refills: 2 | Status: SHIPPED | OUTPATIENT
Start: 2022-06-02 | End: 2022-10-31 | Stop reason: SDUPTHER

## 2022-06-02 NOTE — TELEPHONE ENCOUNTER
Patient is agreeable, please send supplements in to [facility].      Referrals pending, patient would like to stay within [facility]

## 2022-06-02 NOTE — TELEPHONE ENCOUNTER
Vishal Cano MD  [facility] Clinical Support Pool  Kidney function is stable.  Hgb down to 9.7.  Add ferrous sulfate 325 mg daily and vit c 500 mg daily.  Set up consults with nephrology and hematology if agreeable.

## 2022-06-03 ENCOUNTER — NURSE ONLY (OUTPATIENT)
Dept: FAMILY MEDICINE CLINIC | Age: 62
End: 2022-06-03
Payer: MEDICARE

## 2022-06-03 ENCOUNTER — TELEPHONE (OUTPATIENT)
Dept: FAMILY MEDICINE CLINIC | Age: 62
End: 2022-06-03

## 2022-06-03 DIAGNOSIS — Z12.11 SCREENING FOR COLON CANCER: ICD-10-CM

## 2022-06-03 DIAGNOSIS — N18.32 ANEMIA DUE TO STAGE 3B CHRONIC KIDNEY DISEASE (HCC): Primary | ICD-10-CM

## 2022-06-03 DIAGNOSIS — D63.1 ANEMIA DUE TO STAGE 3B CHRONIC KIDNEY DISEASE (HCC): Primary | ICD-10-CM

## 2022-06-03 LAB
CONTROL: POSITIVE
HEMOCCULT STL QL: NEGATIVE
ORGANISM: ABNORMAL
URINE CULTURE, ROUTINE: ABNORMAL

## 2022-06-03 PROCEDURE — 82274 ASSAY TEST FOR BLOOD FECAL: CPT | Performed by: FAMILY MEDICINE

## 2022-06-03 NOTE — TELEPHONE ENCOUNTER
----- Message from Maria Luisa Casey MD sent at 6/3/2022 10:43 AM EDT -----  Urine has mixed growth, verify she is improving with the doxycycline and finish.

## 2022-06-03 NOTE — TELEPHONE ENCOUNTER
----- Message from Elder iHll MD sent at 6/3/2022 10:44 AM EDT -----  Notify FIT test is negative. Continue yearly screens. Add to HM and bill. No blood in the stool.

## 2022-06-09 ENCOUNTER — OFFICE VISIT (OUTPATIENT)
Dept: PSYCHIATRY | Age: 62
End: 2022-06-09
Payer: MEDICARE

## 2022-06-09 DIAGNOSIS — F34.0 CYCLOTHYMIC DISORDER: Primary | ICD-10-CM

## 2022-06-09 DIAGNOSIS — Z51.81 THERAPEUTIC DRUG MONITORING: ICD-10-CM

## 2022-06-09 PROCEDURE — G8427 DOCREV CUR MEDS BY ELIG CLIN: HCPCS | Performed by: NURSE PRACTITIONER

## 2022-06-09 PROCEDURE — 99213 OFFICE O/P EST LOW 20 MIN: CPT | Performed by: NURSE PRACTITIONER

## 2022-06-09 PROCEDURE — G8417 CALC BMI ABV UP PARAM F/U: HCPCS | Performed by: NURSE PRACTITIONER

## 2022-06-09 PROCEDURE — 3017F COLORECTAL CA SCREEN DOC REV: CPT | Performed by: NURSE PRACTITIONER

## 2022-06-09 PROCEDURE — 1036F TOBACCO NON-USER: CPT | Performed by: NURSE PRACTITIONER

## 2022-06-09 RX ORDER — DIVALPROEX SODIUM 500 MG/1
500 TABLET, EXTENDED RELEASE ORAL NIGHTLY
Qty: 30 TABLET | Refills: 3 | Status: SHIPPED | OUTPATIENT
Start: 2022-06-09 | End: 2022-10-26 | Stop reason: SDUPTHER
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
## 2022-06-30 DIAGNOSIS — G71.00 MD (MUSCULAR DYSTROPHY) (HCC): ICD-10-CM

## 2022-06-30 RX ORDER — HYDROCODONE BITARTRATE AND ACETAMINOPHEN 5; 325 MG/1; MG/1
1 TABLET ORAL EVERY 6 HOURS PRN
Qty: 100 TABLET | Refills: 0 | Status: SHIPPED | OUTPATIENT
Start: 2022-06-30 | End: 2022-08-01 | Stop reason: SDUPTHER

## 2022-06-30 NOTE — TELEPHONE ENCOUNTER
Dovie Opitz needs refill of   Requested Prescriptions     Pending Prescriptions Disp Refills    HYDROcodone-acetaminophen (NORCO) 5-325 MG per tablet 100 tablet 0     Sig: Take 1 tablet by mouth every 6 hours as needed for Pain for up to 30 days.  G71.00       Last Filled on:  5/31/22    Last Visit Date:  6/1/2022    Next Visit Date:    Visit date not found

## 2022-06-30 NOTE — TELEPHONE ENCOUNTER
Medication ep'ed to requested pharmacy. PDMP Monitoring:    Last PDMP Kara March as Reviewed [facility]):  Review User Review Instant Review Result   FRANKSANDYSTEVEN 6/30/2022 11:31 AM Reviewed PDMP [1]     [unfilled]  Urine Drug Screenings (1 yr)    No resulted procedures found.        Medication Contract and Consent for Opioid Use Documents Filed      No documents found

## 2022-07-28 ENCOUNTER — TELEPHONE (OUTPATIENT)
Dept: FAMILY MEDICINE CLINIC | Age: 62
End: 2022-07-28

## 2022-07-28 DIAGNOSIS — D63.1 ANEMIA DUE TO STAGE 3B CHRONIC KIDNEY DISEASE (HCC): Primary | ICD-10-CM

## 2022-07-28 DIAGNOSIS — N18.4 CKD (CHRONIC KIDNEY DISEASE) STAGE 4, GFR 15-29 ML/MIN (HCC): ICD-10-CM

## 2022-07-28 DIAGNOSIS — N18.32 ANEMIA DUE TO STAGE 3B CHRONIC KIDNEY DISEASE (HCC): Primary | ICD-10-CM

## 2022-07-28 NOTE — TELEPHONE ENCOUNTER
Pt called, states shewent to her appointment in [place] yesterday and they had cancelled her appointment without her knowledge. She is requesting referrals to [facility]. Orders placed, Pt will call the office next week if she does not hear from them to schedule.

## 2022-08-01 ENCOUNTER — TELEPHONE (OUTPATIENT)
Dept: FAMILY MEDICINE CLINIC | Age: 62
End: 2022-08-01

## 2022-08-01 DIAGNOSIS — G71.00 MD (MUSCULAR DYSTROPHY) (HCC): ICD-10-CM

## 2022-08-01 RX ORDER — HYDROCODONE BITARTRATE AND ACETAMINOPHEN 5; 325 MG/1; MG/1
1 TABLET ORAL EVERY 6 HOURS PRN
Qty: 28 TABLET | Refills: 0 | Status: SHIPPED | OUTPATIENT
Start: 2022-08-01 | End: 2022-08-08

## 2022-08-01 RX ORDER — HYDROCODONE BITARTRATE AND ACETAMINOPHEN 5; 325 MG/1; MG/1
1 TABLET ORAL EVERY 6 HOURS PRN
Qty: 100 TABLET | Refills: 0 | Status: SHIPPED | OUTPATIENT
Start: 2022-08-01 | End: 2022-08-01 | Stop reason: SDUPTHER

## 2022-08-01 NOTE — TELEPHONE ENCOUNTER
Due to insurance pt can only fill 1 week supply of her norco then she can call for the remaining 3 weeks. After that she will be allowed the month supply.  Patient requesting the 1 week rx to [facility] please

## 2022-08-01 NOTE — TELEPHONE ENCOUNTER
Ep';ed norc x 1 week to pharm requested      Controlled Substance Monitoring:    Acute and Chronic Pain Monitoring:   RX Monitoring 8/1/2022   Attestation -   Periodic Controlled Substance Monitoring No signs of potential drug abuse or diversion identified.

## 2022-08-01 NOTE — TELEPHONE ENCOUNTER
Eva Allen needs refill of   Requested Prescriptions     Pending Prescriptions Disp Refills    HYDROcodone-acetaminophen (NORCO) 5-325 MG per tablet 100 tablet 0     Sig: Take 1 tablet by mouth every 6 hours as needed for Pain for up to 30 days. G71.00       Last Filled on: 6/30/22    Last Visit Date: 6/1/2022 cystitis    Next Visit Date:  Visit date not found    Pt has #4 left. Pls call pt at [phone] only if probs. Zip: [phone]      *Also, pt reports she got into a kidney doctor, Dr Clark Early and will be moving up her appt.

## 2022-08-01 NOTE — TELEPHONE ENCOUNTER
Ep'ed norco to pharm requested    Controlled Substance Monitoring:    Acute and Chronic Pain Monitoring:   RX Monitoring 8/1/2022   Attestation -   Periodic Controlled Substance Monitoring No signs of potential drug abuse or diversion identified.

## 2022-08-04 ENCOUNTER — TELEPHONE (OUTPATIENT)
Dept: FAMILY MEDICINE CLINIC | Age: 62
End: 2022-08-04

## 2022-08-04 DIAGNOSIS — E55.9 VITAMIN D DEFICIENCY: Primary | ICD-10-CM

## 2022-08-04 NOTE — TELEPHONE ENCOUNTER
Paragonah Yolanda Day for pt Margarita Shay: wants to know if her Vitamin D can be converted from OTC to Rx? When I did not find Vit D on her med list I asked Yolanda to call back to confirm the correct name of the supplement,. This note was on the wrong encounter. It has been moved to the correct encounter on this patient

## 2022-08-05 NOTE — TELEPHONE ENCOUNTER
Lesa Crockett called in requesting Vit D sent in to pharm, informed her that Yolanda called in yesterday about this and we were waiting on the strength of the Vit D. She will find out and will let us know.

## 2022-08-08 ENCOUNTER — HOSPITAL ENCOUNTER (OUTPATIENT)
Age: 62
Discharge: HOME | End: 2022-08-08
Payer: COMMERCIAL

## 2022-08-08 LAB
ALP BLD-CCNC: 70 U/L (ref 38–126)
ALT SERPL-CCNC: 16 U/L (ref 11–66)
AST SERPL-CCNC: 26 U/L (ref 5–40)
BASOPHILS # BLD: 0.8 %
BASOPHILS ABSOLUTE: 0 THOU/MM3 (ref 0–0.1)
BUN BLDV-MCNC: 21 MG/DL (ref 7–22)
CREAT SERPL-MCNC: 1.5 MG/DL (ref 0.4–1.2)
EOSINOPHIL # BLD: 1.3 %
EOSINOPHILS ABSOLUTE: 0.1 THOU/MM3 (ref 0–0.4)
ERYTHROCYTE [DISTWIDTH] IN BLOOD BY AUTOMATED COUNT: 16.2 % (ref 11.5–14.5)
ERYTHROCYTE [DISTWIDTH] IN BLOOD BY AUTOMATED COUNT: 57.8 FL (ref 35–45)
GFR SERPL CREATININE-BSD FRML MDRD: 35 ML/MIN/1.73M2
HCT VFR BLD CALC: 39.7 % (ref 37–47)
HEMOGLOBIN: 12.1 GM/DL (ref 12–16)
IMMATURE GRANS (ABS): 0.01 THOU/MM3 (ref 0–0.07)
IMMATURE GRANULOCYTES: 0.2 %
LYMPHOCYTES # BLD: 25.2 %
LYMPHOCYTES ABSOLUTE: 1.2 THOU/MM3 (ref 1–4.8)
MCH RBC QN AUTO: 29.5 PG (ref 26–33)
MCHC RBC AUTO-ENTMCNC: 30.5 GM/DL (ref 32.2–35.5)
MCV RBC AUTO: 96.8 FL (ref 81–99)
MONOCYTES # BLD: 7.4 %
MONOCYTES ABSOLUTE: 0.3 THOU/MM3 (ref 0.4–1.3)
NUCLEATED RED BLOOD CELLS: 0 /100 WBC
PLATELET # BLD: 150 THOU/MM3 (ref 130–400)
PMV BLD AUTO: 11.4 FL (ref 9.4–12.4)
RBC # BLD: 4.1 MILL/MM3 (ref 4.2–5.4)
SEG NEUTROPHILS: 65.1 %
SEGMENTED NEUTROPHILS ABSOLUTE COUNT: 3.1 THOU/MM3 (ref 1.8–7.7)
WBC # BLD: 4.7 THOU/MM3 (ref 4.8–10.8)

## 2022-08-08 PROCEDURE — 84450 TRANSFERASE (AST) (SGOT): CPT

## 2022-08-08 PROCEDURE — 84075 ASSAY ALKALINE PHOSPHATASE: CPT

## 2022-08-08 PROCEDURE — 36415 COLL VENOUS BLD VENIPUNCTURE: CPT

## 2022-08-08 PROCEDURE — 84520 ASSAY OF UREA NITROGEN: CPT

## 2022-08-08 PROCEDURE — 84460 ALANINE AMINO (ALT) (SGPT): CPT

## 2022-08-08 PROCEDURE — 82565 ASSAY OF CREATININE: CPT

## 2022-08-08 PROCEDURE — 85025 COMPLETE CBC W/AUTO DIFF WBC: CPT

## 2022-08-08 NOTE — TELEPHONE ENCOUNTER
[facility] called back states Pt is taking 125 mcg Vit D3 over the counter, she is requesting a prescription to [facility].

## 2022-08-10 ENCOUNTER — OFFICE VISIT (OUTPATIENT)
Dept: ONCOLOGY | Age: 62
End: 2022-08-10
Payer: MEDICARE

## 2022-08-10 ENCOUNTER — HOSPITAL ENCOUNTER (OUTPATIENT)
Dept: INFUSION THERAPY | Age: 62
Discharge: HOME | End: 2022-08-10
Payer: MEDICARE

## 2022-08-10 VITALS
OXYGEN SATURATION: 99 % | TEMPERATURE: 97.4 F | DIASTOLIC BLOOD PRESSURE: 65 MMHG | HEART RATE: 72 BPM | SYSTOLIC BLOOD PRESSURE: 139 MMHG | RESPIRATION RATE: 16 BRPM

## 2022-08-10 VITALS
SYSTOLIC BLOOD PRESSURE: 139 MMHG | RESPIRATION RATE: 16 BRPM | HEIGHT: 64 IN | DIASTOLIC BLOOD PRESSURE: 65 MMHG | WEIGHT: 191 LBS | HEART RATE: 72 BPM | OXYGEN SATURATION: 99 % | TEMPERATURE: 97.4 F | BODY MASS INDEX: 32.61 KG/M2

## 2022-08-10 DIAGNOSIS — D64.9 ANEMIA, UNSPECIFIED TYPE: Primary | ICD-10-CM

## 2022-08-10 DIAGNOSIS — D64.9 ANEMIA, UNSPECIFIED TYPE: ICD-10-CM

## 2022-08-10 LAB
ABSOLUTE RETIC #: 60 THOU/MM3 (ref 20–115)
ALBUMIN SERPL-MCNC: 4.4 G/DL (ref 3.5–5.1)
ALP BLD-CCNC: 63 U/L (ref 38–126)
ALT SERPL-CCNC: 18 U/L (ref 11–66)
ANION GAP SERPL CALCULATED.3IONS-SCNC: 14 MEQ/L (ref 8–16)
AST SERPL-CCNC: 30 U/L (ref 5–40)
BASOPHILS # BLD: 1.2 %
BASOPHILS ABSOLUTE: 0.1 THOU/MM3 (ref 0–0.1)
BILIRUB SERPL-MCNC: 0.2 MG/DL (ref 0.3–1.2)
BUN BLDV-MCNC: 17 MG/DL (ref 7–22)
CALCIUM SERPL-MCNC: 10.2 MG/DL (ref 8.5–10.5)
CHLORIDE BLD-SCNC: 101 MEQ/L (ref 98–111)
CO2: 24 MEQ/L (ref 23–33)
CREAT SERPL-MCNC: 1.5 MG/DL (ref 0.4–1.2)
EOSINOPHIL # BLD: 2.1 %
EOSINOPHILS ABSOLUTE: 0.1 THOU/MM3 (ref 0–0.4)
ERYTHROCYTE [DISTWIDTH] IN BLOOD BY AUTOMATED COUNT: 16 % (ref 11.5–14.5)
ERYTHROCYTE [DISTWIDTH] IN BLOOD BY AUTOMATED COUNT: 56.7 FL (ref 35–45)
FERRITIN: 67 NG/ML (ref 10–291)
FOLATE: > 20 NG/ML (ref 4.8–24.2)
GFR SERPL CREATININE-BSD FRML MDRD: 35 ML/MIN/1.73M2
GLUCOSE BLD-MCNC: 88 MG/DL (ref 70–108)
HCT VFR BLD CALC: 38 % (ref 37–47)
HEMOGLOBIN: 11.9 GM/DL (ref 12–16)
IMMATURE GRANS (ABS): 0.01 THOU/MM3 (ref 0–0.07)
IMMATURE GRANULOCYTES: 0.2 %
IMMATURE RETIC FRACT: 13 % (ref 3–15.9)
IRON SATURATION: 18 % (ref 20–50)
IRON: 56 UG/DL (ref 50–170)
LD: 270 U/L (ref 100–190)
LYMPHOCYTES # BLD: 39.2 %
LYMPHOCYTES ABSOLUTE: 1.7 THOU/MM3 (ref 1–4.8)
MCH RBC QN AUTO: 30.1 PG (ref 26–33)
MCHC RBC AUTO-ENTMCNC: 31.3 GM/DL (ref 32.2–35.5)
MCV RBC AUTO: 96.2 FL (ref 81–99)
MONOCYTES # BLD: 8.1 %
MONOCYTES ABSOLUTE: 0.3 THOU/MM3 (ref 0.4–1.3)
NUCLEATED RED BLOOD CELLS: 0 /100 WBC
PLATELET # BLD: 144 THOU/MM3 (ref 130–400)
PMV BLD AUTO: 11.5 FL (ref 9.4–12.4)
POTASSIUM SERPL-SCNC: 4.4 MEQ/L (ref 3.5–5.2)
RBC # BLD: 3.95 MILL/MM3 (ref 4.2–5.4)
RETIC HEMOGLOBIN: 35.8 PG (ref 28.2–35.7)
RETICULOCYTE ABSOLUTE COUNT: 1.5 % (ref 0.5–2)
SEG NEUTROPHILS: 49.2 %
SEGMENTED NEUTROPHILS ABSOLUTE COUNT: 2.1 THOU/MM3 (ref 1.8–7.7)
SODIUM BLD-SCNC: 139 MEQ/L (ref 135–145)
TOTAL IRON BINDING CAPACITY: 303 UG/DL (ref 171–450)
TOTAL PROTEIN: 7.1 G/DL (ref 6.1–8)
VITAMIN B-12: 336 PG/ML (ref 211–911)
WBC # BLD: 4.3 THOU/MM3 (ref 4.8–10.8)

## 2022-08-10 PROCEDURE — 83540 ASSAY OF IRON: CPT

## 2022-08-10 PROCEDURE — 80053 COMPREHEN METABOLIC PANEL: CPT

## 2022-08-10 PROCEDURE — 83010 ASSAY OF HAPTOGLOBIN QUANT: CPT

## 2022-08-10 PROCEDURE — 83550 IRON BINDING TEST: CPT

## 2022-08-10 PROCEDURE — 99203 OFFICE O/P NEW LOW 30 MIN: CPT | Performed by: NURSE PRACTITIONER

## 2022-08-10 PROCEDURE — 83615 LACTATE (LD) (LDH) ENZYME: CPT

## 2022-08-10 PROCEDURE — 82607 VITAMIN B-12: CPT

## 2022-08-10 PROCEDURE — 85025 COMPLETE CBC W/AUTO DIFF WBC: CPT

## 2022-08-10 PROCEDURE — 82728 ASSAY OF FERRITIN: CPT

## 2022-08-10 PROCEDURE — 36415 COLL VENOUS BLD VENIPUNCTURE: CPT

## 2022-08-10 PROCEDURE — 99211 OFF/OP EST MAY X REQ PHY/QHP: CPT

## 2022-08-10 PROCEDURE — 82746 ASSAY OF FOLIC ACID SERUM: CPT

## 2022-08-10 PROCEDURE — 85046 RETICYTE/HGB CONCENTRATE: CPT

## 2022-08-10 NOTE — PROGRESS NOTES
[facility], [address] [address],[address]  [address] [address]  Dept: [phone]  Dept Fax: [phone]: [phone]      Visit Date:8/10/2022     Ashu Araujo is a 60 y.o. female who presents today for:   Chief Complaint   Patient presents with    New Patient     Anemia         HPI:   Ashu Araujo is a 60 y.o. female referred to our office by Dr. Fabby Parekh for evaluation of anemia due to stage 3b chronic kidney disease. PMH includes venous insufficiency, CKD stage 3b, rheumatoid arthritis, lymphedema, muscular dystrophy, cervical spondylosis, DJD, depression, GERD. She reports her methotrexate was recently stopped, she continues on plaquenil. Since methotrexate stopped, H/H has improved from 9.7/32.4 on 6/1/2022 to 12.1/39.7 on 8/8/2022. She reports she has had IV iron in the past.  She was started on oral iron supplementation in June after Iron was found to be low at 20. She never started it. No GI issues, such as bariatric surgery, IBS, colitis, crohns. No COVID-19 infection or vaccinations recently. She has nephrology appnt on 10/6/2022 to establish as a new patient, they are trying to get her appnt moved up sooner. She reports fatigue, weakness, dizziness at times, chronic low back pain, RLS, chronic BLE edema. She wears compression socks that provides improvement. She denies headaches, SOB, heart palpitations, PICA, s/s bleeding. PMH, SH, and FH:  I reviewed the patient's medication and allergy lists as noted on the electronic medical record. The PMH, SH, and FH were also reviewed as noted on the EMR. Past Medical History:   Diagnosis Date    B12 deficiency 12/2020    Depression     Lymphedema 2008    both legs    [facility]    [facility]. Enoc Head at [facility] - no lung involvement per patient    Neuropathy     Obesity (BMI 30-39. 9)     Overflow incontinence     Rheumatoid arthritis(714.0) 2008    Dr. Cole Tucker thrombophlebitis 03/2018    right lower extremity    Venous insufficiency     h/o SVT - 3-4 in the past (has never been on Coumadin)      Past Surgical History:   Procedure Laterality Date    COLONOSCOPY      HYSTERECTOMY (CERVIX STATUS UNKNOWN)  2002    JOINT REPLACEMENT Left 2008    knee    JOINT REPLACEMENT Right 09/10/2013    right    TOTAL KNEE ARTHROPLASTY Left 6/08    TOTAL KNEE ARTHROPLASTY Right 09/10/2013    VARICOSE VEIN SURGERY Right 1998    laser      Family History   Problem Relation Age of Onset    Lung Cancer Mother     Dementia Father     Other Father         vericose veins    Emphysema Father     Heart Disease Father     Bipolar Disorder Sister     Bipolar Disorder Brother       Social History     Tobacco Use    Smoking status: Never    Smokeless tobacco: Never   Substance Use Topics    Alcohol use: No      Current Outpatient Medications   Medication Sig Dispense Refill    Cholecalciferol (VITAMIN D3) 125 MCG (5000 UT) TABS Take 1 tablet by mouth in the morning. 30 tablet 1    divalproex (DEPAKOTE ER) 500 MG extended release tablet Take 1 tablet by mouth nightly 30 tablet 3    vitamin C (ASCORBIC ACID) 500 MG tablet Take 1 tablet by mouth daily 30 tablet 2    b complex vitamins capsule Take 1 capsule by mouth daily      CRANBERRY PO Take by mouth daily      Probiotic Product (PRO-BIOTIC BLEND PO) Take by mouth daily      apixaban (ELIQUIS) 2.5 MG TABS tablet Take 1 tablet by mouth 2 times daily 60 tablet 1    gabapentin (NEURONTIN) 300 MG capsule Take 1 capsule by mouth 2 times daily, G71.00 60 capsule 11    Misc. Devices (WALKER) MISC Rollator, rolling walker with seats, brakes,  Basket. G71.00 1 each 0    Handicap Placard MISC by Does not apply route Request parking placard due to medical conditions. Duration of 5 years.  1 each 0    hydroxychloroquine (PLAQUENIL) 200 MG tablet Take 1 tablet by mouth 2 times daily 60 tablet 5    folic acid (FOLVITE) 1 MG tablet Take 1 mg by mouth      omeprazole (PRILOSEC) 40 MG capsule Take 1 capsule by mouth daily. (Patient taking differently: Take 20 mg by mouth in the morning.) 30 capsule 3    ferrous sulfate (FE TABS) 325 (65 Fe) MG EC tablet Take 1 tablet by mouth daily (with breakfast) (Patient not taking: Reported on 8/10/2022) 30 tablet 2    aspirin (ASPIRIN CHILDRENS) 81 MG chewable tablet Take 1 tablet by mouth daily (Patient not taking: Reported on 8/10/2022) 30 tablet 0     No current facility-administered medications for this visit. Allergies   Allergen Reactions    Oxycontin [Oxycodone Hcl] Other (See Comments)     \"too strong - mental confusion\"    Percocet [Oxycodone-Acetaminophen] Other (See Comments)     \"too strong. I don't like them. \"    Bactrim Rash    Sulfa Antibiotics Rash          Review of Systems:   Review of Systems   Pertinent review of systems noted in HPI, all other ROS negative. Objective:   Physical Exam   /65 (Site: Left Upper Arm, Position: Sitting, Cuff Size: Medium Adult)   Pulse 72   Temp 97.4 °F (36.3 °C) (Oral)   Resp 16   Ht 5' 4\" (1.626 m)   Wt 191 lb (86.6 kg)   SpO2 99%   BMI 32.79 kg/m²    General appearance: No apparent distress, calm and cooperative. HEENT: Pupils equal, round, and reactive to light. Conjunctivae/corneas clear. Oral mucosa moist.  Neck: Supple, with full range of motion. Trachea midline. Respiratory:  Normal respiratory effort. Clear to auscultation all lung fields. Cardiovascular:  RRR, S1/S2. Abdomen: Soft, non-tender, non-distended with active BS  Musculoskeletal: No clubbing, cyanosis or edema bilaterally. She is able to ambulate in office with use of walker. Skin: Skin color, texture, turgor normal.  No visible rashes or lesions. Neurologic:  Neurovascularly intact without any focal sensory/motor deficits.  Cranial nerves: II-XII intact, grossly non-focal.  Psychiatric: Alert and oriented x 3, thought content appropriate, normal insight  Capillary Refill:< 3 seconds   Peripheral Pulses: +2 palpable, equal bilaterally       Imaging Studies and Labs:   CBC:   Lab Results   Component Value Date    WBC 4.7 (L) 08/08/2022    HGB 12.1 08/08/2022    HCT 39.7 08/08/2022    MCV 96.8 08/08/2022     08/08/2022     BMP:   Lab Results   Component Value Date/Time     06/01/2022 02:40 PM    K 5.0 06/01/2022 02:40 PM    K 4.3 05/01/2022 09:50 AM    CL 98 06/01/2022 02:40 PM    CO2 25 06/01/2022 02:40 PM    BUN 21 08/08/2022 10:11 AM    CREATININE 1.5 08/08/2022 10:11 AM    GLUCOSE 110 06/01/2022 02:40 PM    CALCIUM 9.2 06/01/2022 02:40 PM      LFT:   Lab Results   Component Value Date    ALT 16 08/08/2022    AST 26 08/08/2022    ALKPHOS 70 08/08/2022    BILITOT 0.2 (L) 05/01/2022         Assessment and Plan:     1. Anemia, unspecified type  CKD stage 3b.  RA.  GERD. H/H improved on own once methotrexate stopped, she continues on plaquenil. Iron deficiency found in June, she never started oral iron. Obtain labs:    - Comprehensive Metabolic Panel; Future  - Vitamin B12 & Folate; Future  - Iron; Future  - Iron Binding Capacity; Future  - IRON SATURATION; Future  - Ferritin; Future  - Lactate Dehydrogenase; Future  - Haptoglobin; Future  - Reticulocytes; Future    No follow-ups on file. All patient questions answered. Pt voiced understanding. Patient agreed with treatment plan. Follow up as directed. Patient instructed to call for questions or concerns. Electronically signed by   LUBA Ray CNP       I spent a total of 31 minutes on the day of the visit.

## 2022-08-13 LAB — HAPTOGLOBIN: 103 MG/DL (ref 30–200)

## 2022-08-16 ENCOUNTER — TELEPHONE (OUTPATIENT)
Dept: ONCOLOGY | Age: 62
End: 2022-08-16

## 2022-08-16 RX ORDER — FERROUS SULFATE 325(65) MG
325 TABLET ORAL
Qty: 90 TABLET | Refills: 1 | Status: SHIPPED | OUTPATIENT
Start: 2022-08-16 | End: 2022-10-06

## 2022-08-16 NOTE — TELEPHONE ENCOUNTER
Attempted to call pt twice today, received busy signal.  She should take oral iron daily. Will trend in 3 mos.     Electronically signed by LUBA Sparks CNP on 8/16/2022 at 3:44 PM

## 2022-08-17 ENCOUNTER — TELEPHONE (OUTPATIENT)
Dept: ONCOLOGY | Age: 62
End: 2022-08-17

## 2022-08-17 NOTE — TELEPHONE ENCOUNTER
Patient calling and asking if Sai Claros NP can review her recent blood work she had done.  She can be reached tomorrow morning after 9 am.

## 2022-08-24 ENCOUNTER — TELEPHONE (OUTPATIENT)
Dept: ONCOLOGY | Age: 62
End: 2022-08-24

## 2022-08-24 NOTE — TELEPHONE ENCOUNTER
Pt's friend, Yolanda, calling and asking that you call her phone number to discuss labs. She is on [employer]-phone # [phone].

## 2022-08-31 DIAGNOSIS — G71.00 MD (MUSCULAR DYSTROPHY) (HCC): ICD-10-CM

## 2022-08-31 RX ORDER — HYDROCODONE BITARTRATE AND ACETAMINOPHEN 5; 325 MG/1; MG/1
1 TABLET ORAL EVERY 6 HOURS PRN
Qty: 28 TABLET | Refills: 0 | Status: CANCELLED | OUTPATIENT
Start: 2022-08-31 | End: 2022-09-07

## 2022-08-31 RX ORDER — HYDROCODONE BITARTRATE AND ACETAMINOPHEN 5; 325 MG/1; MG/1
1 TABLET ORAL EVERY 6 HOURS PRN
Qty: 100 TABLET | Refills: 0 | Status: SHIPPED | OUTPATIENT
Start: 2022-08-31 | End: 2022-09-29 | Stop reason: SDUPTHER

## 2022-08-31 NOTE — TELEPHONE ENCOUNTER
Ep'ed norco to pharm requested    Controlled Substance Monitoring:    Acute and Chronic Pain Monitoring:   RX Monitoring 8/31/2022   Attestation -   Periodic Controlled Substance Monitoring No signs of potential drug abuse or diversion identified.

## 2022-08-31 NOTE — TELEPHONE ENCOUNTER
Vincenzo Rahman called requesting a refill on the following medications:  Requested Prescriptions     Pending Prescriptions Disp Refills    HYDROcodone-acetaminophen (NORCO) 5-325 MG per tablet 28 tablet 0     Sig: Take 1 tablet by mouth every 6 hours as needed for Pain for up to 7 days. G71.00       Date of last visit: 6/1/2022 cystitis    Date of next visit: Visit date not found    Date of last refill: 8/1/22    [facility] *new pharmacy this time    Pt has #4 left. Pls call pt at [phone] only if probs. She plans to  this Rx on Friday.     Zip: [id number]    Thanks,  Marvin Langford

## 2022-09-22 DIAGNOSIS — D63.1 ANEMIA DUE TO STAGE 3B CHRONIC KIDNEY DISEASE (HCC): ICD-10-CM

## 2022-09-22 DIAGNOSIS — N18.32 ANEMIA DUE TO STAGE 3B CHRONIC KIDNEY DISEASE (HCC): ICD-10-CM

## 2022-09-29 DIAGNOSIS — G71.00 MD (MUSCULAR DYSTROPHY) (HCC): ICD-10-CM

## 2022-09-29 RX ORDER — HYDROCODONE BITARTRATE AND ACETAMINOPHEN 5; 325 MG/1; MG/1
1 TABLET ORAL EVERY 6 HOURS PRN
Qty: 100 TABLET | Refills: 0 | Status: SHIPPED | OUTPATIENT
Start: 2022-09-30 | End: 2022-10-31 | Stop reason: SDUPTHER

## 2022-09-29 NOTE — TELEPHONE ENCOUNTER
Loli Dial called requesting a refill on the following medications:  Requested Prescriptions     Pending Prescriptions Disp Refills    HYDROcodone-acetaminophen (NORCO) 5-325 MG per tablet 100 tablet 0     Sig: Take 1 tablet by mouth every 6 hours as needed for Pain for up to 30 days. G71.00       Date of last visit: 6/1/2022 cystitis    Date of next visit: date not found    Date of last refill: 8/31/22    [facility]    Pt has #4-5 left    Pls call pt at [phone] only if probs.     Zip: [id number]    Thanks,  Betzaida Wade

## 2022-09-29 NOTE — TELEPHONE ENCOUNTER
Ep'ed norco to pharm requested    Controlled Substance Monitoring:    Acute and Chronic Pain Monitoring:   RX Monitoring 9/29/2022   Attestation -   Periodic Controlled Substance Monitoring No signs of potential drug abuse or diversion identified.

## 2022-10-06 ENCOUNTER — OFFICE VISIT (OUTPATIENT)
Dept: NEPHROLOGY | Age: 62
End: 2022-10-06
Payer: MEDICARE

## 2022-10-06 VITALS
WEIGHT: 191 LBS | OXYGEN SATURATION: 96 % | HEART RATE: 71 BPM | SYSTOLIC BLOOD PRESSURE: 134 MMHG | BODY MASS INDEX: 32.79 KG/M2 | DIASTOLIC BLOOD PRESSURE: 82 MMHG

## 2022-10-06 DIAGNOSIS — N18.31 STAGE 3A CHRONIC KIDNEY DISEASE (HCC): Primary | ICD-10-CM

## 2022-10-06 PROCEDURE — 99203 OFFICE O/P NEW LOW 30 MIN: CPT | Performed by: INTERNAL MEDICINE
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
## 2022-10-21 ENCOUNTER — HOSPITAL ENCOUNTER (OUTPATIENT)
Dept: ULTRASOUND IMAGING | Age: 62
Discharge: HOME | End: 2022-10-21
Payer: MEDICARE

## 2022-10-21 DIAGNOSIS — N18.31 STAGE 3A CHRONIC KIDNEY DISEASE (HCC): ICD-10-CM

## 2022-10-21 PROCEDURE — 76770 US EXAM ABDO BACK WALL COMP: CPT

## 2022-10-26 DIAGNOSIS — G71.00 MUSCULAR DYSTROPHY (HCC): ICD-10-CM

## 2022-10-26 DIAGNOSIS — F34.0 CYCLOTHYMIC DISORDER: ICD-10-CM

## 2022-10-26 RX ORDER — GABAPENTIN 300 MG/1
CAPSULE ORAL
Qty: 60 CAPSULE | Refills: 0 | Status: SHIPPED | OUTPATIENT
Start: 2022-10-26 | End: 2022-10-31 | Stop reason: SDUPTHER

## 2022-10-26 RX ORDER — DIVALPROEX SODIUM 500 MG/1
500 TABLET, EXTENDED RELEASE ORAL NIGHTLY
Qty: 30 TABLET | Refills: 0 | Status: SHIPPED | OUTPATIENT
Start: 2022-10-26 | End: 2022-10-31 | Stop reason: SDUPTHER

## 2022-10-26 NOTE — TELEPHONE ENCOUNTER
Snehal Dior called requesting a refill on the following medications:  Requested Prescriptions     Pending Prescriptions Disp Refills    gabapentin (NEURONTIN) 300 MG capsule 60 capsule 11     Sig: Take 1 capsule by mouth 2 times daily, G71.00       Date of last visit: 6/1/2022 cystitis    Date of next visit: date not found    Date of last refill: 10/6/22    [facility]    Pt has #3 left. Pls call pt at [phone] only if probs.     Thanks,  Olivia Tao

## 2022-10-26 NOTE — TELEPHONE ENCOUNTER
Ep'ed gabapentin to pharm requested x 1 month. Due for managed medicare physical and refills. Controlled Substance Monitoring:    Acute and Chronic Pain Monitoring:   RX Monitoring 10/26/2022   Attestation -   Periodic Controlled Substance Monitoring No signs of potential drug abuse or diversion identified.

## 2022-10-26 NOTE — TELEPHONE ENCOUNTER
Scheduled with covering provider 11/14/2022 for medication follow up. Lab order mailed to patient to complete prior to appt.    Requested Prescriptions     Pending Prescriptions Disp Refills    divalproex (DEPAKOTE ER) 500 MG extended release tablet 30 tablet 0     Sig: Take 1 tablet by mouth nightly       Date of last visit: 6/9/2022  Date of next visit (if applicable):11/1/2022  Pharmacy Name: [address] [place] [address]      [facility], [address] [place] [address]

## 2022-10-31 ENCOUNTER — OFFICE VISIT (OUTPATIENT)
Dept: FAMILY MEDICINE CLINIC | Age: 62
End: 2022-10-31
Payer: MEDICARE

## 2022-10-31 VITALS
SYSTOLIC BLOOD PRESSURE: 120 MMHG | WEIGHT: 189 LBS | RESPIRATION RATE: 18 BRPM | HEART RATE: 75 BPM | HEIGHT: 64 IN | OXYGEN SATURATION: 96 % | TEMPERATURE: 97.2 F | BODY MASS INDEX: 32.27 KG/M2 | DIASTOLIC BLOOD PRESSURE: 70 MMHG

## 2022-10-31 DIAGNOSIS — F34.0 CYCLOTHYMIC DISORDER: ICD-10-CM

## 2022-10-31 DIAGNOSIS — G71.00 MUSCULAR DYSTROPHY (HCC): ICD-10-CM

## 2022-10-31 DIAGNOSIS — N30.00 ACUTE CYSTITIS WITHOUT HEMATURIA: ICD-10-CM

## 2022-10-31 DIAGNOSIS — Z00.00 ANNUAL PHYSICAL EXAM: Primary | ICD-10-CM

## 2022-10-31 DIAGNOSIS — D63.1 ANEMIA DUE TO STAGE 3B CHRONIC KIDNEY DISEASE (HCC): ICD-10-CM

## 2022-10-31 DIAGNOSIS — R30.0 BURNING WITH URINATION: ICD-10-CM

## 2022-10-31 DIAGNOSIS — E55.9 VITAMIN D DEFICIENCY: ICD-10-CM

## 2022-10-31 DIAGNOSIS — N18.32 ANEMIA DUE TO STAGE 3B CHRONIC KIDNEY DISEASE (HCC): ICD-10-CM

## 2022-10-31 DIAGNOSIS — Z23 NEED FOR INFLUENZA VACCINATION: ICD-10-CM

## 2022-10-31 DIAGNOSIS — G71.00 MD (MUSCULAR DYSTROPHY) (HCC): ICD-10-CM

## 2022-10-31 PROBLEM — L40.50 PSORIATIC ARTHRITIS (HCC): Status: ACTIVE | Noted: 2022-09-14

## 2022-10-31 LAB
BILIRUBIN URINE: NEGATIVE
BLOOD URINE, POC: NEGATIVE
CHARACTER, URINE: CLEAR
COLOR, URINE: YELLOW
GLUCOSE URINE: NEGATIVE MG/DL
KETONES, URINE: NEGATIVE
LEUKOCYTE CLUMPS, URINE: ABNORMAL
NITRITE, URINE: NEGATIVE
PH, URINE: 5 (ref 5–9)
PROTEIN, URINE: NEGATIVE MG/DL
SPECIFIC GRAVITY, URINE: <= 1.005 (ref 1–1.03)
UROBILINOGEN, URINE: 0.2 EU/DL (ref 0–1)

## 2022-10-31 PROCEDURE — 81003 URINALYSIS AUTO W/O SCOPE: CPT | Performed by: FAMILY MEDICINE

## 2022-10-31 PROCEDURE — 99396 PREV VISIT EST AGE 40-64: CPT | Performed by: FAMILY MEDICINE

## 2022-10-31 PROCEDURE — G0008 ADMIN INFLUENZA VIRUS VAC: HCPCS | Performed by: FAMILY MEDICINE

## 2022-10-31 PROCEDURE — 99213 OFFICE O/P EST LOW 20 MIN: CPT | Performed by: FAMILY MEDICINE

## 2022-10-31 PROCEDURE — 90674 CCIIV4 VAC NO PRSV 0.5 ML IM: CPT | Performed by: FAMILY MEDICINE

## 2022-10-31 RX ORDER — HYDROCODONE BITARTRATE AND ACETAMINOPHEN 5; 325 MG/1; MG/1
1 TABLET ORAL EVERY 6 HOURS PRN
Qty: 100 TABLET | Refills: 0 | Status: SHIPPED | OUTPATIENT
Start: 2022-10-31 | End: 2022-11-30 | Stop reason: SDUPTHER

## 2022-10-31 RX ORDER — OMEPRAZOLE 20 MG/1
CAPSULE, DELAYED RELEASE ORAL
COMMUNITY
Start: 2022-08-17 | End: 2022-10-31

## 2022-10-31 RX ORDER — LANOLIN ALCOHOL/MO/W.PET/CERES
325 CREAM (GRAM) TOPICAL
Qty: 30 TABLET | Refills: 11 | Status: SHIPPED | OUTPATIENT
Start: 2022-10-31 | End: 2023-01-29

## 2022-10-31 RX ORDER — ASCORBIC ACID 500 MG
TABLET ORAL
Qty: 30 TABLET | Refills: 11 | Status: SHIPPED | OUTPATIENT
Start: 2022-10-31

## 2022-10-31 RX ORDER — GABAPENTIN 300 MG/1
CAPSULE ORAL
Qty: 60 CAPSULE | Refills: 11 | Status: SHIPPED | OUTPATIENT
Start: 2022-10-31 | End: 2023-11-02

## 2022-10-31 RX ORDER — AMOXICILLIN AND CLAVULANATE POTASSIUM 875; 125 MG/1; MG/1
1 TABLET, FILM COATED ORAL 2 TIMES DAILY
Qty: 20 TABLET | Refills: 0 | Status: SHIPPED | OUTPATIENT
Start: 2022-10-31 | End: 2022-11-02 | Stop reason: ALTCHOICE

## 2022-10-31 RX ORDER — DIVALPROEX SODIUM 500 MG/1
500 TABLET, EXTENDED RELEASE ORAL NIGHTLY
Qty: 30 TABLET | Refills: 11 | Status: SHIPPED | OUTPATIENT
Start: 2022-10-31

## 2022-10-31 SDOH — ECONOMIC STABILITY: FOOD INSECURITY: WITHIN THE PAST 12 MONTHS, YOU WORRIED THAT YOUR FOOD WOULD RUN OUT BEFORE YOU GOT MONEY TO BUY MORE.: NEVER TRUE

## 2022-10-31 SDOH — ECONOMIC STABILITY: FOOD INSECURITY: WITHIN THE PAST 12 MONTHS, THE FOOD YOU BOUGHT JUST DIDN'T LAST AND YOU DIDN'T HAVE MONEY TO GET MORE.: NEVER TRUE

## 2022-10-31 ASSESSMENT — ENCOUNTER SYMPTOMS
EYE PAIN: 0
SHORTNESS OF BREATH: 0
DIARRHEA: 0
BACK PAIN: 0
WHEEZING: 0
ABDOMINAL PAIN: 0
SORE THROAT: 0
CONSTIPATION: 0
COUGH: 0
CHEST TIGHTNESS: 0
RHINORRHEA: 0
NAUSEA: 0
ABDOMINAL PAIN: 1
BLOOD IN STOOL: 0
VOMITING: 0

## 2022-10-31 ASSESSMENT — SOCIAL DETERMINANTS OF HEALTH (SDOH): HOW HARD IS IT FOR YOU TO PAY FOR THE VERY BASICS LIKE FOOD, HOUSING, MEDICAL CARE, AND HEATING?: NOT HARD AT ALL

## 2022-10-31 NOTE — PROGRESS NOTES
Immunizations Administered       Name Date Dose Route    Influenza, FLUCELVAX, (age 10 mo+), MDCK, PF, 0.5mL 10/31/2022 0.5 mL Intramuscular    Site: Deltoid- Left    Lot: [id number]    NDC: 44167-661-15
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
## 2022-10-31 NOTE — PROGRESS NOTES
Samantha Flores (:  1960) is a 60 y.o. female,Established patient, here for evaluation of the following chief complaint(s): Annual Exam (Refills, uti)         ASSESSMENT/PLAN:  1. Annual physical exam  2. Acute cystitis without hematuria  -     amoxicillin-clavulanate (AUGMENTIN) 875-125 MG per tablet; Take 1 tablet by mouth 2 times daily for 10 days, Disp-20 tablet, R-0Normal  -     Culture, Urine  -new problem, -monitor sxs, call if not improving  Results for orders placed or performed in visit on 10/31/22   POCT Urinalysis No Micro (Auto)   Result Value Ref Range    Glucose, Ur Negative NEGATIVE mg/dl    Bilirubin Urine Negative     Ketones, Urine Negative NEGATIVE    Specific Gravity, Urine <= 1.005 1.002 - 1.030    Blood, UA POC Negative NEGATIVE    pH, Urine 5.00 5.0 - 9.0    Protein, Urine Negative NEGATIVE mg/dl    Urobilinogen, Urine 0.20 0.0 - 1.0 eu/dl    Nitrite, Urine Negative NEGATIVE    Leukocyte Clumps, Urine Moderate (A) NEGATIVE    Color, Urine Yellow [id number]    Character, Urine Clear [id number]       3. Burning with urination  -     POCT Urinalysis No Micro (Auto)  -     amoxicillin-clavulanate (AUGMENTIN) 875-125 MG per tablet; Take 1 tablet by mouth 2 times daily for 10 days, Disp-20 tablet, R-0Normal  -     Culture, Urine  4. Anemia due to stage 3b chronic kidney disease (HCC)  -     ferrous sulfate (FE TABS) 325 (65 Fe) MG EC tablet; Take 1 tablet by mouth daily (with breakfast), Disp-30 tablet, R-11Normal  -     ascorbic acid (RA VITAMIN C) 500 MG tablet; take 1 tablet by mouth once daily, Disp-30 tablet, R-11Normal  5. Vitamin D deficiency  -     Cholecalciferol (VITAMIN D3) 125 MCG (5000 UT) TABS; Take 1 tablet by mouth daily, Disp-30 tablet, R-11Normal  6. MD (muscular dystrophy) ([facility]  -     HYDROcodone-acetaminophen (NORCO) 5-325 MG per tablet; Take 1 tablet by mouth every 6 hours as needed for Pain for up to 30 days. G71.00, Disp-100 tablet, R-0Normal  7.  Cyclothymic disorder  -     divalproex (DEPAKOTE ER) 500 MG extended release tablet; Take 1 tablet by mouth nightly, Disp-30 tablet, R-11Normal  8. Muscular dystrophy ([address]  -     gabapentin (NEURONTIN) 300 MG capsule; Take 1 capsule by mouth 2 times daily, G71.00, Disp-60 capsule, R-11Normal  9. Need for influenza vaccination  -     Influenza, FLUCELVAX, (age 10 mo+), IM, PF, 0.5 mL      No follow-ups on file. Subjective   SUBJECTIVE/OBJECTIVE:  HPI  Pt seen today due to lower abdominal bloating. Feels she has a UTI. Uses cranberry tabs. Reviewed BMI of 33. Encouraged diet, exercise and weight loss. Single, non smoker, pmh reviewed. Review of Systems   Constitutional:  Negative for chills, fatigue, fever and unexpected weight change. HENT:  Negative for congestion, ear pain, rhinorrhea and sore throat. Eyes:  Negative for pain and visual disturbance. Respiratory:  Negative for cough, chest tightness, shortness of breath and wheezing. Cardiovascular:  Negative for chest pain and palpitations. Gastrointestinal:  Positive for abdominal pain. Negative for blood in stool, constipation, diarrhea, nausea and vomiting. Genitourinary:  Positive for dysuria. Negative for difficulty urinating, frequency, hematuria and urgency. Musculoskeletal:  Negative for back pain, joint swelling, myalgias and neck pain. Skin:  Negative for rash. Neurological:  Negative for dizziness and headaches. Hematological:  Negative for adenopathy. Does not bruise/bleed easily. Psychiatric/Behavioral:  Negative for behavioral problems and sleep disturbance. The patient is not nervous/anxious. Objective   Physical Exam--see other note             An electronic signature was used to authenticate this note.     --Goldy Malone MD

## 2022-11-01 ENCOUNTER — TELEPHONE (OUTPATIENT)
Dept: FAMILY MEDICINE CLINIC | Age: 62
End: 2022-11-01

## 2022-11-02 ENCOUNTER — TELEPHONE (OUTPATIENT)
Dept: FAMILY MEDICINE CLINIC | Age: 62
End: 2022-11-02

## 2022-11-02 DIAGNOSIS — N39.0 E. COLI UTI: Primary | ICD-10-CM

## 2022-11-02 DIAGNOSIS — B96.20 E. COLI UTI: Primary | ICD-10-CM

## 2022-11-02 LAB
ORGANISM: ABNORMAL
URINE CULTURE, ROUTINE: ABNORMAL

## 2022-11-02 RX ORDER — NITROFURANTOIN 25; 75 MG/1; MG/1
100 CAPSULE ORAL 2 TIMES DAILY
Qty: 20 CAPSULE | Refills: 0 | Status: SHIPPED | OUTPATIENT
Start: 2022-11-02 | End: 2022-11-12

## 2022-11-02 NOTE — TELEPHONE ENCOUNTER
----- Message from Melba Lovett MD sent at 11/2/2022  7:59 AM EDT -----  Urine is growing e coli. It is resistant to the augmentin. Stop augmentin, ep'ed macrobid to [facility].

## 2022-11-03 ENCOUNTER — TELEPHONE (OUTPATIENT)
Dept: FAMILY MEDICINE CLINIC | Age: 62
End: 2022-11-03

## 2022-11-03 DIAGNOSIS — B37.31 VAGINAL CANDIDIASIS: Primary | ICD-10-CM

## 2022-11-03 RX ORDER — FLUCONAZOLE 150 MG/1
TABLET ORAL
Qty: 2 TABLET | Refills: 0 | Status: SHIPPED | OUTPATIENT
Start: 2022-11-03 | End: 2022-11-04

## 2022-11-03 NOTE — TELEPHONE ENCOUNTER
Pt Cortez Gill reports the antibiotics she began yesterday for her UTI has already caused vaginal burning and itch, so she requests yeast med as you've prescribed her previously at [facility] [place]. Pls call pt at [phone] to reply.     Last ov: 10/31/22 annual

## 2022-11-07 ENCOUNTER — HOSPITAL ENCOUNTER (OUTPATIENT)
Age: 62
Discharge: HOME | End: 2022-11-07
Payer: MEDICARE

## 2022-11-07 DIAGNOSIS — Z51.81 THERAPEUTIC DRUG MONITORING: ICD-10-CM

## 2022-11-07 PROCEDURE — 36415 COLL VENOUS BLD VENIPUNCTURE: CPT

## 2022-11-07 PROCEDURE — 80164 ASSAY DIPROPYLACETIC ACD TOT: CPT

## 2022-11-08 LAB — VALPROIC ACID LEVEL: 28.9 UG/ML (ref 50–100)

## 2022-11-14 ENCOUNTER — HOSPITAL ENCOUNTER (OUTPATIENT)
Age: 62
Discharge: HOME | End: 2022-11-14
Payer: MEDICARE

## 2022-11-14 ENCOUNTER — OFFICE VISIT (OUTPATIENT)
Dept: PSYCHIATRY | Age: 62
End: 2022-11-14
Payer: MEDICARE

## 2022-11-14 ENCOUNTER — TELEPHONE (OUTPATIENT)
Dept: FAMILY MEDICINE CLINIC | Age: 62
End: 2022-11-14

## 2022-11-14 DIAGNOSIS — Z51.81 THERAPEUTIC DRUG MONITORING: ICD-10-CM

## 2022-11-14 DIAGNOSIS — Z87.440 HISTORY OF UTI: Primary | ICD-10-CM

## 2022-11-14 DIAGNOSIS — F34.0 CYCLOTHYMIA: Primary | ICD-10-CM

## 2022-11-14 DIAGNOSIS — Z87.440 HISTORY OF UTI: ICD-10-CM

## 2022-11-14 PROCEDURE — 99214 OFFICE O/P EST MOD 30 MIN: CPT

## 2022-11-14 PROCEDURE — 81001 URINALYSIS AUTO W/SCOPE: CPT

## 2022-11-14 NOTE — TELEPHONE ENCOUNTER
The pt is requesting a repeat UA order as she has completed her antibiotics for her UTI. She is going to bring the UA to [place].

## 2022-11-14 NOTE — PROGRESS NOTES
[facility] PROFESSIONAL SERVS  [facility]  [address]  Javier Greenberg 82  Dept: [phone]  Dept Fax: [phone]  Loc: [phone]    Visit Date: 11/14/2022      SUBJECTIVE DATA     CHIEF COMPLAINT:    Chief Complaint   Patient presents with    Follow-up    Other     Cyclothymic        History obtained from: patient    HISTORY OF PRESENT ILLNESS:    Nadia Cordova is a 60 y.o. female who presents to the office for follow-up on her reports of irritability, mood swings, and depression. Bridge appointment for Lola Louie CNP while she is on maternity leave. Patient reports that she has been missing doses of Depakote. Valproic acid level noted to be 28.9 on 11-7-2022. Denies side effects. Reports she feels medications are working [employer]. \"  Reports stressors related to medical conditions. States she is getting over a UTI which was treated by her PCP with 2 antibiotics. Reports she is still concerned she has a UTI and advised patient to follow up with PCP regarding concerns. Reports other stressors including nephew  get out of [facility], stress with sister states \"I try to avoid her. \"  Overall reports mood has been stable. Denies feeling sad or down or depressed. Denies irritability or mood swings. Denies changes in appetite. Denies changes in energy or motivation. Denies thoughts of harm to self or others. Denies sleep disturbances. Reports able to initiate and maintain sleep. Reports sleeping about 6-7 at night and feeling rested most mornings. Reports anxiety is well managed. Denies worrying or trouble controlling worry. Denies panic attacks. Denies recent or current hypomanic or manic symptoms. Will be going to [facility] for hematology and nephrology. Denies suicidal ideations, intent, plan. No homicidal ideations, intent, plan. No audiovisual hallucinations  Reports has been missing doses of depakote.    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 SUBSTANCE USE:    1. Marijuana: first use at age 11. Last use was around age 20. States she would use a couple of times per week. 2. Alcohol: first use at age 11. Patient states she still drinks on the weekends, but \"I really try to watch. \" Patient states her rheumatologist has recommended she limit her alcohol intake. States she was a \"social alcoholic. I don't think I was full blown because I always went to work. \" States at the max she was drinking every weekend. She would drink roughly a 12 pack of beer. She denies use of liquor. States at this time she will drink 1 or 2 beers (12 oz size) once on the weekend, but does not drink every weekend. Social Determinants of Health     Financial Resource Strain: Low Risk     Difficulty of Paying Living Expenses: Not hard at all   Food Insecurity: No Food Insecurity    Worried About Running Out of Food in the Last Year: Never true    Ran Out of Food in the Last Year: Never true   Transportation Needs: Not on file   Physical Activity: Not on file   Stress: Not on file   Social Connections: Not on file   Intimate Partner Violence: Not on file   Housing Stability: Not on file        FAMILY HISTORY:   Family History   Problem Relation Age of Onset    Lung Cancer Mother     Dementia Father     Other Father         vericose veins    Emphysema Father     Heart Disease Father     Bipolar Disorder Sister     Bipolar Disorder Brother        Psychiatric Family History  Bipolar disorder in a brother and sister; dementia in her father    PAST MEDICAL HISTORY:    Past Medical History:   Diagnosis Date    B12 deficiency 12/2020    Depression     Lymphedema 2008    both legs    MD (muscular dystrophy) ([facility] [address] 2008    Dr. Manjit Jefferson at [facility] - no lung involvement per patient    Neuropathy     Obesity (BMI 30-39. 9)     Overflow incontinence     Rheumatoid arthritis(714.0) 2008    Dr. Almanzar Dire    Superficial thrombophlebitis 03/2018    right lower extremity    Venous insufficiency     h/o SVT - 3-4 in the past (has never been on Coumadin)       PAST SURGICAL HISTORY:    Past Surgical History:   Procedure Laterality Date    COLONOSCOPY      HYSTERECTOMY (CERVIX STATUS UNKNOWN)  2002    JOINT REPLACEMENT Left 2008    knee    JOINT REPLACEMENT Right 09/10/2013    right    TOTAL KNEE ARTHROPLASTY Left 6/08    TOTAL KNEE ARTHROPLASTY Right 09/10/2013    VARICOSE VEIN SURGERY Right 1998    laser       PREVIOUS MEDICATIONS:  Previous Medications    ASCORBIC ACID (RA VITAMIN C) 500 MG TABLET    take 1 tablet by mouth once daily    B COMPLEX VITAMINS CAPSULE    Take 1 capsule by mouth daily    CHOLECALCIFEROL (VITAMIN D3) 125 MCG (5000 UT) TABS    Take 1 tablet by mouth daily    CRANBERRY PO    Take by mouth daily    DIVALPROEX (DEPAKOTE ER) 500 MG EXTENDED RELEASE TABLET    Take 1 tablet by mouth nightly    FERROUS SULFATE (FE TABS) 325 (65 FE) MG EC TABLET    Take 1 tablet by mouth daily (with breakfast)    FOLIC ACID (FOLVITE) 1 MG TABLET    Take 1 mg by mouth    GABAPENTIN (NEURONTIN) 300 MG CAPSULE    Take 1 capsule by mouth 2 times daily, G71.00    HANDICAP PLACARD MISC    by Does not apply route Request parking placard due to medical conditions. Duration of 5 years. HYDROCODONE-ACETAMINOPHEN (NORCO) 5-325 MG PER TABLET    Take 1 tablet by mouth every 6 hours as needed for Pain for up to 30 days. G71.00    HYDROXYCHLOROQUINE (PLAQUENIL) 200 MG TABLET    Take 1 tablet by mouth 2 times daily    INFLIXIMAB IV    Infuse intravenously    MISC. DEVICES (WALKER) MISC    Rollator, rolling walker with seats, brakes,  Basket. G71.00    OMEPRAZOLE (PRILOSEC) 40 MG CAPSULE    Take 1 capsule by mouth daily.     PROBIOTIC PRODUCT (PRO-BIOTIC BLEND PO)    Take by mouth daily       ALLERGIES:    Oxycontin [oxycodone hcl], Percocet [oxycodone-acetaminophen], Bactrim, and Sulfa antibiotics    REVIEW OF SYSTEMS:    ROS:  Constitutional: Negative for appetite change, diaphoresis, fatigue and fever. Respiratory: Negative for cough, shortness of breath and wheezing. Cardiovascular: Negative for chest pain and leg swelling. Gastrointestinal: Negative for nausea, vomiting, diarrhea. Negative for abdominal pain. Neurological: Negative for dizziness, weakness and headaches or tremor. The patient sees Radha Licona MD as her primary care provider. SPECIALISTS: rheumatologist and Dr. Delia Godoy for GI    OBJECTIVE DATA     There were no vitals taken for this visit. Mental Status Evaluation:   Orientation: Alert, oriented, thought content appropriate   Mood:. Euthymic      Affect:  normal      Appearance:  Clean, well-groomed, clothing age and weather appropriate, casually dressed   Activity:  Cooperative, seated calmly   Gait/Posture: Uses rolling walker; posture is WNL   Speech:  normal pitch, normal volume and clear, age appropriate, linear, easy to understand   Thought Process:  within normal limits   Thought Content:  within normal limits   Cognition:  grossly intact   Memory: intact   Insight:  good   Judgment: good   Suicidal Ideations: denies suicidal ideation   Homicidal Ideations: Negative for homicidal ideation      Medication Side Effects: absent       Attention Span attention span and concentration were age appropriate     Screenings Completed in This Encounter:     Anxiety and Depression:                    DIAGNOSIS AND ASSESSMENT DATA     DIAGNOSIS:   1. Cyclothymia    2. Therapeutic drug monitoring        PLAN   Follow-up:  Return in about 5 weeks (around 12/19/2022), or if symptoms worsen or fail to improve. Prescriptions for this encounter:  New Prescriptions    No medications on file       No orders of the defined types were placed in this encounter. There are no discontinued medications.       Additional orders:  Orders Placed This Encounter   Procedures    Valproic Acid Level, Total       Reports overall mood has been stable, denies irritability or mood swings. We resume medications as prescribed as patient's mood is stable, noted PCP had sent him a year supply of Depakote patient not due for refills at this time. Valproic acid level may be due to patient inconsistency with compliance. Discussed with patient importance of medication compliance she verbalizes understanding. Will recheck valproic acid level in 1 month after patient has been taking Depakote consistently. We will follow up to determine if dose needs adjusted. Patient encouraged to actively participate in individual psychotherapy. Patient is encouraged to use deep breathing, meditation, guided imagery, muscle relaxation, calming music, and journaling. Risks, potential side effects, possible drug-drug interactions, benefits and alternate treatments discussed in detail. All questions answered. Patient stated understanding and is agreeable to treatment plan. Labs-valproic acid level to be drawn in 4 weeks     Patient has been instructed to seek emergency help via the emergency and/or calling 911 should symptoms become severe, worsen, or with other concerning symptoms. Patient instructed to go immediately to the emergency room and/or call 911 with any suicidal or homicidal ideations or if audio/visual hallucinations develop  Patient stated understanding and agrees. Patient given crisis center information. Provider Signature:  Electronically signed by BENJA Johnson      **This report has been created using voice recognition software. It may contain minor errors which are inherent in voice recognition technology. **

## 2022-11-14 NOTE — PATIENT INSTRUCTIONS
-Please take medications as prescribed  -Refrain from alcohol or drug use  -Seek emergency help via the emergency and/or calling 911 should symptoms become severe, worsen, or with other concerning symptoms. Go immediately to the emergency room and/or call 911 with any suicidal or homicidal ideations or if audio/visual hallucinations develop.   -Contact office with any questions or concerns. Crisis phone numbers:  Jodee Kendall, and .[facility] [phone]. [address] [phone]. [employer] [phone]. [facility] [address] [facility], and [place] [phone].

## 2022-11-15 ENCOUNTER — TELEPHONE (OUTPATIENT)
Dept: FAMILY MEDICINE CLINIC | Age: 62
End: 2022-11-15

## 2022-11-15 LAB
BACTERIA: ABNORMAL /HPF
BILIRUBIN URINE: NEGATIVE
BLOOD, URINE: NEGATIVE
CASTS 2: ABNORMAL /LPF
CASTS UA: ABNORMAL /LPF
CHARACTER, URINE: CLEAR
COLOR: YELLOW
CRYSTALS, UA: ABNORMAL
EPITHELIAL CELLS, UA: ABNORMAL /HPF
GLUCOSE URINE: NEGATIVE MG/DL
KETONES, URINE: NEGATIVE
LEUKOCYTE ESTERASE, URINE: ABNORMAL
MISCELLANEOUS 2: ABNORMAL
NITRITE, URINE: NEGATIVE
PH UA: 6 (ref 5–9)
PROTEIN UA: NEGATIVE
RBC URINE: ABNORMAL /HPF
RENAL EPITHELIAL, UA: ABNORMAL
SPECIFIC GRAVITY, URINE: <= 1.005 (ref 1–1.03)
UROBILINOGEN, URINE: 0.2 EU/DL (ref 0–1)
WBC UA: ABNORMAL /HPF
YEAST: ABNORMAL

## 2022-11-15 NOTE — TELEPHONE ENCOUNTER
----- Message from Omer Stallings MD sent at 11/15/2022  7:02 AM EST -----  Urine sample is ok. Does not meet culture criteria. Is she having any sxs?

## 2022-11-28 ENCOUNTER — HOSPITAL ENCOUNTER (OUTPATIENT)
Age: 62
Discharge: HOME | End: 2022-11-28
Payer: MEDICARE

## 2022-11-28 LAB
ANION GAP SERPL CALCULATED.3IONS-SCNC: 9 MEQ/L (ref 8–16)
BACTERIA: ABNORMAL
BILIRUBIN URINE: NEGATIVE
BLOOD, URINE: NEGATIVE
BUN BLDV-MCNC: 19 MG/DL (ref 7–22)
CALCIUM SERPL-MCNC: 9.9 MG/DL (ref 8.5–10.5)
CASTS: ABNORMAL /LPF
CASTS: ABNORMAL /LPF
CHARACTER, URINE: CLEAR
CHLORIDE BLD-SCNC: 95 MEQ/L (ref 98–111)
CO2: 29 MEQ/L (ref 23–33)
COLOR: YELLOW
CREAT SERPL-MCNC: 1.5 MG/DL (ref 0.4–1.2)
CREATININE URINE: 22.1 MG/DL
CRYSTALS: ABNORMAL
EPITHELIAL CELLS, UA: ABNORMAL /HPF
GFR SERPL CREATININE-BSD FRML MDRD: 39 ML/MIN/1.73M2
GLUCOSE BLD-MCNC: 103 MG/DL (ref 70–108)
GLUCOSE, URINE: NEGATIVE MG/DL
HCT VFR BLD CALC: 41.3 % (ref 37–47)
HEMOGLOBIN: 13.4 GM/DL (ref 12–16)
KETONES, URINE: NEGATIVE
LEUKOCYTE EST, POC: ABNORMAL
MISCELLANEOUS LAB TEST RESULT: ABNORMAL
NITRITE, URINE: NEGATIVE
PH UA: 5.5 (ref 5–9)
PHOSPHORUS: 3.8 MG/DL (ref 2.4–4.7)
POTASSIUM SERPL-SCNC: 4.3 MEQ/L (ref 3.5–5.2)
PROT/CREAT RATIO, UR: 0.18
PROTEIN UA: NEGATIVE MG/DL
PROTEIN, URINE: < 4 MG/DL
RBC URINE: ABNORMAL /HPF
RENAL EPITHELIAL, UA: ABNORMAL
SODIUM BLD-SCNC: 133 MEQ/L (ref 135–145)
SPECIFIC GRAVITY UA: < 1.005 (ref 1–1.03)
UROBILINOGEN, URINE: 0.2 EU/DL (ref 0–1)
WBC UA: ABNORMAL /HPF
YEAST: ABNORMAL

## 2022-11-28 PROCEDURE — 84156 ASSAY OF PROTEIN URINE: CPT

## 2022-11-28 PROCEDURE — 83970 ASSAY OF PARATHORMONE: CPT

## 2022-11-28 PROCEDURE — 85014 HEMATOCRIT: CPT

## 2022-11-28 PROCEDURE — 84100 ASSAY OF PHOSPHORUS: CPT

## 2022-11-28 PROCEDURE — 82306 VITAMIN D 25 HYDROXY: CPT

## 2022-11-28 PROCEDURE — 80048 BASIC METABOLIC PNL TOTAL CA: CPT

## 2022-11-28 PROCEDURE — 82570 ASSAY OF URINE CREATININE: CPT

## 2022-11-28 PROCEDURE — 36415 COLL VENOUS BLD VENIPUNCTURE: CPT

## 2022-11-28 PROCEDURE — 81001 URINALYSIS AUTO W/SCOPE: CPT

## 2022-11-28 PROCEDURE — 85018 HEMOGLOBIN: CPT

## 2022-11-29 LAB
PTH INTACT: 61.1 PG/ML (ref 15–65)
VITAMIN D 25-HYDROXY: 93 NG/ML (ref 30–100)

## 2022-11-30 DIAGNOSIS — G71.00 MD (MUSCULAR DYSTROPHY) (HCC): ICD-10-CM

## 2022-11-30 RX ORDER — HYDROCODONE BITARTRATE AND ACETAMINOPHEN 5; 325 MG/1; MG/1
1 TABLET ORAL EVERY 6 HOURS PRN
Qty: 100 TABLET | Refills: 0 | Status: SHIPPED | OUTPATIENT
Start: 2022-11-30 | End: 2022-12-30

## 2022-11-30 NOTE — TELEPHONE ENCOUNTER
Noris Waters called requesting a refill on the following medications:  Requested Prescriptions     Pending Prescriptions Disp Refills    HYDROcodone-acetaminophen (NORCO) 5-325 MG per tablet 100 tablet 0     Sig: Take 1 tablet by mouth every 6 hours as needed for Pain for up to 30 days. G71.00       Date of last visit: 10/31/2022 physical    Date of next visit: date not found    Date of last refill: 10/31/22    Pharm[facility]: [facility]    Pt has #4 left. Pls call pt at [phone] only if probs.     Zip: [id number]    Thanks,  Yeny Stien

## 2022-11-30 NOTE — TELEPHONE ENCOUNTER
Ep'ed norco to pharm requested    Controlled Substance Monitoring:    Acute and Chronic Pain Monitoring:   RX Monitoring 10/31/2022   Attestation -   Periodic Controlled Substance Monitoring Possible medication side effects, risk of tolerance/dependence & alternative treatments discussed. ;No signs of potential drug abuse or diversion identified.;Obtaining appropriate analgesic effect of treatment.

## 2022-12-01 ENCOUNTER — OFFICE VISIT (OUTPATIENT)
Dept: FAMILY MEDICINE CLINIC | Age: 62
End: 2022-12-01

## 2022-12-01 ENCOUNTER — TELEPHONE (OUTPATIENT)
Dept: FAMILY MEDICINE CLINIC | Age: 62
End: 2022-12-01

## 2022-12-01 VITALS
HEART RATE: 60 BPM | BODY MASS INDEX: 32.23 KG/M2 | DIASTOLIC BLOOD PRESSURE: 76 MMHG | HEIGHT: 64 IN | TEMPERATURE: 97.2 F | SYSTOLIC BLOOD PRESSURE: 124 MMHG | RESPIRATION RATE: 18 BRPM | WEIGHT: 188.8 LBS | OXYGEN SATURATION: 97 %

## 2022-12-01 DIAGNOSIS — R35.0 URINARY FREQUENCY: ICD-10-CM

## 2022-12-01 DIAGNOSIS — N30.01 ACUTE CYSTITIS WITH HEMATURIA: ICD-10-CM

## 2022-12-01 DIAGNOSIS — Z00.00 MEDICARE ANNUAL WELLNESS VISIT, SUBSEQUENT: Primary | ICD-10-CM

## 2022-12-01 LAB
BILIRUBIN URINE: NEGATIVE
BLOOD URINE, POC: ABNORMAL
CHARACTER, URINE: CLEAR
COLOR, URINE: YELLOW
GLUCOSE URINE: NEGATIVE MG/DL
KETONES, URINE: NEGATIVE
LEUKOCYTE CLUMPS, URINE: ABNORMAL
NITRITE, URINE: NEGATIVE
PH, URINE: 5 (ref 5–9)
PROTEIN, URINE: NEGATIVE MG/DL
SPECIFIC GRAVITY, URINE: <= 1.005 (ref 1–1.03)
UROBILINOGEN, URINE: 0.2 EU/DL (ref 0–1)

## 2022-12-01 RX ORDER — CIPROFLOXACIN 500 MG/1
500 TABLET, FILM COATED ORAL 2 TIMES DAILY
Qty: 20 TABLET | Refills: 0 | Status: SHIPPED | OUTPATIENT
Start: 2022-12-01 | End: 2022-12-11

## 2022-12-01 RX ORDER — FLUCONAZOLE 150 MG/1
TABLET ORAL
Qty: 2 TABLET | Refills: 0 | Status: SHIPPED | OUTPATIENT
Start: 2022-12-01 | End: 2022-12-02

## 2022-12-01 ASSESSMENT — PATIENT HEALTH QUESTIONNAIRE - PHQ9
SUM OF ALL RESPONSES TO PHQ QUESTIONS 1-9: 0
7. TROUBLE CONCENTRATING ON THINGS, SUCH AS READING THE NEWSPAPER OR WATCHING TELEVISION: 0
SUM OF ALL RESPONSES TO PHQ QUESTIONS 1-9: 0
SUM OF ALL RESPONSES TO PHQ QUESTIONS 1-9: 0
3. TROUBLE FALLING OR STAYING ASLEEP: 0
6. FEELING BAD ABOUT YOURSELF - OR THAT YOU ARE A FAILURE OR HAVE LET YOURSELF OR YOUR FAMILY DOWN: 0
2. FEELING DOWN, DEPRESSED OR HOPELESS: 0
9. THOUGHTS THAT YOU WOULD BE BETTER OFF DEAD, OR OF HURTING YOURSELF: 0
10. IF YOU CHECKED OFF ANY PROBLEMS, HOW DIFFICULT HAVE THESE PROBLEMS MADE IT FOR YOU TO DO YOUR WORK, TAKE CARE OF THINGS AT HOME, OR GET ALONG WITH OTHER PEOPLE: 0
SUM OF ALL RESPONSES TO PHQ9 QUESTIONS 1 & 2: 0
5. POOR APPETITE OR OVEREATING: 0
SUM OF ALL RESPONSES TO PHQ QUESTIONS 1-9: 0
8. MOVING OR SPEAKING SO SLOWLY THAT OTHER PEOPLE COULD HAVE NOTICED. OR THE OPPOSITE, BEING SO FIGETY OR RESTLESS THAT YOU HAVE BEEN MOVING AROUND A LOT MORE THAN USUAL: 0
4. FEELING TIRED OR HAVING LITTLE ENERGY: 0
1. LITTLE INTEREST OR PLEASURE IN DOING THINGS: 0

## 2022-12-01 ASSESSMENT — ENCOUNTER SYMPTOMS
VOMITING: 0
WHEEZING: 0
CHEST TIGHTNESS: 0
DIARRHEA: 0
RHINORRHEA: 0
NAUSEA: 0
SORE THROAT: 0
ABDOMINAL PAIN: 0
BLOOD IN STOOL: 0
BACK PAIN: 1
SHORTNESS OF BREATH: 0
COUGH: 0
EYE PAIN: 0
CONSTIPATION: 0

## 2022-12-01 ASSESSMENT — LIFESTYLE VARIABLES
HOW OFTEN DO YOU HAVE A DRINK CONTAINING ALCOHOL: NEVER
HOW MANY STANDARD DRINKS CONTAINING ALCOHOL DO YOU HAVE ON A TYPICAL DAY: PATIENT DOES NOT DRINK

## 2022-12-01 NOTE — TELEPHONE ENCOUNTER
The pt is requesting a medication for yeast, as she states Dr. Radhames Santizo started her on an antibiotic.

## 2022-12-01 NOTE — PROGRESS NOTES
Medicare Annual Wellness Visit    Samantha Flores is here for Medicare AWV (Burning with urination, cramping, low back pain since Monday)    Assessment & Plan   Medicare annual wellness visit, subsequent  Urinary frequency  -     POCT Urinalysis No Micro (Auto)  -     ciprofloxacin (CIPRO) 500 MG tablet; Take 1 tablet by mouth 2 times daily for 10 days, Disp-20 tablet, R-0Normal  Acute cystitis with hematuria  -     ciprofloxacin (CIPRO) 500 MG tablet; Take 1 tablet by mouth 2 times daily for 10 days, Disp-20 tablet, R-0Normal  Encouraged diet, exercise and weight loss. Reviewed health maintenance      Recommendations for Preventive Services Due: see orders and patient instructions/AVS.  Recommended screening schedule for the next 5-10 years is provided to the patient in written form: see Patient Instructions/AVS.     Return for Medicare Annual Wellness Visit in 1 year. Subjective       Patient's complete Health Risk Assessment and screening values have been reviewed and are found in Flowsheets. The following problems were reviewed today and where indicated follow up appointments were made and/or referrals ordered. Positive Risk Factor Screenings with Interventions:             Opioid Risk: (Low risk score <55) Opioid risk score: 37    Patient is low risk for opioid use disorder or overdose. Last PDMP Marco A Lange as Reviewed:  Review User Review Instant Review Result   CHESTER BELLAMY 11/30/2022  8:47 AM     Unable to Review [2]     Last Controlled Substance Monitoring Documentation      [address] [facility] [address] Office Visit from 10/31/2022 in [address] [place] [address]   Periodic Controlled Substance Monitoring Possible medication side effects, risk of tolerance/dependence & alternative treatments discussed., No signs of potential drug abuse or diversion identified., Obtaining appropriate analgesic effect of treatment.  filed at 10/31/2022 GARCIA Worthy 14 and ACP:  General  In general, how would you say your health is?: Fair  In the past 7 days, have you experienced any of the following: New or Increased Pain, New or Increased Fatigue, Loneliness, Social Isolation, Stress or Anger?: (!) Yes  Select all that apply: (!) New or Increased Pain, New or Increased Fatigue  Do you get the social and emotional support that you need?: Yes  Do you have a Living Will?: Yes    Advance Directives       Power of  Living Will ACP-Advance Directive ACP-Power of     Not on File Filed on 06/21/19 Filed Not on File        General Health Risk Interventions:  Pain issues: due to UTI  Fatigue: due to UTI    Health Habits/Nutrition:  Physical Activity: Sufficiently Active    Days of Exercise per Week: 5 days    Minutes of Exercise per Session: 30 min     Have you lost any weight without trying in the past 3 months?: No  Body mass index: (!) 32.92     Health Habits/Nutrition Interventions:  Nutritional issues:  educational materials for healthy, well-balanced diet provided    Hearing/Vision:  Do you or your family notice any trouble with your hearing that hasn't been managed with hearing aids?: No  Do you have difficulty driving, watching TV, or doing any of your daily activities because of your eyesight?: No  Have you had an eye exam within the past year?: (!) No  No results found.   Hearing/Vision Interventions:  Vision concerns:  patient encouraged to make appointment with his/her eye specialist     ADLs:  In the past 7 days, did you need help from others to perform any of the following everyday activities: Eating, dressing, grooming, bathing, toileting, or walking/balance?: (!) Yes  Select all that apply: (!) Bathing  In the past 7 days, did you need help from others to take care of any of the following: Laundry, housekeeping, banking/finances, shopping, telephone use, food preparation, transportation, or taking medications?: (!) Yes  Select all that apply: [employer], Shopping, Food Preparation, Transportation, Housekeeping  ADL Interventions:  Family and friends help out          Objective   Vitals:    12/01/22 1439   BP: 124/76   Pulse: 60   Resp: 18   Temp: 97.2 °F (36.2 °C)   TempSrc: Infrared   SpO2: 97%   Weight: 188 lb 12.8 oz (85.6 kg)   Height: 5' 3.5\" (1.613 m)      Body mass index is 32.92 kg/m². General Appearance: alert and oriented to person, place and time, well developed and well- nourished, in no acute distress  Skin: warm and dry, no rash or erythema  Head: normocephalic and atraumatic  Eyes: pupils equal, round, and reactive to light, extraocular eye movements intact, conjunctivae normal  ENT: tympanic membrane, external ear and ear canal normal bilaterally, nose without deformity, nasal mucosa and turbinates normal without polyps  Neck: supple and non-tender without mass, no thyromegaly or thyroid nodules, no cervical lymphadenopathy  Pulmonary/Chest: clear to auscultation bilaterally- no wheezes, rales or rhonchi, normal air movement, no respiratory distress  Cardiovascular: normal rate, regular rhythm, normal S1 and S2, no murmurs, rubs, clicks, or gallops, distal pulses intact, no carotid bruits  Abdomen: soft, non-tender, non-distended, normal bowel sounds, no masses or organomegaly  Extremities: no cyanosis, clubbing or edema  Musculoskeletal: normal range of motion, no joint swelling, deformity or tenderness  Neurologic: reflexes normal and symmetric, no cranial nerve deficit, gait, coordination and speech normal       Allergies   Allergen Reactions    Oxycontin [Oxycodone Hcl] Other (See Comments)     \"too strong - mental confusion\"    Percocet [Oxycodone-Acetaminophen] Other (See Comments)     \"too strong. I don't like them. \"    Bactrim Rash    Sulfa Antibiotics Rash     Prior to Visit Medications    Medication Sig Taking?  Authorizing Provider   ciprofloxacin (CIPRO) 500 MG tablet Take 1 tablet by mouth 2 times daily for 10 days Yes Landrum Holstein, MD HYDROcodone-acetaminophen (NORCO) 5-325 MG per tablet Take 1 tablet by mouth every 6 hours as needed for Pain for up to 30 days. G71.00 Yes Shalonda Daly MD   INFLIXIMAB IV Infuse intravenously Yes Historical Provider, MD   ferrous sulfate (FE TABS) 325 (65 Fe) MG EC tablet Take 1 tablet by mouth daily (with breakfast) Yes Shalonda Daly MD   Cholecalciferol (VITAMIN D3) 125 MCG (5000 UT) TABS Take 1 tablet by mouth daily Yes Shalonda Daly MD   ascorbic acid (RA VITAMIN C) 500 MG tablet take 1 tablet by mouth once daily Yes Shalonda Daly MD   divalproex (DEPAKOTE ER) 500 MG extended release tablet Take 1 tablet by mouth nightly Yes Shalonda Daly MD   gabapentin (NEURONTIN) 300 MG capsule Take 1 capsule by mouth 2 times daily, G71.00 Yes Shalonda Daly MD   b complex vitamins capsule Take 1 capsule by mouth daily Yes Historical Provider, MD   CRANBERRY PO Take by mouth daily Yes Historical Provider, MD   Probiotic Product (PRO-BIOTIC BLEND PO) Take by mouth daily Yes Historical Provider, MD   Misc. Devices (WALKER) MISC Rollator, rolling walker with seats, brakes,  Basket. G71.00 Yes Shalonda Daly MD   Handicap Placard MISC by Does not apply route Request parking placard due to medical conditions. Duration of 5 years. Yes Shalonda Daly MD   hydroxychloroquine (PLAQUENIL) 200 MG tablet Take 1 tablet by mouth 2 times daily Yes Shalonda Daly MD   folic acid (FOLVITE) 1 MG tablet Take 1 mg by mouth Yes Historical Provider, MD   omeprazole (PRILOSEC) 40 MG capsule Take 1 capsule by mouth daily.   Patient taking differently: Take 20 mg by mouth daily Yes Shalonda Daly MD       CareTeam (Including outside providers/suppliers regularly involved in providing care):   Patient Care Team:  Shalonda Daly MD as PCP - General (Family Medicine)  Shalonda Daly MD as PCP - [facility] Empaneled Provider     Reviewed and updated this visit:  Tobacco  Allergies  Meds  Problems Med Hx  Surg Hx  Soc Hx  Fam Hx

## 2022-12-01 NOTE — PROGRESS NOTES
Estelle Sotomayor (:  1960) is a 60 y.o. female,Established patient, here for evaluation of the following chief complaint(s):  Medicare AWV (Burning with urination, cramping, low back pain since Monday)         ASSESSMENT/PLAN:  1. Medicare annual wellness visit, subsequent  2. Urinary frequency  -     POCT Urinalysis No Micro (Auto)  -     ciprofloxacin (CIPRO) 500 MG tablet; Take 1 tablet by mouth 2 times daily for 10 days, Disp-20 tablet, R-0Normal  -     Culture, Urine  3. Acute cystitis with hematuria  -     ciprofloxacin (CIPRO) 500 MG tablet; Take 1 tablet by mouth 2 times daily for 10 days, Disp-20 tablet, R-0Normal  -     Culture, Urine  -monitor sxs, call if not improving, urine culture    Return for Medicare Annual Wellness Visit in 1 year. Subjective   SUBJECTIVE/OBJECTIVE:  HPI  Pt seen today due to urinary frequency, burning and low back pains. Recently had course of macrobid x 10 days for e coli. No fever or chills. 4 days of sxs. Reviewed BMI of 33. Encouraged diet, exercise and weight loss. Single, non smoker, pmh reviewed. Review of Systems   Constitutional:  Negative for chills, fatigue, fever and unexpected weight change. HENT:  Negative for congestion, ear pain, rhinorrhea and sore throat. Eyes:  Negative for pain and visual disturbance. Respiratory:  Negative for cough, chest tightness, shortness of breath and wheezing. Cardiovascular:  Negative for chest pain and palpitations. Gastrointestinal:  Negative for abdominal pain, blood in stool, constipation, diarrhea, nausea and vomiting. Genitourinary:  Positive for dysuria and frequency. Negative for difficulty urinating, hematuria and urgency. Musculoskeletal:  Positive for back pain. Negative for joint swelling, myalgias and neck pain. Skin:  Negative for rash. Neurological:  Negative for dizziness and headaches. Hematological:  Negative for adenopathy. Does not bruise/bleed easily. Psychiatric/Behavioral:  Negative for behavioral problems and sleep disturbance. The patient is not nervous/anxious. Objective   Physical Exam--see other note             An electronic signature was used to authenticate this note.     --Manolo Terrazas MD

## 2022-12-01 NOTE — PATIENT INSTRUCTIONS
Personalized Preventive Plan for Marissa Rash - 12/1/2022  Medicare offers a range of preventive health benefits. Some of the tests and screenings are paid in full while other may be subject to a deductible, co-insurance, and/or copay. Some of these benefits include a comprehensive review of your medical history including lifestyle, illnesses that may run in your family, and various assessments and screenings as appropriate. After reviewing your medical record and screening and assessments performed today your provider may have ordered immunizations, labs, imaging, and/or referrals for you. A list of these orders (if applicable) as well as your Preventive Care list are included within your After Visit Summary for your review. Other Preventive Recommendations:    A preventive eye exam performed by an eye specialist is recommended every 1-2 years to screen for glaucoma; cataracts, macular degeneration, and other eye disorders. A preventive dental visit is recommended every 6 months. Try to get at least 150 minutes of exercise per week or 10,000 steps per day on a pedometer . Order or download the FREE \"Exercise & Physical Activity: Your Everyday Guide\" from [employer]. Call 2-966.172.5107 or search [employer] online. You need 1154-2965 mg of calcium and 5504-8957 IU of vitamin D per day. It is possible to meet your calcium requirement with diet alone, but a vitamin D supplement is usually necessary to meet this goal.  When exposed to the sun, use a sunscreen that protects against both UVA and UVB radiation with an SPF of 30 or greater. Reapply every 2 to 3 hours or after sweating, drying off with a towel, or swimming. Always wear a seat belt when traveling in a car. Always wear a helmet when riding a bicycle or motorcycle.

## 2022-12-02 ENCOUNTER — TELEPHONE (OUTPATIENT)
Dept: ONCOLOGY | Age: 62
End: 2022-12-02

## 2022-12-03 LAB
ORGANISM: ABNORMAL
URINE CULTURE, ROUTINE: ABNORMAL

## 2022-12-05 ENCOUNTER — TELEPHONE (OUTPATIENT)
Dept: FAMILY MEDICINE CLINIC | Age: 62
End: 2022-12-05

## 2022-12-05 NOTE — TELEPHONE ENCOUNTER
Patient notified of results and verified she is improving on the cipro. Notified her to complete series and call if any problems.

## 2022-12-05 NOTE — TELEPHONE ENCOUNTER
----- Message from Shalonda Daly MD sent at 12/3/2022  9:26 AM EST -----  Culture is showing mixed growth, Verify she is improving with the cipro.

## 2022-12-12 ENCOUNTER — HOSPITAL ENCOUNTER (OUTPATIENT)
Age: 62
Discharge: HOME | End: 2022-12-12
Payer: MEDICARE

## 2022-12-12 DIAGNOSIS — F34.0 CYCLOTHYMIA: ICD-10-CM

## 2022-12-12 LAB — VALPROIC ACID LEVEL: 65.4 UG/ML (ref 50–100)

## 2022-12-12 PROCEDURE — 36415 COLL VENOUS BLD VENIPUNCTURE: CPT

## 2022-12-12 PROCEDURE — 80164 ASSAY DIPROPYLACETIC ACD TOT: CPT

## 2022-12-19 ENCOUNTER — OFFICE VISIT (OUTPATIENT)
Dept: PSYCHIATRY | Age: 62
End: 2022-12-19
Payer: MEDICARE

## 2022-12-19 DIAGNOSIS — F34.0 CYCLOTHYMIA: Primary | ICD-10-CM

## 2022-12-19 DIAGNOSIS — Z51.81 THERAPEUTIC DRUG MONITORING: ICD-10-CM

## 2022-12-19 PROCEDURE — 99214 OFFICE O/P EST MOD 30 MIN: CPT

## 2022-12-19 NOTE — PATIENT INSTRUCTIONS
-Please take medications as prescribed  -Refrain from alcohol or drug use  -Seek emergency help via the emergency and/or calling 911 should symptoms become severe, worsen, or with other concerning symptoms. Go immediately to the emergency room and/or call 911 with any suicidal or homicidal ideations or if audio/visual hallucinations develop.   -Contact office with any questions or concerns. Crisis phone numbers:  Sandra Pretty, and [facility] [phone]. [address] [phone]. [employer] [phone]. [facility] [address] [phone].
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
## 2022-12-27 ENCOUNTER — OFFICE VISIT (OUTPATIENT)
Dept: NEPHROLOGY | Age: 62
End: 2022-12-27
Payer: MEDICARE

## 2022-12-27 VITALS
SYSTOLIC BLOOD PRESSURE: 134 MMHG | WEIGHT: 196 LBS | HEART RATE: 64 BPM | BODY MASS INDEX: 34.18 KG/M2 | OXYGEN SATURATION: 100 % | DIASTOLIC BLOOD PRESSURE: 84 MMHG

## 2022-12-27 DIAGNOSIS — N26.1 ATROPHY OF RIGHT KIDNEY: ICD-10-CM

## 2022-12-27 DIAGNOSIS — N18.31 CHRONIC KIDNEY DISEASE, STAGE 3A (HCC): Primary | ICD-10-CM

## 2022-12-27 PROCEDURE — 99213 OFFICE O/P EST LOW 20 MIN: CPT | Performed by: INTERNAL MEDICINE

## 2022-12-27 NOTE — PROGRESS NOTES
[address] [facility]  [address]  [facility] [address]  Dept: [phone]  Loc: [phone]  Office Progress Note  12/27/2022 10:13 AM      Pt Name:    Alvaro Hernandez  YOB: 1960  Primary Care Physician:  Linnea Hidalgo MD     Chief Complaint:   Chief Complaint   Patient presents with    Chronic Kidney Disease     Stage 3b        Background Information/Interval History:   The patient is a 60 y.o. with hx RA/psoriatic arthritis (follows with rheumatology), muscular dystrophy (dxed at [facility]) who is here for follow-up evaluation of elevated creatinine. Patient was seeing Dr. Bogdan Currie in [place] but now following with Ms. Sparkle Phillips (CNP). Patient reports she was on metho and Plaquanil but subsequently had rising creatinines so it was stopped. She was started on Iv infusion remicade every 2 weeks. She was on Eliquis for DVT at some point. She was seeing hematology here in [facility]. Not much leg swelling- she reports she was dxed with lymphedema. No hx smoking. She used to work in [employer] and also worked at [employer]. She says she used to take some NSAIDs about 10+ years ago. She is here for follow-up Reports she had two UTI symptoms since she started remicade infusion. No fever. No UTI symptoms. Has mild chronic leg swelling. Past History:  Past Medical History:   Diagnosis Date    B12 deficiency 12/2020    Depression     Lymphedema 2008    both legs    MD (muscular dystrophy) ([facility] [address] 2008    Dr. Amber Pina at [facility] - no lung involvement per patient    Neuropathy     Obesity (BMI 30-39. 9)     Overflow incontinence     Rheumatoid arthritis(714.0) 2008    Dr. Geno Stone    Superficial thrombophlebitis 03/2018    right lower extremity    Venous insufficiency     h/o SVT - 3-4 in the past (has never been on Coumadin)     Past Surgical History:   Procedure Laterality Date    COLONOSCOPY      HYSTERECTOMY (CERVIX STATUS UNKNOWN)  2002    JOINT REPLACEMENT Left 2008    knee    JOINT REPLACEMENT Right 09/10/2013    right    TOTAL KNEE ARTHROPLASTY Left 6/08    TOTAL KNEE ARTHROPLASTY Right 09/10/2013    VARICOSE VEIN SURGERY Right 1998    laser        VITALS:  /84 (Site: Left Upper Arm, Position: Sitting, Cuff Size: Medium Adult)   Pulse 64   Wt 196 lb (88.9 kg)   SpO2 100%   BMI 34.18 kg/m²   Wt Readings from Last 3 Encounters:   12/27/22 196 lb (88.9 kg)   12/01/22 188 lb 12.8 oz (85.6 kg)   10/31/22 189 lb (85.7 kg)     Body mass index is 34.18 kg/m². General Appearance: alert and cooperative with exam, appears comfortable, no distress  Oral: moist oral mucus membranes  Neck: No jugular venous distention  Lungs: Air entry B/L, no crackles or rales, no use of accessory muscles  Heart: S1, S2 heard  GI: soft, non-tender, no guarding  Extremities: trace LE edema     Medications:  Current Outpatient Medications   Medication Sig Dispense Refill    HYDROcodone-acetaminophen (NORCO) 5-325 MG per tablet Take 1 tablet by mouth every 6 hours as needed for Pain for up to 30 days. G71.00 100 tablet 0    INFLIXIMAB IV Infuse intravenously      ferrous sulfate (FE TABS) 325 (65 Fe) MG EC tablet Take 1 tablet by mouth daily (with breakfast) 30 tablet 11    Cholecalciferol (VITAMIN D3) 125 MCG (5000 UT) TABS Take 1 tablet by mouth daily 30 tablet 11    ascorbic acid (RA VITAMIN C) 500 MG tablet take 1 tablet by mouth once daily 30 tablet 11    divalproex (DEPAKOTE ER) 500 MG extended release tablet Take 1 tablet by mouth nightly 30 tablet 11    gabapentin (NEURONTIN) 300 MG capsule Take 1 capsule by mouth 2 times daily, G71.00 60 capsule 11    b complex vitamins capsule Take 1 capsule by mouth daily      CRANBERRY PO Take by mouth daily      Probiotic Product (PRO-BIOTIC BLEND PO) Take by mouth daily      Misc. Devices (WALKER) MISC Rollator, rolling walker with seats, brakes,  Basket.   G71.00 1 each 0    Handicap Placard MISC by Does not apply route Request parking placard due to medical conditions. Duration of 5 years. 1 each 0    hydroxychloroquine (PLAQUENIL) 200 MG tablet Take 1 tablet by mouth 2 times daily 60 tablet 5    folic acid (FOLVITE) 1 MG tablet Take 1 mg by mouth      omeprazole (PRILOSEC) 40 MG capsule Take 1 capsule by mouth daily. (Patient taking differently: Take 20 mg by mouth daily) 30 capsule 3     No current facility-administered medications for this visit. Laboratory & Diagnostics:  Old progress notes from referring physician reviewed. Radiology/US kidneys: Right atrophic kidney/hypoplastic  Echo:   Old labs reviewed:  Aug 2022: K 4.4, creat 1.5  Sept 1.5    Nov 2022: UPCR 180 mg/g, Vit D 93, phos 3.8, creat 1.5  UA: no blood, no protein     Impression/Plan:   1. CKD III: likely ? metho induced vs other causes. Stable. Not much proteinuria. BP is well controlled. No recent heavy use of NSAID. She was on metho for 10+ years. Advised low salt diet, low red meat intake. Increase fluid intake. 2. Hx RA: was on methotrexate and now on Remicade  3. Anemia: following with hematology, anemia resolved. Hemoglobin is stable  4. Mild (very mild) hyponatremia: ?depakote related. No intervention at this time. But I informed patient. She is drinking about 80 ounces of total fluids. Advised to reduce it to about 70 ounces. 5. Right atrophic kidney: Us reviewed with patient. Also inform patient that her vitamin D level is 93. She is uncertain how much she is taking. Advised her to reduce vitamin D intake a bit    Orders Placed This Encounter   Procedures    Basic Metabolic Panel    Protein / creatinine ratio, urine    Urinalysis     Return in about 1 year (around 12/27/2023).     Elvia Yin MD  [facility]

## 2022-12-29 DIAGNOSIS — G71.00 MD (MUSCULAR DYSTROPHY) (HCC): ICD-10-CM

## 2022-12-29 RX ORDER — HYDROCODONE BITARTRATE AND ACETAMINOPHEN 5; 325 MG/1; MG/1
1 TABLET ORAL EVERY 6 HOURS PRN
Qty: 100 TABLET | Refills: 0 | Status: SHIPPED | OUTPATIENT
Start: 2022-12-29 | End: 2023-01-28

## 2022-12-29 NOTE — TELEPHONE ENCOUNTER
Ep'ed norco to pharm requested    Controlled Substance Monitoring:    Acute and Chronic Pain Monitoring:   RX Monitoring 12/29/2022   Attestation -   Periodic Controlled Substance Monitoring No signs of potential drug abuse or diversion identified.

## 2022-12-29 NOTE — TELEPHONE ENCOUNTER
Wseetie Card called requesting a refill on the following medications:  Requested Prescriptions     Pending Prescriptions Disp Refills    HYDROcodone-acetaminophen (NORCO) 5-325 MG per tablet 100 tablet 0     Sig: Take 1 tablet by mouth every 6 hours as needed for Pain for up to 30 days. G71.00       Date of last visit: 12/1/2022  Date of next visit (if applicable):Visit date not found  Date of last refill: 11/30/22  Pharmacy Name: [facility] [address].       Thanks,  Chelsey Cm

## 2023-01-13 ENCOUNTER — OFFICE VISIT (OUTPATIENT)
Dept: FAMILY MEDICINE CLINIC | Age: 63
End: 2023-01-13

## 2023-01-13 VITALS
BODY MASS INDEX: 32.95 KG/M2 | WEIGHT: 189 LBS | HEART RATE: 74 BPM | SYSTOLIC BLOOD PRESSURE: 138 MMHG | RESPIRATION RATE: 14 BRPM | DIASTOLIC BLOOD PRESSURE: 76 MMHG

## 2023-01-13 DIAGNOSIS — G71.00 MUSCULAR DYSTROPHY (HCC): ICD-10-CM

## 2023-01-13 DIAGNOSIS — R30.0 DYSURIA: Primary | ICD-10-CM

## 2023-01-13 DIAGNOSIS — F32.1 CURRENT MODERATE EPISODE OF MAJOR DEPRESSIVE DISORDER WITHOUT PRIOR EPISODE (HCC): ICD-10-CM

## 2023-01-13 DIAGNOSIS — N18.4 CKD (CHRONIC KIDNEY DISEASE) STAGE 4, GFR 15-29 ML/MIN (HCC): ICD-10-CM

## 2023-01-13 DIAGNOSIS — M06.9 RHEUMATOID ARTHRITIS, INVOLVING UNSPECIFIED SITE, UNSPECIFIED WHETHER RHEUMATOID FACTOR PRESENT (HCC): ICD-10-CM

## 2023-01-13 LAB
BILIRUBIN URINE: NEGATIVE
BLOOD URINE, POC: NEGATIVE
CHARACTER, URINE: CLEAR
COLOR, URINE: YELLOW
GLUCOSE URINE: NEGATIVE MG/DL
KETONES, URINE: NEGATIVE
LEUKOCYTE CLUMPS, URINE: NEGATIVE
NITRITE, URINE: NEGATIVE
PH, URINE: 5 (ref 5–9)
PROTEIN, URINE: NEGATIVE MG/DL
SPECIFIC GRAVITY, URINE: <= 1.005 (ref 1–1.03)
UROBILINOGEN, URINE: 0.2 EU/DL (ref 0–1)

## 2023-01-13 ASSESSMENT — ENCOUNTER SYMPTOMS
CONSTIPATION: 0
SORE THROAT: 0
NAUSEA: 0
BACK PAIN: 0
WHEEZING: 0
DIARRHEA: 0
CHEST TIGHTNESS: 0
EYE PAIN: 0
ABDOMINAL PAIN: 1
SHORTNESS OF BREATH: 0
BLOOD IN STOOL: 0
COUGH: 0
VOMITING: 0
RHINORRHEA: 0

## 2023-01-13 ASSESSMENT — PATIENT HEALTH QUESTIONNAIRE - PHQ9
8. MOVING OR SPEAKING SO SLOWLY THAT OTHER PEOPLE COULD HAVE NOTICED. OR THE OPPOSITE, BEING SO FIGETY OR RESTLESS THAT YOU HAVE BEEN MOVING AROUND A LOT MORE THAN USUAL: 0
SUM OF ALL RESPONSES TO PHQ9 QUESTIONS 1 & 2: 0
6. FEELING BAD ABOUT YOURSELF - OR THAT YOU ARE A FAILURE OR HAVE LET YOURSELF OR YOUR FAMILY DOWN: 0
SUM OF ALL RESPONSES TO PHQ QUESTIONS 1-9: 0
1. LITTLE INTEREST OR PLEASURE IN DOING THINGS: 0
9. THOUGHTS THAT YOU WOULD BE BETTER OFF DEAD, OR OF HURTING YOURSELF: 0
SUM OF ALL RESPONSES TO PHQ QUESTIONS 1-9: 0
SUM OF ALL RESPONSES TO PHQ QUESTIONS 1-9: 0
3. TROUBLE FALLING OR STAYING ASLEEP: 0
10. IF YOU CHECKED OFF ANY PROBLEMS, HOW DIFFICULT HAVE THESE PROBLEMS MADE IT FOR YOU TO DO YOUR WORK, TAKE CARE OF THINGS AT HOME, OR GET ALONG WITH OTHER PEOPLE: 0
7. TROUBLE CONCENTRATING ON THINGS, SUCH AS READING THE NEWSPAPER OR WATCHING TELEVISION: 0
5. POOR APPETITE OR OVEREATING: 0
4. FEELING TIRED OR HAVING LITTLE ENERGY: 0
2. FEELING DOWN, DEPRESSED OR HOPELESS: 0
SUM OF ALL RESPONSES TO PHQ QUESTIONS 1-9: 0

## 2023-01-13 NOTE — PROGRESS NOTES
Sydnie Newton (:  1960) is a 60 y.o. female,Established patient, here for evaluation of the following chief complaint(s):  Dysuria (Burning, itching, bloated x1 week)         ASSESSMENT/PLAN:  1. Dysuria  -     POCT Urinalysis No Micro (Auto)  2. Muscular dystrophy ([address]  -stable, controlled on gabapentin and norco  3. Rheumatoid arthritis, involving unspecified site, unspecified whether rheumatoid factor present ([facility] [address]  -stable, sees rheumatology  4. Current moderate episode of major depressive disorder without prior episode (HCC)  -stable, controlled on depakote  5. CKD (chronic kidney disease) stage 4, GFR 15-29 ml/min ([facility])  -stable, avoid nephrotoxic meds,   Lab Results   Component Value Date     (L) 2022    K 4.3 2022    CL 95 (L) 2022    CO2 29 2022    BUN 19 2022    CREATININE 1.5 (H) 2022    GLUCOSE 103 2022    CALCIUM 9.9 2022    PROT 7.1 08/10/2022    LABALBU 4.4 08/10/2022    BILITOT 0.2 (L) 08/10/2022    ALKPHOS 63 08/10/2022    AST 30 08/10/2022    ALT 18 08/10/2022    LABGLOM 39 (A) 2022         Results for orders placed or performed in visit on 23   POCT Urinalysis No Micro (Auto)   Result Value Ref Range    Glucose, Ur Negative NEGATIVE mg/dl    Bilirubin Urine Negative     Ketones, Urine Negative NEGATIVE    Specific Gravity, Urine <= 1.005 1.002 - 1.030    Blood, UA POC Negative NEGATIVE    pH, Urine 5.00 5.0 - 9.0    Protein, Urine Negative NEGATIVE mg/dl    Urobilinogen, Urine 0.20 0.0 - 1.0 eu/dl    Nitrite, Urine Negative NEGATIVE    Leukocyte Clumps, Urine Negative NEGATIVE    Color, Urine Yellow [id number]    Character, Urine Clear [id number]     -no signs of infection, continue cranberry and fluids. -monitor sxs, call if not improving        No follow-ups on file. Subjective   SUBJECTIVE/OBJECTIVE:  Dysuria   Pertinent negatives include no chills, frequency, hematuria, nausea, urgency or vomiting. Patient here today for a check up.  Reviewed BMI of 34, Encouraged diet, exercise and weight loss.  1 week of dysuria and itching, bloating, gassy.  History of UTI.  Using cranberry and pushing water.  Single, non smoker, pmh reviewed.      Review of Systems   Constitutional:  Negative for chills, fatigue, fever and unexpected weight change.   HENT:  Negative for congestion, ear pain, rhinorrhea and sore throat.    Eyes:  Negative for pain and visual disturbance.   Respiratory:  Negative for cough, chest tightness, shortness of breath and wheezing.    Cardiovascular:  Negative for chest pain and palpitations.   Gastrointestinal:  Positive for abdominal pain. Negative for blood in stool, constipation, diarrhea, nausea and vomiting.   Genitourinary:  Positive for dysuria. Negative for difficulty urinating, frequency, hematuria and urgency.   Musculoskeletal:  Negative for back pain, joint swelling, myalgias and neck pain.   Skin:  Negative for rash.   Neurological:  Negative for dizziness and headaches.   Hematological:  Negative for adenopathy. Does not bruise/bleed easily.   Psychiatric/Behavioral:  Negative for behavioral problems and sleep disturbance. The patient is not nervous/anxious.         Objective   Physical Exam  Vitals and nursing note reviewed.   Constitutional:       Appearance: She is well-developed.   HENT:      Head: Normocephalic and atraumatic.      Right Ear: External ear normal.      Left Ear: External ear normal.      Nose: Nose normal.      Mouth/Throat:      Mouth: Mucous membranes are moist.   Eyes:      Pupils: Pupils are equal, round, and reactive to light.   Neck:      Thyroid: No thyromegaly.   Cardiovascular:      Rate and Rhythm: Normal rate and regular rhythm.      Heart sounds: Normal heart sounds.   Pulmonary:      Breath sounds: Normal breath sounds. No wheezing or rales.   Abdominal:      General: Bowel sounds are normal.      Palpations: Abdomen is soft.      Tenderness: There is  no abdominal tenderness. There is no guarding or rebound. Musculoskeletal:         General: Normal range of motion. Cervical back: Neck supple. Lymphadenopathy:      Cervical: No cervical adenopathy. Skin:     General: Skin is warm and dry. Findings: No rash. Neurological:      Mental Status: She is alert and oriented to person, place, and time. Cranial Nerves: No cranial nerve deficit. Deep Tendon Reflexes: Reflexes are normal and symmetric. An electronic signature was used to authenticate this note.     --Alea Boudreaux MD

## 2023-01-16 ENCOUNTER — HOSPITAL ENCOUNTER (OUTPATIENT)
Age: 63
Discharge: HOME | End: 2023-01-16
Payer: MEDICARE

## 2023-01-16 DIAGNOSIS — F34.0 CYCLOTHYMIA: ICD-10-CM

## 2023-01-16 LAB — VALPROIC ACID LEVEL: 52.3 UG/ML (ref 50–100)

## 2023-01-16 PROCEDURE — 80164 ASSAY DIPROPYLACETIC ACD TOT: CPT

## 2023-01-16 PROCEDURE — 36415 COLL VENOUS BLD VENIPUNCTURE: CPT

## 2023-01-30 DIAGNOSIS — G71.00 MD (MUSCULAR DYSTROPHY) (HCC): ICD-10-CM

## 2023-01-30 RX ORDER — HYDROCODONE BITARTRATE AND ACETAMINOPHEN 5; 325 MG/1; MG/1
1 TABLET ORAL EVERY 6 HOURS PRN
Qty: 100 TABLET | Refills: 0 | Status: SHIPPED | OUTPATIENT
Start: 2023-01-30 | End: 2023-03-01

## 2023-01-30 NOTE — TELEPHONE ENCOUNTER
Ep'ed norco to pharm requested    Controlled Substance Monitoring:    Acute and Chronic Pain Monitoring:   RX Monitoring 1/30/2023   Attestation -   Periodic Controlled Substance Monitoring No signs of potential drug abuse or diversion identified.

## 2023-01-30 NOTE — TELEPHONE ENCOUNTER
Jenifer Gleason called requesting a refill on the following medications:  Requested Prescriptions     Pending Prescriptions Disp Refills    HYDROcodone-acetaminophen (NORCO) 5-325 MG per tablet 100 tablet 0     Sig: Take 1 tablet by mouth every 6 hours as needed for Pain for up to 30 days. G71.00       Date of last visit: 1/13/2023 dysuria    Date of next visit: date not found    Date of last refill: 12/29/22    [facility]    Pt has #4 left. Aide will drive her to  med tomorrow. Pls call pt at [phone] only if probs.     Zip: [phone]    Thanks,  Lawrence Latham

## 2023-03-01 DIAGNOSIS — G71.00 MD (MUSCULAR DYSTROPHY) (HCC): ICD-10-CM

## 2023-03-01 RX ORDER — HYDROCODONE BITARTRATE AND ACETAMINOPHEN 5; 325 MG/1; MG/1
1 TABLET ORAL EVERY 6 HOURS PRN
Qty: 100 TABLET | Refills: 0 | Status: SHIPPED | OUTPATIENT
Start: 2023-03-01 | End: 2023-03-31

## 2023-03-01 NOTE — TELEPHONE ENCOUNTER
Nakia Sharma called requesting a refill on the following medications:  Requested Prescriptions     Pending Prescriptions Disp Refills    HYDROcodone-acetaminophen (NORCO) 5-325 MG per tablet 100 tablet 0     Sig: Take 1 tablet by mouth every 6 hours as needed for Pain for up to 30 days. G71.00       Date of last visit: 1/13/2023 dysria    Date of next visit: date not found    Date of last refill: 1/30/23    [facility]    Pt has #3 left. Pls call pt at [phone] only if probs.     Zip: [phone]    Thanks,  Manohar Rader

## 2023-03-01 NOTE — TELEPHONE ENCOUNTER
Ep'ed norco to pharm requested    Controlled Substance Monitoring:    Acute and Chronic Pain Monitoring:   RX Monitoring 3/1/2023   Attestation -   Periodic Controlled Substance Monitoring No signs of potential drug abuse or diversion identified.

## 2023-03-30 DIAGNOSIS — G71.00 MD (MUSCULAR DYSTROPHY) (HCC): ICD-10-CM

## 2023-03-30 RX ORDER — HYDROCODONE BITARTRATE AND ACETAMINOPHEN 5; 325 MG/1; MG/1
1 TABLET ORAL EVERY 6 HOURS PRN
Qty: 100 TABLET | Refills: 0 | Status: SHIPPED | OUTPATIENT
Start: 2023-03-30 | End: 2023-04-29

## 2023-03-30 NOTE — TELEPHONE ENCOUNTER
Vincenzo Rahman called requesting a refill on the following medications:  Requested Prescriptions     Pending Prescriptions Disp Refills    HYDROcodone-acetaminophen (NORCO) 5-325 MG per tablet 100 tablet 0     Sig: Take 1 tablet by mouth every 6 hours as needed for Pain for up to 30 days. G71.00       Date of last visit: 1/13/2023 dysuria    Date of next visit: date not found    Date of last refill: 3/1/23    Pharmacy Name: [facility]    Pt has #4-5 left. Pls call pt at [phone] only if probs.     Zip: [phone]    Thanks,  Marvin Langford

## 2023-03-30 NOTE — TELEPHONE ENCOUNTER
Ep'ed norco to pharm requested    Controlled Substance Monitoring:    Acute and Chronic Pain Monitoring:   RX Monitoring 3/30/2023   Attestation -   Periodic Controlled Substance Monitoring No signs of potential drug abuse or diversion identified.

## 2023-04-21 ENCOUNTER — TELEPHONE (OUTPATIENT)
Dept: FAMILY MEDICINE CLINIC | Age: 63
End: 2023-04-21

## 2023-04-21 NOTE — TELEPHONE ENCOUNTER
Pt Lynda  called, requesting Rx for adult wipes and overnight pads to [facility] in [address]. I called and spoke with Bhavani Bobo at [place], she is requesting prescription faxed to [phone]. I called and verified with Pt 351 overnight pads and 6 pks of adult wipes. Prescriptions faxed.

## 2023-04-28 DIAGNOSIS — G71.00 MD (MUSCULAR DYSTROPHY) (HCC): ICD-10-CM

## 2023-04-28 RX ORDER — HYDROCODONE BITARTRATE AND ACETAMINOPHEN 5; 325 MG/1; MG/1
1 TABLET ORAL EVERY 6 HOURS PRN
Qty: 100 TABLET | Refills: 0 | Status: SHIPPED | OUTPATIENT
Start: 2023-04-28 | End: 2023-05-28

## 2023-04-28 NOTE — TELEPHONE ENCOUNTER
Sydnie Newton called requesting a refill on the following medications:  Requested Prescriptions     Pending Prescriptions Disp Refills    HYDROcodone-acetaminophen (NORCO) 5-325 MG per tablet 100 tablet 0     Sig: Take 1 tablet by mouth every 6 hours as needed for Pain for up to 30 days.  G71.00       Date of last visit: 1/13/2023  Date of next visit (if applicable):Visit date not found  Date of last refill: 3/30/23  Pharmacy Name: [facility]

## 2023-04-28 NOTE — TELEPHONE ENCOUNTER
Ep'ed norco to pharm requested    Controlled Substance Monitoring:    Acute and Chronic Pain Monitoring:   RX Monitoring 4/28/2023   Attestation -   Periodic Controlled Substance Monitoring No signs of potential drug abuse or diversion identified.

## 2023-05-30 DIAGNOSIS — G71.00 MD (MUSCULAR DYSTROPHY) (HCC): ICD-10-CM

## 2023-05-30 RX ORDER — HYDROCODONE BITARTRATE AND ACETAMINOPHEN 5; 325 MG/1; MG/1
1 TABLET ORAL EVERY 6 HOURS PRN
Qty: 100 TABLET | Refills: 0 | Status: SHIPPED | OUTPATIENT
Start: 2023-05-30 | End: 2023-06-29

## 2023-05-30 NOTE — TELEPHONE ENCOUNTER
Chasity Vidales called requesting a refill on the following medications:  Requested Prescriptions     Pending Prescriptions Disp Refills    HYDROcodone-acetaminophen (NORCO) 5-325 MG per tablet 100 tablet 0     Sig: Take 1 tablet by mouth every 6 hours as needed for Pain for up to 30 days.  G71.00       Date of last visit: 1/13/2023  Date of next visit (if applicable):Visit date not found  Date of last refill: 04/28/2023 #100/NR  Pharmacy Name: [facility], [address] [place] [address] ([address] [place] [address])

## 2023-05-30 NOTE — TELEPHONE ENCOUNTER
Ep'ed norco to pharm requested    Controlled Substance Monitoring:    Acute and Chronic Pain Monitoring:   RX Monitoring 5/30/2023   Attestation -   Periodic Controlled Substance Monitoring No signs of potential drug abuse or diversion identified.

## 2023-06-29 DIAGNOSIS — G71.00 MD (MUSCULAR DYSTROPHY) (HCC): ICD-10-CM

## 2023-06-29 RX ORDER — HYDROCODONE BITARTRATE AND ACETAMINOPHEN 5; 325 MG/1; MG/1
1 TABLET ORAL EVERY 6 HOURS PRN
Qty: 100 TABLET | Refills: 0 | Status: SHIPPED | OUTPATIENT
Start: 2023-06-29 | End: 2023-07-29

## 2023-07-06 ENCOUNTER — TELEPHONE (OUTPATIENT)
Dept: FAMILY MEDICINE CLINIC | Age: 63
End: 2023-07-06

## 2023-07-06 DIAGNOSIS — G71.00 MUSCULAR DYSTROPHY (HCC): ICD-10-CM

## 2023-07-06 NOTE — TELEPHONE ENCOUNTER
Audrey Goodell from [employer] called  requesting a new rollater walker and a walker tray for standard walker for the pt. She is requesting this be sent to [facility] and a copy  faxed to her as well.    Fax # for [employer] [phone]

## 2023-07-31 ENCOUNTER — OFFICE VISIT (OUTPATIENT)
Dept: FAMILY MEDICINE CLINIC | Age: 63
End: 2023-07-31
Payer: MEDICARE

## 2023-07-31 VITALS — HEART RATE: 56 BPM | SYSTOLIC BLOOD PRESSURE: 128 MMHG | DIASTOLIC BLOOD PRESSURE: 76 MMHG | RESPIRATION RATE: 12 BRPM

## 2023-07-31 DIAGNOSIS — G71.00 MD (MUSCULAR DYSTROPHY) (HCC): Primary | ICD-10-CM

## 2023-07-31 DIAGNOSIS — M06.9 RHEUMATOID ARTHRITIS, INVOLVING UNSPECIFIED SITE, UNSPECIFIED WHETHER RHEUMATOID FACTOR PRESENT (HCC): ICD-10-CM

## 2023-07-31 DIAGNOSIS — R30.0 DYSURIA: ICD-10-CM

## 2023-07-31 LAB
BILIRUBIN URINE: NEGATIVE
BLOOD URINE, POC: NEGATIVE
CHARACTER, URINE: CLEAR
COLOR, URINE: YELLOW
GLUCOSE URINE: NEGATIVE MG/DL
KETONES, URINE: NEGATIVE
LEUKOCYTE CLUMPS, URINE: ABNORMAL
NITRITE, URINE: NEGATIVE
PH, URINE: 6 (ref 5–9)
PROTEIN, URINE: NEGATIVE MG/DL
SPECIFIC GRAVITY, URINE: 1.01 (ref 1–1.03)
UROBILINOGEN, URINE: 0.2 EU/DL (ref 0–1)

## 2023-07-31 PROCEDURE — 99214 OFFICE O/P EST MOD 30 MIN: CPT | Performed by: FAMILY MEDICINE

## 2023-07-31 RX ORDER — HYDROCODONE BITARTRATE AND ACETAMINOPHEN 5; 325 MG/1; MG/1
1 TABLET ORAL EVERY 6 HOURS PRN
Qty: 120 TABLET | Refills: 0 | Status: SHIPPED | OUTPATIENT
Start: 2023-07-31 | End: 2023-08-30

## 2023-07-31 SDOH — ECONOMIC STABILITY: FOOD INSECURITY: WITHIN THE PAST 12 MONTHS, THE FOOD YOU BOUGHT JUST DIDN'T LAST AND YOU DIDN'T HAVE MONEY TO GET MORE.: NEVER TRUE

## 2023-07-31 SDOH — ECONOMIC STABILITY: INCOME INSECURITY: HOW HARD IS IT FOR YOU TO PAY FOR THE VERY BASICS LIKE FOOD, HOUSING, MEDICAL CARE, AND HEATING?: NOT HARD AT ALL

## 2023-07-31 SDOH — ECONOMIC STABILITY: HOUSING INSECURITY
IN THE LAST 12 MONTHS, WAS THERE A TIME WHEN YOU DID NOT HAVE A STEADY PLACE TO SLEEP OR SLEPT IN A SHELTER (INCLUDING NOW)?: NO

## 2023-07-31 SDOH — ECONOMIC STABILITY: FOOD INSECURITY: WITHIN THE PAST 12 MONTHS, YOU WORRIED THAT YOUR FOOD WOULD RUN OUT BEFORE YOU GOT MONEY TO BUY MORE.: NEVER TRUE

## 2023-07-31 ASSESSMENT — ENCOUNTER SYMPTOMS
NAUSEA: 0
SHORTNESS OF BREATH: 0
BACK PAIN: 0
ABDOMINAL PAIN: 0
VOMITING: 0
SORE THROAT: 0
CHEST TIGHTNESS: 0
WHEEZING: 0
COUGH: 0
BLOOD IN STOOL: 0
DIARRHEA: 0
CONSTIPATION: 0
RHINORRHEA: 0
EYE PAIN: 0

## 2023-07-31 NOTE — PROGRESS NOTES
Floyd Severe (:  1960) is a 58 y.o. female,Established patient, here for evaluation of the following chief complaint(s):  6 Month Follow-Up (Medication, possible UTI burning)         ASSESSMENT/PLAN:  1. MD (muscular dystrophy) ([address] )  -     HYDROcodone-acetaminophen (NORCO) 5-325 MG per tablet; Take 1 tablet by mouth every 6 hours as needed for Pain for up to 30 days. G71.00, Disp-120 tablet, R-0Normal  -stable, controlled on prn norco. Controlled Substance Monitoring:    Acute and Chronic Pain Monitoring:   RX Monitoring 2023   Attestation -   Periodic Controlled Substance Monitoring No signs of potential drug abuse or diversion identified. 2. Dysuria  -new problem,   Results for orders placed or performed in visit on 23   POCT Urinalysis No Micro (Auto)   Result Value Ref Range    Glucose, Ur Negative NEGATIVE mg/dl    Bilirubin Urine Negative     Ketones, Urine Negative NEGATIVE    Specific Gravity, Urine 1.015 1.002 - 1.030    Blood, UA POC Negative NEGATIVE    pH, Urine 6.00 5.0 - 9.0    Protein, Urine Negative NEGATIVE mg/dl    Urobilinogen, Urine 0.20 0.0 - 1.0 eu/dl    Nitrite, Urine Negative NEGATIVE    Leukocyte Clumps, Urine Trace (A) NEGATIVE    Color, Urine Yellow YELLOW-STRAW    Character, Urine Clear CLR-SL.CLOUD     -minimal leuks, does not meet criteria for treatment. 3. Rheumatoid arthritis, involving unspecified site, unspecified whether rheumatoid factor present ([address])  -stable, controlled on plaquenil and infliximab infusions. No follow-ups on file. Subjective   SUBJECTIVE/OBJECTIVE:  HPI   Patient here today for a check up. Reviewed BMI of  33. Encouraged diet, exercise and weight loss. Here for norco refill. It is helpful, takes up to 4 tabs daily. No impairment. Burning with urination for 3 to 4 days. Single, non smoker, pmh reviewed. Review of Systems   Constitutional:  Negative for chills, fatigue, fever and unexpected weight change.

## 2023-08-09 ENCOUNTER — HOSPITAL ENCOUNTER (OUTPATIENT)
Dept: MAMMOGRAPHY | Age: 63
Discharge: HOME | End: 2023-08-09
Payer: MEDICARE

## 2023-08-09 VITALS — WEIGHT: 187 LBS | HEIGHT: 64 IN | BODY MASS INDEX: 31.92 KG/M2

## 2023-08-09 DIAGNOSIS — Z12.31 VISIT FOR SCREENING MAMMOGRAM: ICD-10-CM

## 2023-08-09 PROCEDURE — 77063 BREAST TOMOSYNTHESIS BI: CPT

## 2023-08-10 ENCOUNTER — TELEPHONE (OUTPATIENT)
Dept: FAMILY MEDICINE CLINIC | Age: 63
End: 2023-08-10

## 2023-08-10 NOTE — TELEPHONE ENCOUNTER
----- Message from Omer Rubio MD sent at 8/10/2023  9:25 AM EDT -----  Normal mammogram, continue yearly screenings

## 2023-08-28 ENCOUNTER — COMMUNITY OUTREACH (OUTPATIENT)
Dept: FAMILY MEDICINE CLINIC | Age: 63
End: 2023-08-28

## 2023-08-31 DIAGNOSIS — G71.00 MD (MUSCULAR DYSTROPHY) (HCC): ICD-10-CM

## 2023-08-31 RX ORDER — HYDROCODONE BITARTRATE AND ACETAMINOPHEN 5; 325 MG/1; MG/1
1 TABLET ORAL EVERY 6 HOURS PRN
Qty: 120 TABLET | Refills: 0 | Status: SHIPPED | OUTPATIENT
Start: 2023-08-31 | End: 2023-09-30

## 2023-08-31 RX ORDER — HYDROCODONE BITARTRATE AND ACETAMINOPHEN 5; 325 MG/1; MG/1
1 TABLET ORAL EVERY 6 HOURS PRN
Qty: 120 TABLET | Refills: 0 | Status: CANCELLED | OUTPATIENT
Start: 2023-08-31 | End: 2023-09-30

## 2023-08-31 RX ORDER — HYDROCODONE BITARTRATE AND ACETAMINOPHEN 5; 325 MG/1; MG/1
1 TABLET ORAL EVERY 6 HOURS PRN
COMMUNITY
End: 2023-08-31

## 2023-08-31 NOTE — TELEPHONE ENCOUNTER
Ep'ed norco to pharm requested    Controlled Substance Monitoring:    Acute and Chronic Pain Monitoring:   RX Monitoring 8/31/2023   Attestation -   Periodic Controlled Substance Monitoring No signs of potential drug abuse or diversion identified.

## 2023-08-31 NOTE — TELEPHONE ENCOUNTER
Brown Morales called requesting a refill on the following medications:  Requested Prescriptions     Pending Prescriptions Disp Refills    HYDROcodone-acetaminophen (NORCO) 5-325 MG per tablet 120 tablet 0     Sig: Take 1 tablet by mouth every 6 hours as needed for Pain for up to 30 days.  Max Daily Amount: 4 tablets       Date of last visit: 7/31/2023  Date of next visit (if applicable):Visit date not found  Date of last refill: 7/31/23  Pharmacy Name: [address]

## 2023-09-26 ENCOUNTER — OFFICE VISIT (OUTPATIENT)
Dept: PSYCHIATRY | Age: 63
End: 2023-09-26
Payer: MEDICARE

## 2023-09-26 VITALS
OXYGEN SATURATION: 98 % | SYSTOLIC BLOOD PRESSURE: 147 MMHG | HEART RATE: 69 BPM | BODY MASS INDEX: 32.61 KG/M2 | WEIGHT: 191 LBS | DIASTOLIC BLOOD PRESSURE: 77 MMHG | HEIGHT: 64 IN

## 2023-09-26 DIAGNOSIS — F34.0 CYCLOTHYMIC DISORDER: Primary | ICD-10-CM

## 2023-09-26 DIAGNOSIS — Z51.81 THERAPEUTIC DRUG MONITORING: ICD-10-CM

## 2023-09-26 PROCEDURE — 99214 OFFICE O/P EST MOD 30 MIN: CPT | Performed by: NURSE PRACTITIONER

## 2023-09-26 RX ORDER — DIVALPROEX SODIUM 500 MG/1
500 TABLET, EXTENDED RELEASE ORAL NIGHTLY
Qty: 30 TABLET | Refills: 5 | Status: SHIPPED | OUTPATIENT
Start: 2023-09-26

## 2023-09-26 NOTE — PROGRESS NOTES
journaling. Risks, potential side effects, possible drug-drug interactions, benefits and alternate treatments discussed in detail. All questions answered. Patient stated understanding and is agreeable to treatment plan. Patient has been instructed to seek emergency help via the emergency and/or calling 911 should symptoms become severe, worsen, or with other concerning symptoms. Patient instructed to go immediately to the emergency room and/or call 911 with any suicidal or homicidal ideations or if audio/visual hallucinations develop  Patient stated understanding and agrees. Patient given crisis center information. Provider Signature:  Electronically signed by BENJA West      **This report has been created using voice recognition software. It may contain minor errors which are inherent in voice recognition technology. **

## 2023-09-28 ENCOUNTER — OFFICE VISIT (OUTPATIENT)
Dept: FAMILY MEDICINE CLINIC | Age: 63
End: 2023-09-28
Payer: MEDICARE

## 2023-09-28 VITALS
HEART RATE: 66 BPM | OXYGEN SATURATION: 96 % | BODY MASS INDEX: 32.58 KG/M2 | WEIGHT: 189.8 LBS | DIASTOLIC BLOOD PRESSURE: 76 MMHG | SYSTOLIC BLOOD PRESSURE: 122 MMHG | RESPIRATION RATE: 18 BRPM | TEMPERATURE: 97.6 F

## 2023-09-28 DIAGNOSIS — D69.6 THROMBOCYTOPENIA, UNSPECIFIED (HCC): ICD-10-CM

## 2023-09-28 DIAGNOSIS — Z23 NEED FOR INFLUENZA VACCINATION: ICD-10-CM

## 2023-09-28 DIAGNOSIS — R30.0 BURNING WITH URINATION: ICD-10-CM

## 2023-09-28 DIAGNOSIS — N32.89 BLADDER SPASMS: Primary | ICD-10-CM

## 2023-09-28 DIAGNOSIS — G71.00 MUSCULAR DYSTROPHY (HCC): ICD-10-CM

## 2023-09-28 PROCEDURE — G0008 ADMIN INFLUENZA VIRUS VAC: HCPCS | Performed by: FAMILY MEDICINE

## 2023-09-28 PROCEDURE — 81003 URINALYSIS AUTO W/O SCOPE: CPT | Performed by: FAMILY MEDICINE

## 2023-09-28 PROCEDURE — 90674 CCIIV4 VAC NO PRSV 0.5 ML IM: CPT | Performed by: FAMILY MEDICINE

## 2023-09-28 PROCEDURE — 99214 OFFICE O/P EST MOD 30 MIN: CPT | Performed by: FAMILY MEDICINE

## 2023-09-28 RX ORDER — FLAVOXATE HYDROCHLORIDE 100 MG/1
100 TABLET ORAL 3 TIMES DAILY PRN
Qty: 90 TABLET | Refills: 3 | Status: SHIPPED | OUTPATIENT
Start: 2023-09-28

## 2023-09-28 ASSESSMENT — ENCOUNTER SYMPTOMS
SHORTNESS OF BREATH: 0
ABDOMINAL PAIN: 0
VOMITING: 0
SORE THROAT: 0
CONSTIPATION: 0
BLOOD IN STOOL: 0
WHEEZING: 0
RHINORRHEA: 0
BACK PAIN: 0
NAUSEA: 0
CHEST TIGHTNESS: 0
EYE PAIN: 0
DIARRHEA: 0
COUGH: 0

## 2023-09-28 NOTE — PROGRESS NOTES
Immunizations Administered       Name Date Dose Route    Influenza, FLUCELVAX, (age 10 mo+), MDCK, PF, 0.5mL 9/28/2023 0.5 mL Intramuscular    Site: Deltoid- Right    Lot: [id number]    [address]: 07126-842-43

## 2023-09-28 NOTE — PROGRESS NOTES
Maria Luisa Gramajo (:  1960) is a 58 y.o. female,Established patient, here for evaluation of the following chief complaint(s):  Urinary Pain         ASSESSMENT/PLAN:  1. Bladder spasms  -     flavoxATE (URISPAS) 100 MG tablet; Take 1 tablet by mouth 3 times daily as needed for Muscle spasms, Disp-90 tablet, R-3Normal  -chronic condition, exacerbated, add urispas, no signs of infection, .-monitor sxs, call if not improving    2. Burning with urination  -     POCT Urinalysis No Micro (Auto)  3. Thrombocytopenia, unspecified ([address])  -stable, serial labs,   Lab Results   Component Value Date    WBC 4.3 (L) 08/10/2022    HGB 13.4 2022    HCT 41.3 2022    MCV 96.2 08/10/2022     08/10/2022       4. Need for influenza vaccination  -     Influenza, FLUCELVAX, (age 10 mo+), IM, PF, 0.5 mL  5. Muscular dystrophy ([address])  -stable, controlled on neurontin  Results for orders placed or performed in visit on 23   POCT Urinalysis No Micro (Auto)   Result Value Ref Range    Glucose, Ur Negative NEGATIVE mg/dl    Bilirubin Urine Negative     Ketones, Urine Negative NEGATIVE    Specific Gravity, Urine <= 1.005 1.002 - 1.030    Blood, UA POC Negative NEGATIVE    pH, Urine 5.00 5.0 - 9.0    Protein, Urine Negative NEGATIVE mg/dl    Urobilinogen, Urine 0.20 0.0 - 1.0 eu/dl    Nitrite, Urine Negative NEGATIVE    Leukocyte Clumps, Urine Negative NEGATIVE    Color, Urine Yellow YELLOW-STRAW    Character, Urine Clear CLR-SL.CLOUD       No follow-ups on file. Subjective   SUBJECTIVE/OBJECTIVE:  Urinary Pain   Associated symptoms include chills. Pertinent negatives include no frequency, hematuria, nausea, urgency or vomiting. Patient here today for a check up. Reviewed BMI of 33. Encouraged diet, exercise and weight loss. 4 days of of chills, headaches, bladder cramping. Burning. Urine dip is clear. Also would like flu shot. Single, non smoker, pmh reviewed.        Review of Systems   Constitutional:

## 2023-10-09 ENCOUNTER — TELEPHONE (OUTPATIENT)
Dept: FAMILY MEDICINE CLINIC | Age: 63
End: 2023-10-09

## 2023-10-09 DIAGNOSIS — G71.00 MD (MUSCULAR DYSTROPHY) (HCC): ICD-10-CM

## 2023-10-09 RX ORDER — HYDROCODONE BITARTRATE AND ACETAMINOPHEN 5; 325 MG/1; MG/1
1 TABLET ORAL EVERY 6 HOURS PRN
Qty: 120 TABLET | Refills: 0 | Status: SHIPPED | OUTPATIENT
Start: 2023-10-09 | End: 2023-11-08

## 2023-10-09 NOTE — TELEPHONE ENCOUNTER
Yolanda calling on behalf of [employer] ( on HIPAA). She states pt sees Swapnil Garcia(NP) in [place] for rheumatoid arthritis infusion Remicade. She is wanting to have done at [facility] because it is closer. They are asking for a cosigner for the orders & they will fax paperwork over to be signed if okayed. Can return Yolanda's call at Ph:[phone].

## 2023-10-09 NOTE — TELEPHONE ENCOUNTER
Mirella Haley called requesting a refill on the following medications:  Requested Prescriptions     Pending Prescriptions Disp Refills    HYDROcodone-acetaminophen (NORCO) 5-325 MG per tablet 120 tablet 0     Sig: Take 1 tablet by mouth every 6 hours as needed for Pain for up to 30 days. G71.00       Date of last visit: 9/28/2023 bladder spasms    Date of next visit: date not found    Date of last refill: 8/31/23    Pharmacy Name: [facility]    Pt has #4 left. Pls call pt at [phone] only if probs. Zip: [phone]    Dose confirmed by pt as stated above.     Thanks,  Siria Monte

## 2023-10-09 NOTE — TELEPHONE ENCOUNTER
Ep'ed norco to pharm requested    Controlled Substance Monitoring:    Acute and Chronic Pain Monitoring:   RX Monitoring Periodic Controlled Substance Monitoring   10/9/2023  12:26 PM No signs of potential drug abuse or diversion identified.

## 2023-10-10 NOTE — TELEPHONE ENCOUNTER
Yolanda notified, she states that is what she told [facility] but was informed Dr Merline Ferraris will need to place the orders. She will call [facility] back.

## 2023-10-10 NOTE — TELEPHONE ENCOUNTER
No that's not how it works. The prescribing office needs to fax us the orders, I will cosign and forward onto [facility].

## 2023-10-10 NOTE — TELEPHONE ENCOUNTER
Yolanda pt daughter called in stating that Dr Charlotte Villalta will need to place the order per [facility]  for the infusions and the order will need to got to [facility]   Fax [phone].    Pt daughter would like callback with if she is going in the right direction and steps needed

## 2023-10-13 ENCOUNTER — TELEPHONE (OUTPATIENT)
Dept: FAMILY MEDICINE CLINIC | Age: 63
End: 2023-10-13

## 2023-10-13 RX ORDER — SODIUM CHLORIDE 0.9 % (FLUSH) 0.9 %
5-40 SYRINGE (ML) INJECTION PRN
OUTPATIENT
Start: 2023-10-19

## 2023-10-13 RX ORDER — EPINEPHRINE 1 MG/ML
0.3 INJECTION, SOLUTION, CONCENTRATE INTRAVENOUS PRN
OUTPATIENT
Start: 2023-10-19

## 2023-10-13 RX ORDER — HEPARIN 100 UNIT/ML
500 SYRINGE INTRAVENOUS PRN
OUTPATIENT
Start: 2023-10-19

## 2023-10-13 RX ORDER — SODIUM CHLORIDE 9 MG/ML
5-250 INJECTION, SOLUTION INTRAVENOUS PRN
OUTPATIENT
Start: 2023-10-19

## 2023-10-13 RX ORDER — SODIUM CHLORIDE 9 MG/ML
INJECTION, SOLUTION INTRAVENOUS CONTINUOUS
OUTPATIENT
Start: 2023-10-19

## 2023-10-13 RX ORDER — DIPHENHYDRAMINE HYDROCHLORIDE 50 MG/ML
50 INJECTION INTRAMUSCULAR; INTRAVENOUS
OUTPATIENT
Start: 2023-10-19

## 2023-10-13 NOTE — TELEPHONE ENCOUNTER
[facility] called requesting verbal orders to use their therapy plan for emergency. The orders you co-signed from [facility]t did not have them attached. Please advise.

## 2023-10-25 ENCOUNTER — HOSPITAL ENCOUNTER (OUTPATIENT)
Age: 63
Discharge: HOME | End: 2023-10-25
Payer: MEDICARE

## 2023-10-25 DIAGNOSIS — Z51.81 THERAPEUTIC DRUG MONITORING: ICD-10-CM

## 2023-10-25 DIAGNOSIS — F34.0 CYCLOTHYMIC DISORDER: ICD-10-CM

## 2023-10-25 LAB
ALBUMIN SERPL BCG-MCNC: 4.2 G/DL (ref 3.5–5.1)
ALP SERPL-CCNC: 69 U/L (ref 38–126)
ALT SERPL W/O P-5'-P-CCNC: 23 U/L (ref 11–66)
ANION GAP SERPL CALC-SCNC: 9 MEQ/L (ref 8–16)
AST SERPL-CCNC: 38 U/L (ref 5–40)
BASOPHILS ABSOLUTE: 0 THOU/MM3 (ref 0–0.1)
BASOPHILS NFR BLD AUTO: 1.1 %
BILIRUB SERPL-MCNC: 0.2 MG/DL (ref 0.3–1.2)
BUN SERPL-MCNC: 16 MG/DL (ref 7–22)
CALCIUM SERPL-MCNC: 10.2 MG/DL (ref 8.5–10.5)
CHLORIDE SERPL-SCNC: 100 MEQ/L (ref 98–111)
CO2 SERPL-SCNC: 28 MEQ/L (ref 23–33)
CREAT SERPL-MCNC: 1.6 MG/DL (ref 0.4–1.2)
DEPRECATED RDW RBC AUTO: 49.1 FL (ref 35–45)
EOSINOPHIL NFR BLD AUTO: 1.9 %
EOSINOPHILS ABSOLUTE: 0.1 THOU/MM3 (ref 0–0.4)
ERYTHROCYTE [DISTWIDTH] IN BLOOD BY AUTOMATED COUNT: 13.1 % (ref 11.5–14.5)
GFR SERPL CREATININE-BSD FRML MDRD: 36 ML/MIN/1.73M2
GLUCOSE SERPL-MCNC: 81 MG/DL (ref 70–108)
HCT VFR BLD AUTO: 42.3 % (ref 37–47)
HGB BLD-MCNC: 13.1 GM/DL (ref 12–16)
IMM GRANULOCYTES # BLD AUTO: 0.02 THOU/MM3 (ref 0–0.07)
IMM GRANULOCYTES NFR BLD AUTO: 0.5 %
LYMPHOCYTES ABSOLUTE: 1.3 THOU/MM3 (ref 1–4.8)
LYMPHOCYTES NFR BLD AUTO: 33.8 %
MCH RBC QN AUTO: 31.3 PG (ref 26–33)
MCHC RBC AUTO-ENTMCNC: 31 GM/DL (ref 32.2–35.5)
MCV RBC AUTO: 101.2 FL (ref 81–99)
MONOCYTES ABSOLUTE: 0.4 THOU/MM3 (ref 0.4–1.3)
MONOCYTES NFR BLD AUTO: 9.5 %
NEUTROPHILS NFR BLD AUTO: 53.2 %
NRBC BLD AUTO-RTO: 0 /100 WBC
PLATELET # BLD AUTO: 133 THOU/MM3 (ref 130–400)
PMV BLD AUTO: 12.1 FL (ref 9.4–12.4)
POTASSIUM SERPL-SCNC: 4.9 MEQ/L (ref 3.5–5.2)
PROT SERPL-MCNC: 7 G/DL (ref 6.1–8)
RBC # BLD AUTO: 4.18 MILL/MM3 (ref 4.2–5.4)
SEGMENTED NEUTROPHILS ABSOLUTE COUNT: 2 THOU/MM3 (ref 1.8–7.7)
SODIUM SERPL-SCNC: 137 MEQ/L (ref 135–145)
T4 FREE SERPL-MCNC: 1.4 NG/DL (ref 0.93–1.76)
WBC # BLD AUTO: 3.7 THOU/MM3 (ref 4.8–10.8)

## 2023-10-25 PROCEDURE — 82607 VITAMIN B-12: CPT

## 2023-10-25 PROCEDURE — 36415 COLL VENOUS BLD VENIPUNCTURE: CPT

## 2023-10-25 PROCEDURE — 85025 COMPLETE CBC W/AUTO DIFF WBC: CPT

## 2023-10-25 PROCEDURE — 80053 COMPREHEN METABOLIC PANEL: CPT

## 2023-10-25 PROCEDURE — 84439 ASSAY OF FREE THYROXINE: CPT

## 2023-10-25 PROCEDURE — 84443 ASSAY THYROID STIM HORMONE: CPT

## 2023-10-25 PROCEDURE — 80164 ASSAY DIPROPYLACETIC ACD TOT: CPT

## 2023-10-25 PROCEDURE — 82746 ASSAY OF FOLIC ACID SERUM: CPT

## 2023-10-26 LAB
FOLATE SERPL-MCNC: 11 NG/ML (ref 4.8–24.2)
TSH SERPL DL<=0.005 MIU/L-ACNC: 0.84 UIU/ML (ref 0.4–4.2)
VALPROATE SERPL-MCNC: 54 UG/ML (ref 50–100)
VIT B12 SERPL-MCNC: 276 PG/ML (ref 211–911)

## 2023-11-09 DIAGNOSIS — G71.00 MD (MUSCULAR DYSTROPHY) (HCC): ICD-10-CM

## 2023-11-09 RX ORDER — HYDROCODONE BITARTRATE AND ACETAMINOPHEN 5; 325 MG/1; MG/1
1 TABLET ORAL EVERY 6 HOURS PRN
Qty: 120 TABLET | Refills: 0 | Status: SHIPPED | OUTPATIENT
Start: 2023-11-09 | End: 2023-12-09

## 2023-11-09 NOTE — TELEPHONE ENCOUNTER
Daryle Evangelist called requesting a refill on the following medications:  Requested Prescriptions     Pending Prescriptions Disp Refills    HYDROcodone-acetaminophen (NORCO) 5-325 MG per tablet 120 tablet 0     Sig: Take 1 tablet by mouth every 6 hours as needed for Pain for up to 30 days.  G71.00       Date of last visit: 9/28/2023  Date of next visit (if applicable):Visit date not found  Date of last refill: 10/9/23  Pharmacy Name: [facility]

## 2023-11-09 NOTE — TELEPHONE ENCOUNTER
Ep'ed norco to pharm requested    Controlled Substance Monitoring:    Acute and Chronic Pain Monitoring:   RX Monitoring Periodic Controlled Substance Monitoring   11/9/2023   9:42 AM No signs of potential drug abuse or diversion identified.

## 2023-11-15 ENCOUNTER — TELEPHONE (OUTPATIENT)
Dept: NEPHROLOGY | Age: 63
End: 2023-11-15

## 2023-11-15 NOTE — TELEPHONE ENCOUNTER
Called asking when her next appointment date and time is. Also asked if we could mail the lab orders. Confirmed correct address. Printed and mailed.

## 2023-11-16 ENCOUNTER — TELEPHONE (OUTPATIENT)
Dept: FAMILY MEDICINE CLINIC | Age: 63
End: 2023-11-16

## 2023-11-16 DIAGNOSIS — G71.00 MUSCULAR DYSTROPHY (HCC): ICD-10-CM

## 2023-11-16 NOTE — TELEPHONE ENCOUNTER
Pt needs new letter for her housing stating she has to buy incontinent supplies. Letter copied from 01/10/2022, on [facility] desk for signature.  Please mail to Pt

## 2023-11-16 NOTE — TELEPHONE ENCOUNTER
Adelina Ahumada called requesting a refill on the following medications:  Requested Prescriptions     Pending Prescriptions Disp Refills    gabapentin (NEURONTIN) 300 MG capsule [Pharmacy Med Name: GABAPENTIN 300 MG CAPSULE] 60 capsule 11     Sig: take 1 capsule by mouth twice a day       Date of last visit: 10/31/2022 annual  Date of next visit (if applicable):Visit date not found  Date of last refill: 10/31/2022  Pharmacy Name: [facility] C[facility] ([facility])    Pt is due for annual physical, please call and schedule

## 2023-11-17 ENCOUNTER — TELEPHONE (OUTPATIENT)
Dept: FAMILY MEDICINE CLINIC | Age: 63
End: 2023-11-17

## 2023-11-17 RX ORDER — GABAPENTIN 300 MG/1
CAPSULE ORAL
Qty: 60 CAPSULE | Refills: 0 | Status: SHIPPED | OUTPATIENT
Start: 2023-11-17 | End: 2023-12-17

## 2023-11-17 NOTE — TELEPHONE ENCOUNTER
Ep'ed gabapentin to pharm requested    Controlled Substance Monitoring:    Acute and Chronic Pain Monitoring:   RX Monitoring Periodic Controlled Substance Monitoring   11/17/2023  11:12 AM No signs of potential drug abuse or diversion identified.

## 2023-11-21 DIAGNOSIS — D72.9 ABNORMAL WHITE BLOOD CELL (WBC) COUNT: Primary | ICD-10-CM

## 2023-11-29 ENCOUNTER — OFFICE VISIT (OUTPATIENT)
Dept: FAMILY MEDICINE CLINIC | Age: 63
End: 2023-11-29
Payer: MEDICARE

## 2023-11-29 VITALS
DIASTOLIC BLOOD PRESSURE: 76 MMHG | BODY MASS INDEX: 33.2 KG/M2 | RESPIRATION RATE: 18 BRPM | HEIGHT: 63 IN | SYSTOLIC BLOOD PRESSURE: 124 MMHG | WEIGHT: 187.4 LBS | TEMPERATURE: 97 F | HEART RATE: 68 BPM | OXYGEN SATURATION: 98 %

## 2023-11-29 DIAGNOSIS — D63.1 ANEMIA DUE TO STAGE 3B CHRONIC KIDNEY DISEASE (HCC): ICD-10-CM

## 2023-11-29 DIAGNOSIS — F11.20 OPIOID DEPENDENCE WITH CURRENT USE (HCC): ICD-10-CM

## 2023-11-29 DIAGNOSIS — N32.89 BLADDER SPASMS: ICD-10-CM

## 2023-11-29 DIAGNOSIS — E55.9 VITAMIN D DEFICIENCY: ICD-10-CM

## 2023-11-29 DIAGNOSIS — G71.00 MUSCULAR DYSTROPHY (HCC): ICD-10-CM

## 2023-11-29 DIAGNOSIS — Z00.00 ANNUAL PHYSICAL EXAM: Primary | ICD-10-CM

## 2023-11-29 DIAGNOSIS — N18.32 ANEMIA DUE TO STAGE 3B CHRONIC KIDNEY DISEASE (HCC): ICD-10-CM

## 2023-11-29 PROCEDURE — 99396 PREV VISIT EST AGE 40-64: CPT | Performed by: FAMILY MEDICINE

## 2023-11-29 RX ORDER — ASCORBIC ACID 500 MG
TABLET ORAL
Qty: 30 TABLET | Refills: 11 | Status: SHIPPED | OUTPATIENT
Start: 2023-11-29

## 2023-11-29 RX ORDER — GABAPENTIN 300 MG/1
CAPSULE ORAL
Qty: 60 CAPSULE | Refills: 11 | Status: SHIPPED | OUTPATIENT
Start: 2023-11-29 | End: 2023-12-29

## 2023-11-29 RX ORDER — FLAVOXATE HYDROCHLORIDE 100 MG/1
100 TABLET ORAL 3 TIMES DAILY PRN
Qty: 90 TABLET | Refills: 11 | Status: SHIPPED | OUTPATIENT
Start: 2023-11-29

## 2023-11-29 ASSESSMENT — ENCOUNTER SYMPTOMS
EYE PAIN: 0
SHORTNESS OF BREATH: 0
COUGH: 0
CONSTIPATION: 0
NAUSEA: 0
VOMITING: 0
WHEEZING: 0
SORE THROAT: 0
RHINORRHEA: 0
BACK PAIN: 0
ABDOMINAL PAIN: 0
DIARRHEA: 0
BLOOD IN STOOL: 0
CHEST TIGHTNESS: 0

## 2023-11-29 NOTE — PROGRESS NOTES
2023    Carie Worthington (:  1960) is a 58 y.o. female, here for a preventive medicine evaluation. Patient Active Problem List   Diagnosis    Venous insufficiency    Rheumatoid arthritis ([facility])    Lymphedema    Obesity (BMI 30-39. 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[facility])    Depression    Edema of lower extremity    ESR raised    GERD (gastroesophageal reflux disease)    Muscle weakness    History of right knee joint replacement    Current moderate episode of major depressive disorder without prior episode ([facility])    Overflow incontinence    Stage 3 chronic kidney disease, unspecified whether stage 3a or 3b CKD ([facility])    Chronic renal disease, stage III ([address]) [550353]    CKD (chronic kidney disease) stage 4, GFR 15-29 ml/min ([facility])    Psoriatic arthritis ([address])    Cyclothymia    Therapeutic drug monitoring    Thrombocytopenia, unspecified ([facility])    Opioid dependence with current use ([address])       Review of Systems   Constitutional:  Negative for chills, fatigue, fever and unexpected weight change. HENT:  Negative for congestion, ear pain, rhinorrhea and sore throat. Eyes:  Negative for pain and visual disturbance. Respiratory:  Negative for cough, chest tightness, shortness of breath and wheezing. Cardiovascular:  Negative for chest pain and palpitations. Gastrointestinal:  Negative for abdominal pain, blood in stool, constipation, diarrhea, nausea and vomiting. Genitourinary:  Negative for difficulty urinating, frequency, hematuria and urgency. Musculoskeletal:  Negative for back pain, joint swelling, myalgias and neck pain. Skin:  Negative for rash. Neurological:  Negative for dizziness and headaches. Hematological:  Negative for adenopathy. Does not bruise/bleed easily.    Psychiatric/Behavioral:  Negative for behavioral problems

## 2023-12-05 ENCOUNTER — HOSPITAL ENCOUNTER (OUTPATIENT)
Dept: NURSING | Age: 63
Discharge: HOME | End: 2023-12-05
Payer: MEDICARE

## 2023-12-05 VITALS
RESPIRATION RATE: 18 BRPM | BODY MASS INDEX: 33.27 KG/M2 | TEMPERATURE: 96.7 F | WEIGHT: 189 LBS | DIASTOLIC BLOOD PRESSURE: 66 MMHG | SYSTOLIC BLOOD PRESSURE: 136 MMHG | HEART RATE: 64 BPM | OXYGEN SATURATION: 97 %

## 2023-12-05 DIAGNOSIS — L40.50 PSORIATIC ARTHRITIS (HCC): Primary | ICD-10-CM

## 2023-12-05 PROCEDURE — 96413 CHEMO IV INFUSION 1 HR: CPT

## 2023-12-05 PROCEDURE — 96415 CHEMO IV INFUSION ADDL HR: CPT

## 2023-12-05 PROCEDURE — 6360000002 HC RX W HCPCS: Performed by: FAMILY MEDICINE

## 2023-12-05 PROCEDURE — 2580000003 HC RX 258: Performed by: FAMILY MEDICINE

## 2023-12-05 RX ORDER — SODIUM CHLORIDE 9 MG/ML
INJECTION, SOLUTION INTRAVENOUS CONTINUOUS
OUTPATIENT
Start: 2024-01-16

## 2023-12-05 RX ORDER — SODIUM CHLORIDE 9 MG/ML
5-250 INJECTION, SOLUTION INTRAVENOUS PRN
OUTPATIENT
Start: 2024-01-16

## 2023-12-05 RX ORDER — EPINEPHRINE 1 MG/ML
0.3 INJECTION, SOLUTION INTRAMUSCULAR; SUBCUTANEOUS PRN
OUTPATIENT
Start: 2024-01-16

## 2023-12-05 RX ORDER — DIPHENHYDRAMINE HYDROCHLORIDE 50 MG/ML
50 INJECTION INTRAMUSCULAR; INTRAVENOUS
OUTPATIENT
Start: 2024-01-16

## 2023-12-05 RX ORDER — SODIUM CHLORIDE 0.9 % (FLUSH) 0.9 %
5-40 SYRINGE (ML) INJECTION PRN
OUTPATIENT
Start: 2024-01-16

## 2023-12-05 RX ORDER — HEPARIN 100 UNIT/ML
500 SYRINGE INTRAVENOUS PRN
OUTPATIENT
Start: 2024-01-16

## 2023-12-05 RX ADMIN — INFLIXIMAB 300 MG: 100 INJECTION, POWDER, LYOPHILIZED, FOR SOLUTION INTRAVENOUS at 10:58

## 2023-12-05 ASSESSMENT — PAIN - FUNCTIONAL ASSESSMENT: PAIN_FUNCTIONAL_ASSESSMENT: 0-10

## 2023-12-05 NOTE — PROGRESS NOTES
2690 Patient arrived to [facility] ambulatory for remicade infusion. Oriented to room and call light  PT RIGHTS AND RESPONSIBILITIES OFFERED TO PT. She denies recent infection or antibiotic use     1058 Medication started and she denies complaints    1115 Medication infusing and she denies complaints    1130 Medication infusing and she denies complaints    1145 Medication infusing and she denies complaints    1200 Medication infusing and she denies complaints    1230 Medication infusing and she denies complaints    1309 Medication completed and she denies complaints. Iv removed. Discharge instructions given and explained and she denies questions.   Discharged ambulatory       _M___ Safety:       (Environmental)  [place] to environment  Ensure ID band is correct and in place/ allergy band as needed  Assess for fall risk  Initiate fall precautions as applicable (fall band, side rails, etc.)  Call light within reach  Bed in low position/ wheels locked    _M___ Pain:       Assess pain level and characteristics  Administer analgesics as ordered  Assess effectiveness of pain management and report to MD as needed    __M__ Knowledge Deficit:  Assess baseline knowledge  Provide teaching at level of understanding  Provide teaching via preferred learning method  Evaluate teaching effectiveness    __M__ Hemodynamic/Respiratory Status:       (Pre and Post Procedure Monitoring)  Assess/Monitor vital signs and LOC  Assess Baseline SpO2 prior to any sedation  Obtain weight/height  Assess vital signs/ LOC until patient meets discharge criteria  Monitor procedure site and notify MD of any issues

## 2023-12-05 NOTE — DISCHARGE INSTRUCTIONS
Next appointment is scheduled for January 16 at 10:00    [facility]      Can not have an active infection or be on an antibiotic at time of infusion. SIDE EFFECTS: RASH, COUGH, JOINT PAIN, SWELLING TO HANDS/FEET, SORE THROAT, FEVER      Call your doctor or return to the nearest Emergency Room if you start having shortness of breath, chest tightness, wheezing      Please call [phone] with any questions or concerns.

## 2023-12-13 DIAGNOSIS — G71.00 MD (MUSCULAR DYSTROPHY) (HCC): ICD-10-CM

## 2023-12-13 RX ORDER — HYDROCODONE BITARTRATE AND ACETAMINOPHEN 5; 325 MG/1; MG/1
1 TABLET ORAL EVERY 6 HOURS PRN
Qty: 120 TABLET | Refills: 0 | Status: SHIPPED | OUTPATIENT
Start: 2023-12-13 | End: 2024-01-12

## 2023-12-13 NOTE — TELEPHONE ENCOUNTER
Amena Barrientos called requesting a refill on the following medications:  Requested Prescriptions     Pending Prescriptions Disp Refills    HYDROcodone-acetaminophen (NORCO) 5-325 MG per tablet 120 tablet 0     Sig: Take 1 tablet by mouth every 6 hours as needed for Pain for up to 30 days. G71.00       Date of last visit: 11/29/2023 physical    Date of next visit: date not found    Date of last refill: 11/9/23    [facility]: [facility]    Pt has #4 left. Pls call pt at [phone] only if probs. Zip: [phone]    Dose confirmed by pt as stated above.     Thanks,  Jeana Del Rio

## 2023-12-13 NOTE — TELEPHONE ENCOUNTER
Ep'ed norco to pharm requested    Controlled Substance Monitoring:    Acute and Chronic Pain Monitoring:   RX Monitoring Periodic Controlled Substance Monitoring   12/13/2023   1:02 PM No signs of potential drug abuse or diversion identified.

## 2023-12-18 ENCOUNTER — HOSPITAL ENCOUNTER (OUTPATIENT)
Age: 63
Discharge: HOME | End: 2023-12-18
Payer: MEDICARE

## 2023-12-18 DIAGNOSIS — N18.31 CHRONIC KIDNEY DISEASE, STAGE 3A (HCC): ICD-10-CM

## 2023-12-18 DIAGNOSIS — N26.1 ATROPHY OF RIGHT KIDNEY: ICD-10-CM

## 2023-12-18 LAB
ANION GAP SERPL CALC-SCNC: 8 MEQ/L (ref 8–16)
BILIRUB UR QL STRIP: NEGATIVE
BUN SERPL-MCNC: 17 MG/DL (ref 7–22)
CALCIUM SERPL-MCNC: 9.8 MG/DL (ref 8.5–10.5)
CHARACTER UR: CLEAR
CHLORIDE SERPL-SCNC: 100 MEQ/L (ref 98–111)
CO2 SERPL-SCNC: 30 MEQ/L (ref 23–33)
COLOR UR: YELLOW
CREAT SERPL-MCNC: 1.5 MG/DL (ref 0.4–1.2)
CREAT UR-MCNC: 23.3 MG/DL
GFR SERPL CREATININE-BSD FRML MDRD: 38 ML/MIN/1.73M2
GLUCOSE SERPL-MCNC: 99 MG/DL (ref 70–108)
GLUCOSE UR QL STRIP.AUTO: NEGATIVE MG/DL
HGB UR QL STRIP.AUTO: NEGATIVE
KETONES UR QL STRIP.AUTO: NEGATIVE
LEUKOCYTE ESTERASE UR QL STRIP.AUTO: NEGATIVE
NITRITE UR QL STRIP.AUTO: NEGATIVE
PH UR STRIP.AUTO: 5.5 [PH] (ref 5–9)
POTASSIUM SERPL-SCNC: 4.1 MEQ/L (ref 3.5–5.2)
PROT UR STRIP.AUTO-MCNC: NEGATIVE MG/DL
PROT UR-MCNC: < 4 MG/DL
PROT/CREAT 24H UR: 0.17 MG/G{CREAT}
SODIUM SERPL-SCNC: 138 MEQ/L (ref 135–145)
SP GR UR REFRACT.AUTO: < 1.005 (ref 1–1.03)
UROBILINOGEN UR QL STRIP.AUTO: 0.2 EU/DL (ref 0–1)

## 2023-12-18 PROCEDURE — 82570 ASSAY OF URINE CREATININE: CPT

## 2023-12-18 PROCEDURE — 81003 URINALYSIS AUTO W/O SCOPE: CPT

## 2023-12-18 PROCEDURE — 80048 BASIC METABOLIC PNL TOTAL CA: CPT

## 2023-12-18 PROCEDURE — 84156 ASSAY OF PROTEIN URINE: CPT

## 2023-12-18 PROCEDURE — 36415 COLL VENOUS BLD VENIPUNCTURE: CPT

## 2023-12-28 ENCOUNTER — OFFICE VISIT (OUTPATIENT)
Dept: NEPHROLOGY | Age: 63
End: 2023-12-28
Payer: MEDICARE

## 2023-12-28 VITALS
HEART RATE: 67 BPM | WEIGHT: 193.6 LBS | SYSTOLIC BLOOD PRESSURE: 151 MMHG | OXYGEN SATURATION: 96 % | DIASTOLIC BLOOD PRESSURE: 87 MMHG | BODY MASS INDEX: 33.05 KG/M2 | HEIGHT: 64 IN

## 2023-12-28 DIAGNOSIS — N26.1 RIGHT RENAL ATROPHY: ICD-10-CM

## 2023-12-28 DIAGNOSIS — N18.32 CHRONIC KIDNEY DISEASE, STAGE 3B (HCC): Primary | ICD-10-CM

## 2023-12-28 PROCEDURE — 99213 OFFICE O/P EST LOW 20 MIN: CPT | Performed by: INTERNAL MEDICINE

## 2023-12-28 NOTE — PROGRESS NOTES
[facility]  [address] [address]  Dept: [phone]  Loc: [phone]  Office Progress Note  12/28/2023 9:33 AM      Pt Name:    Ronald Story  YOB: 1960  Primary Care Physician:  Mariaelena Angulo MD     Chief Complaint:   Chief Complaint   Patient presents with    Chronic Kidney Disease     Stage 3b        Background Information/Interval History:   The patient is a 58 y.o. with hx RA/psoriatic arthritis (follows with rheumatology), muscular dystrophy (dxed at [facility]) who is here for follow-up evaluation of elevated creatinine. Patient was seeing Dr. Sonya Snowden in [place] but now following with Ms. Sailaja Gregory (CNP). Patient reports she was on metho and Plaquanil but subsequently had rising creatinines so it was stopped. She was started on Iv infusion remicade every 2 weeks. She was on Eliquis for DVT at some point. She was seeing hematology here in [facility] Not much leg swelling- she reports she was dxed with lymphedema. No hx smoking. She used to work in [employer] and also worked at [employer]. She says she used to take some NSAIDs about 10+ years ago. Here for 1 year follow-up. BP is little high here 151/87. She says it is usually okay at home- lower than what we have in office. She feels well. She has no complaints. Past History:  Past Medical History:   Diagnosis Date    B12 deficiency 12/2020    Depression     Lymphedema 2008    both legs    MD (muscular dystrophy) ([address]) 2008    Dr. Anna Bloom at [facility] - no lung involvement per patient    Neuropathy     Obesity (BMI 30-39. 9)     Overflow incontinence     Rheumatoid arthritis(714.0) 2008    Dr. Bradley     Superficial thrombophlebitis 03/2018    right lower extremity    Venous insufficiency     h/o SVT - 3-4 in the past (has never been on Coumadin)     Past Surgical History:   Procedure Laterality Date    COLONOSCOPY      HYSTERECTOMY

## 2024-01-15 ENCOUNTER — TELEPHONE (OUTPATIENT)
Dept: FAMILY MEDICINE CLINIC | Age: 64
End: 2024-01-15

## 2024-01-15 NOTE — TELEPHONE ENCOUNTER
Pt notified, she is going to be in [place] for an appt so she will jsu go to the [facility] walk in clinic.

## 2024-01-15 NOTE — TELEPHONE ENCOUNTER
Either way, she could come here for evaluation, we would likely  order xrays that she could have at [facility].  Or can go to [facility] ( they have a walk in clinic) and xrays can be done there.  Her choice.

## 2024-01-15 NOTE — TELEPHONE ENCOUNTER
Pt called asking is she should go to [facility] or come in to to the office. She states that she fell last Sunday 1/7/24 and injured the top of her right foot. Toes had some discoloration but that got better with soaking in epsom salt. She is still experiencing pain at the top of the foot as it is still swelling

## 2024-01-16 ENCOUNTER — HOSPITAL ENCOUNTER (OUTPATIENT)
Dept: NURSING | Age: 64
Discharge: HOME | End: 2024-01-16
Payer: MEDICARE

## 2024-01-16 VITALS
OXYGEN SATURATION: 98 % | SYSTOLIC BLOOD PRESSURE: 134 MMHG | TEMPERATURE: 96.8 F | DIASTOLIC BLOOD PRESSURE: 67 MMHG | RESPIRATION RATE: 18 BRPM | HEART RATE: 71 BPM | BODY MASS INDEX: 32.27 KG/M2 | WEIGHT: 188 LBS

## 2024-01-16 DIAGNOSIS — L40.50 PSORIATIC ARTHRITIS (HCC): Primary | ICD-10-CM

## 2024-01-16 PROCEDURE — 2580000003 HC RX 258: Performed by: FAMILY MEDICINE

## 2024-01-16 PROCEDURE — 96413 CHEMO IV INFUSION 1 HR: CPT

## 2024-01-16 PROCEDURE — 96415 CHEMO IV INFUSION ADDL HR: CPT

## 2024-01-16 PROCEDURE — 6360000002 HC RX W HCPCS: Performed by: FAMILY MEDICINE

## 2024-01-16 RX ORDER — SODIUM CHLORIDE 9 MG/ML
5-250 INJECTION, SOLUTION INTRAVENOUS PRN
OUTPATIENT
Start: 2024-02-27

## 2024-01-16 RX ORDER — SODIUM CHLORIDE 9 MG/ML
INJECTION, SOLUTION INTRAVENOUS CONTINUOUS
OUTPATIENT
Start: 2024-02-27

## 2024-01-16 RX ORDER — SODIUM CHLORIDE 0.9 % (FLUSH) 0.9 %
5-40 SYRINGE (ML) INJECTION PRN
OUTPATIENT
Start: 2024-02-27

## 2024-01-16 RX ORDER — DIPHENHYDRAMINE HYDROCHLORIDE 50 MG/ML
50 INJECTION INTRAMUSCULAR; INTRAVENOUS
OUTPATIENT
Start: 2024-02-27

## 2024-01-16 RX ORDER — EPINEPHRINE 1 MG/ML
0.3 INJECTION, SOLUTION INTRAMUSCULAR; SUBCUTANEOUS PRN
OUTPATIENT
Start: 2024-02-27

## 2024-01-16 RX ORDER — HEPARIN 100 UNIT/ML
500 SYRINGE INTRAVENOUS PRN
OUTPATIENT
Start: 2024-02-27

## 2024-01-16 RX ADMIN — INFLIXIMAB 300 MG: 100 INJECTION, POWDER, LYOPHILIZED, FOR SOLUTION INTRAVENOUS at 10:36

## 2024-01-16 ASSESSMENT — PAIN DESCRIPTION - LOCATION: LOCATION: FOOT

## 2024-01-16 ASSESSMENT — PAIN SCALES - GENERAL: PAINLEVEL_OUTOF10: 5

## 2024-01-16 NOTE — PROGRESS NOTES
0930 Patient ambulatory to [facility] for Remicade infusion. Patient denies any recent infections or antibiotics. PT RIGHTS AND RESPONSIBILITIES OFFERED TO PT.    1035 Remicade infusion started.     1050 Infusion increased. Patient doing well.     1105 Infusion increased. Patient assisted to bathroom.     1120 Infusion increased. Patient resting in chair.         _M___ Safety:       (Environmental)  [place] to environment  Ensure ID band is correct and in place/ allergy band as needed  Assess for fall risk  Initiate fall precautions as applicable (fall band, side rails, etc.)  Call light within reach  Bed in low position/ wheels locked    _M___ Pain:       Assess pain level and characteristics  Administer analgesics as ordered  Assess effectiveness of pain management and report to MD as needed    _M___ Knowledge Deficit:  Assess baseline knowledge  Provide teaching at level of understanding  Provide teaching via preferred learning method  Evaluate teaching effectiveness    _M___ Hemodynamic/Respiratory Status:       (Pre and Post Procedure Monitoring)  Assess/Monitor vital signs and LOC  Assess Baseline SpO2 prior to any sedation  Obtain weight/height  Assess vital signs/ LOC until patient meets discharge criteria  Monitor procedure site and notify MD of any issues

## 2024-01-16 NOTE — DISCHARGE INSTRUCTIONS
REMICADE DISCHARGE INSTRUCTION SHEET      Can not have an active infection or be on an antibiotic at time of infusion.       SIDE EFFECTS: RASH, COUGH, JOINT PAIN, SWELLING TO HANDS/FEET, SORE THROAT, FEVER      Call your doctor or return to the nearest Emergency Room if you start having shortness of breath, chest tightness, wheezing      Next Remicade infusion is scheduled on: 2/27/24 @ 10am       Please call [phone] with any questions or concerns.

## 2024-01-16 NOTE — PROGRESS NOTES
0930 Patient ambulatory to [facility] for Remicade infusion. Patient denies any recent infections or antibiotics. PT RIGHTS AND RESPONSIBILITIES OFFERED TO PT.    1035 Remicade infusion started.     1050 Infusion increased. Patient doing well.     1105 Infusion increased. Patient assisted to bathroom.     1120 Infusion increased. Patient resting in chair.     1135 Infusion increased. Patient denies any complaints.     1205 Infusion at max rate. Patient resting in chair. Denies any needs.     1250 Infusion complete. Patient tolerated well. AVS reviewed with patient. Verbalizes understanding. Patient left ambulatory to discharge lobby.         _M___ Safety:       (Environmental)  [place] to environment  Ensure ID band is correct and in place/ allergy band as needed  Assess for fall risk  Initiate fall precautions as applicable (fall band, side rails, etc.)  Call light within reach  Bed in low position/ wheels locked    _M___ Pain:       Assess pain level and characteristics  Administer analgesics as ordered  Assess effectiveness of pain management and report to MD as needed    _M___ Knowledge Deficit:  Assess baseline knowledge  Provide teaching at level of understanding  Provide teaching via preferred learning method  Evaluate teaching effectiveness    _M___ Hemodynamic/Respiratory Status:       (Pre and Post Procedure Monitoring)  Assess/Monitor vital signs and LOC  Assess Baseline SpO2 prior to any sedation  Obtain weight/height  Assess vital signs/ LOC until patient meets discharge criteria  Monitor procedure site and notify MD of any issues

## 2024-01-18 ENCOUNTER — HOSPITAL ENCOUNTER (OUTPATIENT)
Dept: GENERAL RADIOLOGY | Age: 64
Discharge: HOME | End: 2024-01-18
Payer: MEDICARE

## 2024-01-18 ENCOUNTER — OFFICE VISIT (OUTPATIENT)
Dept: FAMILY MEDICINE CLINIC | Age: 64
End: 2024-01-18
Payer: MEDICARE

## 2024-01-18 ENCOUNTER — HOSPITAL ENCOUNTER (OUTPATIENT)
Age: 64
Discharge: HOME | End: 2024-01-18
Payer: MEDICARE

## 2024-01-18 ENCOUNTER — TELEPHONE (OUTPATIENT)
Dept: FAMILY MEDICINE CLINIC | Age: 64
End: 2024-01-18

## 2024-01-18 VITALS
WEIGHT: 192.2 LBS | SYSTOLIC BLOOD PRESSURE: 124 MMHG | BODY MASS INDEX: 32.99 KG/M2 | DIASTOLIC BLOOD PRESSURE: 74 MMHG | OXYGEN SATURATION: 96 % | RESPIRATION RATE: 18 BRPM | HEART RATE: 71 BPM | TEMPERATURE: 97.6 F

## 2024-01-18 DIAGNOSIS — F32.1 CURRENT MODERATE EPISODE OF MAJOR DEPRESSIVE DISORDER WITHOUT PRIOR EPISODE (HCC): ICD-10-CM

## 2024-01-18 DIAGNOSIS — M06.9 RHEUMATOID ARTHRITIS, INVOLVING UNSPECIFIED SITE, UNSPECIFIED WHETHER RHEUMATOID FACTOR PRESENT (HCC): ICD-10-CM

## 2024-01-18 DIAGNOSIS — G71.00 MD (MUSCULAR DYSTROPHY) (HCC): ICD-10-CM

## 2024-01-18 DIAGNOSIS — M79.671 RIGHT FOOT PAIN: ICD-10-CM

## 2024-01-18 DIAGNOSIS — F11.20 OPIOID DEPENDENCE WITH CURRENT USE (HCC): ICD-10-CM

## 2024-01-18 DIAGNOSIS — N18.4 CKD (CHRONIC KIDNEY DISEASE) STAGE 4, GFR 15-29 ML/MIN (HCC): ICD-10-CM

## 2024-01-18 DIAGNOSIS — M79.671 RIGHT FOOT PAIN: Primary | ICD-10-CM

## 2024-01-18 PROBLEM — D69.6 THROMBOCYTOPENIA, UNSPECIFIED (HCC): Status: RESOLVED | Noted: 2023-09-28 | Resolved: 2024-01-18

## 2024-01-18 PROCEDURE — 99214 OFFICE O/P EST MOD 30 MIN: CPT | Performed by: FAMILY MEDICINE

## 2024-01-18 PROCEDURE — 73630 X-RAY EXAM OF FOOT: CPT

## 2024-01-18 RX ORDER — HYDROCODONE BITARTRATE AND ACETAMINOPHEN 5; 325 MG/1; MG/1
1 TABLET ORAL EVERY 6 HOURS PRN
Qty: 120 TABLET | Refills: 0 | Status: SHIPPED | OUTPATIENT
Start: 2024-01-18 | End: 2024-02-17

## 2024-01-18 ASSESSMENT — ENCOUNTER SYMPTOMS
WHEEZING: 0
SORE THROAT: 0
EYE PAIN: 0
CONSTIPATION: 0
SHORTNESS OF BREATH: 0
ABDOMINAL PAIN: 0
VOMITING: 0
BACK PAIN: 0
NAUSEA: 0
RHINORRHEA: 0
BLOOD IN STOOL: 0
DIARRHEA: 0
CHEST TIGHTNESS: 0
COUGH: 0

## 2024-01-18 ASSESSMENT — PATIENT HEALTH QUESTIONNAIRE - PHQ9
7. TROUBLE CONCENTRATING ON THINGS, SUCH AS READING THE NEWSPAPER OR WATCHING TELEVISION: 0
8. MOVING OR SPEAKING SO SLOWLY THAT OTHER PEOPLE COULD HAVE NOTICED. OR THE OPPOSITE, BEING SO FIGETY OR RESTLESS THAT YOU HAVE BEEN MOVING AROUND A LOT MORE THAN USUAL: 0
6. FEELING BAD ABOUT YOURSELF - OR THAT YOU ARE A FAILURE OR HAVE LET YOURSELF OR YOUR FAMILY DOWN: 1
SUM OF ALL RESPONSES TO PHQ9 QUESTIONS 1 & 2: 2
1. LITTLE INTEREST OR PLEASURE IN DOING THINGS: 1
SUM OF ALL RESPONSES TO PHQ QUESTIONS 1-9: 3
2. FEELING DOWN, DEPRESSED OR HOPELESS: 1
4. FEELING TIRED OR HAVING LITTLE ENERGY: 0
10. IF YOU CHECKED OFF ANY PROBLEMS, HOW DIFFICULT HAVE THESE PROBLEMS MADE IT FOR YOU TO DO YOUR WORK, TAKE CARE OF THINGS AT HOME, OR GET ALONG WITH OTHER PEOPLE: 1
SUM OF ALL RESPONSES TO PHQ QUESTIONS 1-9: 3
3. TROUBLE FALLING OR STAYING ASLEEP: 0
5. POOR APPETITE OR OVEREATING: 0
9. THOUGHTS THAT YOU WOULD BE BETTER OFF DEAD, OR OF HURTING YOURSELF: 0
SUM OF ALL RESPONSES TO PHQ QUESTIONS 1-9: 3
SUM OF ALL RESPONSES TO PHQ QUESTIONS 1-9: 3

## 2024-01-18 ASSESSMENT — COLUMBIA-SUICIDE SEVERITY RATING SCALE - C-SSRS
2. HAVE YOU ACTUALLY HAD ANY THOUGHTS OF KILLING YOURSELF?: NO
6. HAVE YOU EVER DONE ANYTHING, STARTED TO DO ANYTHING, OR PREPARED TO DO ANYTHING TO END YOUR LIFE?: NO
1. WITHIN THE PAST MONTH, HAVE YOU WISHED YOU WERE DEAD OR WISHED YOU COULD GO TO SLEEP AND NOT WAKE UP?: NO

## 2024-01-18 NOTE — TELEPHONE ENCOUNTER
----- Message from David Davis MD sent at 1/18/2024  3:08 PM EST -----  Notify foot films reveal no fractures, just a lot of chronic changes.

## 2024-01-18 NOTE — PROGRESS NOTES
Debbie Zavaleta (:  1960) is a 63 y.o. female,Established patient, here for evaluation of the following chief complaint(s):  Fall (Right foot pain after falling outside in snow)         ASSESSMENT/PLAN:  1. Right foot pain  -     XR FOOT RIGHT (MIN 3 VIEWS); Future  -unstable, not improving, get imaging to further evaluate, if fracture will send to [facility].      3. Rheumatoid arthritis, involving unspecified site, unspecified whether rheumatoid factor present ([facility])  -stable, controlled on plaquenil and infliximab  4. Opioid dependence with current use ([facility])  -stable, controlled on norco with no aberrancy  5. Current moderate episode of major depressive disorder without prior episode ([facility])  -stable, controlled on depakote  6. CKD (chronic kidney disease) stage 4, GFR 15-29 ml/min ([facility])  -stable, avoid nephrotoxic meds, follows with nephrology  7. MD (muscular dystrophy) ([facility])  -     HYDROcodone-acetaminophen (NORCO) 5-325 MG per tablet; Take 1 tablet by mouth every 6 hours as needed for Pain for up to 30 days. G71.00, Disp-120 tablet, R-0Normal  -stable, controlled on norco and gabapentin.      No follow-ups on file.         Subjective   SUBJECTIVE/OBJECTIVE:  Fall  Pertinent negatives include no abdominal pain, fever, headaches, hematuria, nausea or vomiting.      Patient here today for a check up.  Reviewed BMI of 33.  Encouraged diet, exercise and weight loss.  Fell at aunt and uncles in their backyard.  Hurt right foot, swollen.  Can put weight on it.  Using ice. Overall seems to be worsening.   Single, non smoker, pmh reviewed.       Review of Systems   Constitutional:  Negative for chills, fatigue, fever and unexpected weight change.   HENT:  Negative for congestion, ear pain, rhinorrhea and sore throat.    Eyes:  Negative for pain and visual disturbance.   Respiratory:  Negative for cough, chest tightness, shortness of breath and wheezing.    Cardiovascular:  Negative for chest pain and palpitations.

## 2024-01-24 ENCOUNTER — TELEPHONE (OUTPATIENT)
Dept: FAMILY MEDICINE CLINIC | Age: 64
End: 2024-01-24

## 2024-01-24 NOTE — TELEPHONE ENCOUNTER
Pt Debbie Zavaleta reports she had a Doppler to r/o blood clots per Dr Velarde, who also has ordered an MRI but hasn't been scheduled yet pending insurance ruling.    In meantime today, her legs are worse, with increased pain and swelliing.     After consulting with nurse, pt was advised to call Dr Velarde to find out next step.

## 2024-01-25 ENCOUNTER — HOSPITAL ENCOUNTER (OUTPATIENT)
Age: 64
Discharge: HOME | End: 2024-01-25
Payer: MEDICARE

## 2024-01-25 LAB
BASOPHILS ABSOLUTE: 0 THOU/MM3 (ref 0–0.1)
BASOPHILS NFR BLD AUTO: 1 %
DEPRECATED RDW RBC AUTO: 48 FL (ref 35–45)
EOSINOPHIL NFR BLD AUTO: 2.8 %
EOSINOPHILS ABSOLUTE: 0.1 THOU/MM3 (ref 0–0.4)
ERYTHROCYTE [DISTWIDTH] IN BLOOD BY AUTOMATED COUNT: 13.1 % (ref 11.5–14.5)
HCT VFR BLD AUTO: 41.2 % (ref 37–47)
HGB BLD-MCNC: 12.7 GM/DL (ref 12–16)
IMM GRANULOCYTES # BLD AUTO: 0 THOU/MM3 (ref 0–0.07)
IMM GRANULOCYTES NFR BLD AUTO: 0 %
LYMPHOCYTES ABSOLUTE: 1.4 THOU/MM3 (ref 1–4.8)
LYMPHOCYTES NFR BLD AUTO: 35.9 %
MCH RBC QN AUTO: 31 PG (ref 26–33)
MCHC RBC AUTO-ENTMCNC: 30.8 GM/DL (ref 32.2–35.5)
MCV RBC AUTO: 100.5 FL (ref 81–99)
MONOCYTES ABSOLUTE: 0.4 THOU/MM3 (ref 0.4–1.3)
MONOCYTES NFR BLD AUTO: 10.1 %
NEUTROPHILS NFR BLD AUTO: 50.2 %
NRBC BLD AUTO-RTO: 0 /100 WBC
PLATELET # BLD AUTO: 145 THOU/MM3 (ref 130–400)
PMV BLD AUTO: 11.9 FL (ref 9.4–12.4)
RBC # BLD AUTO: 4.1 MILL/MM3 (ref 4.2–5.4)
SEGMENTED NEUTROPHILS ABSOLUTE COUNT: 2 THOU/MM3 (ref 1.8–7.7)
WBC # BLD AUTO: 4 THOU/MM3 (ref 4.8–10.8)

## 2024-01-25 PROCEDURE — 85025 COMPLETE CBC W/AUTO DIFF WBC: CPT

## 2024-01-25 PROCEDURE — 85651 RBC SED RATE NONAUTOMATED: CPT

## 2024-01-25 PROCEDURE — 36415 COLL VENOUS BLD VENIPUNCTURE: CPT

## 2024-01-25 PROCEDURE — 84550 ASSAY OF BLOOD/URIC ACID: CPT

## 2024-01-25 PROCEDURE — 86140 C-REACTIVE PROTEIN: CPT

## 2024-01-26 ENCOUNTER — TELEPHONE (OUTPATIENT)
Dept: FAMILY MEDICINE CLINIC | Age: 64
End: 2024-01-26

## 2024-01-26 DIAGNOSIS — M10.079 ACUTE IDIOPATHIC GOUT OF FOOT, UNSPECIFIED LATERALITY: Primary | ICD-10-CM

## 2024-01-26 DIAGNOSIS — E79.0 ELEVATED URIC ACID IN BLOOD: ICD-10-CM

## 2024-01-26 LAB
CRP SERPL-MCNC: < 0.3 MG/DL (ref 0–1)
ERYTHROCYTE [SEDIMENTATION RATE] IN BLOOD BY WESTERGREN METHOD: 6 MM/HR (ref 0–20)
URATE SERPL-MCNC: 7.8 MG/DL (ref 2.4–5.7)

## 2024-01-26 RX ORDER — ALLOPURINOL 100 MG/1
100 TABLET ORAL DAILY
Qty: 30 TABLET | Refills: 5 | Status: SHIPPED | OUTPATIENT
Start: 2024-01-26

## 2024-01-26 RX ORDER — PREDNISONE 20 MG/1
TABLET ORAL
Qty: 15 TABLET | Refills: 0 | Status: SHIPPED | OUTPATIENT
Start: 2024-01-26

## 2024-01-26 NOTE — TELEPHONE ENCOUNTER
Ep'ed prednisone for the gout and allopurinol to lower uric acid level.  Recheck uric acid in 1 month.

## 2024-01-26 NOTE — TELEPHONE ENCOUNTER
What does that mean?  A medicine to treat acute gout now or a medicine to lower uric acid level to prevent gout flares?

## 2024-01-26 NOTE — TELEPHONE ENCOUNTER
Pt called in stating that ZOILA told her that she needs to call the PCP and Rheumatology to see who would prescriber her medication for gout   Pt would like call back states she is leaving her house at 11:45 but would like a message left and to call her EM contact and let her know as well   Please advise

## 2024-01-26 NOTE — TELEPHONE ENCOUNTER
Pt states she is having severe pain, she had an appointment 01/18/2024, you ordered xray, no acute findings. She seen Dr Velarde and he ordered BW, elevated uric acid. He referred her back to you. Please send medication to [facility]

## 2024-02-15 ENCOUNTER — TELEMEDICINE (OUTPATIENT)
Dept: FAMILY MEDICINE CLINIC | Age: 64
End: 2024-02-15

## 2024-02-15 DIAGNOSIS — E79.0 ELEVATED URIC ACID IN BLOOD: ICD-10-CM

## 2024-02-15 DIAGNOSIS — B35.4 TINEA CORPORIS: Primary | ICD-10-CM

## 2024-02-15 DIAGNOSIS — G71.00 MD (MUSCULAR DYSTROPHY) (HCC): ICD-10-CM

## 2024-02-15 RX ORDER — FLUCONAZOLE 100 MG/1
100 TABLET ORAL DAILY
Qty: 21 TABLET | Refills: 0 | Status: SHIPPED | OUTPATIENT
Start: 2024-02-15 | End: 2024-03-07

## 2024-02-15 RX ORDER — HYDROCODONE BITARTRATE AND ACETAMINOPHEN 5; 325 MG/1; MG/1
1 TABLET ORAL EVERY 6 HOURS PRN
Qty: 120 TABLET | Refills: 0 | Status: SHIPPED | OUTPATIENT
Start: 2024-02-15 | End: 2024-03-16

## 2024-02-15 NOTE — PROGRESS NOTES
Debbie Zavaleta, was evaluated through a synchronous (real-time) audio-video encounter. The patient (or guardian if applicable) is aware that this is a billable service, which includes applicable co-pays. This Virtual Visit was conducted with patient's (and/or legal guardian's) consent. Patient identification was verified, and a caregiver was present when appropriate.   The patient was located at Home: [address] [place] [id number]  Provider was located at Facility (Appt Dept): [address] [place], [place] [id number]      Debbie Zavaleta (:  1960) is a Established patient, presenting virtually for evaluation of the following:    Assessment & Plan   Below is the assessment and plan developed based on review of pertinent history, physical exam, labs, studies, and medications.  1. Tinea corporis  -     fluconazole (DIFLUCAN) 100 MG tablet; Take 1 tablet by mouth daily for 21 days, Disp-21 tablet, R-0Normal  -acute problem, add diflucan orally, -monitor sxs, call if not improving    2. MD (muscular dystrophy) ([facility])  -     HYDROcodone-acetaminophen (NORCO) 5-325 MG per tablet; Take 1 tablet by mouth every 6 hours as needed for Pain for up to 30 days. G71.00, Disp-120 tablet, R-0Normal  -stable, controlled on norco prn, Controlled Substance Monitoring:    Acute and Chronic Pain Monitoring:   RX Monitoring Periodic Controlled Substance Monitoring   2/15/2024   4:27 PM No signs of potential drug abuse or diversion identified.;Possible medication side effects, risk of tolerance/dependence & alternative treatments discussed.;Obtaining appropriate analgesic effect of treatment.         3. Elevated uric acid in blood  -stable, controlled on allopurinol, serial labs    No follow-ups on file.       Subjective   Skin Problem  Pertinent negatives include no congestion, cough, diarrhea, eye pain, fatigue, fever, rhinorrhea, shortness of breath, sore throat or vomiting.     Pt seen today due to 1 to 2 week s of right

## 2024-02-22 ENCOUNTER — TELEPHONE (OUTPATIENT)
Dept: FAMILY MEDICINE CLINIC | Age: 64
End: 2024-02-22

## 2024-02-22 NOTE — TELEPHONE ENCOUNTER
Pt called in asking if she is to continue the fluconazole or if she can get a refill on the medication.currently has 8 pills left    Pt is still has swelling and rash on the legs states that it has improved some but not much

## 2024-02-26 ENCOUNTER — TELEPHONE (OUTPATIENT)
Dept: FAMILY MEDICINE CLINIC | Age: 64
End: 2024-02-26

## 2024-02-26 ENCOUNTER — HOSPITAL ENCOUNTER (EMERGENCY)
Age: 64
Discharge: HOME | End: 2024-02-26
Attending: EMERGENCY MEDICINE
Payer: MEDICARE

## 2024-02-26 VITALS
OXYGEN SATURATION: 100 % | RESPIRATION RATE: 16 BRPM | DIASTOLIC BLOOD PRESSURE: 72 MMHG | HEART RATE: 76 BPM | SYSTOLIC BLOOD PRESSURE: 157 MMHG | BODY MASS INDEX: 32.27 KG/M2 | TEMPERATURE: 97.8 F | HEIGHT: 64 IN | WEIGHT: 189 LBS

## 2024-02-26 DIAGNOSIS — L03.119 CELLULITIS OF LOWER EXTREMITY, UNSPECIFIED LATERALITY: Primary | ICD-10-CM

## 2024-02-26 PROCEDURE — 99283 EMERGENCY DEPT VISIT LOW MDM: CPT

## 2024-02-26 RX ORDER — CEPHALEXIN 500 MG/1
500 CAPSULE ORAL 2 TIMES DAILY
Qty: 14 CAPSULE | Refills: 0 | Status: SHIPPED | OUTPATIENT
Start: 2024-02-26 | End: 2024-03-04

## 2024-02-26 NOTE — DISCHARGE INSTRUCTIONS
Return to the emergency department immediately if there is any new or concerning symptom.  Please arrange for your infusion to cover after you have finished antibiotics.

## 2024-02-26 NOTE — TELEPHONE ENCOUNTER
Indy from OP nursing notified, she called Pt and she is currently being evaluated for cellulitis at the ED. They are going to postpone infusion.

## 2024-02-26 NOTE — TELEPHONE ENCOUNTER
Indy from [facility] called, Pt reached out to them because she is due for her Remicade. She wanted to verify it is okay for her to proceed with her infusion. She is taking the medication for gout and she still has swelling and rash on her right leg.  Please advise

## 2024-02-26 NOTE — PATIENT INSTRUCTIONS
All catheters were removed.  · You don't get better as expected. Where can you learn more? Go to [email or web address] and sign in to your [employer] account. Enter [phone] in the [facility] box to learn more about \"Vitamin B12 Deficiency: Care Instructions. \"     If you do not have an account, please click on the \"Sign Up Now\" link. Current as of: November 8, 2019               Content Version: 12.6  © 2505-7072 [employer]. Care instructions adapted under license by [facility] ([facility]). If you have questions about a medical condition or this instruction, always ask your healthcare professional. [address] any warranty or liability for your use of this information.

## 2024-02-26 NOTE — ED TRIAGE NOTES
Pt. Presents ambulatory to ED with walker c/o possible cellulitis in rt. Lower leg.  Pt. Voices has been treated with prednisone and allopurinol.  Pedal pulses palpated. No redness noted, skin intact to bilateral lower legs and feet.

## 2024-02-26 NOTE — ED PROVIDER NOTES
[facility]  EMERGENCY DEPARTMENT ENCOUNTER          Pt Name: Debbie Zavaleta  MRN: [id number]  Birthdate 1960  Date of evaluation: 2/26/2024  Physician: Lucas Puentes MD, FACEP, FAAEM, FAWM      CHIEF COMPLAINT       Chief Complaint   Patient presents with    Cellulitis         HISTORY OF PRESENT ILLNESS    HPI  Debbie Zavaleta is a 64 y.o. female who presents to the emergency department from home, as a walk in to the ED lobby for evaluation of redness to bilateral lower extremities noticed today.  Denies fever, chills.  This is similar to previous episodes of cellulitis she has had in the past.  Patient wears compression stockings every day.  In addition she is currently being treated for tinea corporis on bilateral calfs as well.  The patient has no other acute complaints at this time.        PAST MEDICAL AND SURGICAL HISTORY     Past Medical History:   Diagnosis Date    B12 deficiency 12/2020    Depression     Lymphedema 2008    both legs    MD (muscular dystrophy) ([facility]) 2008    Dr. Hills at [facility] - no lung involvement per patient    Neuropathy     Obesity (BMI 30-39.9)     Overflow incontinence     Rheumatoid arthritis(714.0) 2008    Dr. Ruiz - [place]    Superficial thrombophlebitis 03/2018    right lower extremity    Venous insufficiency     h/o SVT - 3-4 in the past (has never been on Coumadin)     Past Surgical History:   Procedure Laterality Date    COLONOSCOPY      HYSTERECTOMY (CERVIX STATUS UNKNOWN)  2002    JOINT REPLACEMENT Left 2008    knee    JOINT REPLACEMENT Right 09/10/2013    right    TOTAL KNEE ARTHROPLASTY Left 6/08    TOTAL KNEE ARTHROPLASTY Right 09/10/2013    VARICOSE VEIN SURGERY Right 1998    laser         MEDICATIONS   No current facility-administered medications for this encounter.    Current Outpatient Medications:     cephALEXin (KEFLEX) 500 MG capsule, Take 1 capsule by mouth 2 times daily for 7 days, Disp: 14 capsule, Rfl: 0

## 2024-02-26 NOTE — DISCHARGE INSTR - COC
Continuity of Care Form    Patient Name: Debbie Zavaleta   :  1960  MRN:  [id number]    Admit date:  2024  Discharge date:  ***    Code Status Order: Prior   Advance Directives:     Admitting Physician:  No admitting provider for patient encounter.  PCP: David Davis MD    Discharging Nurse: ***  Discharging Hospital Unit/Room#: E6/E6  Discharging Unit Phone Number: ***    Emergency Contact:   Extended Emergency Contact Information  Primary Emergency Contact: Yolanda Mayorga   [facility]  Home Phone: [phone]  Mobile Phone: [phone]  Relation: Other  Preferred language: English    Past Surgical History:  Past Surgical History:   Procedure Laterality Date    COLONOSCOPY      HYSTERECTOMY (CERVIX STATUS UNKNOWN)      JOINT REPLACEMENT Left     knee    JOINT REPLACEMENT Right 09/10/2013    right    TOTAL KNEE ARTHROPLASTY Left     TOTAL KNEE ARTHROPLASTY Right 09/10/2013    VARICOSE VEIN SURGERY Right     laser       Immunization History:   Immunization History   Administered Date(s) Administered    COVID-19, MODERNA BLUE border, Primary or Immunocompromised, (age 12y+), IM, 100 mcg/0.5mL 2021, 2021, 2021, 2021    Influenza Virus Vaccine 10/29/2015    Influenza, AFLURIA (age 3 yrs+), FLUZONE, (age 6 mo+), MDV, 0.5mL 2016, 10/18/2017, 10/15/2018    Influenza, FLUARIX, FLULAVAL, FLUZONE (age 6 mo+) AND AFLURIA, (age 3 y+), PF, 0.5mL 10/01/2019    Influenza, FLUCELVAX, (age 6 mo+), MDCK, PF, 0.5mL 10/04/2021, 10/31/2022, 2023    Pneumococcal, PPSV23, PNEUMOVAX 23, (age 2y+), SC/IM, 0.5mL 10/18/2017       Active Problems:  Patient Active Problem List   Diagnosis Code    Venous insufficiency I87.2    Rheumatoid arthritis ([facility]) M06.9    Lymphedema I89.0    Obesity (BMI 30-39.9) E66.9    Muscular dystrophy ([facility]) G71.00    Peripheral neuropathy G62.9    Acquired pes planus of both feet M21.41, M21.42    Cervical spondylosis M47.812    DJD

## 2024-02-27 ENCOUNTER — TELEPHONE (OUTPATIENT)
Dept: FAMILY MEDICINE CLINIC | Age: 64
End: 2024-02-27

## 2024-02-27 ENCOUNTER — HOSPITAL ENCOUNTER (OUTPATIENT)
Dept: NURSING | Age: 64
Discharge: HOME | End: 2024-02-27

## 2024-02-27 NOTE — TELEPHONE ENCOUNTER
Pt called, states she still has ringworm on her leg.  I notified her to complete her medication and call the office if it worsens.

## 2024-02-27 NOTE — TELEPHONE ENCOUNTER
Is currently on abx also for cellulitis.  Recommend appt after the treatments are complete if not resolved.

## 2024-03-05 ENCOUNTER — OFFICE VISIT (OUTPATIENT)
Dept: FAMILY MEDICINE CLINIC | Age: 64
End: 2024-03-05
Payer: MEDICARE

## 2024-03-05 VITALS
WEIGHT: 191 LBS | OXYGEN SATURATION: 96 % | SYSTOLIC BLOOD PRESSURE: 124 MMHG | BODY MASS INDEX: 32.61 KG/M2 | HEART RATE: 79 BPM | DIASTOLIC BLOOD PRESSURE: 70 MMHG | RESPIRATION RATE: 18 BRPM | HEIGHT: 64 IN | TEMPERATURE: 97.4 F

## 2024-03-05 DIAGNOSIS — B35.4 TINEA CORPORIS: ICD-10-CM

## 2024-03-05 DIAGNOSIS — Z00.00 MEDICARE ANNUAL WELLNESS VISIT, SUBSEQUENT: Primary | ICD-10-CM

## 2024-03-05 DIAGNOSIS — L03.116 LEFT LEG CELLULITIS: ICD-10-CM

## 2024-03-05 PROCEDURE — G0439 PPPS, SUBSEQ VISIT: HCPCS | Performed by: FAMILY MEDICINE

## 2024-03-05 PROCEDURE — 1123F ACP DISCUSS/DSCN MKR DOCD: CPT | Performed by: FAMILY MEDICINE

## 2024-03-05 PROCEDURE — 99214 OFFICE O/P EST MOD 30 MIN: CPT | Performed by: FAMILY MEDICINE

## 2024-03-05 RX ORDER — KETOCONAZOLE 20 MG/ML
SHAMPOO TOPICAL
Qty: 120 ML | Refills: 11 | Status: SHIPPED | OUTPATIENT
Start: 2024-03-05

## 2024-03-05 RX ORDER — AMOXICILLIN AND CLAVULANATE POTASSIUM 875; 125 MG/1; MG/1
1 TABLET, FILM COATED ORAL 2 TIMES DAILY
Qty: 20 TABLET | Refills: 0 | Status: SHIPPED | OUTPATIENT
Start: 2024-03-05 | End: 2024-03-15

## 2024-03-05 RX ORDER — TERBINAFINE HYDROCHLORIDE 250 MG/1
250 TABLET ORAL DAILY
Qty: 30 TABLET | Refills: 0 | Status: SHIPPED | OUTPATIENT
Start: 2024-03-05 | End: 2024-04-04

## 2024-03-05 RX ORDER — PRENATAL VIT 91/IRON/FOLIC/DHA 28-975-200
COMBINATION PACKAGE (EA) ORAL
Qty: 42 G | Refills: 5 | Status: SHIPPED | OUTPATIENT
Start: 2024-03-05

## 2024-03-05 ASSESSMENT — ENCOUNTER SYMPTOMS
WHEEZING: 0
BLOOD IN STOOL: 0
CONSTIPATION: 0
RHINORRHEA: 0
VOMITING: 0
SORE THROAT: 0
ABDOMINAL PAIN: 0
NAUSEA: 0
COLOR CHANGE: 1
DIARRHEA: 0
SHORTNESS OF BREATH: 0
EYE PAIN: 0
COUGH: 0
CHEST TIGHTNESS: 0
BACK PAIN: 0

## 2024-03-05 ASSESSMENT — PATIENT HEALTH QUESTIONNAIRE - PHQ9
10. IF YOU CHECKED OFF ANY PROBLEMS, HOW DIFFICULT HAVE THESE PROBLEMS MADE IT FOR YOU TO DO YOUR WORK, TAKE CARE OF THINGS AT HOME, OR GET ALONG WITH OTHER PEOPLE: 1
2. FEELING DOWN, DEPRESSED OR HOPELESS: 0
9. THOUGHTS THAT YOU WOULD BE BETTER OFF DEAD, OR OF HURTING YOURSELF: 0
SUM OF ALL RESPONSES TO PHQ QUESTIONS 1-9: 2
SUM OF ALL RESPONSES TO PHQ QUESTIONS 1-9: 2
SUM OF ALL RESPONSES TO PHQ9 QUESTIONS 1 & 2: 1
3. TROUBLE FALLING OR STAYING ASLEEP: 0
7. TROUBLE CONCENTRATING ON THINGS, SUCH AS READING THE NEWSPAPER OR WATCHING TELEVISION: 0
6. FEELING BAD ABOUT YOURSELF - OR THAT YOU ARE A FAILURE OR HAVE LET YOURSELF OR YOUR FAMILY DOWN: 0
8. MOVING OR SPEAKING SO SLOWLY THAT OTHER PEOPLE COULD HAVE NOTICED. OR THE OPPOSITE, BEING SO FIGETY OR RESTLESS THAT YOU HAVE BEEN MOVING AROUND A LOT MORE THAN USUAL: 0
5. POOR APPETITE OR OVEREATING: 0
1. LITTLE INTEREST OR PLEASURE IN DOING THINGS: 1
SUM OF ALL RESPONSES TO PHQ QUESTIONS 1-9: 2
4. FEELING TIRED OR HAVING LITTLE ENERGY: 1
SUM OF ALL RESPONSES TO PHQ QUESTIONS 1-9: 2

## 2024-03-05 NOTE — PROGRESS NOTES
Debbie Zavaleta (:  1960) is a 64 y.o. female,Established patient, here for evaluation of the following chief complaint(s):  Medicare AWV (Right leg cellulitis, needs Nizoral rx)         ASSESSMENT/PLAN:  1. Medicare annual wellness visit, subsequent  2. Tinea corporis  -     terbinafine (LAMISIL) 250 MG tablet; Take 1 tablet by mouth daily, Disp-30 tablet, R-0Normal  -     ketoconazole (NIZORAL) 2 % shampoo; Apply topically daily as needed., Disp-120 mL, R-11, Normal  -     terbinafine (LAMISIL AT ATHLETES FOOT) 1 % cream; Apply topically 2 times daily., Disp-42 g, R-5, Normal  -unstable, stop diflucan, add lamisil, -monitor sxs, call if not improving    3. Left leg cellulitis  -     amoxicillin-clavulanate (AUGMENTIN) 875-125 MG per tablet; Take 1 tablet by mouth 2 times daily for 10 days, Disp-20 tablet, R-0Normal  -unstable, stop keflex, add augmentin, -monitor sxs, call if not improving      Return in 1 year (on 3/5/2025).         Subjective   SUBJECTIVE/OBJECTIVE:  HPI   Pt seen today due to continued cellulitis and ringworm of the legs.  Was treated with keflex 500 mg bid x 7 days for ED.  Left leg is still red and tender.  Has been on diflucan 100 mg daily without resolution of the ringworm.  Single, non smoker, pmh reviewed.      Review of Systems   Constitutional:  Negative for chills, fatigue, fever and unexpected weight change.   HENT:  Negative for congestion, ear pain, rhinorrhea and sore throat.    Eyes:  Negative for pain and visual disturbance.   Respiratory:  Negative for cough, chest tightness, shortness of breath and wheezing.    Cardiovascular:  Negative for chest pain and palpitations.   Gastrointestinal:  Negative for abdominal pain, blood in stool, constipation, diarrhea, nausea and vomiting.   Genitourinary:  Negative for difficulty urinating, frequency, hematuria and urgency.   Musculoskeletal:  Negative for back pain, joint swelling, myalgias and neck pain.   Skin:  Positive for 
MD   allopurinol (ZYLOPRIM) 100 MG tablet Take 1 tablet by mouth daily Yes David Davis MD   ascorbic acid (RA VITAMIN C) 500 MG tablet take 1 tablet by mouth once daily Yes David Davis MD   Cholecalciferol (VITAMIN D3) 125 MCG (5000 UT) TABS Take 1 tablet by mouth daily Yes David Davis MD   flavoxATE (URISPAS) 100 MG tablet Take 1 tablet by mouth 3 times daily as needed for Muscle spasms Yes David Davis MD   gabapentin (NEURONTIN) 300 MG capsule take 1 capsule by mouth twice a day Yes David Davis MD   divalproex (DEPAKOTE ER) 500 MG extended release tablet Take 1 tablet by mouth nightly Yes Shelly Lubin APRN - CNP   [facility]. Devices (WALKER) MISC Rollator, rolling walker with seats, brakes,  Basket.  G71.00Rollator, rolling walker with seats, brakes,  Basket  G71.00 Yes David Davis MD   INFLIXIMAB IV Infuse intravenously Every 6 weeks Yes Dulce Adrian MD   b complex vitamins capsule Take 1 capsule by mouth daily Yes Dulce Adrian MD   CRANBERRY PO Take by mouth daily Yes Dulce Adrian MD   Probiotic Product (PRO-BIOTIC BLEND PO) Take by mouth daily Yes Dulce Adrian MD   Handicap Placard MISC by Does not apply route Request parking placard due to medical conditions.  Duration of 5 years. Yes David Davis MD   hydroxychloroquine (PLAQUENIL) 200 MG tablet Take 1 tablet by mouth 2 times daily Yes David Davis MD   omeprazole (PRILOSEC) 40 MG capsule Take 1 capsule by mouth daily.  Patient taking differently: Take 20 mg by mouth daily Yes David Davis MD       [facility] (Including outside providers/suppliers regularly involved in providing care):   Patient Care Team:  David Davis MD as PCP - General (Family Medicine)  David Davis MD as PCP - Empaneled Provider     Reviewed and updated this visit:  Tobacco  Allergies  Meds  Problems  Med Hx  Surg Hx  Soc Hx  Fam Hx

## 2024-03-20 ENCOUNTER — HOSPITAL ENCOUNTER (OUTPATIENT)
Dept: NURSING | Age: 64
End: 2024-03-20

## 2024-03-26 ENCOUNTER — OFFICE VISIT (OUTPATIENT)
Dept: PSYCHIATRY | Age: 64
End: 2024-03-26
Payer: MEDICARE

## 2024-03-26 VITALS
WEIGHT: 193 LBS | BODY MASS INDEX: 32.95 KG/M2 | HEART RATE: 75 BPM | SYSTOLIC BLOOD PRESSURE: 146 MMHG | OXYGEN SATURATION: 96 % | HEIGHT: 64 IN | DIASTOLIC BLOOD PRESSURE: 79 MMHG

## 2024-03-26 DIAGNOSIS — F34.0 CYCLOTHYMIC DISORDER: ICD-10-CM

## 2024-03-26 PROCEDURE — 1123F ACP DISCUSS/DSCN MKR DOCD: CPT | Performed by: NURSE PRACTITIONER

## 2024-03-26 PROCEDURE — 99214 OFFICE O/P EST MOD 30 MIN: CPT | Performed by: NURSE PRACTITIONER

## 2024-03-26 RX ORDER — DIVALPROEX SODIUM 500 MG/1
500 TABLET, EXTENDED RELEASE ORAL NIGHTLY
Qty: 30 TABLET | Refills: 5 | Status: SHIPPED | OUTPATIENT
Start: 2024-03-26

## 2024-03-26 RX ORDER — HYDROCODONE BITARTRATE AND ACETAMINOPHEN 5; 325 MG/1; MG/1
1 TABLET ORAL EVERY 6 HOURS PRN
COMMUNITY

## 2024-03-26 ASSESSMENT — ANXIETY QUESTIONNAIRES
5. BEING SO RESTLESS THAT IT IS HARD TO SIT STILL: 0-NOT AT ALL
3. WORRYING TOO MUCH ABOUT DIFFERENT THINGS: 0-NOT AT ALL
4. TROUBLE RELAXING: 0-NOT AT ALL
2. NOT BEING ABLE TO STOP OR CONTROL WORRYING: 0-NOT AT ALL
1. FEELING NERVOUS, ANXIOUS, OR ON EDGE: NOT AT ALL
GAD7 TOTAL SCORE: 0
6. BECOMING EASILY ANNOYED OR IRRITABLE: 0-NOT AT ALL
7. FEELING AFRAID AS IF SOMETHING AWFUL MIGHT HAPPEN: 0-NOT AT ALL

## 2024-03-26 ASSESSMENT — PATIENT HEALTH QUESTIONNAIRE - PHQ9
SUM OF ALL RESPONSES TO PHQ QUESTIONS 1-9: 0
SUM OF ALL RESPONSES TO PHQ9 QUESTIONS 1 & 2: 0
SUM OF ALL RESPONSES TO PHQ QUESTIONS 1-9: 0
1. LITTLE INTEREST OR PLEASURE IN DOING THINGS: NOT AT ALL
2. FEELING DOWN, DEPRESSED OR HOPELESS: NOT AT ALL

## 2024-03-26 NOTE — PROGRESS NOTES
her primary care provider.    SPECIALISTS: rheumatologist and Dr. Paris for GI    OBJECTIVE DATA     BP (!) 146/79   Pulse 75   Ht 1.633 m (5' 4.3\")   Wt 87.5 kg (193 lb)   SpO2 96%   BMI 32.82 kg/m²         Physical Exam    Mental Status Evaluation:   Orientation: Alert, oriented, thought content appropriate   Mood:. Euthymic      Affect:  normal      Appearance:  Clean, well-groomed, clothing age and weather appropriate, casually dressed   Activity:  Cooperative, seated calmly   Gait/Posture: Uses rolling walker; posture is WNL   Speech:  normal pitch, normal volume and clear, age appropriate, linear, easy to understand   Thought Process:  within normal limits   Thought Content:  within normal limits   Cognition:  grossly intact   Memory: intact   Insight:  good   Judgment: good   Suicidal Ideations: denies suicidal ideation   Homicidal Ideations: Negative for homicidal ideation      Medication Side Effects: absent       Attention Span attention span and concentration were age appropriate     Screenings Completed in This Encounter:     Anxiety and Depression:      LOUISA-7  Feeling nervous, anxious, or on edge: Not at all  Not able to stop or control worryin-Not at all  Worrying too much about different things: 0-Not at all  Trouble relaxin-Not at all  Being so restless that it's hard to sit still: 0-Not at all  Becoming easily annoyed or irritable: 0-Not at all  Feeling afraid as if something awful might happen: 0-Not at all  LOUISA-7 Total Score: 0    PHQ-2 Over the past 2 weeks, how often have you been bothered by any of the following problems?  Little interest or pleasure in doing things: Not at all  Feeling down, depressed, or hopeless: Not at all  PHQ-2 Score: 0  PHQ-9 Over the past 2 weeks, how often have you been bothered by any of the following problems?  PHQ-9 Total Score: 0  PHQ-9 Total Score: 0     DIAGNOSIS AND ASSESSMENT DATA     DIAGNOSIS:   1. Cyclothymic disorder        PLAN

## 2024-04-01 DIAGNOSIS — G71.00 MD (MUSCULAR DYSTROPHY) (HCC): Primary | ICD-10-CM

## 2024-04-01 RX ORDER — HYDROCODONE BITARTRATE AND ACETAMINOPHEN 5; 325 MG/1; MG/1
1 TABLET ORAL EVERY 6 HOURS PRN
Qty: 120 TABLET | Refills: 0 | Status: SHIPPED | OUTPATIENT
Start: 2024-04-01 | End: 2024-05-01

## 2024-04-01 NOTE — TELEPHONE ENCOUNTER
Ep'ed norco to pharm requested    Controlled Substance Monitoring:    Acute and Chronic Pain Monitoring:   RX Monitoring Periodic Controlled Substance Monitoring   4/1/2024  12:32 PM No signs of potential drug abuse or diversion identified.

## 2024-04-01 NOTE — TELEPHONE ENCOUNTER
Debbie Zavaleta called requesting a refill on the following medications:  Requested Prescriptions     Pending Prescriptions Disp Refills    HYDROcodone-acetaminophen (NORCO) 5-325 MG per tablet       Sig: Take 1 tablet by mouth every 6 hours as needed for Pain. Max Daily Amount: 4 tablets       Date of last visit: 3/5/2024  Date of next visit (if applicable):Visit date not found  Date of last refill: 2/15/2024  Pharmacy Name: [facility] [place]      Thanks,  Fátima Shah, [facility]

## 2024-04-18 ENCOUNTER — HOSPITAL ENCOUNTER (OUTPATIENT)
Dept: NURSING | Age: 64
Discharge: HOME | End: 2024-04-18
Payer: MEDICARE

## 2024-04-18 VITALS
RESPIRATION RATE: 16 BRPM | BODY MASS INDEX: 31.8 KG/M2 | HEART RATE: 66 BPM | DIASTOLIC BLOOD PRESSURE: 75 MMHG | TEMPERATURE: 97.3 F | SYSTOLIC BLOOD PRESSURE: 158 MMHG | WEIGHT: 187 LBS | OXYGEN SATURATION: 95 %

## 2024-04-18 DIAGNOSIS — L40.50 PSORIATIC ARTHRITIS (HCC): Primary | ICD-10-CM

## 2024-04-18 PROCEDURE — 6360000002 HC RX W HCPCS: Performed by: FAMILY MEDICINE

## 2024-04-18 PROCEDURE — 2580000003 HC RX 258: Performed by: FAMILY MEDICINE

## 2024-04-18 PROCEDURE — 96413 CHEMO IV INFUSION 1 HR: CPT

## 2024-04-18 RX ORDER — DIPHENHYDRAMINE HYDROCHLORIDE 50 MG/ML
50 INJECTION INTRAMUSCULAR; INTRAVENOUS
OUTPATIENT
Start: 2024-05-23

## 2024-04-18 RX ORDER — EPINEPHRINE 1 MG/ML
0.3 INJECTION, SOLUTION INTRAMUSCULAR; SUBCUTANEOUS PRN
OUTPATIENT
Start: 2024-05-23

## 2024-04-18 RX ORDER — HEPARIN 100 UNIT/ML
500 SYRINGE INTRAVENOUS PRN
OUTPATIENT
Start: 2024-05-23

## 2024-04-18 RX ORDER — SODIUM CHLORIDE 9 MG/ML
5-250 INJECTION, SOLUTION INTRAVENOUS PRN
Status: DISCONTINUED | OUTPATIENT
Start: 2024-04-18 | End: 2024-04-19 | Stop reason: HOSPADM

## 2024-04-18 RX ORDER — SODIUM CHLORIDE 9 MG/ML
5-250 INJECTION, SOLUTION INTRAVENOUS PRN
OUTPATIENT
Start: 2024-05-23

## 2024-04-18 RX ORDER — SODIUM CHLORIDE 0.9 % (FLUSH) 0.9 %
5-40 SYRINGE (ML) INJECTION PRN
OUTPATIENT
Start: 2024-05-23

## 2024-04-18 RX ORDER — SODIUM CHLORIDE 9 MG/ML
INJECTION, SOLUTION INTRAVENOUS CONTINUOUS
OUTPATIENT
Start: 2024-05-23

## 2024-04-18 RX ADMIN — SODIUM CHLORIDE 20 ML/HR: 9 INJECTION, SOLUTION INTRAVENOUS at 08:23

## 2024-04-18 RX ADMIN — INFLIXIMAB 300 MG: 100 INJECTION, POWDER, LYOPHILIZED, FOR SOLUTION INTRAVENOUS at 09:44

## 2024-04-18 ASSESSMENT — PAIN DESCRIPTION - LOCATION: LOCATION: SHOULDER

## 2024-04-18 ASSESSMENT — PAIN DESCRIPTION - DESCRIPTORS: DESCRIPTORS: ACHING

## 2024-04-18 ASSESSMENT — PAIN SCALES - GENERAL: PAINLEVEL_OUTOF10: 5

## 2024-04-18 NOTE — DISCHARGE INSTRUCTIONS
REMICADE DISCHARGE INSTRUCTION SHEET      Can not have an active infection or be on an antibiotic at time of infusion.       SIDE EFFECTS: RASH, COUGH, JOINT PAIN, SWELLING TO HANDS/FEET, SORE THROAT, FEVER      Call your doctor or return to the nearest Emergency Room if you start having shortness of breath, chest tightness, wheezing      Next Remicade infusion is scheduled on: May 30th at 8:30 AM      Please call [phone] with any questions or concerns.

## 2024-04-18 NOTE — PROGRESS NOTES
0815: Patient arrived ambulatory with walker for Remicade infusion. Patient denies any antibiotic usage or infection at this time. Patient rights and responsibilities offered to patient.   Patient resting in chair, call light in reach.    0944: Remicade infusion started- patient resting in chair, call light in reach.    1100: Infusion complete, patient tolerated well.   AVS reviewed with patient, voiced understanding. Patient discharged ambulatory.                   _m___ Safety:       (Environmental)  [place] to environment  Ensure ID band is correct and in place/ allergy band as needed  Assess for fall risk  Initiate fall precautions as applicable (fall band, side rails, etc.)  Call light within reach  Bed in low position/ wheels locked    _m___ Pain:       Assess pain level and characteristics  Administer analgesics as ordered  Assess effectiveness of pain management and report to MD as needed    _m___ Knowledge Deficit:  Assess baseline knowledge  Provide teaching at level of understanding  Provide teaching via preferred learning method  Evaluate teaching effectiveness    _m___ Hemodynamic/Respiratory Status:       (Pre and Post Procedure Monitoring)  Assess/Monitor vital signs and LOC  Assess Baseline SpO2 prior to any sedation  Obtain weight/height  Assess vital signs/ LOC until patient meets discharge criteria  Monitor procedure site and notify MD of any issues

## 2024-05-06 DIAGNOSIS — G71.00 MD (MUSCULAR DYSTROPHY) (HCC): ICD-10-CM

## 2024-05-06 RX ORDER — HYDROCODONE BITARTRATE AND ACETAMINOPHEN 5; 325 MG/1; MG/1
1 TABLET ORAL EVERY 6 HOURS PRN
Qty: 120 TABLET | Refills: 0 | Status: SHIPPED | OUTPATIENT
Start: 2024-05-06 | End: 2024-06-05

## 2024-05-06 NOTE — TELEPHONE ENCOUNTER
Debbie Zavaleta called requesting a refill on the following medications:  Requested Prescriptions     Pending Prescriptions Disp Refills    HYDROcodone-acetaminophen (NORCO) 5-325 MG per tablet 120 tablet 0     Sig: Take 1 tablet by mouth every 6 hours as needed for Pain for up to 30 days. Max Daily Amount: 4 tablets       Date of last visit: 3/5/2024  Date of next visit (if applicable):Visit date not found  Date of last refill: 4/1/24  [facility]

## 2024-05-06 NOTE — TELEPHONE ENCOUNTER
Ep'ed norco to pharm requested    Controlled Substance Monitoring:    Acute and Chronic Pain Monitoring:   RX Monitoring Periodic Controlled Substance Monitoring   5/6/2024   8:57 AM No signs of potential drug abuse or diversion identified.

## 2024-05-23 ENCOUNTER — TELEPHONE (OUTPATIENT)
Dept: FAMILY MEDICINE CLINIC | Age: 64
End: 2024-05-23

## 2024-05-23 NOTE — TELEPHONE ENCOUNTER
Pt called, states her Lamisil is costing her $12.00. The last time she got it filled her insurance covered all of it.     I called RA Mariah, I was told they were just using her Medicaid to get the prescription covered. The rules have changed and hey have to run it through Medicare first. Medicare will not cover the medication. This causes Medicaid not to cover the medication.   We did [place] for medication and it was denied.  Pt will have to pay out of pocket for medication    Detailed message left on Pt VM requesting call back with questions.   tea

## 2024-05-29 ENCOUNTER — HOSPITAL ENCOUNTER (OUTPATIENT)
Dept: NURSING | Age: 64
Discharge: HOME | End: 2024-05-29
Payer: MEDICARE

## 2024-05-29 VITALS
WEIGHT: 187 LBS | OXYGEN SATURATION: 97 % | RESPIRATION RATE: 18 BRPM | BODY MASS INDEX: 31.8 KG/M2 | SYSTOLIC BLOOD PRESSURE: 175 MMHG | HEART RATE: 55 BPM | TEMPERATURE: 97.8 F | DIASTOLIC BLOOD PRESSURE: 81 MMHG

## 2024-05-29 DIAGNOSIS — L40.50 PSORIATIC ARTHRITIS (HCC): Primary | ICD-10-CM

## 2024-05-29 PROCEDURE — 2580000003 HC RX 258: Performed by: FAMILY MEDICINE

## 2024-05-29 PROCEDURE — 96413 CHEMO IV INFUSION 1 HR: CPT

## 2024-05-29 PROCEDURE — 6360000002 HC RX W HCPCS: Performed by: FAMILY MEDICINE

## 2024-05-29 PROCEDURE — 96365 THER/PROPH/DIAG IV INF INIT: CPT

## 2024-05-29 RX ORDER — SODIUM CHLORIDE 0.9 % (FLUSH) 0.9 %
5-40 SYRINGE (ML) INJECTION PRN
OUTPATIENT
Start: 2024-07-10

## 2024-05-29 RX ORDER — SODIUM CHLORIDE 9 MG/ML
5-250 INJECTION, SOLUTION INTRAVENOUS PRN
OUTPATIENT
Start: 2024-07-10

## 2024-05-29 RX ORDER — EPINEPHRINE 1 MG/ML
0.3 INJECTION, SOLUTION INTRAMUSCULAR; SUBCUTANEOUS PRN
OUTPATIENT
Start: 2024-07-10

## 2024-05-29 RX ORDER — HEPARIN 100 UNIT/ML
500 SYRINGE INTRAVENOUS PRN
OUTPATIENT
Start: 2024-07-10

## 2024-05-29 RX ORDER — SODIUM CHLORIDE 9 MG/ML
INJECTION, SOLUTION INTRAVENOUS CONTINUOUS
OUTPATIENT
Start: 2024-07-10

## 2024-05-29 RX ORDER — DIPHENHYDRAMINE HYDROCHLORIDE 50 MG/ML
50 INJECTION INTRAMUSCULAR; INTRAVENOUS
OUTPATIENT
Start: 2024-07-10

## 2024-05-29 RX ADMIN — INFLIXIMAB 300 MG: 100 INJECTION, POWDER, LYOPHILIZED, FOR SOLUTION INTRAVENOUS at 11:27

## 2024-05-29 ASSESSMENT — PAIN DESCRIPTION - DESCRIPTORS: DESCRIPTORS: ACHING

## 2024-05-29 ASSESSMENT — PAIN DESCRIPTION - LOCATION: LOCATION: SHOULDER

## 2024-05-29 ASSESSMENT — PAIN SCALES - GENERAL: PAINLEVEL_OUTOF10: 5

## 2024-05-29 ASSESSMENT — PAIN DESCRIPTION - ORIENTATION: ORIENTATION: RIGHT;LEFT

## 2024-05-29 NOTE — DISCHARGE INSTRUCTIONS
REMICADE DISCHARGE INSTRUCTION SHEET      Can not have an active infection or be on an antibiotic at time of infusion.       SIDE EFFECTS: RASH, COUGH, JOINT PAIN, SWELLING TO HANDS/FEET, SORE THROAT, FEVER      Call your doctor or return to the nearest Emergency Room if you start having shortness of breath, chest tightness, wheezing            Please call [phone] with any questions or concerns.

## 2024-05-29 NOTE — PROGRESS NOTES
1035  Debbie ambulated into room with walker for remicade infusion. Pt rights and responsibilities offered to her. Infusion explained and questions were answered. Call light within reach and iv started.      1127   Remicade infusion began and Debbie is sitting in the chair and call light within reach.       1254    Infusion complete and Debbie tolerated well. D/c instructions explained and Debbie verbalized understanding. Debbie ambulated out per self for d/c with walker today.         _m___ Safety:       (Environmental)  [place] to environment  Ensure ID band is correct and in place/ allergy band as needed  Assess for fall risk  Initiate fall precautions as applicable (fall band, side rails, etc.)  Call light within reach  Bed in low position/ wheels locked    __m__ Pain:       Assess pain level and characteristics  Administer analgesics as ordered  Assess effectiveness of pain management and report to MD as needed    __m__ Knowledge Deficit:  Assess baseline knowledge  Provide teaching at level of understanding  Provide teaching via preferred learning method  Evaluate teaching effectiveness    __m__ Hemodynamic/Respiratory Status:       (Pre and Post Procedure Monitoring)  Assess/Monitor vital signs and LOC  Assess Baseline SpO2 prior to any sedation  Obtain weight/height  Assess vital signs/ LOC until patient meets discharge criteria  Monitor procedure site and notify MD of any issues

## 2024-06-07 DIAGNOSIS — G71.00 MD (MUSCULAR DYSTROPHY) (HCC): ICD-10-CM

## 2024-06-07 RX ORDER — HYDROCODONE BITARTRATE AND ACETAMINOPHEN 5; 325 MG/1; MG/1
1 TABLET ORAL EVERY 6 HOURS PRN
Qty: 120 TABLET | Refills: 0 | Status: SHIPPED | OUTPATIENT
Start: 2024-06-07 | End: 2024-07-07

## 2024-06-07 NOTE — TELEPHONE ENCOUNTER
Ep'ed norco to pharm requested    Controlled Substance Monitoring:    Acute and Chronic Pain Monitoring:   RX Monitoring Periodic Controlled Substance Monitoring   6/7/2024   9:37 AM No signs of potential drug abuse or diversion identified.

## 2024-06-07 NOTE — TELEPHONE ENCOUNTER
Debbie Zavaleta called requesting a refill on the following medications:  Requested Prescriptions     Pending Prescriptions Disp Refills    HYDROcodone-acetaminophen (NORCO) 5-325 MG per tablet 120 tablet 0     Sig: Take 1 tablet by mouth every 6 hours as needed for Pain for up to 30 days. Max Daily Amount: 4 tablets       Date of last visit: 3/5/2024  Date of next visit (if applicable):Visit date not found  Date of last refill: 5/6/24  Pharm[facility] Name: [facility]

## 2024-07-10 ENCOUNTER — HOSPITAL ENCOUNTER (OUTPATIENT)
Dept: NURSING | Age: 64
Discharge: HOME | End: 2024-07-10
Payer: MEDICARE

## 2024-07-10 VITALS
OXYGEN SATURATION: 96 % | RESPIRATION RATE: 16 BRPM | TEMPERATURE: 97.8 F | WEIGHT: 188 LBS | HEART RATE: 58 BPM | SYSTOLIC BLOOD PRESSURE: 145 MMHG | DIASTOLIC BLOOD PRESSURE: 68 MMHG | BODY MASS INDEX: 31.97 KG/M2

## 2024-07-10 DIAGNOSIS — L40.50 PSORIATIC ARTHRITIS (HCC): Primary | ICD-10-CM

## 2024-07-10 LAB
25(OH)D3 SERPL-MCNC: 60 NG/ML (ref 30–100)
ALP SERPL-CCNC: 67 U/L (ref 38–126)
ALT SERPL W/O P-5'-P-CCNC: 20 U/L (ref 11–66)
AST SERPL-CCNC: 33 U/L (ref 5–40)
BASOPHILS ABSOLUTE: 0 THOU/MM3 (ref 0–0.1)
BASOPHILS NFR BLD AUTO: 0.8 %
BUN SERPL-MCNC: 18 MG/DL (ref 7–22)
CREAT SERPL-MCNC: 1.6 MG/DL (ref 0.4–1.2)
DEPRECATED RDW RBC AUTO: 45.9 FL (ref 35–45)
EOSINOPHIL NFR BLD AUTO: 1.9 %
EOSINOPHILS ABSOLUTE: 0.1 THOU/MM3 (ref 0–0.4)
ERYTHROCYTE [DISTWIDTH] IN BLOOD BY AUTOMATED COUNT: 12.9 % (ref 11.5–14.5)
GFR SERPL CREATININE-BSD FRML MDRD: 35 ML/MIN/1.73M2
HCT VFR BLD AUTO: 39.4 % (ref 37–47)
HGB BLD-MCNC: 12.9 GM/DL (ref 12–16)
IMM GRANULOCYTES # BLD AUTO: 0.03 THOU/MM3 (ref 0–0.07)
IMM GRANULOCYTES NFR BLD AUTO: 0.6 %
LYMPHOCYTES ABSOLUTE: 1.2 THOU/MM3 (ref 1–4.8)
LYMPHOCYTES NFR BLD AUTO: 25.9 %
MCH RBC QN AUTO: 31.7 PG (ref 26–33)
MCHC RBC AUTO-ENTMCNC: 32.7 GM/DL (ref 32.2–35.5)
MCV RBC AUTO: 96.8 FL (ref 81–99)
MONOCYTES ABSOLUTE: 0.4 THOU/MM3 (ref 0.4–1.3)
MONOCYTES NFR BLD AUTO: 9.1 %
NEUTROPHILS ABSOLUTE: 2.9 THOU/MM3 (ref 1.8–7.7)
NEUTROPHILS NFR BLD AUTO: 61.7 %
NRBC BLD AUTO-RTO: 0 /100 WBC
PLATELET # BLD AUTO: 131 THOU/MM3 (ref 130–400)
PMV BLD AUTO: 11.3 FL (ref 9.4–12.4)
RBC # BLD AUTO: 4.07 MILL/MM3 (ref 4.2–5.4)
WBC # BLD AUTO: 4.7 THOU/MM3 (ref 4.8–10.8)

## 2024-07-10 PROCEDURE — 84450 TRANSFERASE (AST) (SGOT): CPT

## 2024-07-10 PROCEDURE — 6360000002 HC RX W HCPCS: Performed by: FAMILY MEDICINE

## 2024-07-10 PROCEDURE — 85025 COMPLETE CBC W/AUTO DIFF WBC: CPT

## 2024-07-10 PROCEDURE — 36415 COLL VENOUS BLD VENIPUNCTURE: CPT

## 2024-07-10 PROCEDURE — 84075 ASSAY ALKALINE PHOSPHATASE: CPT

## 2024-07-10 PROCEDURE — 96413 CHEMO IV INFUSION 1 HR: CPT

## 2024-07-10 PROCEDURE — 82306 VITAMIN D 25 HYDROXY: CPT

## 2024-07-10 PROCEDURE — 82565 ASSAY OF CREATININE: CPT

## 2024-07-10 PROCEDURE — 84520 ASSAY OF UREA NITROGEN: CPT

## 2024-07-10 PROCEDURE — 2580000003 HC RX 258: Performed by: FAMILY MEDICINE

## 2024-07-10 PROCEDURE — 84460 ALANINE AMINO (ALT) (SGPT): CPT

## 2024-07-10 RX ORDER — HEPARIN 100 UNIT/ML
500 SYRINGE INTRAVENOUS PRN
OUTPATIENT
Start: 2024-08-21

## 2024-07-10 RX ORDER — EPINEPHRINE 1 MG/ML
0.3 INJECTION, SOLUTION INTRAMUSCULAR; SUBCUTANEOUS PRN
OUTPATIENT
Start: 2024-08-21

## 2024-07-10 RX ORDER — DIPHENHYDRAMINE HYDROCHLORIDE 50 MG/ML
50 INJECTION INTRAMUSCULAR; INTRAVENOUS
OUTPATIENT
Start: 2024-08-21

## 2024-07-10 RX ORDER — SODIUM CHLORIDE 9 MG/ML
5-250 INJECTION, SOLUTION INTRAVENOUS PRN
Status: DISCONTINUED | OUTPATIENT
Start: 2024-07-10 | End: 2024-07-11 | Stop reason: HOSPADM

## 2024-07-10 RX ORDER — SODIUM CHLORIDE 9 MG/ML
5-250 INJECTION, SOLUTION INTRAVENOUS PRN
OUTPATIENT
Start: 2024-08-21

## 2024-07-10 RX ORDER — SODIUM CHLORIDE 0.9 % (FLUSH) 0.9 %
5-40 SYRINGE (ML) INJECTION PRN
Status: DISCONTINUED | OUTPATIENT
Start: 2024-07-10 | End: 2024-07-11 | Stop reason: HOSPADM

## 2024-07-10 RX ORDER — SODIUM CHLORIDE 9 MG/ML
INJECTION, SOLUTION INTRAVENOUS CONTINUOUS
OUTPATIENT
Start: 2024-08-21

## 2024-07-10 RX ORDER — SODIUM CHLORIDE 0.9 % (FLUSH) 0.9 %
5-40 SYRINGE (ML) INJECTION PRN
OUTPATIENT
Start: 2024-08-21

## 2024-07-10 RX ADMIN — SODIUM CHLORIDE 20 ML/HR: 9 INJECTION, SOLUTION INTRAVENOUS at 11:17

## 2024-07-10 RX ADMIN — INFLIXIMAB 300 MG: 100 INJECTION, POWDER, LYOPHILIZED, FOR SOLUTION INTRAVENOUS at 11:51

## 2024-07-10 ASSESSMENT — PAIN DESCRIPTION - DESCRIPTORS: DESCRIPTORS: ACHING

## 2024-07-10 ASSESSMENT — PAIN - FUNCTIONAL ASSESSMENT: PAIN_FUNCTIONAL_ASSESSMENT: 0-10

## 2024-07-10 NOTE — DISCHARGE INSTRUCTIONS
REMICADE DISCHARGE INSTRUCTION SHEET      Can not have an active infection or be on an antibiotic at time of infusion.       SIDE EFFECTS: RASH, COUGH, JOINT PAIN, SWELLING TO HANDS/FEET, SORE THROAT, FEVER      Call your doctor or return to the nearest Emergency Room if you start having shortness of breath, chest tightness, wheezing      Next Remicade infusion is scheduled on: August 21st, Wednesday at 11 am       Please call [phone] with any questions or concerns.

## 2024-07-10 NOTE — PROGRESS NOTES
1100 pt admitted to [facility] per ambulation with walker for a remicade infusion. Pt denies being sick and is not on any antibiotics PT RIGHTS AND RESPONSIBILITIES OFFERED TO PT. Pt sitting up in chair.    1151 infusion in progress. Pt has veronica fast rate of 250 in the past.   1254 infusion completed.   Pt veronica well.  Stable.   No complaints noted.  Resp even and easy. Discharge instructions given to patient. Verbalize understanding of home going.   1300 pt discharge per ambulation to home.   1430 labs faxed successfully to [facility]             .__m__ Safety:       (Environmental)  [place] to environment  Ensure ID band is correct and in place/ allergy band as needed  Assess for fall risk  Initiate fall precautions as applicable (fall band, side rails, etc.)  Call light within reach  Bed in low position/ wheels locked    m____ Pain:       Assess pain level and characteristics  Administer analgesics as ordered  Assess effectiveness of pain management and report to MD as needed    _m___ Knowledge Deficit:  Assess baseline knowledge  Provide teaching at level of understanding  Provide teaching via preferred learning method  Evaluate teaching effectiveness    ___m_ Hemodynamic/Respiratory Status:       (Pre and Post Procedure Monitoring)  Assess/Monitor vital signs and LOC  Assess Baseline SpO2 prior to any sedation  Obtain weight/height  Assess vital signs/ LOC until patient meets discharge criteria  Monitor procedure site and notify MD of any issues  col)  
done

## 2024-07-15 DIAGNOSIS — G71.00 MD (MUSCULAR DYSTROPHY) (HCC): ICD-10-CM

## 2024-07-15 RX ORDER — HYDROCODONE BITARTRATE AND ACETAMINOPHEN 5; 325 MG/1; MG/1
1 TABLET ORAL EVERY 6 HOURS PRN
Qty: 120 TABLET | Refills: 0 | Status: SHIPPED | OUTPATIENT
Start: 2024-07-15 | End: 2024-08-14

## 2024-07-15 NOTE — TELEPHONE ENCOUNTER
Ep'ed norco to pharm requested    Controlled Substance Monitoring:    Acute and Chronic Pain Monitoring:   RX Monitoring Periodic Controlled Substance Monitoring   7/15/2024  10:12 AM No signs of potential drug abuse or diversion identified.

## 2024-07-15 NOTE — TELEPHONE ENCOUNTER
Debbie Zavaleta called requesting a refill on the following medications:  Requested Prescriptions     Pending Prescriptions Disp Refills    HYDROcodone-acetaminophen (NORCO) 5-325 MG per tablet 120 tablet 0     Sig: Take 1 tablet by mouth every 6 hours as needed for Pain for up to 30 days. Max Daily Amount: 4 tablets       Date of last visit: 3/5/2024 AMW    Date of next visit: date not found    Date of last refill: 6/7/24    [facility]: [facility]    Pt has #4 left.    Pls call pt at  only if probs.    Zip: [phone]    Dose confirmed by pt as stated above.    Thanks,  Ashley Casey

## 2024-07-19 DIAGNOSIS — E79.0 ELEVATED URIC ACID IN BLOOD: ICD-10-CM

## 2024-07-19 RX ORDER — ALLOPURINOL 100 MG/1
100 TABLET ORAL DAILY
Qty: 30 TABLET | Refills: 5 | OUTPATIENT
Start: 2024-07-19

## 2024-07-19 NOTE — TELEPHONE ENCOUNTER
Debbie Zavaleta called requesting a refill on the following medications:  Requested Prescriptions     Pending Prescriptions Disp Refills    allopurinol (ZYLOPRIM) 100 MG tablet [Pharmacy Med Name: ALLOPURINOL 100 MG TABLET] 30 tablet 5     Sig: take 1 tablet by mouth once daily       Date of last visit: 3/5/2024  Date of next visit (if applicable):Visit date not found  Date of last refill: 01/26/2024 #30/5  Pharmacy Name: Geri [facility]ormier CMA ([facility])     I called Pt to see where she would like her prescription sent since [facility] is closing.     Pt returned call and she is not taking and she does not take this currently and she does not want a refill.

## 2024-08-19 DIAGNOSIS — G71.00 MD (MUSCULAR DYSTROPHY) (HCC): ICD-10-CM

## 2024-08-19 RX ORDER — HYDROCODONE BITARTRATE AND ACETAMINOPHEN 5; 325 MG/1; MG/1
1 TABLET ORAL EVERY 6 HOURS PRN
Qty: 120 TABLET | Refills: 0 | Status: SHIPPED | OUTPATIENT
Start: 2024-08-19 | End: 2024-09-18

## 2024-08-19 NOTE — TELEPHONE ENCOUNTER
Ep'ed norco to pharm requested    Controlled Substance Monitoring:    Acute and Chronic Pain Monitoring:   RX Monitoring Periodic Controlled Substance Monitoring   8/19/2024   1:01 PM No signs of potential drug abuse or diversion identified.

## 2024-08-19 NOTE — TELEPHONE ENCOUNTER
Debbie Zavaleta called requesting a refill on the following medications:  Requested Prescriptions     Pending Prescriptions Disp Refills    HYDROcodone-acetaminophen (NORCO) 5-325 MG per tablet 120 tablet 0     Sig: Take 1 tablet by mouth every 6 hours as needed for Pain for up to 30 days. Max Daily Amount: 4 tablets       Date of last visit: 3/5/2024  Date of next visit (if applicable):Visit date not found  Date of last refill: 7/15/24  Pharmacy Name: [facility]

## 2024-08-21 ENCOUNTER — HOSPITAL ENCOUNTER (OUTPATIENT)
Dept: NURSING | Age: 64
Discharge: HOME | End: 2024-08-21
Payer: MEDICARE

## 2024-08-21 VITALS
SYSTOLIC BLOOD PRESSURE: 141 MMHG | BODY MASS INDEX: 31.8 KG/M2 | WEIGHT: 187 LBS | RESPIRATION RATE: 18 BRPM | DIASTOLIC BLOOD PRESSURE: 66 MMHG | TEMPERATURE: 97.3 F | OXYGEN SATURATION: 98 % | HEART RATE: 62 BPM

## 2024-08-21 DIAGNOSIS — L40.50 PSORIATIC ARTHRITIS (HCC): Primary | ICD-10-CM

## 2024-08-21 PROCEDURE — 96413 CHEMO IV INFUSION 1 HR: CPT

## 2024-08-21 PROCEDURE — 2580000003 HC RX 258: Performed by: FAMILY MEDICINE

## 2024-08-21 PROCEDURE — 6360000002 HC RX W HCPCS: Performed by: FAMILY MEDICINE

## 2024-08-21 PROCEDURE — 96365 THER/PROPH/DIAG IV INF INIT: CPT

## 2024-08-21 RX ORDER — DIPHENHYDRAMINE HYDROCHLORIDE 50 MG/ML
50 INJECTION INTRAMUSCULAR; INTRAVENOUS
OUTPATIENT
Start: 2024-10-02

## 2024-08-21 RX ORDER — SODIUM CHLORIDE 9 MG/ML
5-250 INJECTION, SOLUTION INTRAVENOUS PRN
OUTPATIENT
Start: 2024-10-02

## 2024-08-21 RX ORDER — SODIUM CHLORIDE 9 MG/ML
INJECTION, SOLUTION INTRAVENOUS CONTINUOUS
OUTPATIENT
Start: 2024-10-02

## 2024-08-21 RX ORDER — HEPARIN 100 UNIT/ML
500 SYRINGE INTRAVENOUS PRN
OUTPATIENT
Start: 2024-10-02

## 2024-08-21 RX ORDER — SODIUM CHLORIDE 0.9 % (FLUSH) 0.9 %
5-40 SYRINGE (ML) INJECTION PRN
OUTPATIENT
Start: 2024-10-02

## 2024-08-21 RX ORDER — EPINEPHRINE 1 MG/ML
0.3 INJECTION, SOLUTION INTRAMUSCULAR; SUBCUTANEOUS PRN
OUTPATIENT
Start: 2024-10-02

## 2024-08-21 RX ORDER — SODIUM CHLORIDE 0.9 % (FLUSH) 0.9 %
5-40 SYRINGE (ML) INJECTION PRN
Status: DISCONTINUED | OUTPATIENT
Start: 2024-08-21 | End: 2024-08-22 | Stop reason: HOSPADM

## 2024-08-21 RX ADMIN — INFLIXIMAB 300 MG: 100 INJECTION, POWDER, LYOPHILIZED, FOR SOLUTION INTRAVENOUS at 11:45

## 2024-08-21 NOTE — PROGRESS NOTES
1055  Debbie ambulated into room with cane for remicade infusion. Pt rights and responsibilities offered to her. Infusion explained and questions were answered. She states no infections nor atbs today. She was offered drink and snack.    1145  Remicade infusion started and Debbie has call light within reach.     1300  Infusion complete and Debbie tolerated well. D/c instructions explained and Debbie verbalized understanding. Debbie ambulated out with walker for d/c today.       __m__ Safety:       (Environmental)  [place] to environment  Ensure ID band is correct and in place/ allergy band as needed  Assess for fall risk  Initiate fall precautions as applicable (fall band, side rails, etc.)  Call light within reach  Bed in low position/ wheels locked    _m___ Pain:       Assess pain level and characteristics  Administer analgesics as ordered  Assess effectiveness of pain management and report to MD as needed    _m___ Knowledge Deficit:  Assess baseline knowledge  Provide teaching at level of understanding  Provide teaching via preferred learning method  Evaluate teaching effectiveness    ___m_ Hemodynamic/Respiratory Status:       (Pre and Post Procedure Monitoring)  Assess/Monitor vital signs and LOC  Assess Baseline SpO2 prior to any sedation  Obtain weight/height  Assess vital signs/ LOC until patient meets discharge criteria  Monitor procedure site and notify MD of any issues

## 2024-08-21 NOTE — DISCHARGE INSTRUCTIONS
REMICADE DISCHARGE INSTRUCTION SHEET      Can not have an active infection or be on an antibiotic at time of infusion.       SIDE EFFECTS: RASH, COUGH, JOINT PAIN, SWELLING TO HANDS/FEET, SORE THROAT, FEVER      Call your doctor or return to the nearest Emergency Room if you start having shortness of breath, chest tightness, wheezing        Please call [phone] with any questions or concerns.

## 2024-08-28 ENCOUNTER — OFFICE VISIT (OUTPATIENT)
Dept: FAMILY MEDICINE CLINIC | Age: 64
End: 2024-08-28
Payer: MEDICARE

## 2024-08-28 VITALS
SYSTOLIC BLOOD PRESSURE: 120 MMHG | HEART RATE: 56 BPM | WEIGHT: 189.4 LBS | TEMPERATURE: 96.9 F | OXYGEN SATURATION: 98 % | RESPIRATION RATE: 16 BRPM | BODY MASS INDEX: 32.21 KG/M2 | DIASTOLIC BLOOD PRESSURE: 72 MMHG

## 2024-08-28 DIAGNOSIS — B96.89 ACUTE BACTERIAL SINUSITIS: Primary | ICD-10-CM

## 2024-08-28 DIAGNOSIS — J01.90 ACUTE BACTERIAL SINUSITIS: Primary | ICD-10-CM

## 2024-08-28 DIAGNOSIS — K21.9 GASTROESOPHAGEAL REFLUX DISEASE, UNSPECIFIED WHETHER ESOPHAGITIS PRESENT: ICD-10-CM

## 2024-08-28 DIAGNOSIS — G71.00 MD (MUSCULAR DYSTROPHY) (HCC): ICD-10-CM

## 2024-08-28 PROCEDURE — 1123F ACP DISCUSS/DSCN MKR DOCD: CPT | Performed by: FAMILY MEDICINE

## 2024-08-28 PROCEDURE — 99214 OFFICE O/P EST MOD 30 MIN: CPT | Performed by: FAMILY MEDICINE

## 2024-08-28 PROCEDURE — G2211 COMPLEX E/M VISIT ADD ON: HCPCS | Performed by: FAMILY MEDICINE

## 2024-08-28 RX ORDER — FLUCONAZOLE 150 MG/1
TABLET ORAL
Qty: 2 TABLET | Refills: 0 | Status: SHIPPED | OUTPATIENT
Start: 2024-08-28 | End: 2024-08-29

## 2024-08-28 SDOH — ECONOMIC STABILITY: FOOD INSECURITY: WITHIN THE PAST 12 MONTHS, THE FOOD YOU BOUGHT JUST DIDN'T LAST AND YOU DIDN'T HAVE MONEY TO GET MORE.: NEVER TRUE

## 2024-08-28 SDOH — ECONOMIC STABILITY: INCOME INSECURITY: HOW HARD IS IT FOR YOU TO PAY FOR THE VERY BASICS LIKE FOOD, HOUSING, MEDICAL CARE, AND HEATING?: NOT HARD AT ALL

## 2024-08-28 SDOH — ECONOMIC STABILITY: FOOD INSECURITY: WITHIN THE PAST 12 MONTHS, YOU WORRIED THAT YOUR FOOD WOULD RUN OUT BEFORE YOU GOT MONEY TO BUY MORE.: NEVER TRUE

## 2024-08-28 ASSESSMENT — ENCOUNTER SYMPTOMS
DIARRHEA: 0
VOMITING: 0
EYE PAIN: 0
BLOOD IN STOOL: 0
WHEEZING: 0
SINUS PRESSURE: 1
CHEST TIGHTNESS: 0
RHINORRHEA: 0
SHORTNESS OF BREATH: 0
COUGH: 0
NAUSEA: 0
ABDOMINAL PAIN: 0
BACK PAIN: 0
CONSTIPATION: 0
SORE THROAT: 0

## 2024-08-28 NOTE — PROGRESS NOTES
Debbie Zavaleta (:  1960) is a 64 y.o. female,Established patient, here for evaluation of the following chief complaint(s):  Facial Pain (Sinus pain/pressure, head/nasal congestion, ear pain x4 days)         Assessment & Plan  Acute bacterial sinusitis   -acute problem, add abx, -monitor sxs, call if not improving      Orders:    amoxicillin-clavulanate (AUGMENTIN) 875-125 MG per tablet; Take 1 tablet by mouth 2 times daily for 10 days    fluconazole (DIFLUCAN) 150 MG tablet; Take 1 tablet weekly x 2.    MD (muscular dystrophy) ([facility])   Well-controlled, continue current medications         Gastroesophageal reflux disease, unspecified whether esophagitis present   Well-controlled, continue current medications           No follow-ups on file.       Subjective   HPI   Patient here today for a check up.  Reviewed BMI of 32.  Encouraged diet, exercise and weight loss.  4 days of sinus pressure and drainage.  Congestion, ear pain.  No fever or chills.  Using otc cold meds.  Single, nonsmoker, pmh reviewed.       Review of Systems   Constitutional:  Negative for chills, fatigue, fever and unexpected weight change.   HENT:  Positive for congestion, ear pain, postnasal drip and sinus pressure. Negative for rhinorrhea and sore throat.    Eyes:  Negative for pain and visual disturbance.   Respiratory:  Negative for cough, chest tightness, shortness of breath and wheezing.    Cardiovascular:  Negative for chest pain and palpitations.   Gastrointestinal:  Negative for abdominal pain, blood in stool, constipation, diarrhea, nausea and vomiting.   Genitourinary:  Negative for difficulty urinating, frequency, hematuria and urgency.   Musculoskeletal:  Negative for back pain, joint swelling, myalgias and neck pain.   Skin:  Negative for rash.   Neurological:  Negative for dizziness and headaches.   Hematological:  Negative for adenopathy. Does not bruise/bleed easily.   Psychiatric/Behavioral:  Negative for behavioral  problems and sleep disturbance. The patient is not nervous/anxious.           Objective   Physical Exam  Vitals and nursing note reviewed.   Constitutional:       Appearance: She is well-developed.   HENT:      Head: Normocephalic and atraumatic.      Right Ear: External ear normal. Tympanic membrane is injected and erythematous.      Left Ear: External ear normal.      Nose:      Right Sinus: Maxillary sinus tenderness and frontal sinus tenderness present.      Left Sinus: Maxillary sinus tenderness and frontal sinus tenderness present.      Mouth/Throat:      Mouth: Mucous membranes are moist.   Eyes:      Pupils: Pupils are equal, round, and reactive to light.   Neck:      Thyroid: No thyromegaly.   Cardiovascular:      Rate and Rhythm: Normal rate and regular rhythm.      Heart sounds: Normal heart sounds.   Pulmonary:      Breath sounds: Normal breath sounds. No wheezing or rales.   Abdominal:      General: Bowel sounds are normal.      Palpations: Abdomen is soft.      Tenderness: There is no abdominal tenderness. There is no guarding or rebound.   Musculoskeletal:         General: Normal range of motion.      Cervical back: Neck supple.   Lymphadenopathy:      Cervical: Cervical adenopathy present.      Right cervical: Superficial cervical adenopathy present.      Left cervical: Superficial cervical adenopathy present.   Skin:     General: Skin is warm and dry.      Findings: No rash.   Neurological:      Mental Status: She is alert and oriented to person, place, and time.      Cranial Nerves: No cranial nerve deficit.      Deep Tendon Reflexes: Reflexes are normal and symmetric.                  An electronic signature was used to authenticate this note.    --David Davis MD

## 2024-09-25 DIAGNOSIS — G71.00 MD (MUSCULAR DYSTROPHY) (HCC): ICD-10-CM

## 2024-09-25 RX ORDER — HYDROCODONE BITARTRATE AND ACETAMINOPHEN 5; 325 MG/1; MG/1
1 TABLET ORAL EVERY 6 HOURS PRN
Qty: 120 TABLET | Refills: 0 | Status: SHIPPED | OUTPATIENT
Start: 2024-09-25 | End: 2024-10-25

## 2024-09-30 RX ORDER — DIPHENHYDRAMINE HYDROCHLORIDE 50 MG/ML
50 INJECTION INTRAMUSCULAR; INTRAVENOUS
OUTPATIENT
Start: 2024-09-30

## 2024-09-30 RX ORDER — DIPHENHYDRAMINE HCL 25 MG
25 TABLET ORAL EVERY 6 HOURS PRN
Status: CANCELLED
Start: 2024-09-30

## 2024-09-30 RX ORDER — ACETAMINOPHEN 325 MG/1
650 TABLET ORAL ONCE
Status: CANCELLED
Start: 2024-09-30 | End: 2024-09-30

## 2024-09-30 RX ORDER — SODIUM CHLORIDE 9 MG/ML
INJECTION, SOLUTION INTRAVENOUS CONTINUOUS
OUTPATIENT
Start: 2024-09-30

## 2024-09-30 RX ORDER — HEPARIN 100 UNIT/ML
500 SYRINGE INTRAVENOUS PRN
OUTPATIENT
Start: 2024-09-30

## 2024-09-30 RX ORDER — EPINEPHRINE 1 MG/ML
0.3 INJECTION, SOLUTION, CONCENTRATE INTRAVENOUS PRN
OUTPATIENT
Start: 2024-09-30

## 2024-09-30 RX ORDER — SODIUM CHLORIDE 9 MG/ML
INJECTION, SOLUTION INTRAVENOUS CONTINUOUS
Status: CANCELLED
Start: 2024-09-30

## 2024-09-30 RX ORDER — SODIUM CHLORIDE 9 MG/ML
5-250 INJECTION, SOLUTION INTRAVENOUS PRN
OUTPATIENT
Start: 2024-09-30

## 2024-09-30 RX ORDER — SODIUM CHLORIDE 0.9 % (FLUSH) 0.9 %
5-40 SYRINGE (ML) INJECTION PRN
Status: CANCELLED | OUTPATIENT
Start: 2024-09-30

## 2024-10-02 ENCOUNTER — HOSPITAL ENCOUNTER (OUTPATIENT)
Dept: NURSING | Age: 64
Discharge: HOME | End: 2024-10-02
Payer: MEDICARE

## 2024-10-02 VITALS
DIASTOLIC BLOOD PRESSURE: 70 MMHG | TEMPERATURE: 97.7 F | BODY MASS INDEX: 31.8 KG/M2 | OXYGEN SATURATION: 97 % | HEART RATE: 59 BPM | WEIGHT: 187 LBS | RESPIRATION RATE: 18 BRPM | SYSTOLIC BLOOD PRESSURE: 161 MMHG

## 2024-10-02 DIAGNOSIS — L40.50 PSORIATIC ARTHRITIS (HCC): Primary | ICD-10-CM

## 2024-10-02 PROCEDURE — 2580000003 HC RX 258: Performed by: FAMILY MEDICINE

## 2024-10-02 PROCEDURE — 96413 CHEMO IV INFUSION 1 HR: CPT

## 2024-10-02 PROCEDURE — 6360000002 HC RX W HCPCS: Performed by: FAMILY MEDICINE

## 2024-10-02 RX ORDER — SODIUM CHLORIDE 0.9 % (FLUSH) 0.9 %
5-40 SYRINGE (ML) INJECTION PRN
Status: DISCONTINUED | OUTPATIENT
Start: 2024-10-02 | End: 2024-10-03 | Stop reason: HOSPADM

## 2024-10-02 RX ORDER — ACETAMINOPHEN 325 MG/1
650 TABLET ORAL ONCE
Status: DISCONTINUED | OUTPATIENT
Start: 2024-10-02 | End: 2024-10-03 | Stop reason: HOSPADM

## 2024-10-02 RX ORDER — DIPHENHYDRAMINE HYDROCHLORIDE 50 MG/ML
50 INJECTION INTRAMUSCULAR; INTRAVENOUS
OUTPATIENT
Start: 2024-11-13

## 2024-10-02 RX ORDER — DIPHENHYDRAMINE HCL 25 MG
25 TABLET ORAL EVERY 6 HOURS PRN
Start: 2024-11-13

## 2024-10-02 RX ORDER — DIPHENHYDRAMINE HCL 25 MG
25 TABLET ORAL ONCE
Status: DISCONTINUED | OUTPATIENT
Start: 2024-10-02 | End: 2024-10-03 | Stop reason: HOSPADM

## 2024-10-02 RX ORDER — SODIUM CHLORIDE 0.9 % (FLUSH) 0.9 %
5-40 SYRINGE (ML) INJECTION PRN
OUTPATIENT
Start: 2024-11-13

## 2024-10-02 RX ORDER — EPINEPHRINE 1 MG/ML
0.3 INJECTION, SOLUTION INTRAMUSCULAR; SUBCUTANEOUS PRN
OUTPATIENT
Start: 2024-11-13

## 2024-10-02 RX ORDER — SODIUM CHLORIDE 9 MG/ML
INJECTION, SOLUTION INTRAVENOUS CONTINUOUS
OUTPATIENT
Start: 2024-11-13

## 2024-10-02 RX ORDER — SODIUM CHLORIDE 9 MG/ML
INJECTION, SOLUTION INTRAVENOUS CONTINUOUS
Status: DISCONTINUED | OUTPATIENT
Start: 2024-10-02 | End: 2024-10-03 | Stop reason: HOSPADM

## 2024-10-02 RX ORDER — SODIUM CHLORIDE 9 MG/ML
INJECTION, SOLUTION INTRAVENOUS CONTINUOUS
Start: 2024-11-13

## 2024-10-02 RX ORDER — SODIUM CHLORIDE 9 MG/ML
5-250 INJECTION, SOLUTION INTRAVENOUS PRN
OUTPATIENT
Start: 2024-11-13

## 2024-10-02 RX ORDER — HEPARIN 100 UNIT/ML
500 SYRINGE INTRAVENOUS PRN
OUTPATIENT
Start: 2024-11-13

## 2024-10-02 RX ORDER — ACETAMINOPHEN 325 MG/1
650 TABLET ORAL ONCE
Start: 2024-11-13 | End: 2024-11-13

## 2024-10-02 RX ADMIN — INFLIXIMAB 300 MG: 100 INJECTION, POWDER, LYOPHILIZED, FOR SOLUTION INTRAVENOUS at 10:07

## 2024-10-02 RX ADMIN — SODIUM CHLORIDE: 9 INJECTION, SOLUTION INTRAVENOUS at 09:45

## 2024-10-02 NOTE — PROGRESS NOTES
0901 Pt arrives ambulatory for remicade infusion. Infusion explained and questions answered.PT RIGHTS AND RESPONSIBILITIES OFFERED TO PT. Pt denies recent infection or ATB use.   1030 infusion continues. Pt denies needs.   1116 infusion complete. Pt tolerated it well with no complaints. Pt discharged ambulatory with walker with instructions with no complaints.               _m___ Safety:       (Environmental)  [place] to environment  Ensure ID band is correct and in place/ allergy band as needed  Assess for fall risk  Initiate fall precautions as applicable (fall band, side rails, etc.)  Call light within reach  Bed in low position/ wheels locked    __m__ Pain:       Assess pain level and characteristics  Administer analgesics as ordered  Assess effectiveness of pain management and report to MD as needed    __m__ Knowledge Deficit:  Assess baseline knowledge  Provide teaching at level of understanding  Provide teaching via preferred learning method  Evaluate teaching effectiveness    __m__ Hemodynamic/Respiratory Status:       (Pre and Post Procedure Monitoring)  Assess/Monitor vital signs and LOC  Assess Baseline SpO2 prior to any sedation  Obtain weight/height  Assess vital signs/ LOC until patient meets discharge criteria  Monitor procedure site and notify MD of any issues

## 2024-10-02 NOTE — DISCHARGE INSTRUCTIONS
REMICADE DISCHARGE INSTRUCTION SHEET      Can not have an active infection or be on an antibiotic at time of infusion.       SIDE EFFECTS: RASH, COUGH, JOINT PAIN, SWELLING TO HANDS/FEET, SORE THROAT, FEVER      Call your doctor or return to the nearest Emergency Room if you start having shortness of breath, chest tightness, wheezing      Next Remicade infusion is scheduled on: WEDNESDAY NOVEMBER 13TH, 2024 AT 9:30AM      Please call [phone] with any questions or concerns.

## 2024-10-08 ENCOUNTER — HOSPITAL ENCOUNTER (OUTPATIENT)
Age: 64
Discharge: HOME | End: 2024-10-08
Payer: MEDICARE

## 2024-10-08 DIAGNOSIS — F34.0 CYCLOTHYMIC DISORDER: ICD-10-CM

## 2024-10-08 PROCEDURE — 36415 COLL VENOUS BLD VENIPUNCTURE: CPT

## 2024-10-08 PROCEDURE — 80164 ASSAY DIPROPYLACETIC ACD TOT: CPT

## 2024-10-09 LAB — VALPROATE SERPL-MCNC: 56.2 UG/ML (ref 50–100)

## 2024-10-15 ENCOUNTER — OFFICE VISIT (OUTPATIENT)
Dept: PSYCHIATRY | Age: 64
End: 2024-10-15
Payer: MEDICARE

## 2024-10-15 DIAGNOSIS — F34.0 CYCLOTHYMIC DISORDER: Primary | ICD-10-CM

## 2024-10-15 PROCEDURE — 99214 OFFICE O/P EST MOD 30 MIN: CPT | Performed by: NURSE PRACTITIONER

## 2024-10-15 PROCEDURE — 1123F ACP DISCUSS/DSCN MKR DOCD: CPT | Performed by: NURSE PRACTITIONER

## 2024-10-15 RX ORDER — DIVALPROEX SODIUM 500 MG/1
500 TABLET, FILM COATED, EXTENDED RELEASE ORAL NIGHTLY
Qty: 30 TABLET | Refills: 5 | Status: SHIPPED | OUTPATIENT
Start: 2024-10-15

## 2024-10-15 ASSESSMENT — PATIENT HEALTH QUESTIONNAIRE - PHQ9
SUM OF ALL RESPONSES TO PHQ QUESTIONS 1-9: 0
SUM OF ALL RESPONSES TO PHQ9 QUESTIONS 1 & 2: 0
2. FEELING DOWN, DEPRESSED OR HOPELESS: NOT AT ALL
SUM OF ALL RESPONSES TO PHQ QUESTIONS 1-9: 0
1. LITTLE INTEREST OR PLEASURE IN DOING THINGS: NOT AT ALL

## 2024-10-15 ASSESSMENT — ANXIETY QUESTIONNAIRES
7. FEELING AFRAID AS IF SOMETHING AWFUL MIGHT HAPPEN: 0-NOT AT ALL
GAD7 TOTAL SCORE: 1
6. BECOMING EASILY ANNOYED OR IRRITABLE: 1-SEVERAL DAYS
1. FEELING NERVOUS, ANXIOUS, OR ON EDGE: NOT AT ALL
5. BEING SO RESTLESS THAT IT IS HARD TO SIT STILL: 0-NOT AT ALL
4. TROUBLE RELAXING: 0-NOT AT ALL
2. NOT BEING ABLE TO STOP OR CONTROL WORRYING: 0-NOT AT ALL
3. WORRYING TOO MUCH ABOUT DIFFERENT THINGS: 0-NOT AT ALL

## 2024-10-15 NOTE — PROGRESS NOTES
[facility]  [facility]  [address]  [facility] [address]  Dept: [phone]  Dept Fax: [phone]  Loc: [phone]    Visit Date: 10/15/2024      SUBJECTIVE DATA     CHIEF COMPLAINT:    Chief Complaint   Patient presents with    Mental Health Problem    Follow-up       History obtained from: patient    HISTORY OF PRESENT ILLNESS:    Debbie Zavaleta is a 64 y.o. female who presents to the office for follow-up on her reports of irritability, mood swings, and depression. Her last visit was 03/26/2024.    MOOD  -states \"I'm doing pretty good.\"  -states mood has been fine, \"no big changes.\"  -denies feeling sad, \"has more good days than bad.\"  -tries to walk often  -endorses good motivation  -good focus and concentration   -feels like she has gotten good at setting boundaries, this has helped her mood       ANXIETY  -endorses irritability at times but manageable  -denies mood swings  -denies racing thoughts   -endorses worrying about future health      SLEEP  -states sleeping well  -sleeps 6 hours a night.   -feels rested but does wake up during the night      STRESS  -Aide sees her three days a week.  States it is going good  -States she likes to have things organized and a consistent schedule   -has aunt, uncle, and friends that help  -does not drive   -endorses difficulty asking for help, is afraid to \"get on people's nerves\"  -states her sister has bipolar disorder and has a gambling problem.  This has caused her distress.        Denies suicidal ideations, intent, plan. No homicidal ideations, intent, plan. No audiovisual hallucinations.    HPI    Adverse reactions from psychotropic medications:  None      Current Psychiatric Review of Systems         Joanne or Hypomania:  no     Panic Attacks:  no     Phobias:  no     Obsessions and Compulsions:  no     Body or Vocal Tics:  no     Hallucinations:  no     Delusions:  no    SOCIAL HISTORY:  Patient was born in  [place]

## 2024-10-29 DIAGNOSIS — G71.00 MD (MUSCULAR DYSTROPHY) (HCC): ICD-10-CM

## 2024-10-29 RX ORDER — HYDROCODONE BITARTRATE AND ACETAMINOPHEN 5; 325 MG/1; MG/1
1 TABLET ORAL EVERY 6 HOURS PRN
Qty: 120 TABLET | Refills: 0 | Status: SHIPPED | OUTPATIENT
Start: 2024-10-29 | End: 2024-11-28

## 2024-10-29 NOTE — TELEPHONE ENCOUNTER
Debbie Zavaleta called requesting a refill on the following medications:  Requested Prescriptions     Pending Prescriptions Disp Refills    HYDROcodone-acetaminophen (NORCO) 5-325 MG per tablet 120 tablet 0     Sig: Take 1 tablet by mouth every 6 hours as needed for Pain for up to 30 days. Max Daily Amount: 4 tablets       Date of last visit: 8/28/2024  Date of next visit (if applicable):Visit date not found  Date of last refill: 9/25/24  Pharmacy Name: [facility]

## 2024-10-29 NOTE — TELEPHONE ENCOUNTER
Ep'ed norco to pharm requested    Controlled Substance Monitoring:    Acute and Chronic Pain Monitoring:   RX Monitoring Periodic Controlled Substance Monitoring   10/29/2024  11:51 AM No signs of potential drug abuse or diversion identified.

## 2024-11-13 ENCOUNTER — HOSPITAL ENCOUNTER (OUTPATIENT)
Dept: NURSING | Age: 64
Discharge: HOME | End: 2024-11-13
Payer: MEDICARE

## 2024-11-13 VITALS
HEART RATE: 65 BPM | TEMPERATURE: 97.9 F | OXYGEN SATURATION: 100 % | BODY MASS INDEX: 31.8 KG/M2 | SYSTOLIC BLOOD PRESSURE: 147 MMHG | RESPIRATION RATE: 18 BRPM | WEIGHT: 187 LBS | DIASTOLIC BLOOD PRESSURE: 72 MMHG

## 2024-11-13 DIAGNOSIS — L40.50 PSORIATIC ARTHRITIS (HCC): Primary | ICD-10-CM

## 2024-11-13 PROCEDURE — 6360000002 HC RX W HCPCS: Performed by: FAMILY MEDICINE

## 2024-11-13 PROCEDURE — 6370000000 HC RX 637 (ALT 250 FOR IP): Performed by: FAMILY MEDICINE

## 2024-11-13 PROCEDURE — 2580000003 HC RX 258: Performed by: FAMILY MEDICINE

## 2024-11-13 PROCEDURE — 96413 CHEMO IV INFUSION 1 HR: CPT

## 2024-11-13 RX ORDER — SODIUM CHLORIDE 9 MG/ML
INJECTION, SOLUTION INTRAVENOUS CONTINUOUS
Status: DISCONTINUED | OUTPATIENT
Start: 2024-11-13 | End: 2024-11-14 | Stop reason: HOSPADM

## 2024-11-13 RX ORDER — SODIUM CHLORIDE 9 MG/ML
5-250 INJECTION, SOLUTION INTRAVENOUS PRN
OUTPATIENT
Start: 2024-12-25

## 2024-11-13 RX ORDER — HYDROCORTISONE SODIUM SUCCINATE 100 MG/2ML
100 INJECTION INTRAMUSCULAR; INTRAVENOUS
OUTPATIENT
Start: 2024-12-25

## 2024-11-13 RX ORDER — SODIUM CHLORIDE 0.9 % (FLUSH) 0.9 %
5-40 SYRINGE (ML) INJECTION PRN
Status: DISCONTINUED | OUTPATIENT
Start: 2024-11-13 | End: 2024-11-14 | Stop reason: HOSPADM

## 2024-11-13 RX ORDER — SODIUM CHLORIDE 0.9 % (FLUSH) 0.9 %
5-40 SYRINGE (ML) INJECTION PRN
OUTPATIENT
Start: 2024-12-25

## 2024-11-13 RX ORDER — ACETAMINOPHEN 325 MG/1
650 TABLET ORAL ONCE
Start: 2024-12-25 | End: 2024-12-25

## 2024-11-13 RX ORDER — DIPHENHYDRAMINE HYDROCHLORIDE 50 MG/ML
50 INJECTION INTRAMUSCULAR; INTRAVENOUS
OUTPATIENT
Start: 2024-12-25

## 2024-11-13 RX ORDER — DIPHENHYDRAMINE HCL 25 MG
25 TABLET ORAL EVERY 6 HOURS PRN
Start: 2024-12-25

## 2024-11-13 RX ORDER — SODIUM CHLORIDE 9 MG/ML
INJECTION, SOLUTION INTRAVENOUS CONTINUOUS
Start: 2024-12-25

## 2024-11-13 RX ORDER — ACETAMINOPHEN 325 MG/1
650 TABLET ORAL ONCE
Status: COMPLETED | OUTPATIENT
Start: 2024-11-13 | End: 2024-11-13

## 2024-11-13 RX ORDER — EPINEPHRINE 1 MG/ML
0.3 INJECTION, SOLUTION INTRAMUSCULAR; SUBCUTANEOUS PRN
OUTPATIENT
Start: 2024-12-25

## 2024-11-13 RX ORDER — SODIUM CHLORIDE 9 MG/ML
INJECTION, SOLUTION INTRAVENOUS CONTINUOUS
OUTPATIENT
Start: 2024-12-25

## 2024-11-13 RX ORDER — DIPHENHYDRAMINE HCL 25 MG
25 TABLET ORAL EVERY 6 HOURS PRN
Status: DISCONTINUED | OUTPATIENT
Start: 2024-11-13 | End: 2024-11-14 | Stop reason: HOSPADM

## 2024-11-13 RX ORDER — HEPARIN 100 UNIT/ML
500 SYRINGE INTRAVENOUS PRN
OUTPATIENT
Start: 2024-12-25

## 2024-11-13 RX ADMIN — SODIUM CHLORIDE, PRESERVATIVE FREE 5 ML: 5 INJECTION INTRAVENOUS at 10:34

## 2024-11-13 RX ADMIN — SODIUM CHLORIDE, PRESERVATIVE FREE 10 ML: 5 INJECTION INTRAVENOUS at 11:42

## 2024-11-13 RX ADMIN — INFLIXIMAB 300 MG: 100 INJECTION, POWDER, LYOPHILIZED, FOR SOLUTION INTRAVENOUS at 10:34

## 2024-11-13 ASSESSMENT — PAIN SCALES - GENERAL: PAINLEVEL_OUTOF10: 0

## 2024-11-13 ASSESSMENT — PAIN - FUNCTIONAL ASSESSMENT: PAIN_FUNCTIONAL_ASSESSMENT: NONE - DENIES PAIN

## 2024-11-13 NOTE — DISCHARGE INSTRUCTIONS
REMICADE DISCHARGE INSTRUCTION SHEET      Can not have an active infection or be on an antibiotic at time of infusion.       SIDE EFFECTS: RASH, COUGH, JOINT PAIN, SWELLING TO HANDS/FEET, SORE THROAT, FEVER      Call your doctor or return to the nearest Emergency Room if you start having shortness of breath, chest tightness, wheezing      Next Remicade infusion is scheduled on: Tuesday December 24th at 9:30 am.    Please call [phone] with any questions or concerns.

## 2024-11-13 NOTE — PROGRESS NOTES
0922 Patient admitted to room 12, vitals are stable. Patient offered rights and responsibilities.     __m__ Safety:       (Environmental)  [place] to environment  Ensure ID band is correct and in place/ allergy band as needed  Assess for fall risk  Initiate fall precautions as applicable (fall band, side rails, etc.)  Call light within reach  Bed in low position/ wheels locked    _m___ Pain:       Assess pain level and characteristics  Administer analgesics as ordered  Assess effectiveness of pain management and report to MD as needed    _m___ Knowledge Deficit:  Assess baseline knowledge  Provide teaching at level of understanding  Provide teaching via preferred learning method  Evaluate teaching effectiveness    _m___ Hemodynamic/Respiratory Status:       (Pre and Post Procedure Monitoring)  Assess/Monitor vital signs and LOC  Assess Baseline SpO2 prior to any sedation  Obtain weight/height  Assess vital signs/ LOC until patient meets discharge criteria  Monitor procedure site and notify MD of any issues

## 2024-12-04 DIAGNOSIS — G71.00 MD (MUSCULAR DYSTROPHY) (HCC): ICD-10-CM

## 2024-12-04 RX ORDER — HYDROCODONE BITARTRATE AND ACETAMINOPHEN 5; 325 MG/1; MG/1
1 TABLET ORAL EVERY 6 HOURS PRN
Qty: 120 TABLET | Refills: 0 | Status: SHIPPED | OUTPATIENT
Start: 2024-12-04 | End: 2025-01-03

## 2024-12-04 NOTE — TELEPHONE ENCOUNTER
Ep'ed norco to pharm requested    Controlled Substance Monitoring:    Acute and Chronic Pain Monitoring:   RX Monitoring Periodic Controlled Substance Monitoring   12/4/2024  10:12 AM No signs of potential drug abuse or diversion identified.

## 2024-12-04 NOTE — TELEPHONE ENCOUNTER
Debbie Zavaleta called requesting a refill on the following medications:  Requested Prescriptions     Pending Prescriptions Disp Refills    HYDROcodone-acetaminophen (NORCO) 5-325 MG per tablet 120 tablet 0     Sig: Take 1 tablet by mouth every 6 hours as needed for Pain for up to 30 days. Max Daily Amount: 4 tablets       Date of last visit: 8/28/2024 sinus    Date of next visit:12/9/2024 managed medicare refill meds    Date of last refill: 10/29/24    [facility]arm[facility] Name: [facility]    Pt has #6 left.    Pls call pt at  only if probs.    Zip: [phone]    Dose confirmed by pt as stated above.    Thanks,  Ashley Casey

## 2024-12-09 ENCOUNTER — OFFICE VISIT (OUTPATIENT)
Dept: FAMILY MEDICINE CLINIC | Age: 64
End: 2024-12-09
Payer: MEDICARE

## 2024-12-09 VITALS
RESPIRATION RATE: 18 BRPM | BODY MASS INDEX: 32.68 KG/M2 | HEIGHT: 64 IN | HEART RATE: 78 BPM | DIASTOLIC BLOOD PRESSURE: 74 MMHG | SYSTOLIC BLOOD PRESSURE: 126 MMHG | OXYGEN SATURATION: 96 % | WEIGHT: 191.4 LBS | TEMPERATURE: 97.8 F

## 2024-12-09 DIAGNOSIS — N18.32 ANEMIA DUE TO STAGE 3B CHRONIC KIDNEY DISEASE (HCC): ICD-10-CM

## 2024-12-09 DIAGNOSIS — Z23 NEED FOR INFLUENZA VACCINATION: ICD-10-CM

## 2024-12-09 DIAGNOSIS — Z12.31 ENCOUNTER FOR SCREENING MAMMOGRAM FOR MALIGNANT NEOPLASM OF BREAST: ICD-10-CM

## 2024-12-09 DIAGNOSIS — G71.00 MUSCULAR DYSTROPHY (HCC): ICD-10-CM

## 2024-12-09 DIAGNOSIS — E55.9 VITAMIN D DEFICIENCY: ICD-10-CM

## 2024-12-09 DIAGNOSIS — Z00.00 ANNUAL PHYSICAL EXAM: Primary | ICD-10-CM

## 2024-12-09 DIAGNOSIS — D63.1 ANEMIA DUE TO STAGE 3B CHRONIC KIDNEY DISEASE (HCC): ICD-10-CM

## 2024-12-09 DIAGNOSIS — E79.0 ELEVATED URIC ACID IN BLOOD: ICD-10-CM

## 2024-12-09 PROCEDURE — G0008 ADMIN INFLUENZA VIRUS VAC: HCPCS | Performed by: FAMILY MEDICINE

## 2024-12-09 PROCEDURE — 90653 IIV ADJUVANT VACCINE IM: CPT | Performed by: FAMILY MEDICINE

## 2024-12-09 PROCEDURE — 99397 PER PM REEVAL EST PAT 65+ YR: CPT | Performed by: FAMILY MEDICINE

## 2024-12-09 RX ORDER — GABAPENTIN 300 MG/1
CAPSULE ORAL
Qty: 60 CAPSULE | Refills: 11 | Status: SHIPPED | OUTPATIENT
Start: 2024-12-09 | End: 2025-12-20

## 2024-12-09 RX ORDER — ALLOPURINOL 100 MG/1
100 TABLET ORAL DAILY
Qty: 30 TABLET | Refills: 11 | Status: SHIPPED | OUTPATIENT
Start: 2024-12-09

## 2024-12-09 RX ORDER — ASCORBIC ACID 500 MG
TABLET ORAL
Qty: 30 TABLET | Refills: 11 | Status: SHIPPED | OUTPATIENT
Start: 2024-12-09

## 2024-12-09 ASSESSMENT — ENCOUNTER SYMPTOMS
RHINORRHEA: 0
CHEST TIGHTNESS: 0
CONSTIPATION: 0
SORE THROAT: 0
BLOOD IN STOOL: 0
NAUSEA: 0
BACK PAIN: 0
SHORTNESS OF BREATH: 0
COUGH: 0
ABDOMINAL PAIN: 0
WHEEZING: 0
DIARRHEA: 0
VOMITING: 0
EYE PAIN: 0

## 2024-12-09 NOTE — PROGRESS NOTES
2024    Debbie Zavaleta (:  1960) is a 64 y.o. female, here for a preventive medicine evaluation.    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[facility])    Depression    Edema of lower extremity    ESR raised    GERD (gastroesophageal reflux disease)    Muscle weakness    History of right knee joint replacement    Current moderate episode of major depressive disorder without prior episode ([facility])    Overflow incontinence    Stage 3 chronic kidney disease, unspecified whether stage 3a or 3b CKD ([facility])    Chronic renal disease, stage III ([facility]) [320674]    CKD (chronic kidney disease) stage 4, GFR 15-29 ml/min ([facility])    Psoriatic arthritis ([facility])    Cyclothymia    Therapeutic drug monitoring    Opioid dependence with current use ([facility])       Review of Systems   Constitutional:  Negative for chills, fatigue, fever and unexpected weight change.   HENT:  Negative for congestion, ear pain, rhinorrhea and sore throat.    Eyes:  Negative for pain and visual disturbance.   Respiratory:  Negative for cough, chest tightness, shortness of breath and wheezing.    Cardiovascular:  Negative for chest pain and palpitations.   Gastrointestinal:  Negative for abdominal pain, blood in stool, constipation, diarrhea, nausea and vomiting.   Genitourinary:  Negative for difficulty urinating, frequency, hematuria and urgency.   Musculoskeletal:  Negative for back pain, joint swelling, myalgias and neck pain.   Skin:  Negative for rash.   Neurological:  Negative for dizziness and headaches.   Hematological:  Negative for adenopathy. Does not bruise/bleed easily.   Psychiatric/Behavioral:  Negative for behavioral problems and sleep disturbance. The

## 2024-12-09 NOTE — PROGRESS NOTES
Vaccine Information Sheet, \"Influenza - Inactivated\"  given to Debbie Zavaleta, or parent/legal guardian of  Debbie Zavaleta and verbalized understanding.    Patient responses:    Have you ever had a reaction to a flu vaccine? No  Do you have an allergy to eggs, neomycin or polymixin?  No  Do you have an allergy to Thimerosal, contact lens solution, or Merthiolate? No  Have you ever had Guillian Jordan Syndrome?  No  Do you have any current illness?  No  Do you have a temperature above 100 degrees? No  Are you pregnant? No  If pregnant, permission obtained from physician? No  Do you have an active neurological disorder? No      Flu vaccine given per order. Please see immunization tab.     Immunizations Administered       Name Date Dose Route    Influenza, FLUAD, (age 65 y+), IM, Trivalent PF, 0.5mL 12/9/2024 0.5 mL Intramuscular    Site: Deltoid- Right    Lot: [id number]    NDC: 54252-916-59

## 2024-12-09 NOTE — ASSESSMENT & PLAN NOTE
Chronic, at goal (stable), continue current treatment plan    Orders:    gabapentin (NEURONTIN) 300 MG capsule; take 1 capsule by mouth twice a day    Misc. Devices (WALKER) MISC; Rollator, rolling walker with seats, brakes,  Basket.  G71.00Rollator, rolling walker with seats, brakes,  Basket  G71.00    Controlled Substance Monitoring:    Acute and Chronic Pain Monitoring:   RX Monitoring Periodic Controlled Substance Monitoring   12/9/2024   2:21 PM Possible medication side effects, risk of tolerance/dependence & alternative treatments discussed.;No signs of potential drug abuse or diversion identified.;Obtaining appropriate analgesic effect of treatment.

## 2024-12-24 ENCOUNTER — HOSPITAL ENCOUNTER (OUTPATIENT)
Dept: NURSING | Age: 64
Discharge: HOME | End: 2024-12-24
Payer: MEDICARE

## 2024-12-24 VITALS
HEART RATE: 70 BPM | RESPIRATION RATE: 18 BRPM | BODY MASS INDEX: 31.46 KG/M2 | WEIGHT: 185 LBS | DIASTOLIC BLOOD PRESSURE: 72 MMHG | SYSTOLIC BLOOD PRESSURE: 141 MMHG | OXYGEN SATURATION: 95 % | TEMPERATURE: 97.8 F

## 2024-12-24 DIAGNOSIS — L40.50 PSORIATIC ARTHRITIS (HCC): Primary | ICD-10-CM

## 2024-12-24 PROCEDURE — 2580000003 HC RX 258: Performed by: FAMILY MEDICINE

## 2024-12-24 PROCEDURE — 6360000002 HC RX W HCPCS: Performed by: FAMILY MEDICINE

## 2024-12-24 PROCEDURE — 96413 CHEMO IV INFUSION 1 HR: CPT

## 2024-12-24 RX ORDER — ACETAMINOPHEN 325 MG/1
650 TABLET ORAL ONCE
Start: 2025-02-04 | End: 2025-02-04

## 2024-12-24 RX ORDER — DIPHENHYDRAMINE HYDROCHLORIDE 50 MG/ML
50 INJECTION INTRAMUSCULAR; INTRAVENOUS
OUTPATIENT
Start: 2025-02-04

## 2024-12-24 RX ORDER — DIPHENHYDRAMINE HCL 25 MG
25 TABLET ORAL EVERY 6 HOURS PRN
Status: DISCONTINUED | OUTPATIENT
Start: 2024-12-24 | End: 2024-12-25 | Stop reason: HOSPADM

## 2024-12-24 RX ORDER — SODIUM CHLORIDE 9 MG/ML
INJECTION, SOLUTION INTRAVENOUS CONTINUOUS
Status: DISCONTINUED | OUTPATIENT
Start: 2024-12-24 | End: 2024-12-25 | Stop reason: HOSPADM

## 2024-12-24 RX ORDER — HYDROCORTISONE SODIUM SUCCINATE 100 MG/2ML
100 INJECTION INTRAMUSCULAR; INTRAVENOUS
OUTPATIENT
Start: 2025-02-04

## 2024-12-24 RX ORDER — SODIUM CHLORIDE 9 MG/ML
INJECTION, SOLUTION INTRAVENOUS CONTINUOUS
Start: 2025-02-04

## 2024-12-24 RX ORDER — ACETAMINOPHEN 325 MG/1
650 TABLET ORAL ONCE
Status: DISCONTINUED | OUTPATIENT
Start: 2024-12-24 | End: 2024-12-25 | Stop reason: HOSPADM

## 2024-12-24 RX ORDER — DIPHENHYDRAMINE HCL 25 MG
25 TABLET ORAL EVERY 6 HOURS PRN
Start: 2025-02-04

## 2024-12-24 RX ORDER — SODIUM CHLORIDE 0.9 % (FLUSH) 0.9 %
5-40 SYRINGE (ML) INJECTION PRN
OUTPATIENT
Start: 2025-02-04

## 2024-12-24 RX ORDER — SODIUM CHLORIDE 9 MG/ML
5-250 INJECTION, SOLUTION INTRAVENOUS PRN
OUTPATIENT
Start: 2025-02-04

## 2024-12-24 RX ORDER — EPINEPHRINE 1 MG/ML
0.3 INJECTION, SOLUTION INTRAMUSCULAR; SUBCUTANEOUS PRN
OUTPATIENT
Start: 2025-02-04

## 2024-12-24 RX ORDER — SODIUM CHLORIDE 9 MG/ML
INJECTION, SOLUTION INTRAVENOUS CONTINUOUS
OUTPATIENT
Start: 2025-02-04

## 2024-12-24 RX ORDER — HEPARIN 100 UNIT/ML
500 SYRINGE INTRAVENOUS PRN
OUTPATIENT
Start: 2025-02-04

## 2024-12-24 RX ADMIN — INFLIXIMAB 300 MG: 100 INJECTION, POWDER, LYOPHILIZED, FOR SOLUTION INTRAVENOUS at 10:05

## 2024-12-24 ASSESSMENT — PAIN SCALES - GENERAL: PAINLEVEL_OUTOF10: 5

## 2024-12-24 ASSESSMENT — PAIN DESCRIPTION - DESCRIPTORS: DESCRIPTORS: ACHING

## 2024-12-24 ASSESSMENT — PAIN DESCRIPTION - LOCATION: LOCATION: GENERALIZED

## 2024-12-24 NOTE — DISCHARGE INSTRUCTIONS
REMICADE DISCHARGE INSTRUCTION SHEET      Can not have an active infection or be on an antibiotic at time of infusion.       SIDE EFFECTS: RASH, COUGH, JOINT PAIN, SWELLING TO HANDS/FEET, SORE THROAT, FEVER      Call your doctor or return to the nearest Emergency Room if you start having shortness of breath, chest tightness, wheezing      Next Remicade infusion is scheduled on: February 4 at 9:30      Please call [phone] with any questions or concerns.    no ROM deficits were identified

## 2024-12-24 NOTE — PROGRESS NOTES
0921 Patient arrived to [facility] ambulatory for remicade infusion.  Oriented to room and call light  PT RIGHTS AND RESPONSIBILITIES OFFERED TO PT.  She denies recent infection or antibiotic use.  Patient refusing pre-medications     1005 Medication started and she denies complaints    1117 Medication completed and she denies complaints.  Iv removed.  Discharge instructions given and explained and she denies questions.  Discharged ambulatory       _M___ Safety:       (Environmental)  [place] to environment  Ensure ID band is correct and in place/ allergy band as needed  Assess for fall risk  Initiate fall precautions as applicable (fall band, side rails, etc.)  Call light within reach  Bed in low position/ wheels locked    __M__ Pain:       Assess pain level and characteristics  Administer analgesics as ordered  Assess effectiveness of pain management and report to MD as needed    _M___ Knowledge Deficit:  Assess baseline knowledge  Provide teaching at level of understanding  Provide teaching via preferred learning method  Evaluate teaching effectiveness    _M___ Hemodynamic/Respiratory Status:       (Pre and Post Procedure Monitoring)  Assess/Monitor vital signs and LOC  Assess Baseline SpO2 prior to any sedation  Obtain weight/height  Assess vital signs/ LOC until patient meets discharge criteria  Monitor procedure site and notify MD of any issues

## 2025-01-06 DIAGNOSIS — N18.32 CHRONIC KIDNEY DISEASE, STAGE 3B (HCC): Primary | ICD-10-CM

## 2025-01-06 DIAGNOSIS — N26.1 RIGHT RENAL ATROPHY: ICD-10-CM

## 2025-01-07 ENCOUNTER — HOSPITAL ENCOUNTER (OUTPATIENT)
Dept: MAMMOGRAPHY | Age: 65
Discharge: HOME | End: 2025-01-07
Payer: MEDICARE

## 2025-01-07 VITALS — BODY MASS INDEX: 31.58 KG/M2 | WEIGHT: 185 LBS | HEIGHT: 64 IN

## 2025-01-07 DIAGNOSIS — N26.1 RIGHT RENAL ATROPHY: ICD-10-CM

## 2025-01-07 DIAGNOSIS — N18.32 CHRONIC KIDNEY DISEASE, STAGE 3B (HCC): ICD-10-CM

## 2025-01-07 DIAGNOSIS — Z12.31 ENCOUNTER FOR SCREENING MAMMOGRAM FOR MALIGNANT NEOPLASM OF BREAST: ICD-10-CM

## 2025-01-07 LAB
ANION GAP SERPL CALC-SCNC: 13 MEQ/L (ref 8–16)
BUN SERPL-MCNC: 15 MG/DL (ref 7–22)
CALCIUM SERPL-MCNC: 9.8 MG/DL (ref 8.5–10.5)
CHLORIDE SERPL-SCNC: 100 MEQ/L (ref 98–111)
CO2 SERPL-SCNC: 26 MEQ/L (ref 23–33)
CREAT SERPL-MCNC: 1.3 MG/DL (ref 0.4–1.2)
GFR SERPL CREATININE-BSD FRML MDRD: 45 ML/MIN/1.73M2
GLUCOSE SERPL-MCNC: 74 MG/DL (ref 70–108)
POTASSIUM SERPL-SCNC: 4.1 MEQ/L (ref 3.5–5.2)
SODIUM SERPL-SCNC: 139 MEQ/L (ref 135–145)

## 2025-01-07 PROCEDURE — 77063 BREAST TOMOSYNTHESIS BI: CPT

## 2025-01-07 PROCEDURE — 80048 BASIC METABOLIC PNL TOTAL CA: CPT

## 2025-01-07 PROCEDURE — 36415 COLL VENOUS BLD VENIPUNCTURE: CPT

## 2025-01-08 ENCOUNTER — TELEPHONE (OUTPATIENT)
Dept: FAMILY MEDICINE CLINIC | Age: 65
End: 2025-01-08

## 2025-01-08 NOTE — TELEPHONE ENCOUNTER
----- Message from Dr. David Davis MD sent at 1/8/2025  6:56 AM EST -----  Normal mammogram.  Continue yearly screenings.

## 2025-01-10 ENCOUNTER — HOSPITAL ENCOUNTER (EMERGENCY)
Age: 65
Discharge: HOME | End: 2025-01-10
Payer: MEDICARE

## 2025-01-10 VITALS
BODY MASS INDEX: 32.27 KG/M2 | DIASTOLIC BLOOD PRESSURE: 79 MMHG | HEIGHT: 64 IN | WEIGHT: 189 LBS | HEART RATE: 70 BPM | SYSTOLIC BLOOD PRESSURE: 173 MMHG | TEMPERATURE: 97.7 F | OXYGEN SATURATION: 98 % | RESPIRATION RATE: 16 BRPM

## 2025-01-10 DIAGNOSIS — B37.31 YEAST INFECTION OF THE VAGINA: Primary | ICD-10-CM

## 2025-01-10 DIAGNOSIS — K52.9 GASTROENTERITIS: ICD-10-CM

## 2025-01-10 LAB
BILIRUB UR STRIP.AUTO-MCNC: NEGATIVE MG/DL
CHARACTER UR: CLEAR
COLOR, UA: YELLOW
GLUCOSE UR QL STRIP.AUTO: NEGATIVE MG/DL
KETONES UR QL STRIP.AUTO: NEGATIVE
NITRITE UR QL STRIP.AUTO: NEGATIVE
PH UR STRIP.AUTO: 6 [PH] (ref 5–9)
PROT UR STRIP.AUTO-MCNC: NEGATIVE MG/DL
RBC #/AREA URNS HPF: NEGATIVE /[HPF]
SP GR UR STRIP.AUTO: 1.01 (ref 1–1.03)
UROBILINOGEN, URINE: 0.2 EU/DL (ref 0.2–1)
WBC #/AREA URNS HPF: NEGATIVE /[HPF]

## 2025-01-10 PROCEDURE — 99213 OFFICE O/P EST LOW 20 MIN: CPT

## 2025-01-10 PROCEDURE — 81003 URINALYSIS AUTO W/O SCOPE: CPT

## 2025-01-10 RX ORDER — ONDANSETRON 4 MG/1
4 TABLET, ORALLY DISINTEGRATING ORAL 3 TIMES DAILY PRN
Qty: 21 TABLET | Refills: 0 | Status: SHIPPED | OUTPATIENT
Start: 2025-01-10

## 2025-01-10 RX ORDER — FLAVOXATE HYDROCHLORIDE 100 MG/1
100 TABLET ORAL 3 TIMES DAILY PRN
COMMUNITY
End: 2025-01-13 | Stop reason: SINTOL

## 2025-01-10 RX ORDER — HYDROCODONE BITARTRATE AND ACETAMINOPHEN 5; 325 MG/1; MG/1
1 TABLET ORAL EVERY 6 HOURS PRN
COMMUNITY
End: 2025-01-13 | Stop reason: SDUPTHER

## 2025-01-10 RX ORDER — NYSTATIN 100000 U/G
CREAM TOPICAL
Qty: 30 G | Refills: 0 | Status: SHIPPED | OUTPATIENT
Start: 2025-01-10 | End: 2025-01-20

## 2025-01-10 RX ORDER — CYCLOBENZAPRINE HCL 10 MG
100 TABLET ORAL 3 TIMES DAILY PRN
COMMUNITY
End: 2025-01-10

## 2025-01-10 ASSESSMENT — PAIN DESCRIPTION - ORIENTATION: ORIENTATION_2: RIGHT;LEFT

## 2025-01-10 ASSESSMENT — ENCOUNTER SYMPTOMS
DIARRHEA: 1
NAUSEA: 1
ABDOMINAL PAIN: 1

## 2025-01-10 ASSESSMENT — PAIN DESCRIPTION - LOCATION
LOCATION: OTHER (COMMENT)
LOCATION_2: BACK

## 2025-01-10 ASSESSMENT — PAIN - FUNCTIONAL ASSESSMENT: PAIN_FUNCTIONAL_ASSESSMENT: 0-10

## 2025-01-10 ASSESSMENT — PAIN SCALES - GENERAL: PAINLEVEL_OUTOF10: 8

## 2025-01-10 ASSESSMENT — PAIN DESCRIPTION - DESCRIPTORS: DESCRIPTORS_2: ACHING

## 2025-01-10 ASSESSMENT — PAIN DESCRIPTION - INTENSITY: RATING_2: 8

## 2025-01-10 NOTE — DISCHARGE INSTRUCTIONS
Push fluids  King diet  Wipe front to back  Dab urine away do not wipe at this time  Try to decrease pad usage  Keep skin dry  Zofran as needed for nausea  Follow up with PCP - I think a pelvic floor therapist might be helpful for incontinence  ER if symptoms worsen

## 2025-01-10 NOTE — ED PROVIDER NOTES
[facility] [place] EMERGENCY DEPARTMENT  Urgent Care Encounter       CHIEF COMPLAINT       Chief Complaint   Patient presents with    Dysuria    Back Pain     Onset x 2 days       Nurses Notes reviewed and I agree except as noted in the HPI.  HISTORY OF PRESENT ILLNESS   Debbie Zavaleta is a 64 y.o. female who presents with complaints of burning with urination, lower back pain, and vaginal skin irritation that started two days ago. Pt reports that she recently started using scented fabric sheets but otherwise has stayed on same routine. Patient reports that just sitting down her labia are burning. Pt denies seeing a rash. Patient reports that she has not noticed foul smelling urine, or blood in urine. Patient reports that today she started having an upset stomach, and a headache. Pt reports one episode of diarrhea. Patient reports that her neighbor girl had the flu at the beginning of the week. Patient reports that she has frequent incontinence and wears pads or pull up's at all times. Pt reports that she uses a feminine vaginal wash that is PH balanced.       The history is provided by the patient and a friend.       REVIEW OF SYSTEMS     Review of Systems   Gastrointestinal:  Positive for abdominal pain, diarrhea and nausea.   Genitourinary:  Negative for vaginal discharge.        Burning sensation of vagina / and when peeing   All other systems reviewed and are negative.      PAST MEDICAL HISTORY         Diagnosis Date    B12 deficiency 12/2020    Depression     Lymphedema 2008    both legs    MD (muscular dystrophy) ([facility]) 2008    Dr. Hills at [facility] - no lung involvement per patient    Neuropathy     Obesity (BMI 30-39.9)     Overflow incontinence     Rheumatoid arthritis(714.0) 2008    Dr. Ruiz - [place]    Superficial thrombophlebitis 03/2018    right lower extremity    Venous insufficiency     h/o SVT - 3-4 in the past (has never been on Coumadin)       SURGICALHISTORY     Patient  has a past

## 2025-01-10 NOTE — ED TRIAGE NOTES
Pt to room via walker. Friend Yolanda accompanied patient. Pt c/o dysuria and lower back pain x 2 days and concerned she may have a UTI.

## 2025-01-13 ENCOUNTER — TELEPHONE (OUTPATIENT)
Dept: FAMILY MEDICINE CLINIC | Age: 65
End: 2025-01-13

## 2025-01-13 DIAGNOSIS — N32.89 BLADDER SPASMS: ICD-10-CM

## 2025-01-13 DIAGNOSIS — G71.00 MUSCULAR DYSTROPHY (HCC): Primary | ICD-10-CM

## 2025-01-13 RX ORDER — HYDROCODONE BITARTRATE AND ACETAMINOPHEN 5; 325 MG/1; MG/1
1 TABLET ORAL EVERY 8 HOURS PRN
Qty: 90 TABLET | Refills: 0 | Status: SHIPPED | OUTPATIENT
Start: 2025-01-13 | End: 2025-02-12

## 2025-01-13 RX ORDER — OXYBUTYNIN CHLORIDE 5 MG/1
5 TABLET ORAL 2 TIMES DAILY
Qty: 60 TABLET | Refills: 2 | Status: SHIPPED | OUTPATIENT
Start: 2025-01-13

## 2025-01-13 NOTE — TELEPHONE ENCOUNTER
Message noted.  Has she tried ditropan ( oxybutynin), detrol, vesicare, myrbetriq??    Also push to lower/wean narcotics, can she decrease to 3 tabs of norco daily?

## 2025-01-13 NOTE — TELEPHONE ENCOUNTER
She has not tried any others, current one is the only one she has taken.  Pt is agreeable to decreasing norco to 3 per day.

## 2025-01-13 NOTE — TELEPHONE ENCOUNTER
Add ditropan 5 mg BID for spasms.  Stay hydrated, causes dry mouth.   Also [facility] at 3 tabs daily as needed.    Controlled Substance Monitoring:    Acute and Chronic Pain Monitoring:   RX Monitoring Periodic Controlled Substance Monitoring   12/9/2024   2:21 PM Possible medication side effects, risk of tolerance/dependence & alternative treatments discussed.;No signs of potential drug abuse or diversion identified.;Obtaining appropriate analgesic effect of treatment.

## 2025-01-13 NOTE — TELEPHONE ENCOUNTER
Per HIPAA, message left for pt notifying her rx sent to the pharmacy and to stay hydrated as it can cause dry mouth.

## 2025-01-13 NOTE — TELEPHONE ENCOUNTER
Patient asking if there is anything else she can try for the bladder spasms. She said within 30 min of taking the flavoxate, she gets terrible diarrhea.    Also requesting refill of Norco.  Last filled  12/4/24 #120-0  LOV 12/9/24 physical    Uses [facility]

## 2025-01-14 ENCOUNTER — OFFICE VISIT (OUTPATIENT)
Dept: NEPHROLOGY | Age: 65
End: 2025-01-14
Payer: MEDICARE

## 2025-01-14 VITALS
SYSTOLIC BLOOD PRESSURE: 171 MMHG | HEIGHT: 64 IN | DIASTOLIC BLOOD PRESSURE: 79 MMHG | HEART RATE: 54 BPM | WEIGHT: 187 LBS | BODY MASS INDEX: 31.92 KG/M2 | OXYGEN SATURATION: 98 %

## 2025-01-14 DIAGNOSIS — N18.31 CHRONIC KIDNEY DISEASE, STAGE 3A (HCC): Primary | ICD-10-CM

## 2025-01-14 DIAGNOSIS — R03.0 ELEVATED BLOOD PRESSURE READING: ICD-10-CM

## 2025-01-14 PROCEDURE — 99213 OFFICE O/P EST LOW 20 MIN: CPT | Performed by: INTERNAL MEDICINE

## 2025-01-14 PROCEDURE — 1123F ACP DISCUSS/DSCN MKR DOCD: CPT | Performed by: INTERNAL MEDICINE

## 2025-01-14 NOTE — PROGRESS NOTES
release tablet Take 1 tablet by mouth nightly 30 tablet 5    terbinafine (LAMISIL AT ATHLETES FOOT) 1 % cream Apply topically 2 times daily. 42 g 5    INFLIXIMAB IV Infuse intravenously Every 6 weeks      b complex vitamins capsule Take 1 capsule by mouth daily      CRANBERRY PO Take by mouth daily      Probiotic Product (PRO-BIOTIC BLEND PO) Take by mouth daily      Handicap Placard MISC by Does not apply route Request parking placard due to medical conditions.  Duration of 5 years. 1 each 0    omeprazole (PRILOSEC) 40 MG capsule Take 1 capsule by mouth daily. (Patient taking differently: Take 20 mg by mouth daily) 30 capsule 3     No current facility-administered medications for this visit.        Laboratory & Diagnostics:  Old progress notes from referring physician reviewed.  Radiology/US kidneys: Right atrophic kidney/hypoplastic  Echo:   Old labs reviewed:  Aug 2022: K 4.4, creat 1.5  Sept 1.5    Nov 2022: UPCR 180 mg/g, Vit D 93, phos 3.8, creat 1.5  UA: no blood, no protein    Dec 2023: Creat is 1.5, K 4.1, BUN 17  UA: no blood, no protein, UPCR 170 mg/g  Jan 2025: Creat 1.3, K 4.1, UA: no blood, no protein     Impression/Plan:   1. CKD III: likely ?metho induced vs other causes. Stable. Not much proteinuria.  She was on metho for 10+ years. Advised low salt diet, low red meat intake.She reports she did take a lot of ibuprofen in the past. Creat is stable. Overall lytes are stable. BP is high - discussed in detail. She says Bp at home is better. Advised her to call me with some readings in 2 weeks.   2. Hx RA: follows with Rheum in [place]  3. Right atrophic kidney  4. Elevated BP: plan as above. D/W pt in detail.     US reviewed  Oct 2022    Orders Placed This Encounter   Procedures    Basic Metabolic Panel    Protein / creatinine ratio, urine     Return in about 1 year (around 1/14/2026).    Reed Gregorio MD  [facility]

## 2025-01-15 ENCOUNTER — HOSPITAL ENCOUNTER (OUTPATIENT)
Age: 65
Discharge: HOME | End: 2025-01-15
Payer: MEDICARE

## 2025-01-15 ENCOUNTER — TELEPHONE (OUTPATIENT)
Dept: FAMILY MEDICINE CLINIC | Age: 65
End: 2025-01-15

## 2025-01-15 DIAGNOSIS — E79.0 ELEVATED URIC ACID IN BLOOD: ICD-10-CM

## 2025-01-15 DIAGNOSIS — E79.0 ELEVATED URIC ACID IN BLOOD: Primary | ICD-10-CM

## 2025-01-15 LAB — URATE SERPL-MCNC: 7.4 MG/DL (ref 2.4–5.7)

## 2025-01-15 PROCEDURE — 84550 ASSAY OF BLOOD/URIC ACID: CPT

## 2025-01-15 PROCEDURE — 36415 COLL VENOUS BLD VENIPUNCTURE: CPT

## 2025-01-15 NOTE — TELEPHONE ENCOUNTER
Pt never rechecked uric acid level from last Jan. She just saw her Rheumatology in [place] and they are wanting her to get a new level. [facility] so she is wanting to know if ok to place new order for uric acid.

## 2025-01-16 ENCOUNTER — TELEPHONE (OUTPATIENT)
Dept: FAMILY MEDICINE CLINIC | Age: 65
End: 2025-01-16

## 2025-01-16 NOTE — TELEPHONE ENCOUNTER
----- Message from Dr. David Davis MD sent at 1/16/2025  7:03 AM EST -----  Uric acid is better but still high.  Recommend increase allopurinol to 2 tabs daily if agreeable and recheck uric acid level in 6 weeks.

## 2025-01-16 NOTE — TELEPHONE ENCOUNTER
Yolanda, on HIPAA, notified of results.   Pt had an appt with [facility] and she is requesting uric acid results. Rheum discussed with pt about not being on the allopurinol unless level is high. She wants to discuss with Rheum and will call back regarding increasing dose.

## 2025-02-04 ENCOUNTER — HOSPITAL ENCOUNTER (OUTPATIENT)
Dept: NURSING | Age: 65
Discharge: HOME | End: 2025-02-04

## 2025-02-04 NOTE — PROGRESS NOTES
0967 pt arrives ambulatory with walker for remicade infusion. Infusion explained and questions answered. PT RIGHTS AND RESPONSIBILITIES OFFERED TO PT. Pt states she just got new insurance and provided that info to the  at registration. Explained to pt that her medication needs to be reauthorized due to new insurance and that it may take a few days. Explained to pt that we will call her when medication is authorized with new insurance for a reschedule date. Pt verbalized understanding and denies questions or concerns. Pt discharged ambulatory with no complaints.               _m___ Safety:       (Environmental)  [place] to environment  Ensure ID band is correct and in place/ allergy band as needed  Assess for fall risk  Initiate fall precautions as applicable (fall band, side rails, etc.)  Call light within reach  Bed in low position/ wheels locked    __m__ Pain:       Assess pain level and characteristics  Administer analgesics as ordered  Assess effectiveness of pain management and report to MD as needed    __m__ Knowledge Deficit:  Assess baseline knowledge  Provide teaching at level of understanding  Provide teaching via preferred learning method  Evaluate teaching effectiveness    __m__ Hemodynamic/Respiratory Status:       (Pre and Post Procedure Monitoring)  Assess/Monitor vital signs and LOC  Assess Baseline SpO2 prior to any sedation  Obtain weight/height  Assess vital signs/ LOC until patient meets discharge criteria  Monitor procedure site and notify MD of any issues

## 2025-02-11 ENCOUNTER — HOSPITAL ENCOUNTER (OUTPATIENT)
Dept: NURSING | Age: 65
Discharge: HOME | End: 2025-02-11
Payer: COMMERCIAL

## 2025-02-11 VITALS
SYSTOLIC BLOOD PRESSURE: 158 MMHG | HEART RATE: 78 BPM | RESPIRATION RATE: 18 BRPM | WEIGHT: 187 LBS | DIASTOLIC BLOOD PRESSURE: 77 MMHG | BODY MASS INDEX: 32.1 KG/M2 | OXYGEN SATURATION: 95 % | TEMPERATURE: 96.8 F

## 2025-02-11 DIAGNOSIS — G71.00 MUSCULAR DYSTROPHY (HCC): ICD-10-CM

## 2025-02-11 DIAGNOSIS — L40.50 PSORIATIC ARTHRITIS (HCC): Primary | ICD-10-CM

## 2025-02-11 PROCEDURE — 96413 CHEMO IV INFUSION 1 HR: CPT

## 2025-02-11 PROCEDURE — 2580000003 HC RX 258: Performed by: FAMILY MEDICINE

## 2025-02-11 PROCEDURE — 6360000002 HC RX W HCPCS: Performed by: FAMILY MEDICINE

## 2025-02-11 RX ORDER — SODIUM CHLORIDE 9 MG/ML
INJECTION, SOLUTION INTRAVENOUS CONTINUOUS
Status: DISCONTINUED | OUTPATIENT
Start: 2025-02-11 | End: 2025-02-12 | Stop reason: HOSPADM

## 2025-02-11 RX ORDER — SODIUM CHLORIDE 0.9 % (FLUSH) 0.9 %
5-40 SYRINGE (ML) INJECTION PRN
OUTPATIENT
Start: 2025-03-18

## 2025-02-11 RX ORDER — HEPARIN 100 UNIT/ML
500 SYRINGE INTRAVENOUS PRN
OUTPATIENT
Start: 2025-03-18

## 2025-02-11 RX ORDER — SODIUM CHLORIDE 9 MG/ML
INJECTION, SOLUTION INTRAVENOUS CONTINUOUS
OUTPATIENT
Start: 2025-03-18

## 2025-02-11 RX ORDER — DIPHENHYDRAMINE HYDROCHLORIDE 50 MG/ML
50 INJECTION INTRAMUSCULAR; INTRAVENOUS
OUTPATIENT
Start: 2025-03-18

## 2025-02-11 RX ORDER — ACETAMINOPHEN 325 MG/1
650 TABLET ORAL ONCE
Start: 2025-03-18 | End: 2025-03-18

## 2025-02-11 RX ORDER — HYDROCORTISONE SODIUM SUCCINATE 100 MG/2ML
100 INJECTION INTRAMUSCULAR; INTRAVENOUS
OUTPATIENT
Start: 2025-03-18

## 2025-02-11 RX ORDER — EPINEPHRINE 1 MG/ML
0.3 INJECTION, SOLUTION INTRAMUSCULAR; SUBCUTANEOUS PRN
OUTPATIENT
Start: 2025-03-18

## 2025-02-11 RX ORDER — SODIUM CHLORIDE 9 MG/ML
INJECTION, SOLUTION INTRAVENOUS CONTINUOUS
Start: 2025-03-18

## 2025-02-11 RX ORDER — DIPHENHYDRAMINE HCL 25 MG
25 TABLET ORAL ONCE
Status: DISCONTINUED | OUTPATIENT
Start: 2025-02-11 | End: 2025-02-12 | Stop reason: HOSPADM

## 2025-02-11 RX ORDER — HYDROCODONE BITARTRATE AND ACETAMINOPHEN 5; 325 MG/1; MG/1
1 TABLET ORAL EVERY 8 HOURS PRN
Qty: 90 TABLET | Refills: 0 | Status: SHIPPED | OUTPATIENT
Start: 2025-02-11 | End: 2025-03-13

## 2025-02-11 RX ORDER — SODIUM CHLORIDE 9 MG/ML
5-250 INJECTION, SOLUTION INTRAVENOUS PRN
OUTPATIENT
Start: 2025-03-18

## 2025-02-11 RX ORDER — ACETAMINOPHEN 325 MG/1
650 TABLET ORAL ONCE
Status: DISCONTINUED | OUTPATIENT
Start: 2025-02-11 | End: 2025-02-12 | Stop reason: HOSPADM

## 2025-02-11 RX ORDER — DIPHENHYDRAMINE HCL 25 MG
25 TABLET ORAL EVERY 6 HOURS PRN
Start: 2025-03-18

## 2025-02-11 RX ADMIN — INFLIXIMAB 300 MG: 100 INJECTION, POWDER, LYOPHILIZED, FOR SOLUTION INTRAVENOUS at 14:24

## 2025-02-11 ASSESSMENT — PAIN DESCRIPTION - PAIN TYPE: TYPE: CHRONIC PAIN

## 2025-02-11 ASSESSMENT — PAIN SCALES - GENERAL: PAINLEVEL_OUTOF10: 6

## 2025-02-11 ASSESSMENT — PAIN DESCRIPTION - DESCRIPTORS: DESCRIPTORS: ACHING

## 2025-02-11 ASSESSMENT — PAIN DESCRIPTION - LOCATION: LOCATION: GENERALIZED

## 2025-02-11 NOTE — DISCHARGE INSTRUCTIONS
REMICADE DISCHARGE INSTRUCTION SHEET      Can not have an active infection or be on an antibiotic at time of infusion.       SIDE EFFECTS: RASH, COUGH, JOINT PAIN, SWELLING TO HANDS/FEET, SORE THROAT, FEVER      Call your doctor or return to the nearest Emergency Room if you start having shortness of breath, chest tightness, wheezing      Next Remicade infusion is scheduled on: March 25 at 9:30      Please call [phone] with any questions or concerns.

## 2025-02-11 NOTE — PROGRESS NOTES
1252 Patient arrived to [facility] ambulatory for remicade infusion.  Oriented to room and call light  PT RIGHTS AND RESPONSIBILITIES OFFERED TO PT.  She denies recent infection or antibiotic use.  She refused pre-medications      1424 Medication started and she denies complaints    1525 Medication completed and she denies complaints.  Iv removed.  Discharge instructions given and explained and she denies questions.  Discharged ambulatory     _M___ Safety:       (Environmental)  [place] to environment  Ensure ID band is correct and in place/ allergy band as needed  Assess for fall risk  Initiate fall precautions as applicable (fall band, side rails, etc.)  Call light within reach  Bed in low position/ wheels locked    _M___ Pain:       Assess pain level and characteristics  Administer analgesics as ordered  Assess effectiveness of pain management and report to MD as needed    __M__ Knowledge Deficit:  Assess baseline knowledge  Provide teaching at level of understanding  Provide teaching via preferred learning method  Evaluate teaching effectiveness    ____ Hemodynamic/Respiratory Status:       (Pre and Post Procedure Monitoring)  Assess/Monitor vital signs and LOC  Assess Baseline SpO2 prior to any sedation  Obtain weight/height  Assess vital signs/ LOC until patient meets discharge criteria  Monitor procedure site and notify MD of any issues    ____ Infection-Risk of Central Venous Catheter:  Monitor for infection signs and symptoms (catheter site redness, temperature elevation, etc)  Assess for infection risks  Educate regarding infection prevention  Manage central venous catheter (flushes/ dressing changes per protocol)

## 2025-02-11 NOTE — TELEPHONE ENCOUNTER
Ep'ed norco to pharm requested    Controlled Substance Monitoring:    Acute and Chronic Pain Monitoring:   RX Monitoring Periodic Controlled Substance Monitoring   2/11/2025  12:16 PM No signs of potential drug abuse or diversion identified.

## 2025-02-11 NOTE — TELEPHONE ENCOUNTER
Debbie Zavaleta called requesting a refill on the following medications:  Requested Prescriptions     Pending Prescriptions Disp Refills    HYDROcodone-acetaminophen (NORCO) 5-325 MG per tablet 90 tablet 0     Sig: Take 1 tablet by mouth every 8 hours as needed for Pain for up to 30 days. Max Daily Amount: 3 tablets       Date of last visit: 12/9/2024  Date of next visit (if applicable):Visit date not found  Date of last refill: 1/13/25  [facility]

## 2025-03-12 DIAGNOSIS — G71.00 MUSCULAR DYSTROPHY (HCC): ICD-10-CM

## 2025-03-12 RX ORDER — HYDROCODONE BITARTRATE AND ACETAMINOPHEN 5; 325 MG/1; MG/1
1 TABLET ORAL EVERY 8 HOURS PRN
Qty: 90 TABLET | Refills: 0 | Status: SHIPPED | OUTPATIENT
Start: 2025-03-12 | End: 2025-04-11

## 2025-03-12 NOTE — TELEPHONE ENCOUNTER
Ep'ed norco to pharm requested    Controlled Substance Monitoring:    Acute and Chronic Pain Monitoring:   RX Monitoring Periodic Controlled Substance Monitoring   3/12/2025  10:04 AM No signs of potential drug abuse or diversion identified.

## 2025-03-12 NOTE — TELEPHONE ENCOUNTER
Debbie Zavaleta called requesting a refill on the following medications:  Requested Prescriptions     Pending Prescriptions Disp Refills    HYDROcodone-acetaminophen (NORCO) 5-325 MG per tablet 90 tablet 0     Sig: Take 1 tablet by mouth every 8 hours as needed for Pain for up to 30 days. Max Daily Amount: 3 tablets       Date of last visit: 12/9/2024  Date of next visit (if applicable):Visit date not found  Date of last refill: 2/11/25  Pharm[facility] Name: [facility]

## 2025-03-25 ENCOUNTER — TELEPHONE (OUTPATIENT)
Dept: FAMILY MEDICINE CLINIC | Age: 65
End: 2025-03-25

## 2025-03-25 ENCOUNTER — APPOINTMENT (OUTPATIENT)
Dept: INTERVENTIONAL RADIOLOGY/VASCULAR | Age: 65
End: 2025-03-25
Payer: COMMERCIAL

## 2025-03-25 ENCOUNTER — HOSPITAL ENCOUNTER (EMERGENCY)
Age: 65
Discharge: HOME | End: 2025-03-25
Payer: COMMERCIAL

## 2025-03-25 ENCOUNTER — HOSPITAL ENCOUNTER (OUTPATIENT)
Dept: NURSING | Age: 65
Discharge: HOME | End: 2025-03-25

## 2025-03-25 VITALS
RESPIRATION RATE: 14 BRPM | SYSTOLIC BLOOD PRESSURE: 165 MMHG | HEART RATE: 54 BPM | OXYGEN SATURATION: 97 % | DIASTOLIC BLOOD PRESSURE: 68 MMHG

## 2025-03-25 DIAGNOSIS — B37.31 VAGINAL CANDIDIASIS: Primary | ICD-10-CM

## 2025-03-25 DIAGNOSIS — L03.116 CELLULITIS OF LEFT LOWER EXTREMITY: Primary | ICD-10-CM

## 2025-03-25 PROCEDURE — 93971 EXTREMITY STUDY: CPT

## 2025-03-25 PROCEDURE — 99284 EMERGENCY DEPT VISIT MOD MDM: CPT

## 2025-03-25 RX ORDER — CEPHALEXIN 500 MG/1
500 CAPSULE ORAL 4 TIMES DAILY
Qty: 28 CAPSULE | Refills: 0 | Status: SHIPPED | OUTPATIENT
Start: 2025-03-25 | End: 2025-04-01

## 2025-03-25 RX ORDER — KETOCONAZOLE 20 MG/ML
SHAMPOO, SUSPENSION TOPICAL
Refills: 10 | OUTPATIENT
Start: 2025-03-25

## 2025-03-25 RX ORDER — FLUCONAZOLE 150 MG/1
TABLET ORAL
Qty: 2 TABLET | Refills: 0 | Status: SHIPPED | OUTPATIENT
Start: 2025-03-25 | End: 2025-03-26

## 2025-03-25 NOTE — DISCHARGE INSTRUCTIONS
If given narcotics (opiates) during this Emergency Department visit, you should not drink, drive or operate any machinery for at least 6 hours.    Follow-up with your PCP in a week's time to monitor for resolution and to receive probiotics as needed.  Be on the look out for any fever chills, worsening pain, increasing redness and swelling.  If any of these present return to the ED immediately.    Take your medication as indicated and prescribed.  If you are given an antibiotic then, make sure you get the prescription filled and take the antibiotics until finished.  Drink plenty of water while taking the antibiotics.  Avoid drinking alcohol or drinks that have caffeine in it while taking antibiotics.       For pain use acetaminophen (Tylenol) or ibuprofen (Motrin / Advil), unless prescribed medications that have acetaminophen or ibuprofen (or similar medications) in it.  You can take over the counter acetaminophen tablets (1 - 2 tablets of the 500-mg strength every 6 hours) or ibuprofen tablets (2 tablets every 4 hours).    PLEASE RETURN TO THE EMERGENCY DEPARTMENT IMMEDIATELY for worsening symptoms, white drainage from the wound, redness or streaking, or if you develop any concerning symptoms such as: high fever not relieved by acetaminophen (Tylenol) and/or ibuprofen (Motrin / Advil), chills, shortness of breath, chest pain, feeling of your heart fluttering or racing, persistent nausea and/or vomiting, vomiting up blood, blood in your stool, loss of consciousness, numbness, weakness or tingling in the arms or legs or change in color of the extremities, changes in mental status, persistent headache, blurry vision, loss of bladder / bowel control, unable to follow up with your physician, or other any other care or concern.

## 2025-03-25 NOTE — TELEPHONE ENCOUNTER
Pt called in asking why she didn't get medication for yeast infection to take along with antibiotic. Pt feels as though to much antibiotic was prescribed to her and she will need that medication and would like [facility] to [facility].      Pt has vv appt tomorrow morning

## 2025-03-25 NOTE — DISCHARGE INSTRUCTIONS
REMICADE DISCHARGE INSTRUCTION SHEET      Can not have an active infection or be on an antibiotic at time of infusion.       SIDE EFFECTS: RASH, COUGH, JOINT PAIN, SWELLING TO HANDS/FEET, SORE THROAT, FEVER      Call your doctor or return to the nearest Emergency Room if you start having shortness of breath, chest tightness, wheezing      Next Remicade infusion is scheduled on: TUESDAY MAY 6TH, 2025 AT 9:30AM      Please call [phone] with any questions or concerns.

## 2025-03-25 NOTE — ED NOTES
Pt to the ED via self. Patient presents with complaints of LLE swelling, redness and pain. Patient states that she has a history of blood clots. Patient denies taking any anticoagulants a this time. Patient is alert and oriented x 4. Respirations are regular and unlabored. Call light within reach.

## 2025-03-25 NOTE — ED PROVIDER NOTES
[facility] EMERGENCY DEPARTMENT      EMERGENCY MEDICINE     Pt Name: Debbie Zavaleta  MRN: [id number]  Birthdate 1960  Date of evaluation: 3/25/2025  Provider: Belgica Cedeño PA-C    CHIEF COMPLAINT       Chief Complaint   Patient presents with    Leg Swelling     left     HISTORY OF PRESENT ILLNESS   Debbie Zavaleta is a pleasant 65 y.o. female who presents to the emergency department from from home, by private vehicle for evaluation of left lower leg pain.    Patient presents to the ED with her friend.  She reports of left lower leg swelling that began this morning.  Left leg is hot to touch, red, swollen, and not alleviated by gabapentin and Norco.  Patient has a history of muscular dystrophy in which she is seeing a NP in [facility] for her.  Patient denies being on any current anticoagulants.  Patient has been on Eliquis in the past due to history of DVT.  Patient denies any recent surgery within 12 weeks.  Pain worsens to touch and walking.  Patient states she feels a superficial mass on the anterior medial side of her left lower extremity.  Otherwise patient denies chest pain, difficulty breathing, fever chills, recent sickness, sick contacts, abdominal pain, constipation/diarrhea, dysuria/polyuria.    PASTMEDICAL HISTORY     Past Medical History:   Diagnosis Date    B12 deficiency 12/2020    Depression     Lymphedema 2008    both legs    MD (muscular dystrophy) ([facility]) 2008    Dr. Hills at [facility] - no lung involvement per patient    Neuropathy     Obesity (BMI 30-39.9)     Overflow incontinence     Rheumatoid arthritis(714.0) 2008    Dr. Ruiz - [place]    Superficial thrombophlebitis 03/2018    right lower extremity    Venous insufficiency     h/o SVT - 3-4 in the past (has never been on Coumadin)       Patient Active Problem List   Diagnosis Code    Venous insufficiency I87.2    Rheumatoid arthritis ([facility]) M06.9    Lymphedema I89.0    Obesity (BMI 30-39.9) E66.9    Muscular dystrophy ([facility]) G71.00

## 2025-03-25 NOTE — PROGRESS NOTES
0995 Pt arrives ambulatory with walker for remicade infusion. Pt states she thinks she has a blood clot in left leg. Pt is having pain, slight redness and slight swelling of left leg. Pt states she has a hx of 2 blood clots in right leg 2 years ago and states her left leg feels the same as it did with the previous blood clots. Pt states she does not take any blood thinner medication. Advised pt she needs to be evaluated by either PCP or ER. Pt states she would rather go to ER. Pt to ER via wheelchair.   [id number] pts emergency contact Yolanda called and notified pt was taken to ER and to call ER with any questions as pt does not have cellphone.              _m___ Safety:       (Environmental)  [place] to environment  Ensure ID band is correct and in place/ allergy band as needed  Assess for fall risk  Initiate fall precautions as applicable (fall band, side rails, etc.)  Call light within reach  Bed in low position/ wheels locked    _m___ Pain:       Assess pain level and characteristics  Administer analgesics as ordered  Assess effectiveness of pain management and report to MD as needed    _m___ Knowledge Deficit:  Assess baseline knowledge  Provide teaching at level of understanding  Provide teaching via preferred learning method  Evaluate teaching effectiveness    __m__ Hemodynamic/Respiratory Status:       (Pre and Post Procedure Monitoring)  Assess/Monitor vital signs and LOC  Assess Baseline SpO2 prior to any sedation  Obtain weight/height  Assess vital signs/ LOC until patient meets discharge criteria  Monitor procedure site and notify MD of any issues

## 2025-03-25 NOTE — TELEPHONE ENCOUNTER
Debbie Zavaleta called requesting a refill on the following medications:  Requested Prescriptions     Pending Prescriptions Disp Refills    ketoconazole (NIZORAL) 2 % shampoo [Pharmacy Med Name: KETOCONAZOLE 2% SHAMPOO]  10     Sig: SHAMPOO DAILY IF NEEDED       Date of last visit: 12/9/2024  Date of next visit (if applicable):Visit date not found  Date of last refill: 03/05/2024  Pharmacy Name: Ger[facility] CMA ([facility])    Pt is due for AWV, please contact her to schedule.   Does she need a refill of this medication?

## 2025-03-26 ENCOUNTER — TELEMEDICINE (OUTPATIENT)
Dept: FAMILY MEDICINE CLINIC | Age: 65
End: 2025-03-26
Payer: COMMERCIAL

## 2025-03-26 DIAGNOSIS — B35.4 TINEA CORPORIS: ICD-10-CM

## 2025-03-26 DIAGNOSIS — L03.116 CELLULITIS OF LEFT LOWER LEG: Primary | ICD-10-CM

## 2025-03-26 DIAGNOSIS — G71.00 MD (MUSCULAR DYSTROPHY) (HCC): ICD-10-CM

## 2025-03-26 PROCEDURE — 1160F RVW MEDS BY RX/DR IN RCRD: CPT | Performed by: FAMILY MEDICINE

## 2025-03-26 PROCEDURE — 1090F PRES/ABSN URINE INCON ASSESS: CPT | Performed by: FAMILY MEDICINE

## 2025-03-26 PROCEDURE — 3017F COLORECTAL CA SCREEN DOC REV: CPT | Performed by: FAMILY MEDICINE

## 2025-03-26 PROCEDURE — 1123F ACP DISCUSS/DSCN MKR DOCD: CPT | Performed by: FAMILY MEDICINE

## 2025-03-26 PROCEDURE — 99214 OFFICE O/P EST MOD 30 MIN: CPT | Performed by: FAMILY MEDICINE

## 2025-03-26 PROCEDURE — G8427 DOCREV CUR MEDS BY ELIG CLIN: HCPCS | Performed by: FAMILY MEDICINE

## 2025-03-26 PROCEDURE — G2211 COMPLEX E/M VISIT ADD ON: HCPCS | Performed by: FAMILY MEDICINE

## 2025-03-26 PROCEDURE — G8399 PT W/DXA RESULTS DOCUMENT: HCPCS | Performed by: FAMILY MEDICINE

## 2025-03-26 PROCEDURE — 1159F MED LIST DOCD IN RCRD: CPT | Performed by: FAMILY MEDICINE

## 2025-03-26 RX ORDER — KETOCONAZOLE 20 MG/ML
SHAMPOO, SUSPENSION TOPICAL
Qty: 120 ML | Refills: 11 | Status: SHIPPED | OUTPATIENT
Start: 2025-03-26

## 2025-03-26 ASSESSMENT — PATIENT HEALTH QUESTIONNAIRE - PHQ9
SUM OF ALL RESPONSES TO PHQ QUESTIONS 1-9: 0
2. FEELING DOWN, DEPRESSED OR HOPELESS: NOT AT ALL
SUM OF ALL RESPONSES TO PHQ QUESTIONS 1-9: 0
SUM OF ALL RESPONSES TO PHQ QUESTIONS 1-9: 0
1. LITTLE INTEREST OR PLEASURE IN DOING THINGS: NOT AT ALL
SUM OF ALL RESPONSES TO PHQ QUESTIONS 1-9: 0

## 2025-03-26 ASSESSMENT — ENCOUNTER SYMPTOMS
NAUSEA: 0
CHEST TIGHTNESS: 0
ABDOMINAL PAIN: 0
WHEEZING: 0
CONSTIPATION: 0
SHORTNESS OF BREATH: 0
EYE PAIN: 0
BACK PAIN: 0
DIARRHEA: 0
SORE THROAT: 0
BLOOD IN STOOL: 0
VOMITING: 0
RHINORRHEA: 0
COUGH: 0

## 2025-03-26 NOTE — PROGRESS NOTES
Debbie Zavaleta, was evaluated through a synchronous (real-time) audio-video encounter. The patient (or guardian if applicable) is aware that this is a billable service, which includes applicable co-pays. This Virtual Visit was conducted with patient's (and/or legal guardian's) consent. Patient identification was verified, and a caregiver was present when appropriate.   The patient was located at Home: [address] [place] [id number]  Provider was located at Facility (Appt Dept): [address] [place], [place] [id number]  Confirm you are appropriately licensed, registered, or certified to deliver care in the state where the patient is located as indicated above. If you are not or unsure, please re-schedule the visit: Yes, I confirm.     Debbie Zavaleta (:  1960) is a Established patient, presenting virtually for evaluation of the following:      Below is the assessment and plan developed based on review of pertinent history, physical exam, labs, studies, and medications.     Assessment & Plan  Cellulitis of left lower leg   Acute condition, new,  finish keflex  -monitor sxs, call if not improving         Tinea corporis   Chronic, at goal (stable), continue current treatment plan    Orders:    ketoconazole (NIZORAL) 2 % shampoo; Apply topically daily as needed.    MD (muscular dystrophy) ([facility])   Monitored by specialist- no acute findings meriting change in the plan           No follow-ups on file.       Subjective   HPI  Pt Seen today due to follow up from lower leg cellulitis, doppler was negative for clotting.  Given keflex 500 mg QID x 7 days.  We added diflucan for yeast.  Also needs refill of nizoral shampoo.  Reviewed BMI of 32.  Encouraged diet, exercise and weight loss.  Single, non smoker, pmh reviewed.    Review of Systems   Constitutional:  Negative for chills, fatigue, fever and unexpected weight change.   HENT:  Negative for congestion, ear pain, rhinorrhea and sore throat.    Eyes:

## 2025-03-26 NOTE — PROGRESS NOTES
Documentation:  I communicated with the patient and/or health care decision maker about lower leg cellulitis.   Details of this discussion including any medical advice provided: Seen today due to follow up from lower leg cellulitis, doppler was negative for clotting.  Given keflex 500 mg QID x 7 days.  We added diflucan for yeast.  Also needs refill of nizoral shampoo.  Reviewed BMI of 32.  Encouraged diet, exercise and weight loss.  Single, non smoker, pmh reviewed.      1. Cellulitis of left lower leg  -new problem, recurrent, finish course of keflex, -monitor sxs, call if not improving      2. Tinea corporis  -stable, controlled on nizoral shampoo  - ketoconazole (NIZORAL) 2 % shampoo; Apply topically daily as needed.  Dispense: 120 mL; Refill: 11    3. MD (muscular dystrophy) ([facility])  -stable, follows with neurology      Total Time: minutes: 11-20 minutes    Debbie Zavaleta was evaluated through a synchronous (real-time) audio encounter. Patient identification was verified at the start of the visit. She (or guardian if applicable) is aware that this is a billable service, which includes applicable co-pays. This visit was conducted with the patient's (and/or legal guardian's) verbal consent. She has not had a related appointment within my department in the past 7 days or scheduled within the next 24 hours.   The patient was located at Home: [address] [place], [place] [id number].  The provider was located at Facility (Appt Dept): [address] [place] [address].  Confirm you are appropriately licensed, registered, or certified to deliver care in the state where the patient is located as indicated above. If you are not or unsure, please re-schedule the visit: Yes, I confirm.     Note: not billable if this call serves to triage the patient into an appointment for the relevant concern    Debbie Zavaleta is a 65 y.o. female evaluated via telephone on 3/26/2025 for Other (Follow up cellulitis from [facility])  .

## 2025-04-02 ENCOUNTER — APPOINTMENT (OUTPATIENT)
Dept: INTERVENTIONAL RADIOLOGY/VASCULAR | Age: 65
End: 2025-04-02
Payer: COMMERCIAL

## 2025-04-02 ENCOUNTER — HOSPITAL ENCOUNTER (EMERGENCY)
Age: 65
Discharge: HOME | End: 2025-04-02
Payer: COMMERCIAL

## 2025-04-02 VITALS
OXYGEN SATURATION: 99 % | RESPIRATION RATE: 18 BRPM | DIASTOLIC BLOOD PRESSURE: 74 MMHG | HEART RATE: 68 BPM | SYSTOLIC BLOOD PRESSURE: 140 MMHG | TEMPERATURE: 98.1 F

## 2025-04-02 DIAGNOSIS — R68.83 CHILLS: Primary | ICD-10-CM

## 2025-04-02 LAB
ALBUMIN SERPL BCG-MCNC: 4.3 G/DL (ref 3.4–4.9)
ALP SERPL-CCNC: 58 U/L (ref 35–104)
ALT SERPL W/O P-5'-P-CCNC: 22 U/L (ref 10–35)
ANION GAP SERPL CALC-SCNC: 13 MEQ/L (ref 8–16)
AST SERPL-CCNC: 28 U/L (ref 10–35)
BASOPHILS ABSOLUTE: 0 THOU/MM3 (ref 0–0.1)
BASOPHILS NFR BLD AUTO: 0.3 %
BILIRUB SERPL-MCNC: 0.5 MG/DL (ref 0.3–1.2)
BUN SERPL-MCNC: 19 MG/DL (ref 8–23)
CALCIUM SERPL-MCNC: 9.9 MG/DL (ref 8.8–10.2)
CHLORIDE SERPL-SCNC: 97 MEQ/L (ref 98–111)
CO2 SERPL-SCNC: 24 MEQ/L (ref 22–29)
CREAT SERPL-MCNC: 1.3 MG/DL (ref 0.5–0.9)
DEPRECATED RDW RBC AUTO: 46 FL (ref 35–45)
EOSINOPHIL NFR BLD AUTO: 0 %
EOSINOPHILS ABSOLUTE: 0 THOU/MM3 (ref 0–0.4)
ERYTHROCYTE [DISTWIDTH] IN BLOOD BY AUTOMATED COUNT: 13.1 % (ref 11.5–14.5)
FLUAV RNA RESP QL NAA+PROBE: NOT DETECTED
FLUBV RNA RESP QL NAA+PROBE: NOT DETECTED
GFR SERPL CREATININE-BSD FRML MDRD: 45 ML/MIN/1.73M2
GLUCOSE SERPL-MCNC: 118 MG/DL (ref 74–109)
HCT VFR BLD AUTO: 41.8 % (ref 37–47)
HGB BLD-MCNC: 14.2 GM/DL (ref 12–16)
IMM GRANULOCYTES # BLD AUTO: 0.05 THOU/MM3 (ref 0–0.07)
IMM GRANULOCYTES NFR BLD AUTO: 0.5 %
LACTATE SERPL-SCNC: 0.8 MMOL/L (ref 0.5–2)
LYMPHOCYTES ABSOLUTE: 1.1 THOU/MM3 (ref 1–4.8)
LYMPHOCYTES NFR BLD AUTO: 11 %
MCH RBC QN AUTO: 32.1 PG (ref 26–33)
MCHC RBC AUTO-ENTMCNC: 34 GM/DL (ref 32.2–35.5)
MCV RBC AUTO: 94.6 FL (ref 81–99)
MONOCYTES ABSOLUTE: 1 THOU/MM3 (ref 0.4–1.3)
MONOCYTES NFR BLD AUTO: 10.2 %
NEUTROPHILS ABSOLUTE: 7.5 THOU/MM3 (ref 1.8–7.7)
NEUTROPHILS NFR BLD AUTO: 78 %
NRBC BLD AUTO-RTO: 0 /100 WBC
OSMOLALITY SERPL CALC.SUM OF ELEC: 271.6 MOSMOL/KG (ref 275–300)
PLATELET # BLD AUTO: 149 THOU/MM3 (ref 130–400)
PMV BLD AUTO: 11.1 FL (ref 9.4–12.4)
POTASSIUM SERPL-SCNC: 4.1 MEQ/L (ref 3.5–5.2)
PROCALCITONIN SERPL IA-MCNC: 0.15 NG/ML (ref 0.01–0.09)
PROT SERPL-MCNC: 7.7 G/DL (ref 6.4–8.3)
RBC # BLD AUTO: 4.42 MILL/MM3 (ref 4.2–5.4)
SARS-COV-2 RNA RESP QL NAA+PROBE: NOT DETECTED
SODIUM SERPL-SCNC: 134 MEQ/L (ref 135–145)
WBC # BLD AUTO: 9.6 THOU/MM3 (ref 4.8–10.8)

## 2025-04-02 PROCEDURE — 87636 SARSCOV2 & INF A&B AMP PRB: CPT

## 2025-04-02 PROCEDURE — 85025 COMPLETE CBC W/AUTO DIFF WBC: CPT

## 2025-04-02 PROCEDURE — 36415 COLL VENOUS BLD VENIPUNCTURE: CPT

## 2025-04-02 PROCEDURE — 99284 EMERGENCY DEPT VISIT MOD MDM: CPT

## 2025-04-02 PROCEDURE — 83605 ASSAY OF LACTIC ACID: CPT

## 2025-04-02 PROCEDURE — 80053 COMPREHEN METABOLIC PANEL: CPT

## 2025-04-02 PROCEDURE — 93971 EXTREMITY STUDY: CPT

## 2025-04-02 PROCEDURE — 84145 PROCALCITONIN (PCT): CPT

## 2025-04-02 NOTE — DISCHARGE INSTRUCTIONS
Be sure to follow-up with your rheumatologist tomorrow.  Follow-up with your family doctor and have a steady routine of labs to look for any abnormalities.    If you develop fever chills, difficulty breathing, chest pain, or any other serious concerns then return to the ED immediately.

## 2025-04-02 NOTE — ED TRIAGE NOTES
Pt to ED with a headache, chills and feeling feverish. Pt states that she was recently seen for cellulitis and told to come back if she felt worse. Pt states that the cellulitis was to the left leg. There appears to be very little redness and swelling.

## 2025-04-02 NOTE — ED PROVIDER NOTES
dystrophy ([facility]) G71.09    Depression F32.A    Edema of lower extremity R60.0    ESR raised R70.0    GERD (gastroesophageal reflux disease) K21.9    Muscle weakness M62.81    History of right knee joint replacement Z96.651    Current moderate episode of major depressive disorder without prior episode ([facility]) F32.1    Overflow incontinence N39.490    Stage 3 chronic kidney disease, unspecified whether stage 3a or 3b CKD ([facility]) N18.30    Chronic renal disease, stage III ([facility]) [[id number]] N18.30    CKD (chronic kidney disease) stage 4, GFR 15-29 ml/min ([facility]) N18.4    Psoriatic arthritis ([facility]) L40.50    Cyclothymia F34.0    Therapeutic drug monitoring Z51.81    Opioid dependence with current use ([facility]) F11.20     SURGICAL HISTORY       Past Surgical History:   Procedure Laterality Date    COLONOSCOPY      HYSTERECTOMY (CERVIX STATUS UNKNOWN)  2002    JOINT REPLACEMENT Left 2008    knee    JOINT REPLACEMENT Right 09/10/2013    right    TOTAL KNEE ARTHROPLASTY Left 6/08    TOTAL KNEE ARTHROPLASTY Right 09/10/2013    VARICOSE VEIN SURGERY Right 1998    laser       CURRENT MEDICATIONS       Previous Medications    ALLOPURINOL (ZYLOPRIM) 100 MG TABLET    Take 1 tablet by mouth daily    ASCORBIC ACID (RA VITAMIN C) 500 MG TABLET    take 1 tablet by mouth once daily    B COMPLEX VITAMINS CAPSULE    Take 1 capsule by mouth daily    CRANBERRY PO    Take by mouth daily    DIVALPROEX (DEPAKOTE ER) 500 MG EXTENDED RELEASE TABLET    Take 1 tablet by mouth nightly    GABAPENTIN (NEURONTIN) 300 MG CAPSULE    take 1 capsule by mouth twice a day    HANDICAP PLACARD MISC    by Does not apply route Request parking placard due to medical conditions.  Duration of 5 years.    HYDROCODONE-ACETAMINOPHEN (NORCO) 5-325 MG PER TABLET    Take 1 tablet by mouth every 8 hours as needed for Pain for up to 30 days. Max Daily Amount: 3 tablets    INFLIXIMAB IV    Infuse intravenously Every 6 weeks    KETOCONAZOLE (NIZORAL) 2 % SHAMPOO    Apply topically daily

## 2025-04-03 ENCOUNTER — TELEPHONE (OUTPATIENT)
Dept: FAMILY MEDICINE CLINIC | Age: 65
End: 2025-04-03

## 2025-04-07 ENCOUNTER — APPOINTMENT (OUTPATIENT)
Dept: CT IMAGING | Age: 65
DRG: 564 | End: 2025-04-07
Payer: COMMERCIAL

## 2025-04-07 ENCOUNTER — HOSPITAL ENCOUNTER (INPATIENT)
Age: 65
LOS: 3 days | Discharge: HOME | DRG: 564 | End: 2025-04-10
Attending: STUDENT IN AN ORGANIZED HEALTH CARE EDUCATION/TRAINING PROGRAM | Admitting: STUDENT IN AN ORGANIZED HEALTH CARE EDUCATION/TRAINING PROGRAM
Payer: COMMERCIAL

## 2025-04-07 ENCOUNTER — APPOINTMENT (OUTPATIENT)
Dept: MRI IMAGING | Age: 65
DRG: 564 | End: 2025-04-07
Payer: COMMERCIAL

## 2025-04-07 DIAGNOSIS — N28.81: ICD-10-CM

## 2025-04-07 DIAGNOSIS — N39.0 URINARY TRACT INFECTION WITH HEMATURIA, SITE UNSPECIFIED: ICD-10-CM

## 2025-04-07 DIAGNOSIS — J90 PLEURAL EFFUSION: ICD-10-CM
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
## 2025-04-07 DIAGNOSIS — I27.82 CHRONIC PULMONARY EMBOLISM WITHOUT ACUTE COR PULMONALE, UNSPECIFIED PULMONARY EMBOLISM TYPE (HCC): ICD-10-CM

## 2025-04-07 DIAGNOSIS — N17.9 AKI (ACUTE KIDNEY INJURY): ICD-10-CM

## 2025-04-07 DIAGNOSIS — R79.89 ELEVATED TROPONIN: ICD-10-CM

## 2025-04-07 DIAGNOSIS — N26.1 ATROPHIC KIDNEY: ICD-10-CM

## 2025-04-07 DIAGNOSIS — K76.9 LIVER LESION: ICD-10-CM

## 2025-04-07 DIAGNOSIS — R79.89 ELEVATED LFTS: ICD-10-CM

## 2025-04-07 DIAGNOSIS — E87.1 HYPONATREMIA: ICD-10-CM

## 2025-04-07 DIAGNOSIS — R41.82 ALTERED MENTAL STATUS, UNSPECIFIED ALTERED MENTAL STATUS TYPE: Primary | ICD-10-CM

## 2025-04-07 DIAGNOSIS — R31.9 URINARY TRACT INFECTION WITH HEMATURIA, SITE UNSPECIFIED: ICD-10-CM

## 2025-04-07 DIAGNOSIS — E04.9 ENLARGED THYROID: ICD-10-CM

## 2025-04-07 LAB
ALBUMIN SERPL BCG-MCNC: 3.4 G/DL (ref 3.4–4.9)
ALP SERPL-CCNC: 76 U/L (ref 38–126)
ALT SERPL W/O P-5'-P-CCNC: 38 U/L (ref 10–35)
AMMONIA PLAS-MCNC: 18 UMOL/L (ref 11–51)
AMORPH SED URNS QL MICRO: ABNORMAL
ANION GAP SERPL CALC-SCNC: 13 MEQ/L (ref 8–16)
AST SERPL-CCNC: 100 U/L (ref 10–35)
BACTERIA URNS QL MICRO: ABNORMAL /HPF
BASOPHILS ABSOLUTE: 0 THOU/MM3 (ref 0–0.1)
BASOPHILS NFR BLD AUTO: 0.3 %
BILIRUB CONJ SERPL-MCNC: 0.2 MG/DL (ref 0–0.2)
BILIRUB SERPL-MCNC: 0.3 MG/DL (ref 0.3–1.2)
BILIRUB UR QL STRIP.AUTO: NEGATIVE
BUN SERPL-MCNC: 31 MG/DL (ref 8–23)
CALCIUM SERPL-MCNC: 9.8 MG/DL (ref 8.8–10.2)
CASTS #/AREA URNS LPF: ABNORMAL /LPF
CASTS 2: ABNORMAL /LPF
CHARACTER UR: ABNORMAL
CHLORIDE SERPL-SCNC: 90 MEQ/L (ref 98–111)
CK SERPL-CCNC: 1261 U/L (ref 26–192)
CO2 SERPL-SCNC: 24 MEQ/L (ref 22–29)
COLOR, UA: YELLOW
CREAT SERPL-MCNC: 1.8 MG/DL (ref 0.5–0.9)
CREAT UR-MCNC: 113 MG/DL
CRYSTALS URNS MICRO: ABNORMAL
CRYSTALS URNS MICRO: ABNORMAL
DEPRECATED RDW RBC AUTO: 45.2 FL (ref 35–45)
EKG ATRIAL RATE: 83 BPM
EKG ATRIAL RATE: 85 BPM
EKG ATRIAL RATE: 95 BPM
EKG ATRIAL RATE: 96 BPM
EKG P AXIS: 0 DEGREES
EKG P AXIS: 17 DEGREES
EKG P AXIS: 32 DEGREES
EKG P AXIS: 39 DEGREES
EKG P-R INTERVAL: 124 MS
EKG P-R INTERVAL: 134 MS
EKG P-R INTERVAL: 140 MS
EKG P-R INTERVAL: 142 MS
EKG Q-T INTERVAL: 314 MS
EKG Q-T INTERVAL: 330 MS
EKG Q-T INTERVAL: 360 MS
EKG Q-T INTERVAL: 372 MS
EKG QRS DURATION: 72 MS
EKG QRS DURATION: 78 MS
EKG QRS DURATION: 80 MS
EKG QRS DURATION: 80 MS
EKG QTC CALCULATION (BAZETT): 394 MS
EKG QTC CALCULATION (BAZETT): 416 MS
EKG QTC CALCULATION (BAZETT): 428 MS
EKG QTC CALCULATION (BAZETT): 437 MS
EKG R AXIS: -14 DEGREES
EKG R AXIS: -17 DEGREES
EKG R AXIS: -4 DEGREES
EKG R AXIS: -8 DEGREES
EKG T AXIS: -14 DEGREES
EKG T AXIS: -19 DEGREES
EKG T AXIS: -7 DEGREES
EKG T AXIS: 5 DEGREES
EKG VENTRICULAR RATE: 83 BPM
EKG VENTRICULAR RATE: 85 BPM
EKG VENTRICULAR RATE: 95 BPM
EKG VENTRICULAR RATE: 96 BPM
EOSINOPHIL NFR BLD AUTO: 0 %
EOSINOPHILS ABSOLUTE: 0 THOU/MM3 (ref 0–0.4)
EPITHELIAL CELLS, UA: ABNORMAL /HPF
ERYTHROCYTE [DISTWIDTH] IN BLOOD BY AUTOMATED COUNT: 13.4 % (ref 11.5–14.5)
FLUAV RNA RESP QL NAA+PROBE: NOT DETECTED
FLUBV RNA RESP QL NAA+PROBE: NOT DETECTED
GFR SERPL CREATININE-BSD FRML MDRD: 31 ML/MIN/1.73M2
GLUCOSE SERPL-MCNC: 127 MG/DL (ref 74–109)
GLUCOSE UR QL STRIP.AUTO: NEGATIVE MG/DL
HCT VFR BLD AUTO: 42.4 % (ref 37–47)
HGB BLD-MCNC: 14.5 GM/DL (ref 12–16)
HGB UR QL STRIP.AUTO: ABNORMAL
IMM GRANULOCYTES # BLD AUTO: 0.43 THOU/MM3 (ref 0–0.07)
IMM GRANULOCYTES NFR BLD AUTO: 3.7 %
KETONES UR QL STRIP.AUTO: ABNORMAL
LACTATE SERPL-SCNC: 1.2 MMOL/L (ref 0.5–2)
LIPASE SERPL-CCNC: 38 U/L (ref 13–60)
LYMPHOCYTES ABSOLUTE: 0.8 THOU/MM3 (ref 1–4.8)
LYMPHOCYTES NFR BLD AUTO: 6.8 %
MCH RBC QN AUTO: 31.3 PG (ref 26–33)
MCHC RBC AUTO-ENTMCNC: 34.2 GM/DL (ref 32.2–35.5)
MCV RBC AUTO: 91.4 FL (ref 81–99)
MISCELLANEOUS 2: ABNORMAL
MISCELLANEOUS LAB TEST RESULT: ABNORMAL
MONOCYTES ABSOLUTE: 1.4 THOU/MM3 (ref 0.4–1.3)
MONOCYTES NFR BLD AUTO: 11.8 %
MUCOUS THREADS URNS QL MICRO: ABNORMAL
NEUTROPHILS ABSOLUTE: 8.9 THOU/MM3 (ref 1.8–7.7)
NEUTROPHILS NFR BLD AUTO: 77.4 %
NITRITE UR QL STRIP: NEGATIVE
NRBC BLD AUTO-RTO: 0 /100 WBC
OSMOLALITY SERPL CALC.SUM OF ELEC: 263.3 MOSMOL/KG (ref 275–300)
OSMOLALITY UR: 484 MOSMOL/KG (ref 250–750)
PH UR STRIP.AUTO: 5.5 [PH] (ref 5–9)
PLATELET # BLD AUTO: 189 THOU/MM3 (ref 130–400)
PLATELET BLD QL SMEAR: ADEQUATE
PMV BLD AUTO: 10.8 FL (ref 9.4–12.4)
POTASSIUM SERPL-SCNC: 4.7 MEQ/L (ref 3.5–5.2)
PROCALCITONIN SERPL IA-MCNC: 1.2 NG/ML (ref 0.01–0.09)
PROT SERPL-MCNC: 7.2 G/DL (ref 6.4–8.3)
PROT UR STRIP.AUTO-MCNC: 100 MG/DL
RBC # BLD AUTO: 4.64 MILL/MM3 (ref 4.2–5.4)
RBC URINE: ABNORMAL /HPF
RENAL EPI CELLS #/AREA URNS HPF: ABNORMAL /[HPF]
SARS-COV-2 RNA RESP QL NAA+PROBE: NOT DETECTED
SCAN OF BLOOD SMEAR: NORMAL
SODIUM SERPL-SCNC: 127 MEQ/L (ref 135–145)
SODIUM SERPL-SCNC: 129 MEQ/L (ref 135–145)
SODIUM SERPL-SCNC: 129 MEQ/L (ref 135–145)
SODIUM UR-SCNC: < 20 MEQ/L
SP GR UR REFRACT.AUTO: 1.02 (ref 1–1.03)
T4 FREE SERPL-MCNC: 1.3 NG/DL (ref 0.92–1.68)
TROPONIN, HIGH SENSITIVITY: 42 NG/L (ref 0–12)
TROPONIN, HIGH SENSITIVITY: 46 NG/L (ref 0–12)
TSH SERPL DL<=0.05 MIU/L-ACNC: 0.18 UIU/ML (ref 0.27–4.2)
UROBILINOGEN, URINE: 0.2 EU/DL (ref 0–1)
UUN 24H UR-MCNC: 854 MG/DL
WBC # BLD AUTO: 11.5 THOU/MM3 (ref 4.8–10.8)
WBC #/AREA URNS HPF: ABNORMAL /HPF
WBC #/AREA URNS HPF: ABNORMAL /[HPF]
YEAST LIKE FUNGI URNS QL MICRO: ABNORMAL

## 2025-04-07 PROCEDURE — 93005 ELECTROCARDIOGRAM TRACING: CPT | Performed by: STUDENT IN AN ORGANIZED HEALTH CARE EDUCATION/TRAINING PROGRAM

## 2025-04-07 PROCEDURE — 72125 CT NECK SPINE W/O DYE: CPT

## 2025-04-07 PROCEDURE — 74177 CT ABD & PELVIS W/CONTRAST: CPT

## 2025-04-07 PROCEDURE — 84443 ASSAY THYROID STIM HORMONE: CPT

## 2025-04-07 PROCEDURE — 99285 EMERGENCY DEPT VISIT HI MDM: CPT

## 2025-04-07 PROCEDURE — 70450 CT HEAD/BRAIN W/O DYE: CPT

## 2025-04-07 PROCEDURE — 6370000000 HC RX 637 (ALT 250 FOR IP)

## 2025-04-07 PROCEDURE — 84484 ASSAY OF TROPONIN QUANT: CPT

## 2025-04-07 PROCEDURE — 70551 MRI BRAIN STEM W/O DYE: CPT

## 2025-04-07 PROCEDURE — 6360000004 HC RX CONTRAST MEDICATION: Performed by: NURSE PRACTITIONER

## 2025-04-07 PROCEDURE — 6370000000 HC RX 637 (ALT 250 FOR IP): Performed by: NURSE PRACTITIONER

## 2025-04-07 PROCEDURE — 85025 COMPLETE CBC W/AUTO DIFF WBC: CPT

## 2025-04-07 PROCEDURE — 87636 SARSCOV2 & INF A&B AMP PRB: CPT

## 2025-04-07 PROCEDURE — 87086 URINE CULTURE/COLONY COUNT: CPT

## 2025-04-07 PROCEDURE — 84439 ASSAY OF FREE THYROXINE: CPT

## 2025-04-07 PROCEDURE — 76376 3D RENDER W/INTRP POSTPROCES: CPT

## 2025-04-07 PROCEDURE — 96375 TX/PRO/DX INJ NEW DRUG ADDON: CPT

## 2025-04-07 PROCEDURE — 87186 SC STD MICRODIL/AGAR DIL: CPT

## 2025-04-07 PROCEDURE — 1200000003 HC TELEMETRY R&B

## 2025-04-07 PROCEDURE — 71275 CT ANGIOGRAPHY CHEST: CPT

## 2025-04-07 PROCEDURE — 93005 ELECTROCARDIOGRAM TRACING: CPT | Performed by: NURSE PRACTITIONER

## 2025-04-07 PROCEDURE — 6360000002 HC RX W HCPCS: Performed by: NURSE PRACTITIONER

## 2025-04-07 PROCEDURE — 83935 ASSAY OF URINE OSMOLALITY: CPT

## 2025-04-07 PROCEDURE — 2500000003 HC RX 250 WO HCPCS: Performed by: NURSE PRACTITIONER

## 2025-04-07 PROCEDURE — 99223 1ST HOSP IP/OBS HIGH 75: CPT | Performed by: STUDENT IN AN ORGANIZED HEALTH CARE EDUCATION/TRAINING PROGRAM

## 2025-04-07 PROCEDURE — 82570 ASSAY OF URINE CREATININE: CPT

## 2025-04-07 PROCEDURE — 2580000003 HC RX 258

## 2025-04-07 PROCEDURE — 82248 BILIRUBIN DIRECT: CPT

## 2025-04-07 PROCEDURE — 87040 BLOOD CULTURE FOR BACTERIA: CPT

## 2025-04-07 PROCEDURE — 84540 ASSAY OF URINE/UREA-N: CPT

## 2025-04-07 PROCEDURE — 87077 CULTURE AEROBIC IDENTIFY: CPT

## 2025-04-07 PROCEDURE — 82550 ASSAY OF CK (CPK): CPT

## 2025-04-07 PROCEDURE — 83690 ASSAY OF LIPASE: CPT

## 2025-04-07 PROCEDURE — 1200000000 HC SEMI PRIVATE

## 2025-04-07 PROCEDURE — 80053 COMPREHEN METABOLIC PANEL: CPT

## 2025-04-07 PROCEDURE — 2580000003 HC RX 258: Performed by: NURSE PRACTITIONER

## 2025-04-07 PROCEDURE — 96361 HYDRATE IV INFUSION ADD-ON: CPT

## 2025-04-07 PROCEDURE — 82140 ASSAY OF AMMONIA: CPT

## 2025-04-07 PROCEDURE — 81001 URINALYSIS AUTO W/SCOPE: CPT

## 2025-04-07 PROCEDURE — 6360000002 HC RX W HCPCS

## 2025-04-07 PROCEDURE — 84295 ASSAY OF SERUM SODIUM: CPT

## 2025-04-07 PROCEDURE — 36415 COLL VENOUS BLD VENIPUNCTURE: CPT

## 2025-04-07 PROCEDURE — 84300 ASSAY OF URINE SODIUM: CPT

## 2025-04-07 PROCEDURE — 96374 THER/PROPH/DIAG INJ IV PUSH: CPT

## 2025-04-07 PROCEDURE — 84145 PROCALCITONIN (PCT): CPT

## 2025-04-07 PROCEDURE — 83605 ASSAY OF LACTIC ACID: CPT

## 2025-04-07 RX ORDER — SODIUM CHLORIDE 0.9 % (FLUSH) 0.9 %
5-40 SYRINGE (ML) INJECTION EVERY 12 HOURS SCHEDULED
Status: DISCONTINUED | OUTPATIENT
Start: 2025-04-07 | End: 2025-04-10 | Stop reason: HOSPADM

## 2025-04-07 RX ORDER — PANTOPRAZOLE SODIUM 40 MG/1
40 TABLET, DELAYED RELEASE ORAL
Status: DISCONTINUED | OUTPATIENT
Start: 2025-04-08 | End: 2025-04-10 | Stop reason: HOSPADM

## 2025-04-07 RX ORDER — ONDANSETRON 2 MG/ML
4 INJECTION INTRAMUSCULAR; INTRAVENOUS EVERY 6 HOURS PRN
Status: DISCONTINUED | OUTPATIENT
Start: 2025-04-07 | End: 2025-04-10 | Stop reason: HOSPADM

## 2025-04-07 RX ORDER — SODIUM CHLORIDE 0.9 % (FLUSH) 0.9 %
5-40 SYRINGE (ML) INJECTION PRN
Status: DISCONTINUED | OUTPATIENT
Start: 2025-04-07 | End: 2025-04-10 | Stop reason: HOSPADM

## 2025-04-07 RX ORDER — MAGNESIUM SULFATE IN WATER 40 MG/ML
2000 INJECTION, SOLUTION INTRAVENOUS PRN
Status: DISCONTINUED | OUTPATIENT
Start: 2025-04-07 | End: 2025-04-10 | Stop reason: HOSPADM

## 2025-04-07 RX ORDER — ACETAMINOPHEN 325 MG/1
650 TABLET ORAL ONCE
Status: COMPLETED | OUTPATIENT
Start: 2025-04-07 | End: 2025-04-07

## 2025-04-07 RX ORDER — IOPAMIDOL 755 MG/ML
80 INJECTION, SOLUTION INTRAVASCULAR
Status: COMPLETED | OUTPATIENT
Start: 2025-04-07 | End: 2025-04-07

## 2025-04-07 RX ORDER — ALLOPURINOL 100 MG/1
100 TABLET ORAL DAILY
Status: DISCONTINUED | OUTPATIENT
Start: 2025-04-07 | End: 2025-04-07

## 2025-04-07 RX ORDER — SODIUM CHLORIDE 9 MG/ML
INJECTION, SOLUTION INTRAVENOUS CONTINUOUS
Status: DISCONTINUED | OUTPATIENT
Start: 2025-04-07 | End: 2025-04-08

## 2025-04-07 RX ORDER — POTASSIUM CHLORIDE 7.45 MG/ML
10 INJECTION INTRAVENOUS PRN
Status: DISCONTINUED | OUTPATIENT
Start: 2025-04-07 | End: 2025-04-10 | Stop reason: HOSPADM

## 2025-04-07 RX ORDER — DIVALPROEX SODIUM 500 MG/1
500 TABLET, FILM COATED, EXTENDED RELEASE ORAL NIGHTLY
Status: DISCONTINUED | OUTPATIENT
Start: 2025-04-07 | End: 2025-04-10 | Stop reason: HOSPADM

## 2025-04-07 RX ORDER — 0.9 % SODIUM CHLORIDE 0.9 %
1000 INTRAVENOUS SOLUTION INTRAVENOUS ONCE
Status: COMPLETED | OUTPATIENT
Start: 2025-04-07 | End: 2025-04-07

## 2025-04-07 RX ORDER — HYDROCODONE BITARTRATE AND ACETAMINOPHEN 5; 325 MG/1; MG/1
1 TABLET ORAL ONCE
Status: COMPLETED | OUTPATIENT
Start: 2025-04-07 | End: 2025-04-07

## 2025-04-07 RX ORDER — SODIUM CHLORIDE 9 MG/ML
INJECTION, SOLUTION INTRAVENOUS PRN
Status: DISCONTINUED | OUTPATIENT
Start: 2025-04-07 | End: 2025-04-10 | Stop reason: HOSPADM

## 2025-04-07 RX ORDER — GABAPENTIN 300 MG/1
300 CAPSULE ORAL 2 TIMES DAILY
Status: DISCONTINUED | OUTPATIENT
Start: 2025-04-07 | End: 2025-04-10 | Stop reason: HOSPADM

## 2025-04-07 RX ORDER — POLYETHYLENE GLYCOL 3350 17 G/17G
17 POWDER, FOR SOLUTION ORAL DAILY PRN
Status: DISCONTINUED | OUTPATIENT
Start: 2025-04-07 | End: 2025-04-10 | Stop reason: HOSPADM

## 2025-04-07 RX ORDER — ACETAMINOPHEN 650 MG/1
650 SUPPOSITORY RECTAL EVERY 6 HOURS PRN
Status: DISCONTINUED | OUTPATIENT
Start: 2025-04-07 | End: 2025-04-10 | Stop reason: HOSPADM

## 2025-04-07 RX ORDER — ONDANSETRON 4 MG/1
4 TABLET, ORALLY DISINTEGRATING ORAL EVERY 8 HOURS PRN
Status: DISCONTINUED | OUTPATIENT
Start: 2025-04-07 | End: 2025-04-10 | Stop reason: HOSPADM

## 2025-04-07 RX ORDER — OXYBUTYNIN CHLORIDE 5 MG/1
5 TABLET ORAL 2 TIMES DAILY
Status: DISCONTINUED | OUTPATIENT
Start: 2025-04-07 | End: 2025-04-10 | Stop reason: HOSPADM

## 2025-04-07 RX ORDER — 0.9 % SODIUM CHLORIDE 0.9 %
1000 INTRAVENOUS SOLUTION INTRAVENOUS ONCE
Status: DISCONTINUED | OUTPATIENT
Start: 2025-04-07 | End: 2025-04-07

## 2025-04-07 RX ORDER — POTASSIUM CHLORIDE 1500 MG/1
40 TABLET, EXTENDED RELEASE ORAL PRN
Status: DISCONTINUED | OUTPATIENT
Start: 2025-04-07 | End: 2025-04-10 | Stop reason: HOSPADM

## 2025-04-07 RX ORDER — HEPARIN SODIUM 5000 [USP'U]/ML
5000 INJECTION, SOLUTION INTRAVENOUS; SUBCUTANEOUS EVERY 8 HOURS SCHEDULED
Status: DISCONTINUED | OUTPATIENT
Start: 2025-04-07 | End: 2025-04-09

## 2025-04-07 RX ORDER — ACETAMINOPHEN 325 MG/1
650 TABLET ORAL EVERY 6 HOURS PRN
Status: DISCONTINUED | OUTPATIENT
Start: 2025-04-07 | End: 2025-04-10 | Stop reason: HOSPADM

## 2025-04-07 RX ADMIN — SODIUM CHLORIDE: 0.9 INJECTION, SOLUTION INTRAVENOUS at 18:28

## 2025-04-07 RX ADMIN — ACETAMINOPHEN 650 MG: 325 TABLET ORAL at 11:14

## 2025-04-07 RX ADMIN — GABAPENTIN 300 MG: 300 CAPSULE ORAL at 21:11

## 2025-04-07 RX ADMIN — DIVALPROEX SODIUM 500 MG: 500 TABLET, EXTENDED RELEASE ORAL at 21:11

## 2025-04-07 RX ADMIN — PIPERACILLIN AND TAZOBACTAM 3375 MG: 3; .375 INJECTION, POWDER, FOR SOLUTION INTRAVENOUS at 23:20

## 2025-04-07 RX ADMIN — HEPARIN SODIUM 5000 UNITS: 5000 INJECTION INTRAVENOUS; SUBCUTANEOUS at 21:11

## 2025-04-07 RX ADMIN — AZITHROMYCIN MONOHYDRATE 500 MG: 500 INJECTION, POWDER, LYOPHILIZED, FOR SOLUTION INTRAVENOUS at 14:09

## 2025-04-07 RX ADMIN — IOPAMIDOL 80 ML: 755 INJECTION, SOLUTION INTRAVENOUS at 11:54

## 2025-04-07 RX ADMIN — HYDROCODONE BITARTRATE AND ACETAMINOPHEN 1 TABLET: 5; 325 TABLET ORAL at 13:09

## 2025-04-07 RX ADMIN — CEFTRIAXONE SODIUM 2000 MG: 2 INJECTION, POWDER, FOR SOLUTION INTRAMUSCULAR; INTRAVENOUS at 13:39

## 2025-04-07 RX ADMIN — SODIUM CHLORIDE 1000 ML: 0.9 INJECTION, SOLUTION INTRAVENOUS at 11:16

## 2025-04-07 RX ADMIN — OXYBUTYNIN CHLORIDE 5 MG: 5 TABLET ORAL at 21:11

## 2025-04-07 RX ADMIN — PIPERACILLIN AND TAZOBACTAM 4500 MG: 4; .5 INJECTION, POWDER, LYOPHILIZED, FOR SOLUTION INTRAVENOUS at 18:50

## 2025-04-07 ASSESSMENT — PAIN DESCRIPTION - FREQUENCY: FREQUENCY: CONTINUOUS

## 2025-04-07 ASSESSMENT — PAIN DESCRIPTION - DESCRIPTORS: DESCRIPTORS: ACHING

## 2025-04-07 ASSESSMENT — PAIN - FUNCTIONAL ASSESSMENT
PAIN_FUNCTIONAL_ASSESSMENT: 0-10
PAIN_FUNCTIONAL_ASSESSMENT: 0-10

## 2025-04-07 ASSESSMENT — PAIN SCALES - GENERAL
PAINLEVEL_OUTOF10: 8
PAINLEVEL_OUTOF10: 8
PAINLEVEL_OUTOF10: 0

## 2025-04-07 ASSESSMENT — PAIN DESCRIPTION - LOCATION: LOCATION: CHEST;EAR

## 2025-04-07 NOTE — ED NOTES
Patient presents to the ED with c/o of Altered Mental Status. Pt caregiver states she just finished antibiotics last week for cellulitis. Pt caregiver states that patient was dog sitting yesterday, where her aunt found her on the floor. Pt is unsure how she fell, and caregiver wants her checked out. Pt caregiver states she does not know what time the pt fell, and is unsure if she hit her head. Pt caregiver states that pt aunt wanted her checked out because she did not know how medication she took yesterday. Pt caregiver states that pt is confused, and not her normal self. Pt is alert and oriented to self. Covid/Flu Swab obtained. VS obtained. Call light within reach. Denies needs.   
Pt and family updated on POC. Pt is resting in cot with respirations even and unlabored. No distress noted. Call light is within reach.   
Pt is resting in cot with respirations even and unlabored. Friend remains at bedside. No concerns voiced at this time. Call light is within reach.   
Pt taken down to CT at this time.  
Pt transported to [facility] on cart and in stable condition. Contacted floor before transport, and spoke with TESSY Engel.   
RN placed pt on 2 L NC due to pt sating at 89%. Roxanna notified.   
Roxanna at bedside updating pt and family on POC.   
JOINT REPLACEMENT Right 09/10/2013    right    TOTAL KNEE ARTHROPLASTY Left 6/08    TOTAL KNEE ARTHROPLASTY Right 09/10/2013    VARICOSE VEIN SURGERY Right 1998    laser       PAST MEDICAL HISTORY       Past Medical History:   Diagnosis Date    B12 deficiency 12/2020    Depression     Lymphedema 2008    both legs    MD (muscular dystrophy) ([facility]) 2008    Dr. Hills at [facility] - no lung involvement per patient    Neuropathy     Obesity (BMI 30-39.9)     Overflow incontinence     Rheumatoid arthritis(714.0) 2008    Dr. Ruiz [facility]    Superficial thrombophlebitis 03/2018    right lower extremity    Venous insufficiency     h/o SVT - 3-4 in the past (has never been on Coumadin)           Electronically signed by Niles Khan RN on 4/7/2025 at 2:10 PM

## 2025-04-07 NOTE — H&P
evidence of an acute process. **This report has been created using voice recognition software. It may contain minor errors which are inherent in voice recognition technology.** Electronically signed by Dr. Reddy Lovelace      EKG:   As above    Micro:  No active infections    Electronically Signed by  Cal Boles DO, MBA  PGY-2 Internal Medicine Resident  [facility]  On 4/7/2025 at 4:29 PM    Staff: Bryant Deal DO    This report has been created using voice recognition software. It may contain minor errors which are inherent in voice recognition technology

## 2025-04-07 NOTE — ED PROVIDER NOTES
Emergency Medicine      EMERGENCY MEDICINE     Pt Name: Debbie Zavaleta  MRN: [id number]  Birthdate 1960  Date of evaluation: 4/7/2025  Provider: LUBA Cortez CNP    CHIEF COMPLAINT       Chief Complaint   Patient presents with    Altered Mental Status    Fall     HISTORY OF PRESENT ILLNESS   Debbie Zavaleta is a pleasant 65 y.o. female who presents to the emergency department from home with c/o AMS and fall with a pertinent past medical history of muscular dystrophy, neuropathy, Rheumatoid arthritis. Pt and pts. Friend gave history. Pt. States she does not remember how she fell. Pts. Friend states that she was watching her aunt/uncles small dog and she fell over that. Pt. Thinks she fell around 11:30 last evening and not does recall how she fell, if she hit her head, or if there is any LOC. Pts. Aunt thinks that she seemed drowsy last night. Pt. States she is currently taking sudafed 1 tab for 1 week now, and does not remember if she took her other medications last night. Pt. Complains of chest pain, fever, diffuse tenderness over her chest and abdomen. Pt denies urinary frequency, urgency or dysuria. Friend states that the patient is not acting like herself--she normally is very engaged and inquisitive and talkative.        History is obtained from:  patient, friend of patient  PASTMEDICAL HISTORY     Past Medical History:   Diagnosis Date    B12 deficiency 12/2020    Depression     Lymphedema 2008    both legs    MD (muscular dystrophy) ([facility]) 2008    Dr. Hills at [facility] - no lung involvement per patient    Neuropathy     Obesity (BMI 30-39.9)     Overflow incontinence     Rheumatoid arthritis(714.0) 2008    Dr. Ruiz - [place]    Superficial thrombophlebitis 03/2018    right lower extremity    Venous insufficiency     h/o SVT - 3-4 in the past (has never been on Coumadin)       Patient Active Problem List   Diagnosis Code    Venous insufficiency I87.2    Rheumatoid arthritis ([facility]) M06.9    Lymphedema

## 2025-04-08 ENCOUNTER — APPOINTMENT (OUTPATIENT)
Dept: ULTRASOUND IMAGING | Age: 65
DRG: 564 | End: 2025-04-08
Payer: COMMERCIAL

## 2025-04-08 ENCOUNTER — APPOINTMENT (OUTPATIENT)
Age: 65
DRG: 564 | End: 2025-04-08
Attending: PHYSICIAN ASSISTANT
Payer: COMMERCIAL

## 2025-04-08 PROBLEM — R41.82 ALTERED MENTAL STATUS: Status: ACTIVE | Noted: 2025-04-08

## 2025-04-08 LAB
A1AT SERPL-MCNC: 324 MG/DL (ref 90–200)
ANION GAP SERPL CALC-SCNC: 13 MEQ/L (ref 8–16)
BUN SERPL-MCNC: 29 MG/DL (ref 8–23)
CALCIUM SERPL-MCNC: 8.6 MG/DL (ref 8.8–10.2)
CHLORIDE SERPL-SCNC: 95 MEQ/L (ref 98–111)
CK SERPL-CCNC: 621 U/L (ref 26–192)
CO2 SERPL-SCNC: 21 MEQ/L (ref 22–29)
CREAT SERPL-MCNC: 1.6 MG/DL (ref 0.5–0.9)
D DIMER PPP IA.FEU-MCNC: ABNORMAL NG/ML FEU (ref 0–500)
DEPRECATED RDW RBC AUTO: 46.7 FL (ref 35–45)
ECHO AO ASC DIAM: 3.2 CM
ECHO AO ASCENDING AORTA INDEX: 1.68 CM/M2
ECHO AV CUSP MM: 2.1 CM
ECHO AV MEAN GRADIENT: 5 MMHG
ECHO AV MEAN VELOCITY: 1.1 M/S
ECHO AV PEAK GRADIENT: 10 MMHG
ECHO AV PEAK VELOCITY: 1.6 M/S
ECHO AV VELOCITY RATIO: 0.81
ECHO AV VTI: 27.8 CM
ECHO BSA: 1.95 M2
ECHO EST RA PRESSURE: 8 MMHG
ECHO LA AREA 2C: 17 CM2
ECHO LA AREA 4C: 18.2 CM2
ECHO LA DIAMETER INDEX: 1.74 CM/M2
ECHO LA DIAMETER: 3.3 CM
ECHO LA MAJOR AXIS: 5.1 CM
ECHO LA MINOR AXIS: 5.6 CM
ECHO LA VOL BP: 49 ML (ref 22–52)
ECHO LA VOL MOD A2C: 42 ML (ref 22–52)
ECHO LA VOL MOD A4C: 52 ML (ref 22–52)
ECHO LA VOL/BSA BIPLANE: 26 ML/M2 (ref 16–34)
ECHO LA VOLUME INDEX MOD A2C: 22 ML/M2 (ref 16–34)
ECHO LA VOLUME INDEX MOD A4C: 27 ML/M2 (ref 16–34)
ECHO LV E' LATERAL VELOCITY: 11 CM/S
ECHO LV E' SEPTAL VELOCITY: 5.4 CM/S
ECHO LV EDV A2C: 74 ML
ECHO LV EDV A4C: 67 ML
ECHO LV EDV INDEX A4C: 35 ML/M2
ECHO LV EDV NDEX A2C: 39 ML/M2
ECHO LV EF PHYSICIAN: 60 %
ECHO LV EJECTION FRACTION A2C: 59 %
ECHO LV EJECTION FRACTION A4C: 57 %
ECHO LV EJECTION FRACTION BIPLANE: 59 % (ref 55–100)
ECHO LV ESV A2C: 30 ML
ECHO LV ESV A4C: 29 ML
ECHO LV ESV INDEX A2C: 16 ML/M2
ECHO LV ESV INDEX A4C: 15 ML/M2
ECHO LV FRACTIONAL SHORTENING: 31 % (ref 28–44)
ECHO LV INTERNAL DIMENSION DIASTOLE INDEX: 2.37 CM/M2
ECHO LV INTERNAL DIMENSION DIASTOLIC: 4.5 CM (ref 3.9–5.3)
ECHO LV INTERNAL DIMENSION SYSTOLIC INDEX: 1.63 CM/M2
ECHO LV INTERNAL DIMENSION SYSTOLIC: 3.1 CM
ECHO LV ISOVOLUMETRIC RELAXATION TIME (IVRT): 81 MS
ECHO LV IVSD: 0.9 CM (ref 0.6–0.9)
ECHO LV MASS 2D: 132.8 G (ref 67–162)
ECHO LV MASS INDEX 2D: 69.9 G/M2 (ref 43–95)
ECHO LV POSTERIOR WALL DIASTOLIC: 0.9 CM (ref 0.6–0.9)
ECHO LV RELATIVE WALL THICKNESS RATIO: 0.4
ECHO LVOT AV VTI INDEX: 0.92
ECHO LVOT MEAN GRADIENT: 4 MMHG
ECHO LVOT PEAK GRADIENT: 6 MMHG
ECHO LVOT PEAK VELOCITY: 1.3 M/S
ECHO LVOT VTI: 25.7 CM
ECHO MV A VELOCITY: 0.66 M/S
ECHO MV E DECELERATION TIME (DT): 188 MS
ECHO MV E VELOCITY: 0.72 M/S
ECHO MV E/A RATIO: 1.09
ECHO MV E/E' LATERAL: 6.55
ECHO MV E/E' RATIO (AVERAGED): 9.94
ECHO MV E/E' SEPTAL: 13.33
ECHO PULMONARY ARTERY END DIASTOLIC PRESSURE: 8 MMHG
ECHO PV MAX VELOCITY: 0.7 M/S
ECHO PV PEAK GRADIENT: 2 MMHG
ECHO PV REGURGITANT MAX VELOCITY: 1.4 M/S
ECHO RIGHT VENTRICULAR SYSTOLIC PRESSURE (RVSP): 30 MMHG
ECHO RV INTERNAL DIMENSION: 3.1 CM
ECHO RV TAPSE: 1.9 CM (ref 1.7–?)
ECHO TV E WAVE: 0.7 M/S
ECHO TV REGURGITANT MAX VELOCITY: 2.35 M/S
ECHO TV REGURGITANT PEAK GRADIENT: 22 MMHG
ERYTHROCYTE [DISTWIDTH] IN BLOOD BY AUTOMATED COUNT: 13.7 % (ref 11.5–14.5)
FERRITIN SERPL IA-MCNC: 1238 NG/ML (ref 13–150)
FOLATE SERPL-MCNC: 13.6 NG/ML (ref 4.6–34.8)
GFR SERPL CREATININE-BSD FRML MDRD: 35 ML/MIN/1.73M2
GGT SERPL-CCNC: 20 U/L (ref 5–36)
GLUCOSE SERPL-MCNC: 96 MG/DL (ref 74–109)
HAV IGM SER QL: NONREACTIVE
HBV CORE IGM SERPL QL IA: NONREACTIVE
HBV SURFACE AG SERPL QL IA: NONREACTIVE
HCT VFR BLD AUTO: 37.2 % (ref 37–47)
HCV IGG SERPL QL IA: NONREACTIVE
HGB BLD-MCNC: 12.5 GM/DL (ref 12–16)
HOMOCYSTEINE: 18.5 UMOL/L (ref 0–15)
IRON SATN MFR SERPL: 10 % (ref 20–50)
IRON SERPL-MCNC: 16 UG/DL (ref 37–145)
MAGNESIUM SERPL-MCNC: 2.2 MG/DL (ref 1.6–2.6)
MCH RBC QN AUTO: 30.7 PG (ref 26–33)
MCHC RBC AUTO-ENTMCNC: 33.6 GM/DL (ref 32.2–35.5)
MCV RBC AUTO: 91.4 FL (ref 81–99)
PLATELET # BLD AUTO: 167 THOU/MM3 (ref 130–400)
PMV BLD AUTO: 10.7 FL (ref 9.4–12.4)
POTASSIUM SERPL-SCNC: 3.9 MEQ/L (ref 3.5–5.2)
RBC # BLD AUTO: 4.07 MILL/MM3 (ref 4.2–5.4)
SODIUM SERPL-SCNC: 128 MEQ/L (ref 135–145)
SODIUM SERPL-SCNC: 129 MEQ/L (ref 135–145)
SODIUM SERPL-SCNC: 130 MEQ/L (ref 135–145)
SODIUM SERPL-SCNC: 131 MEQ/L (ref 135–145)
TIBC SERPL-MCNC: 153 UG/DL (ref 171–450)
VIT B12 SERPL-MCNC: 349 PG/ML (ref 232–1245)
WBC # BLD AUTO: 12.7 THOU/MM3 (ref 4.8–10.8)

## 2025-04-08 PROCEDURE — 6360000002 HC RX W HCPCS

## 2025-04-08 PROCEDURE — 93975 VASCULAR STUDY: CPT

## 2025-04-08 PROCEDURE — 6370000000 HC RX 637 (ALT 250 FOR IP)

## 2025-04-08 PROCEDURE — 81240 F2 GENE: CPT

## 2025-04-08 PROCEDURE — 82550 ASSAY OF CK (CPK): CPT

## 2025-04-08 PROCEDURE — 82607 VITAMIN B-12: CPT

## 2025-04-08 PROCEDURE — 86147 CARDIOLIPIN ANTIBODY EA IG: CPT

## 2025-04-08 PROCEDURE — 2580000003 HC RX 258: Performed by: PHYSICIAN ASSISTANT

## 2025-04-08 PROCEDURE — 86038 ANTINUCLEAR ANTIBODIES: CPT

## 2025-04-08 PROCEDURE — 80048 BASIC METABOLIC PNL TOTAL CA: CPT

## 2025-04-08 PROCEDURE — 82103 ALPHA-1-ANTITRYPSIN TOTAL: CPT

## 2025-04-08 PROCEDURE — 83540 ASSAY OF IRON: CPT

## 2025-04-08 PROCEDURE — 76705 ECHO EXAM OF ABDOMEN: CPT

## 2025-04-08 PROCEDURE — 83516 IMMUNOASSAY NONANTIBODY: CPT

## 2025-04-08 PROCEDURE — 85027 COMPLETE CBC AUTOMATED: CPT

## 2025-04-08 PROCEDURE — 1200000000 HC SEMI PRIVATE

## 2025-04-08 PROCEDURE — 82746 ASSAY OF FOLIC ACID SERUM: CPT

## 2025-04-08 PROCEDURE — 82105 ALPHA-FETOPROTEIN SERUM: CPT

## 2025-04-08 PROCEDURE — 6360000002 HC RX W HCPCS: Performed by: PHYSICIAN ASSISTANT

## 2025-04-08 PROCEDURE — 97530 THERAPEUTIC ACTIVITIES: CPT

## 2025-04-08 PROCEDURE — 83735 ASSAY OF MAGNESIUM: CPT

## 2025-04-08 PROCEDURE — 2500000003 HC RX 250 WO HCPCS: Performed by: PHYSICIAN ASSISTANT

## 2025-04-08 PROCEDURE — 86256 FLUORESCENT ANTIBODY TITER: CPT

## 2025-04-08 PROCEDURE — 84295 ASSAY OF SERUM SODIUM: CPT

## 2025-04-08 PROCEDURE — 82390 ASSAY OF CERULOPLASMIN: CPT

## 2025-04-08 PROCEDURE — 81291 MTHFR GENE: CPT

## 2025-04-08 PROCEDURE — 85300 ANTITHROMBIN III ACTIVITY: CPT

## 2025-04-08 PROCEDURE — 36415 COLL VENOUS BLD VENIPUNCTURE: CPT

## 2025-04-08 PROCEDURE — 97166 OT EVAL MOD COMPLEX 45 MIN: CPT

## 2025-04-08 PROCEDURE — 86376 MICROSOMAL ANTIBODY EACH: CPT

## 2025-04-08 PROCEDURE — 82977 ASSAY OF GGT: CPT

## 2025-04-08 PROCEDURE — 83550 IRON BINDING TEST: CPT

## 2025-04-08 PROCEDURE — 83090 ASSAY OF HOMOCYSTEINE: CPT

## 2025-04-08 PROCEDURE — 85246 CLOT FACTOR VIII VW ANTIGEN: CPT

## 2025-04-08 PROCEDURE — 85379 FIBRIN DEGRADATION QUANT: CPT

## 2025-04-08 PROCEDURE — 93306 TTE W/DOPPLER COMPLETE: CPT

## 2025-04-08 PROCEDURE — 80074 ACUTE HEPATITIS PANEL: CPT

## 2025-04-08 PROCEDURE — 6370000000 HC RX 637 (ALT 250 FOR IP): Performed by: PHYSICIAN ASSISTANT

## 2025-04-08 PROCEDURE — 2580000003 HC RX 258

## 2025-04-08 PROCEDURE — 1200000003 HC TELEMETRY R&B

## 2025-04-08 PROCEDURE — 99233 SBSQ HOSP IP/OBS HIGH 50: CPT | Performed by: PHYSICIAN ASSISTANT

## 2025-04-08 PROCEDURE — 82728 ASSAY OF FERRITIN: CPT

## 2025-04-08 RX ORDER — HYDROCODONE BITARTRATE AND ACETAMINOPHEN 5; 325 MG/1; MG/1
1 TABLET ORAL EVERY 8 HOURS PRN
Refills: 0 | Status: DISCONTINUED | OUTPATIENT
Start: 2025-04-08 | End: 2025-04-10 | Stop reason: HOSPADM

## 2025-04-08 RX ORDER — SODIUM CHLORIDE 9 MG/ML
INJECTION, SOLUTION INTRAVENOUS CONTINUOUS
Status: DISCONTINUED | OUTPATIENT
Start: 2025-04-08 | End: 2025-04-10 | Stop reason: HOSPADM

## 2025-04-08 RX ORDER — FLUCONAZOLE 100 MG/1
150 TABLET ORAL ONCE
Status: COMPLETED | OUTPATIENT
Start: 2025-04-08 | End: 2025-04-08

## 2025-04-08 RX ADMIN — FLUCONAZOLE 150 MG: 100 TABLET ORAL at 14:44

## 2025-04-08 RX ADMIN — WATER 1000 MG: 1 INJECTION INTRAMUSCULAR; INTRAVENOUS; SUBCUTANEOUS at 14:44

## 2025-04-08 RX ADMIN — SODIUM CHLORIDE: 0.9 INJECTION, SOLUTION INTRAVENOUS at 12:38

## 2025-04-08 RX ADMIN — DIVALPROEX SODIUM 500 MG: 500 TABLET, EXTENDED RELEASE ORAL at 20:28

## 2025-04-08 RX ADMIN — HYDROCODONE BITARTRATE AND ACETAMINOPHEN 1 TABLET: 5; 325 TABLET ORAL at 12:36

## 2025-04-08 RX ADMIN — GABAPENTIN 300 MG: 300 CAPSULE ORAL at 20:29

## 2025-04-08 RX ADMIN — OXYBUTYNIN CHLORIDE 5 MG: 5 TABLET ORAL at 20:29

## 2025-04-08 RX ADMIN — HEPARIN SODIUM 5000 UNITS: 5000 INJECTION INTRAVENOUS; SUBCUTANEOUS at 14:11

## 2025-04-08 RX ADMIN — OXYBUTYNIN CHLORIDE 5 MG: 5 TABLET ORAL at 07:51

## 2025-04-08 RX ADMIN — PIPERACILLIN AND TAZOBACTAM 3375 MG: 3; .375 INJECTION, POWDER, FOR SOLUTION INTRAVENOUS at 06:14

## 2025-04-08 RX ADMIN — SODIUM CHLORIDE: 0.9 INJECTION, SOLUTION INTRAVENOUS at 18:21

## 2025-04-08 RX ADMIN — AZITHROMYCIN MONOHYDRATE 500 MG: 500 INJECTION, POWDER, LYOPHILIZED, FOR SOLUTION INTRAVENOUS at 14:11

## 2025-04-08 RX ADMIN — HEPARIN SODIUM 5000 UNITS: 5000 INJECTION INTRAVENOUS; SUBCUTANEOUS at 20:28

## 2025-04-08 RX ADMIN — GABAPENTIN 300 MG: 300 CAPSULE ORAL at 07:51

## 2025-04-08 RX ADMIN — HEPARIN SODIUM 5000 UNITS: 5000 INJECTION INTRAVENOUS; SUBCUTANEOUS at 06:11

## 2025-04-08 RX ADMIN — PANTOPRAZOLE SODIUM 40 MG: 40 TABLET, DELAYED RELEASE ORAL at 06:11

## 2025-04-08 ASSESSMENT — PAIN SCALES - GENERAL
PAINLEVEL_OUTOF10: 0

## 2025-04-08 ASSESSMENT — ENCOUNTER SYMPTOMS
BLOOD IN STOOL: 0
ABDOMINAL PAIN: 0
ANAL BLEEDING: 0
TROUBLE SWALLOWING: 0
ABDOMINAL DISTENTION: 0
DIARRHEA: 0
RECTAL PAIN: 0
ALLERGIC/IMMUNOLOGIC NEGATIVE: 1
RESPIRATORY NEGATIVE: 1
NAUSEA: 0
EYES NEGATIVE: 1
VOMITING: 0
CONSTIPATION: 0

## 2025-04-08 ASSESSMENT — PAIN DESCRIPTION - LOCATION: LOCATION: BACK

## 2025-04-08 ASSESSMENT — PAIN DESCRIPTION - DESCRIPTORS: DESCRIPTORS: DISCOMFORT

## 2025-04-08 NOTE — PLAN OF CARE
Problem: Discharge Planning  Goal: Discharge to home or other facility with appropriate resources  Outcome: Progressing  Flowsheets (Taken 4/8/2025 1335)  Discharge to home or other facility with appropriate resources:   Identify barriers to discharge with patient and caregiver   Identify discharge learning needs (meds, wound care, etc)   Arrange for needed discharge resources and transportation as appropriate     Problem: Safety - Adult  Goal: Free from fall injury  Outcome: Progressing  Flowsheets (Taken 4/8/2025 1335)  Free From Fall Injury:   Instruct family/caregiver on patient safety   Based on caregiver fall risk screen, instruct family/caregiver to ask for assistance with transferring infant if caregiver noted to have fall risk factors     Problem: ABCDS Injury Assessment  Goal: Absence of physical injury  Outcome: Progressing  Flowsheets (Taken 4/8/2025 1335)  Absence of Physical Injury: Implement safety measures based on patient assessment  Care plan reviewed with patient.

## 2025-04-08 NOTE — PLAN OF CARE
Problem: Discharge Planning  Goal: Discharge to home or other facility with appropriate resources  4/8/2025 1440 by Winter Valencia, MSW, LSW  Outcome: Progressing  Flowsheets (Taken 4/8/2025 1440)  Discharge to home or other facility with appropriate resources: Identify barriers to discharge with patient and caregiver  Note: Return to home, current with [facility] for aides, see  notes 4/8/25.

## 2025-04-08 NOTE — CARE COORDINATION
4/8/25, 2:37 PM EDT    DISCHARGE PLANNING EVALUATION       SW consult received as Patient is current with [facility].  SW spoke with Veena from [facility] and Patient is current with aides 3x's a week and no skilled services.  SW spoke with [facility] and verified above services and she asked for an update at discharge.  Patient is from home and has been managing okay with her walker.  Patient is concerned about a possible need for therapy at discharge.

## 2025-04-08 NOTE — CONSULTS
Consult History & Physical      Patient:  Debbie Zavaleta  YOB: 1960  MRN: [id number]     Acct: [id number]  Code Status: Full Code  PCP: David Davis MD      Chief Complaint:    Chief Complaint   Patient presents with    Altered Mental Status    Fall       Date of Service: Pt seen/examined in consultation on 4/8/2025    History Of Present Illness:      Debbie Zavaleta  is a 65 y.o. female who we are asked to see/evaluate by Jenna Miguel PA-C for medical management of liver lesions found on imaging and transaminitis.  Patient presented to the ED due to altered mental status and being found down at  home s/p fall.  She apparently has been having multiple falls at home. Friend at the bedside reports that patient has not had an appetite in months.  She had cellulitis and things have been downhill since then.  Denies any unintentional weight loss.  Does report heartburn intermittently at home.     Past Medical History:    Past Medical History:   Diagnosis Date    B12 deficiency 12/2020    Depression     Lymphedema 2008    both legs    MD (muscular dystrophy) ([facility]) 2008    Dr. Hills at [facility] - no lung involvement per patient    Neuropathy     Obesity (BMI 30-39.9)     Overflow incontinence     Rheumatoid arthritis(714.0) 2008    Dr. Ruiz - [place]    Superficial thrombophlebitis 03/2018    right lower extremity    Venous insufficiency     h/o SVT - 3-4 in the past (has never been on Coumadin)       Home Medications:  Prior to Admission medications    Medication Sig Start Date End Date Taking? Authorizing Provider   ketoconazole (NIZORAL) 2 % shampoo Apply topically daily as needed. 3/26/25  Yes David Davis MD   HYDROcodone-acetaminophen (NORCO) 5-325 MG per tablet Take 1 tablet by mouth every 8 hours as needed for Pain for up to 30 days. Max Daily Amount: 3 tablets 3/12/25 4/11/25 Yes David Davis MD   oxyBUTYnin (DITROPAN) 5 MG tablet Take 1 tablet by mouth 2 times daily 1/13/25  Yes

## 2025-04-08 NOTE — CARE COORDINATION
Case Mangement Assessment Initial Evaluation    Date/Time of Evaluation: 2025 7:58 AM  Assessment Completed by: Nova Reis RN    If patient is discharged prior to next notation, then this note serves as note for discharge by case management.    Patient Name: Debbie Zavaleta                   YOB: 1960  Diagnosis: Hyponatremia [E87.1]  Pleural effusion [J90]  Enlarged thyroid [E04.9]  Atrophic kidney [N26.1]  Elevated troponin [R79.89]  Liver lesion [K76.9]  RABIA (acute kidney injury) [N17.9]  Hypertrophic kidney [N28.81]  Fall, initial encounter [W19.XXXA]  Urinary tract infection with hematuria, site unspecified [N39.0, R31.9]  Altered mental status, unspecified altered mental status type [R41.82]  Chronic pulmonary embolism without acute cor pulmonale, unspecified pulmonary embolism type (HCC) [I27.82]                   Date / Time: 2025  9:37 AM  Location: [address] [place] [address]     Patient Admission Status: Inpatient   Readmission Risk Low 0-14, Mod 15-19), High > 20: Readmission Risk Score: 13.5    Current PCP: David Davis MD  Health Care Decision Makers:   Primary Decision Maker: Yolanda Mayorga - [facility] - [phone]    Additional Case Management Notes: To ED after being found down at home. Reports of frequent falls. Hospitalist following. GI to see. PT/OT. SQ heparin. IV zosyn q8h. 90% on RA. Na 129. Creat 1.6. Total  (1261) Blood cultures pending.     Procedures: none    Imagin/7 CT Head: Negative    CT Cervical: Straightening of the normal cervical lordosis with superimposed cervical  spondylosis at C5-6. 2. No acute fracture. 3. Enlarged right and left lobes of the thyroid gland.   CTA Chest: Filling defect in the right lower lobe pulmonary arterial branch which may represent a chronic thrombus. 2. Enlarged node in the right tracheobronchial angle. 3. Right pleural effusion. Right lower lobe infiltrate. 4. Thoracic spondylosis. 5. Multiple hypodensities in the liver suspicious

## 2025-04-09 LAB
AFP SERPL-MCNC: < 1.8 UG/L
ALBUMIN SERPL BCG-MCNC: 2.7 G/DL (ref 3.4–4.9)
ALP SERPL-CCNC: 62 U/L (ref 38–126)
ALT SERPL W/O P-5'-P-CCNC: 111 U/L (ref 10–35)
ANION GAP SERPL CALC-SCNC: 11 MEQ/L (ref 8–16)
AST SERPL-CCNC: 223 U/L (ref 10–35)
BACTERIA UR CULT: ABNORMAL
BILIRUB CONJ SERPL-MCNC: < 0.1 MG/DL (ref 0–0.2)
BILIRUB SERPL-MCNC: < 0.2 MG/DL (ref 0.3–1.2)
BUN SERPL-MCNC: 25 MG/DL (ref 8–23)
CALCIUM SERPL-MCNC: 8.7 MG/DL (ref 8.8–10.2)
CHLORIDE SERPL-SCNC: 101 MEQ/L (ref 98–111)
CK SERPL-CCNC: 311 U/L (ref 26–192)
CO2 SERPL-SCNC: 21 MEQ/L (ref 22–29)
CREAT SERPL-MCNC: 1.3 MG/DL (ref 0.5–0.9)
DEPRECATED RDW RBC AUTO: 47.8 FL (ref 35–45)
ERYTHROCYTE [DISTWIDTH] IN BLOOD BY AUTOMATED COUNT: 14 % (ref 11.5–14.5)
GFR SERPL CREATININE-BSD FRML MDRD: 45 ML/MIN/1.73M2
GLUCOSE SERPL-MCNC: 109 MG/DL (ref 74–109)
HCT VFR BLD AUTO: 34.3 % (ref 37–47)
HGB BLD-MCNC: 11.8 GM/DL (ref 12–16)
MCH RBC QN AUTO: 31.9 PG (ref 26–33)
MCHC RBC AUTO-ENTMCNC: 34.4 GM/DL (ref 32.2–35.5)
MCV RBC AUTO: 92.7 FL (ref 81–99)
MITOCHONDRIAL M2 AB, IGG: 2.7 U/ML (ref 0–4)
ORGANISM: ABNORMAL
PLATELET # BLD AUTO: 183 THOU/MM3 (ref 130–400)
PMV BLD AUTO: 11.1 FL (ref 9.4–12.4)
POTASSIUM SERPL-SCNC: 3.5 MEQ/L (ref 3.5–5.2)
PROT SERPL-MCNC: 5.6 G/DL (ref 6.4–8.3)
RBC # BLD AUTO: 3.7 MILL/MM3 (ref 4.2–5.4)
SODIUM SERPL-SCNC: 133 MEQ/L (ref 135–145)
SODIUM SERPL-SCNC: 135 MEQ/L (ref 135–145)
SODIUM SERPL-SCNC: 136 MEQ/L (ref 135–145)
WBC # BLD AUTO: 10.3 THOU/MM3 (ref 4.8–10.8)

## 2025-04-09 PROCEDURE — 2500000003 HC RX 250 WO HCPCS: Performed by: PHYSICIAN ASSISTANT

## 2025-04-09 PROCEDURE — 80053 COMPREHEN METABOLIC PANEL: CPT

## 2025-04-09 PROCEDURE — 82248 BILIRUBIN DIRECT: CPT

## 2025-04-09 PROCEDURE — 84295 ASSAY OF SERUM SODIUM: CPT

## 2025-04-09 PROCEDURE — 99233 SBSQ HOSP IP/OBS HIGH 50: CPT | Performed by: PHYSICIAN ASSISTANT

## 2025-04-09 PROCEDURE — 6370000000 HC RX 637 (ALT 250 FOR IP)

## 2025-04-09 PROCEDURE — 36415 COLL VENOUS BLD VENIPUNCTURE: CPT

## 2025-04-09 PROCEDURE — 6370000000 HC RX 637 (ALT 250 FOR IP): Performed by: PHYSICIAN ASSISTANT

## 2025-04-09 PROCEDURE — 1200000003 HC TELEMETRY R&B

## 2025-04-09 PROCEDURE — 97162 PT EVAL MOD COMPLEX 30 MIN: CPT

## 2025-04-09 PROCEDURE — 1200000000 HC SEMI PRIVATE

## 2025-04-09 PROCEDURE — 6360000002 HC RX W HCPCS

## 2025-04-09 PROCEDURE — 97530 THERAPEUTIC ACTIVITIES: CPT

## 2025-04-09 PROCEDURE — 97110 THERAPEUTIC EXERCISES: CPT

## 2025-04-09 PROCEDURE — 85027 COMPLETE CBC AUTOMATED: CPT

## 2025-04-09 PROCEDURE — 2500000003 HC RX 250 WO HCPCS

## 2025-04-09 PROCEDURE — 2580000003 HC RX 258: Performed by: PHYSICIAN ASSISTANT

## 2025-04-09 PROCEDURE — 82550 ASSAY OF CK (CPK): CPT

## 2025-04-09 PROCEDURE — 6360000002 HC RX W HCPCS: Performed by: PHYSICIAN ASSISTANT

## 2025-04-09 RX ADMIN — SODIUM CHLORIDE: 0.9 INJECTION, SOLUTION INTRAVENOUS at 17:53

## 2025-04-09 RX ADMIN — DIVALPROEX SODIUM 500 MG: 500 TABLET, EXTENDED RELEASE ORAL at 21:45

## 2025-04-09 RX ADMIN — HEPARIN SODIUM 5000 UNITS: 5000 INJECTION INTRAVENOUS; SUBCUTANEOUS at 12:25

## 2025-04-09 RX ADMIN — PANTOPRAZOLE SODIUM 40 MG: 40 TABLET, DELAYED RELEASE ORAL at 04:14

## 2025-04-09 RX ADMIN — WATER 1000 MG: 1 INJECTION INTRAMUSCULAR; INTRAVENOUS; SUBCUTANEOUS at 13:22

## 2025-04-09 RX ADMIN — GABAPENTIN 300 MG: 300 CAPSULE ORAL at 08:09

## 2025-04-09 RX ADMIN — APIXABAN 2.5 MG: 2.5 TABLET, FILM COATED ORAL at 21:45

## 2025-04-09 RX ADMIN — OXYBUTYNIN CHLORIDE 5 MG: 5 TABLET ORAL at 21:45

## 2025-04-09 RX ADMIN — HYDROCODONE BITARTRATE AND ACETAMINOPHEN 1 TABLET: 5; 325 TABLET ORAL at 13:22

## 2025-04-09 RX ADMIN — GABAPENTIN 300 MG: 300 CAPSULE ORAL at 21:45

## 2025-04-09 RX ADMIN — APIXABAN 2.5 MG: 2.5 TABLET, FILM COATED ORAL at 16:19

## 2025-04-09 RX ADMIN — SODIUM CHLORIDE, PRESERVATIVE FREE 10 ML: 5 INJECTION INTRAVENOUS at 21:44

## 2025-04-09 RX ADMIN — AZITHROMYCIN MONOHYDRATE 500 MG: 500 INJECTION, POWDER, LYOPHILIZED, FOR SOLUTION INTRAVENOUS at 12:25

## 2025-04-09 RX ADMIN — OXYBUTYNIN CHLORIDE 5 MG: 5 TABLET ORAL at 08:09

## 2025-04-09 RX ADMIN — HEPARIN SODIUM 5000 UNITS: 5000 INJECTION INTRAVENOUS; SUBCUTANEOUS at 04:14

## 2025-04-09 ASSESSMENT — PAIN SCALES - WONG BAKER
WONGBAKER_NUMERICALRESPONSE: NO HURT
WONGBAKER_NUMERICALRESPONSE: NO HURT

## 2025-04-09 ASSESSMENT — PAIN SCALES - GENERAL
PAINLEVEL_OUTOF10: 5
PAINLEVEL_OUTOF10: 0
PAINLEVEL_OUTOF10: 0
PAINLEVEL_OUTOF10: 7
PAINLEVEL_OUTOF10: 0

## 2025-04-09 ASSESSMENT — PAIN DESCRIPTION - LOCATION: LOCATION: GENERALIZED

## 2025-04-09 ASSESSMENT — PAIN DESCRIPTION - DESCRIPTORS: DESCRIPTORS: ACHING

## 2025-04-10 ENCOUNTER — TELEPHONE (OUTPATIENT)
Dept: FAMILY MEDICINE CLINIC | Age: 65
End: 2025-04-10

## 2025-04-10 VITALS
TEMPERATURE: 97.8 F | SYSTOLIC BLOOD PRESSURE: 127 MMHG | HEART RATE: 58 BPM | HEIGHT: 64 IN | RESPIRATION RATE: 16 BRPM | WEIGHT: 186 LBS | BODY MASS INDEX: 31.76 KG/M2 | DIASTOLIC BLOOD PRESSURE: 78 MMHG | OXYGEN SATURATION: 97 %

## 2025-04-10 DIAGNOSIS — B37.31 VAGINAL CANDIDIASIS: Primary | ICD-10-CM

## 2025-04-10 LAB
ALBUMIN SERPL BCG-MCNC: 2.7 G/DL (ref 3.4–4.9)
ALP SERPL-CCNC: 71 U/L (ref 38–126)
ALT SERPL W/O P-5'-P-CCNC: 109 U/L (ref 10–35)
ANION GAP SERPL CALC-SCNC: 10 MEQ/L (ref 8–16)
AST SERPL-CCNC: 153 U/L (ref 10–35)
AT III ACT/NOR PPP CHRO: 85 % (ref 76–128)
BILIRUB CONJ SERPL-MCNC: < 0.1 MG/DL (ref 0–0.2)
BILIRUB SERPL-MCNC: < 0.2 MG/DL (ref 0.3–1.2)
BUN SERPL-MCNC: 19 MG/DL (ref 8–23)
CALCIUM SERPL-MCNC: 9.2 MG/DL (ref 8.8–10.2)
CARDIOLIPIN IGG SER IA-ACNC: < 10 GPL
CARDIOLIPIN IGM SER IA-ACNC: 36 MPL
CERULOPLASMIN SERPL-MCNC: 34 MG/DL (ref 16–45)
CHLORIDE SERPL-SCNC: 108 MEQ/L (ref 98–111)
CO2 SERPL-SCNC: 22 MEQ/L (ref 22–29)
CREAT SERPL-MCNC: 1.2 MG/DL (ref 0.5–0.9)
DEPRECATED RDW RBC AUTO: 50 FL (ref 35–45)
ERYTHROCYTE [DISTWIDTH] IN BLOOD BY AUTOMATED COUNT: 14.4 % (ref 11.5–14.5)
GFR SERPL CREATININE-BSD FRML MDRD: 50 ML/MIN/1.73M2
GLUCOSE SERPL-MCNC: 113 MG/DL (ref 74–109)
HCT VFR BLD AUTO: 34.7 % (ref 37–47)
HGB BLD-MCNC: 11.8 GM/DL (ref 12–16)
MCH RBC QN AUTO: 32.1 PG (ref 26–33)
MCHC RBC AUTO-ENTMCNC: 34 GM/DL (ref 32.2–35.5)
MCV RBC AUTO: 94.3 FL (ref 81–99)
NUCLEAR IGG SER QL IA: NORMAL
PLATELET # BLD AUTO: 206 THOU/MM3 (ref 130–400)
PMV BLD AUTO: 11.1 FL (ref 9.4–12.4)
POTASSIUM SERPL-SCNC: 3.7 MEQ/L (ref 3.5–5.2)
PROT SERPL-MCNC: 5.6 G/DL (ref 6.4–8.3)
RBC # BLD AUTO: 3.68 MILL/MM3 (ref 4.2–5.4)
SMA IGG SER-ACNC: 42 UNITS (ref 0–19)
SODIUM SERPL-SCNC: 140 MEQ/L (ref 135–145)
SPECIMEN SOURCE: NORMAL
VWF AG ACT/NOR PPP IA: NORMAL %
WBC # BLD AUTO: 8.7 THOU/MM3 (ref 4.8–10.8)

## 2025-04-10 PROCEDURE — 82248 BILIRUBIN DIRECT: CPT

## 2025-04-10 PROCEDURE — 81256 HFE GENE: CPT

## 2025-04-10 PROCEDURE — 99239 HOSP IP/OBS DSCHRG MGMT >30: CPT | Performed by: PHYSICIAN ASSISTANT

## 2025-04-10 PROCEDURE — 80053 COMPREHEN METABOLIC PANEL: CPT

## 2025-04-10 PROCEDURE — 36415 COLL VENOUS BLD VENIPUNCTURE: CPT

## 2025-04-10 PROCEDURE — 97530 THERAPEUTIC ACTIVITIES: CPT

## 2025-04-10 PROCEDURE — 85027 COMPLETE CBC AUTOMATED: CPT

## 2025-04-10 PROCEDURE — 6370000000 HC RX 637 (ALT 250 FOR IP): Performed by: PHYSICIAN ASSISTANT

## 2025-04-10 PROCEDURE — 6370000000 HC RX 637 (ALT 250 FOR IP)

## 2025-04-10 RX ORDER — CEFDINIR 300 MG/1
300 CAPSULE ORAL 2 TIMES DAILY
Qty: 6 CAPSULE | Refills: 0 | Status: SHIPPED | OUTPATIENT
Start: 2025-04-10 | End: 2025-04-13

## 2025-04-10 RX ADMIN — APIXABAN 2.5 MG: 2.5 TABLET, FILM COATED ORAL at 07:59

## 2025-04-10 RX ADMIN — GABAPENTIN 300 MG: 300 CAPSULE ORAL at 07:59

## 2025-04-10 RX ADMIN — PANTOPRAZOLE SODIUM 40 MG: 40 TABLET, DELAYED RELEASE ORAL at 05:46

## 2025-04-10 RX ADMIN — OXYBUTYNIN CHLORIDE 5 MG: 5 TABLET ORAL at 07:59

## 2025-04-10 ASSESSMENT — PAIN SCALES - GENERAL
PAINLEVEL_OUTOF10: 0
PAINLEVEL_OUTOF10: 0

## 2025-04-10 ASSESSMENT — PAIN SCALES - WONG BAKER
WONGBAKER_NUMERICALRESPONSE: NO HURT
WONGBAKER_NUMERICALRESPONSE: NO HURT

## 2025-04-10 NOTE — PROGRESS NOTES
Hospitalist Progress Note      Patient:  Debbie Zavaleta 65 y.o. female     Unit/Bed:6K-21/021-A    Date of Admission: 4/7/2025      ASSESSMENT AND PLAN    Active Problems  Acute metabolic encephalopathy, improved  Pt found down at home, unknown how long. Reports feeling better overall. Pt is alert and oriented; friend at bedside agrees mental status back to baseline  CTH, MRI Brain negative  Discussed importance of wearing life alert.  RLL pneumonia  Pt reports recent URI symptoms. RLL infiltrate and right pleural effusion on CT.  Pt on ceftriaxone, azithromycin--start PO abx on discharge  Possible acute cystitis  +Ucx, no urinary symptoms. Pt is on ceftriaxone for above which will cover.   Rhabdomyolysis, traumatic.   Prerenal, secondary to rhabdo, dehydration, prolonged time down.   Treated with aggressive IVF.  Cr improving - Creatinine 1.2.  Hepatic cysts  Multiple hypodensities seen on liver.   Liver ultrasound reported multiple steatosis.  Multiple hepatic cysts up to 4.4 cm.  Patent hepatic vessels.   Discussed with GI, cystoscopy stable,-present on previous imaging.  Liver biopsy was canceled.  Chronic thrombus right pulm artery   Noted on CTA chest. Small in appearance. No hypoxia, hypotension, or tachycardia.   Pt reports history of DVTs, has been on OAC in the past.   Echo showed EF 60%. No signs of pulmonary HTN or right sided failure.   Patient to follow up with PCP for hypercoag workup and possible prophylactic anticoagulation  Resolved Problems  Hyponatremia   Likely d/t dehydration as above.   Pt started on IVF initially.   NS at 50cc/hr  Na improved at appropriate rate.   Chronic Conditions (reviewed and stable unless otherwise stated)  GERD  Muscular dystrophy  Inflammatory arthritis/psoriatic arthritis      LDA: []CVC / []PICC / []Midline / []Peters / []Drains / []Mediport / [x]None  Antibiotics: yes  Steroids: no  Labs (still needed?): []Yes / [x]No  IVF (still needed?): []Yes / [x]No    Level 
A Regarding order for CT biopsy for \"new liver lesions:\" Dr Cohen has reviewed the CT images and his impression is that they look like cysts. Comparison was made with the ultrasound from 2014 and there are cysts present then as well. He recommends to continue with scheduled liver ultrasound that was ordered today for further comparison. At this time, Dr Cohen states there are no masses present to biopsy. PerfectServe message sent to Balaji Aguilar NP.   
Discharge teaching and instructions for diagnosis/procedure of RABIA completed with patient using teachback method. AVS reviewed. Printed prescriptions given to patient. Patient voiced understanding regarding prescriptions, follow up appointments, and care of self at home. Discharged in a wheelchair to  home with support per friend.   
Echo completed. Dr. Shahid to read.   
[facility]  STRZ RENAL TELEMETRY 6K  Occupational Therapy  Daily Note    Discharge Recommendations: Home with Home Health OT  Equipment Recommendations: No Defer to next level of care.    Time In: 1225  Time Out: 1239  Timed Code Treatment Minutes: 14 Minutes  Minutes: 14          Date: 4/10/2025  Patient Name: Debbie Zavaleta,   Gender: female      Room: [address] [place] [address]  MRN: [id number]  : 1960  (65 y.o.)  Referring Practitioner: Cal Boles DO  Diagnosis: RABIA (acute kidney injury)  Additional Pertinent Hx: Per EMR, \"Debbie Zavaleta is a 65 y.o. female with a PMH of muscular dystrophy, inflammatory arthritis/psoriatic arthritis, CKD, DVT who presented from home after being found down.  Also found to be encephalopathic and oriented only to person.  Patient has been having recurrent falls over the past couple weeks.  Patient states she feels off balance and falls out of nowhere.  Denies any mechanical obstacle in the way.  Denies any lightheadedness or room spinning.  Denies any loss of consciousness during these episodes.  Patient had fallen yesterday evening and unable to get off the floor.  Family members came to the house to  their dog which is when they noticed she was on the ground.  Was brought to the ED today for overall feeling bad and worsening mentation as patient was only oriented to herself.  On my initial evaluation she was oriented x 4 which was an improvement per ED nursing staff and friend at bedside.\"    Restrictions/Precautions:  Restrictions/Precautions: Fall Risk, General Precautions     Social/Functional History:  Lives With: Alone  Type of Home: Apartment  Home Layout: One level  Home Access: Level entry  Home Equipment: Walker - Rolling   Bathroom Shower/Tub: Walk-in shower, Shower chair with back  Bathroom Toilet: Handicap height  Bathroom Accessibility: Accessible  IADL Comments: aides come to the home 3 days per week to help with laundry, grocerys, 
Patient received sacramental anointing by a . If you are in need of  support, please call [phone]. If you are in need of a  after 6:30pm, please call the house supervisor for the on-call .      Rere Calles  [facility] Health Coordinator  [phone]   
[facility]  INPATIENT PHYSICAL THERAPY  EVALUATION  STRZ RENAL TELEMETRY 6K - 6K-21/021-A    Discharge Recommendations: Continue to assess pending progress, anticipate home with current services, may benefit from HH PT to ensure safe transitions  Equipment Recommendations: No             Time In: 829  Time Out: 904  Timed Code Treatment Minutes: 27 Minutes  Minutes: 35          Date: 2025  Patient Name: Debbie Zavaleta,  Gender:  female        MRN: [id number]  : 1960  (65 y.o.)      Referring Practitioner: Cal Boles DO  Diagnosis: RABIA (acute kidney injury)  Additional Pertinent Hx: Per EMR \"Debbie Zavaleta is a 65 y.o. female with a PMH of muscular dystrophy, inflammatory arthritis/psoriatic arthritis, CKD, DVT who presented from home after being found down.  Also found to be encephalopathic and oriented only to person.  Patient has been having recurrent falls over the past couple weeks.  Patient states she feels off balance and falls out of nowhere.  Denies any mechanical obstacle in the way.  Denies any lightheadedness or room spinning.  Denies any loss of consciousness during these episodes.  Patient had fallen yesterday evening and unable to get off the floor.  Family members came to the house to  their dog which is when they noticed she was on the ground.  Was brought to the ED today for overall feeling bad and worsening mentation as patient was only oriented to herself.  On my initial evaluation she was oriented x 4 which was an improvement per ED nursing staff and friend at bedside.\"     Restrictions/Precautions:  Restrictions/Precautions: Fall Risk, General Precautions            Required Braces or Orthoses?: No      Subjective:  Chart Reviewed: Yes  Patient assessed for rehabilitation services?: Yes  Family/Caregiver Present: No  Subjective: OK to see pt per nursing.  Pt sitting in bedside chair when PT arrived,pleasant and cooperative, talkative. Pt agreeable  to PT 
[facility]  INPATIENT OCCUPATIONAL THERAPY  STRZ RENAL TELEMETRY 6K  EVALUATION      Discharge Recommendations: Continue to assess pending progress, Subacute/Skilled Nursing Facility  Equipment Recommendations: No Defer to next level of care.      Time In: 1137  Time Out: 1158  Timed Code Treatment Minutes: 13 Minutes  Minutes: 21          Date: 2025  Patient Name: Debbie Zavaleta,   Gender: female      MRN: [id number]  : 1960  (65 y.o.)  Referring Practitioner: Cal Boles DO  Diagnosis: RABIA (acute kidney injury)  Additional Pertinent Hx: Per EMR, \"Debbie Zavaleta is a 65 y.o. female with a PMH of muscular dystrophy, inflammatory arthritis/psoriatic arthritis, CKD, DVT who presented from home after being found down.  Also found to be encephalopathic and oriented only to person.  Patient has been having recurrent falls over the past couple weeks.  Patient states she feels off balance and falls out of nowhere.  Denies any mechanical obstacle in the way.  Denies any lightheadedness or room spinning.  Denies any loss of consciousness during these episodes.  Patient had fallen yesterday evening and unable to get off the floor.  Family members came to the house to  their dog which is when they noticed she was on the ground.  Was brought to the ED today for overall feeling bad and worsening mentation as patient was only oriented to herself.  On my initial evaluation she was oriented x 4 which was an improvement per ED nursing staff and friend at bedside.\"    Restrictions/Precautions:  Restrictions/Precautions: Fall Risk, General Precautions    Subjective  Chart Reviewed: Yes, Orders, Progress Notes, History and Physical  Patient assessed for rehabilitation services?: Yes    Subjective: RN okayed OT session. Upon arrival patient was in recliner visiting with friend. Pt was agreeable to OT session.    Pain: 0/10: Pt did not c/o pain     Vitals: Vitals not assessed per clinical judgement, 
Content: Thought content normal.         Judgment: Judgment normal.           Labs:   CBC:   Lab Results   Component Value Date/Time    WBC 8.7 04/10/2025 06:09 AM    HGB 11.8 04/10/2025 06:09 AM    HCT 34.7 04/10/2025 06:09 AM    MCV 94.3 04/10/2025 06:09 AM     04/10/2025 06:09 AM     BMP:   Lab Results   Component Value Date/Time     04/10/2025 06:09 AM    K 3.7 04/10/2025 06:09 AM    K 4.3 05/01/2022 09:50 AM     04/10/2025 06:09 AM    CO2 22 04/10/2025 06:09 AM    PHOS 3.8 11/28/2022 02:54 PM    BUN 19 04/10/2025 06:09 AM    CREATININE 1.2 04/10/2025 06:09 AM    CALCIUM 9.2 04/10/2025 06:09 AM     PT/INR: No results found for: \"PROTIME\", \"INR\"  Lipids:   Lab Results   Component Value Date/Time    ALKPHOS 71 04/10/2025 06:09 AM     04/10/2025 06:09 AM     04/10/2025 06:09 AM    BILITOT <0.2 04/10/2025 06:09 AM    BILIDIR <0.1 04/10/2025 06:09 AM    LIPASE 38.0 04/07/2025 10:23 AM     Significant Diagnostic Studies:   NA     Current Meds:  Scheduled Meds:   apixaban  2.5 mg Oral BID    cefTRIAXone (ROCEPHIN) IV  1,000 mg IntraVENous Q24H    sodium chloride flush  5-40 mL IntraVENous 2 times per day    divalproex  500 mg Oral Nightly    gabapentin  300 mg Oral BID    pantoprazole  40 mg Oral QAM AC    oxyBUTYnin  5 mg Oral BID     Continuous Infusions:   sodium chloride 50 mL/hr at 04/09/25 1753    sodium chloride         ASSESSMENT:  66 yo female who presented for fall and AMS.  She was found to have transaminitis with multiple liver lesions.       New liver lesions suspicious for metastatic disease  Transaminitis  Acute encephalopathy  Recurrent falls  Pulmonary embolism  UTI  CAP  RABIA on CKD III  GERD  Muscular Dystrophy  Inflammatory arthritis/psoriatic arthritis.      PLAN:     Supportive care per primary  US Liver with dopplers showing multiple hepatic cysts negative for portal vein thrombus.   Ferritin 1238  HFE 3 mutations pending.   Iron 16, Sat 10%- will need 
PM      QUERY TEXT:    Rhabdomyolysis is documented in the medical record H&P on 4/7.  Please specify   the type:    The clinical indicators include:  - rhabdomyolysis, falls, extended down time ED note, RABIA  - Per Ed note:  fell around 11:30 last evening  - Per H&P:  rhabdomyolysis after being found down.  - Labs: CK 1261, Crea 1.8, , , ALT 38, LA 1.2  - Treatments: IVF bolus, IVF maint, labs, imaging,  Options provided:  -- Traumatic rhabdomyolysis  -- Nontraumatic rhabdomyolysis  -- Other - I will add my own diagnosis  -- Disagree - Not applicable / Not valid  -- Disagree - Clinically unable to determine / Unknown  -- Refer to Clinical Documentation Reviewer    PROVIDER RESPONSE TEXT:    This patient has traumatic rhabdomyolysis.    Query created by: Geoffrey Chavis on 4/8/2025 3:25 PM      Electronically signed by:  Jenna Meek PA-C 4/9/2025 9:02 AM          
cm.  Mild echogenic pancreas could be age related or fatty replacement.  No ascites in the visual field.     IMPRESSION:  Impression:  1. Multiple hepatic cysts up to 4.4 cm.  2. Patent hepatic vessels.  3. Hepatic steatosis.        Current Meds:  Scheduled Meds:   cefTRIAXone (ROCEPHIN) IV  1,000 mg IntraVENous Q24H    azithromycin  500 mg IntraVENous Q24H    sodium chloride flush  5-40 mL IntraVENous 2 times per day    divalproex  500 mg Oral Nightly    gabapentin  300 mg Oral BID    pantoprazole  40 mg Oral QAM AC    oxyBUTYnin  5 mg Oral BID    heparin (porcine)  5,000 Units SubCUTAneous 3 times per day     Continuous Infusions:   sodium chloride 100 mL/hr at 04/08/25 2030    sodium chloride         ASSESSMENT:  64 yo female who presented for fall and AMS.  She was found to have transaminitis with multiple liver lesions.       New liver lesions suspicious for metastatic disease  Transaminitis  Acute encephalopathy  Recurrent falls  Pulmonary embolism  UTI  CAP  RABIA on CKD III  GERD  Muscular Dystrophy  Inflammatory arthritis/psoriatic arthritis.      PLAN:     Supportive care per primary  US Liver with dopplers showing multiple hepatic cysts negative for portal vein thrombus.   Ferritin 1238  HFE 3 mutations pending.   Iron 16, Sat 10%- will need supplementation at home once cleared from infection standpoint.   AFP negative.   Alpha-1 antitrypsin 324  Trend LFT's , worse today.   Hepatopathy negative  Autoimmune workup pending.   Regular diet as tolerated.   Suspect could be DILI vs ischemic injury due to dehydration.   2gm Acetaminophen limit in 24 hours.   No nsaids  GI following.     Case reviewed and impression/plan reviewed in collaboration with Dr. Cb Tran.   Electronically signed by LUBA Rock - CNP on 4/9/2025 at 9:22 AM    [employer] ([employer])            
AM    
HYDROcodone-acetaminophen, sodium chloride flush, sodium chloride, potassium chloride **OR** potassium alternative oral replacement **OR** potassium chloride, magnesium sulfate, ondansetron **OR** ondansetron, polyethylene glycol, acetaminophen **OR** acetaminophen    Exam:  /64   Pulse 53   Temp 98.2 °F (36.8 °C) (Oral)   Resp 18   Ht 1.626 m (5' 4.02\")   Wt 84.4 kg (186 lb)   SpO2 93%   BMI 31.91 kg/m²   General:  No distress, appears stated age.   Eyes:  PERRL. Conjunctivae/corneas clear.  HENT: Head normal appearing. Nares normal. Oral mucosa moist.  Hearing intact.   Neck: Supple, with full range of motion. Trachea midline.  No gross JVD appreciated.  Respiratory:  Normal effort. Clear to auscultation, without rales or wheezes or rhonchi.  Cardiovascular: Normal rate, regular rhythm with normal S1/S2 without murmurs.    No lower extremity edema.   Abdomen: Soft, non-tender, non-distended with normal bowel sounds.  Musculoskeletal: No joint swelling or tenderness. Normal tone. No abnormal movements.   Skin: Warm and dry. No rashes or lesions. Tortuous LE veins.   Neurologic:  No focal sensory/motor deficits in the upper or lower extremities. Cranial nerves:  grossly non-focal 2-12.     Psychiatric: Alert and oriented, normal insight and thought content.   Capillary Refill: Brisk,< 3 seconds.      Labs/Radiology: See chart or assessment above.     Electronically signed by Jenna Miguel PA-C on 4/9/2025 at 1:26 PM

## 2025-04-10 NOTE — CARE COORDINATION
4/10/25, 1:14 PM EDT    Patient goals/plan/ treatment preferences discussed by  and .  Patient goals/plan/ treatment preferences reviewed with patient/ family.  Patient/ family verbalize understanding of discharge plan and are in agreement with goal/plan/treatment preferences.  Understanding was demonstrated using the teach back method.  AVS provided by RN at time of discharge, which includes all necessary medical information pertaining to the patients current course of illness, treatment, post-discharge goals of care, and treatment preferences.     Services At/After Discharge: Community Resources, Home Health, Aide services, Nursing service, OT, and PT    Pt is being discharged home today.  MELYSSA notified Gala at [facility], new  order is in place.  They can retrieve off of epic.    SW notified pts PASSP[employer] JANETT Mtz of discharge today.

## 2025-04-10 NOTE — DISCHARGE INSTRUCTIONS
Acute Kidney Injury: Care Instructions    Acute kidney injury (RABIA) is a sudden decrease in kidney function. This can happen over a period of hours, days or, in some cases, weeks. RABIA used to be called acute renal failure, but kidney failure doesn't always happen with RABIA. Common causes of RABIA are serious infection, blood loss, and some medicines.  When RABIA happens, the kidneys have trouble removing waste and excess fluids from the body as urine. The waste and fluids build up and become harmful.  Kidney function may return to normal if the cause of RABIA is treated quickly. Your chance of a full recovery depends on what caused the problem, how quickly the cause was treated, and what other medical problems you have. You may have a treatment called dialysis. It does the work of healthy kidneys to remove waste and fluids for a short time.  Follow-up care is a key part of your treatment and safety. Be sure to make and go to all appointments, and call your doctor if you are having problems. It's also a good idea to know your test results and keep a list of the medicines you take.  Talk to your doctor about how much fluid you should drink.  Eat a balanced diet. Talk to your doctor or a dietitian about what type of diet may be best for you. You may need to limit sodium, potassium, and phosphorus.  If you need dialysis, follow the instructions and schedule for dialysis that your doctor gives you.  Do not smoke. Smoking can make your condition worse. If you need help quitting, talk to your doctor about stop-smoking programs and medicines. These can increase your chances of quitting for good.  Limit alcohol.  Review all of your medicines with your doctor. Do not take any medicines, including nonsteroidal anti-inflammatory drugs (NSAIDs), such as ibuprofen (Advil, Motrin) or naproxen (Aleve), unless your doctor says it is safe for you to do so.  Make sure that anyone treating you for any health problem knows that you have

## 2025-04-11 LAB
LKM AB TITR SER IF: NORMAL {TITER}
SMOOTH MUSCLE IGG TITR SER: NORMAL {TITER}

## 2025-04-11 RX ORDER — FLUCONAZOLE 150 MG/1
TABLET ORAL
Qty: 2 TABLET | Refills: 0 | Status: SHIPPED | OUTPATIENT
Start: 2025-04-11 | End: 2025-04-12

## 2025-04-11 NOTE — TELEPHONE ENCOUNTER
Care Transitions Initial Follow Up Call    Outreach made within 2 business days of discharge: Yes    Patient: Debbie Zavaleta Patient : 1960   MRN: [id number]  Reason for Admission: RABIA  Discharge Date: 4/10/25       Spoke with: Patient    Discharge department/facility: [facility] Interactive Patient Contact:  Was patient able to fill all prescriptions: Yes  Was patient instructed to bring all medications to the follow-up visit: Yes  Is patient taking all medications as directed in the discharge summary? Yes  Does patient understand their discharge instructions: Yes  Does patient have questions or concerns that need addressed prior to 7-14 day follow up office visit: yes - Pt is requesting medication for yeast infection    Additional needs identified to be addressed with provider  Pt is taking oral antibiotics and is requesting medication for yeast infection to [facility]               Scheduled appointment with PCP within 7-14 days    Follow Up  Future Appointments   Date Time Provider Department Center   4/15/2025  9:00 AM Shelly Lubin APRN - CNP N [facility]   2025  3:00 PM David Davis MD [facility]   2026  9:20 AM Reed Gregorio MD [facility]       Geri Pacheco CMA ([facility])

## 2025-04-12 LAB
BACTERIA BLD AEROBE CULT: NORMAL
BACTERIA BLD AEROBE CULT: NORMAL

## 2025-04-12 NOTE — DISCHARGE SUMMARY
Hospital Medicine Discharge Summary      Patient Identification:   Debbie Zavaleta   : 1960  MRN: [id number]   Account: [id number]      Patient's PCP: David Davis MD    Admit Date: 2025     Discharge Date: 4/10/2025  4:31 PM    Admitting Physician: Bryant Deal DO     Discharge Physician: Gloria Holt PA     Discharge Diagnoses:    Active Hospital Problems    Diagnosis Date Noted    Altered mental status [R41.82] 2025    RABIA (acute kidney injury) [N17.9] 2025       Discharge Assessment & Plan:  Acute metabolic encephalopathy, improved  Pt found down at home, unknown how long. Reports feeling better overall. Pt is alert and oriented; friend at bedside agrees mental status back to baseline  CTH, MRI Brain negative  Discussed importance of wearing life alert.  RLL pneumonia  Pt reports recent URI symptoms. RLL infiltrate and right pleural effusion on CT.  Pt on ceftriaxone, azithromycin--start PO abx on discharge  Possible acute cystitis  +Ucx, no urinary symptoms. Pt is on ceftriaxone for above which will cover.   Rhabdomyolysis, traumatic.   Prerenal, secondary to rhabdo, dehydration, prolonged time down.   Treated with aggressive IVF.  Cr improving - Creatinine 1.2.  Hepatic cysts  Multiple hypodensities seen on liver.   Liver ultrasound reported multiple steatosis.  Multiple hepatic cysts up to 4.4 cm.  Patent hepatic vessels.   Discussed with GI, cystoscopy stable,-present on previous imaging.  Liver biopsy was canceled.  Chronic thrombus right pulm artery   Noted on CTA chest. Small in appearance. No hypoxia, hypotension, or tachycardia.   Pt reports history of DVTs, has been on OAC in the past.   Echo showed EF 60%. No signs of pulmonary HTN or right sided failure.   Patient to follow up with PCP for hypercoag workup and possible prophylactic anticoagulation    The patient was seen and examined on day of discharge and this discharge summary is in conjunction with any daily

## 2025-04-13 LAB
F2 C.20210G>A GENO BLD/T: NEGATIVE
MTHFR C.1298A>C GENO BLD/T: NORMAL
MTHFR C.677C>T GENO BLD/T: NEGATIVE
MTHFR GENE MUT ANL BLD/T: NORMAL
SPECIMEN SOURCE: NORMAL
SPECIMEN SOURCE: NORMAL

## 2025-04-14 DIAGNOSIS — G71.00 MUSCULAR DYSTROPHY (HCC): ICD-10-CM

## 2025-04-14 RX ORDER — HYDROCODONE BITARTRATE AND ACETAMINOPHEN 5; 325 MG/1; MG/1
1 TABLET ORAL EVERY 8 HOURS PRN
Qty: 90 TABLET | Refills: 0 | Status: SHIPPED | OUTPATIENT
Start: 2025-04-14 | End: 2025-05-14

## 2025-04-14 NOTE — TELEPHONE ENCOUNTER
Ep'ed norco to pharm requested    Controlled Substance Monitoring:    Acute and Chronic Pain Monitoring:   RX Monitoring Periodic Controlled Substance Monitoring   4/14/2025  12:49 PM No signs of potential drug abuse or diversion identified.

## 2025-04-14 NOTE — TELEPHONE ENCOUNTER
Debbie Zavaleta called requesting a refill on the following medications:  Requested Prescriptions     Pending Prescriptions Disp Refills    HYDROcodone-acetaminophen (NORCO) 5-325 MG per tablet 90 tablet 0     Sig: Take 1 tablet by mouth every 8 hours as needed for Pain for up to 30 days. Max Daily Amount: 3 tablets       Date of last visit: 3/26/2025 [facility] ED f/u    Date of next visit:4/17/2025 HFU    Date of last refill: 3/12/25    Pharmacy Name: [facility]    Pt has #6 left and says she uses #4 per day.    Pls call pt at  only if probs.    Zip: [id number]    Dose confirmed by pt as stated above.    Thanks,  Ashley Casey

## 2025-04-15 LAB
HFE GENE MUT ANL BLD/T: NORMAL
HFE P.C282Y BLD/T QL: NORMAL
HFE P.H63D BLD/T QL: NEGATIVE
HFE P.S65C BLD/T QL: NEGATIVE
SPECIMEN SOURCE: NORMAL

## 2025-04-17 ENCOUNTER — TELEPHONE (OUTPATIENT)
Dept: FAMILY MEDICINE CLINIC | Age: 65
End: 2025-04-17

## 2025-04-17 ENCOUNTER — OFFICE VISIT (OUTPATIENT)
Dept: FAMILY MEDICINE CLINIC | Age: 65
End: 2025-04-17

## 2025-04-17 VITALS
OXYGEN SATURATION: 96 % | BODY MASS INDEX: 32.49 KG/M2 | RESPIRATION RATE: 18 BRPM | SYSTOLIC BLOOD PRESSURE: 126 MMHG | TEMPERATURE: 97.8 F | HEART RATE: 74 BPM | WEIGHT: 189.4 LBS | DIASTOLIC BLOOD PRESSURE: 76 MMHG

## 2025-04-17 DIAGNOSIS — N30.00 ACUTE CYSTITIS WITHOUT HEMATURIA: ICD-10-CM

## 2025-04-17 DIAGNOSIS — Z09 HOSPITAL DISCHARGE FOLLOW-UP: ICD-10-CM

## 2025-04-17 DIAGNOSIS — R40.4 TRANSIENT ALTERATION OF AWARENESS: Primary | ICD-10-CM

## 2025-04-17 DIAGNOSIS — G71.00 MD (MUSCULAR DYSTROPHY) (HCC): ICD-10-CM

## 2025-04-17 DIAGNOSIS — R53.81 PHYSICAL DECONDITIONING: ICD-10-CM

## 2025-04-17 DIAGNOSIS — J18.9 PNEUMONIA DUE TO INFECTIOUS ORGANISM, UNSPECIFIED LATERALITY, UNSPECIFIED PART OF LUNG: ICD-10-CM

## 2025-04-17 DIAGNOSIS — I27.82 CHRONIC PULMONARY EMBOLISM WITHOUT ACUTE COR PULMONALE, UNSPECIFIED PULMONARY EMBOLISM TYPE (HCC): ICD-10-CM

## 2025-04-17 ASSESSMENT — ENCOUNTER SYMPTOMS
CONSTIPATION: 0
CHEST TIGHTNESS: 0
SHORTNESS OF BREATH: 1
VOMITING: 0
BLOOD IN STOOL: 0
WHEEZING: 0
SORE THROAT: 0
EYE PAIN: 0
ABDOMINAL PAIN: 0
COUGH: 1
DIARRHEA: 0
BACK PAIN: 0
NAUSEA: 0
RHINORRHEA: 0

## 2025-04-17 NOTE — PROGRESS NOTES
Post-Discharge Transitional Care Follow Up      Debbie Zavaleta   YOB: 1960    Date of Office Visit:  4/17/2025  Date of Hospital Admission: 4/7/25  Date of Hospital Discharge: 4/10/25  Readmission Risk Score (high >=14%. Medium >=10%):Readmission Risk Score: 17.4      Care management risk score Rising risk (score 2-5) and Complex Care (Scores >=6): No Risk Score On File     Non face to face  following discharge, date last encounter closed (first attempt may have been earlier): 04/10/2025     Call initiated 2 business days of discharge: Yes     Transient alteration of awareness  -unstable, recent hospitalization, improved, -monitor sxs, call if not improving    Pneumonia due to infectious organism, unspecified laterality, unspecified part of lung  -acute problem, finished abx, improved.    Acute cystitis without hematuria  -acute problem, finished abx, improving.  Physical deconditioning  -     [facility] by Angelica Palomares  -new problem, unstable, not at goal, consult home health for therpaies  MD (muscular dystrophy) (Formerly [facility])  -     Handicap Placard MISC; Starting Thu 4/17/2025, Disp-1 each, R-0, PrintRequest parking placard due to medical conditions.  Duration of 5 years.  -stable, continue present therapy  Hospital discharge follow-up  -     MI DISCHARGE MEDS RECONCILED W/ CURRENT OUTPATIENT MED LIST  Chronic pulmonary embolism without acute cor pulmonale, unspecified pulmonary embolism type ([facility])  -     apixaban (ELIQUIS) 2.5 MG TABS tablet; Take 1 tablet by mouth 2 times daily, Disp-60 tablet, R-7Normal  -chronic, unstable, restart eliquis    Medical Decision Making: high complexity  Return in 3 months (on 7/17/2025), or if symptoms worsen or fail to improve, for AWV.           Subjective:   HPI  Presented to [facility] due to AMS.      Inpatient course: Discharge summary reviewed- see chart.    Interval history/Current status: Diagnosed pneumonia, UTI, chronic pulmonary embolism.  Finished

## 2025-04-17 NOTE — TELEPHONE ENCOUNTER
Voicemail received from Mela at [facility]. States due to limited availability and staffing they are unable to accept pt

## 2025-04-18 NOTE — TELEPHONE ENCOUNTER
Spoke with Yolanda on HIPAA, she was informed that [facility] home health is not covered on insurance. She will call number on back of patient's card to see who is covered.

## 2025-04-22 ENCOUNTER — TELEPHONE (OUTPATIENT)
Dept: FAMILY MEDICINE CLINIC | Age: 65
End: 2025-04-22

## 2025-04-22 NOTE — TELEPHONE ENCOUNTER
Pt called in stating that since she got out of the hospital her legs have increased in swelling and now are red. States that both legs are red from ankles up.   She is unsure of what to do and asking what could cause this.   Pt has has no changes aside from eliquis. Pt asking if she could possibly be allergic to the medication

## 2025-04-22 NOTE — TELEPHONE ENCOUNTER
Doubtful on allergic reaction, those reactions are whole body, not just her legs.  Probably should see her tomorrow if able to come in.

## 2025-04-23 ENCOUNTER — TELEPHONE (OUTPATIENT)
Dept: INFECTIOUS DISEASES | Age: 65
End: 2025-04-23

## 2025-04-23 ENCOUNTER — APPOINTMENT (OUTPATIENT)
Dept: INTERVENTIONAL RADIOLOGY/VASCULAR | Age: 65
End: 2025-04-23
Attending: NURSE PRACTITIONER
Payer: COMMERCIAL

## 2025-04-23 ENCOUNTER — TELEPHONE (OUTPATIENT)
Dept: FAMILY MEDICINE CLINIC | Age: 65
End: 2025-04-23

## 2025-04-23 ENCOUNTER — HOSPITAL ENCOUNTER (EMERGENCY)
Age: 65
Discharge: HOME | End: 2025-04-23
Attending: NURSE PRACTITIONER
Payer: COMMERCIAL

## 2025-04-23 VITALS
RESPIRATION RATE: 16 BRPM | HEIGHT: 64 IN | OXYGEN SATURATION: 95 % | HEART RATE: 69 BPM | TEMPERATURE: 98.3 F | DIASTOLIC BLOOD PRESSURE: 79 MMHG | SYSTOLIC BLOOD PRESSURE: 142 MMHG | BODY MASS INDEX: 32.27 KG/M2 | WEIGHT: 189 LBS

## 2025-04-23 DIAGNOSIS — B37.31 VAGINAL CANDIDIASIS: Primary | ICD-10-CM

## 2025-04-23 DIAGNOSIS — L03.115 BILATERAL LOWER LEG CELLULITIS: Primary | ICD-10-CM

## 2025-04-23 DIAGNOSIS — L03.116 BILATERAL LOWER LEG CELLULITIS: Primary | ICD-10-CM

## 2025-04-23 LAB
ANION GAP SERPL CALC-SCNC: 12 MEQ/L (ref 8–16)
AUTO DIFF PNL BLD: ABNORMAL
BASOPHILS ABSOLUTE: 0.1 THOU/MM3 (ref 0–0.1)
BASOPHILS NFR BLD AUTO: 1.8 %
BUN SERPL-MCNC: 17 MG/DL (ref 8–23)
CALCIUM SERPL-MCNC: 10.1 MG/DL (ref 8.8–10.2)
CHLORIDE SERPL-SCNC: 100 MEQ/L (ref 98–111)
CO2 SERPL-SCNC: 25 MEQ/L (ref 22–29)
CREAT SERPL-MCNC: 1.4 MG/DL (ref 0.5–0.9)
CRP SERPL-MCNC: 0.46 MG/DL (ref 0–0.5)
DEPRECATED RDW RBC AUTO: 51.4 FL (ref 35–45)
ECHO BSA: 1.97 M2
EOSINOPHIL NFR BLD AUTO: 1.8 %
EOSINOPHILS ABSOLUTE: 0.1 THOU/MM3 (ref 0–0.4)
ERYTHROCYTE [DISTWIDTH] IN BLOOD BY AUTOMATED COUNT: 14.1 % (ref 11.5–14.5)
ERYTHROCYTE [SEDIMENTATION RATE] IN BLOOD BY WESTERGREN METHOD: 28 MM/HR (ref 0–20)
GFR SERPL CREATININE-BSD FRML MDRD: 41 ML/MIN/1.73M2
GLUCOSE SERPL-MCNC: 99 MG/DL (ref 74–109)
HCT VFR BLD AUTO: 39.2 % (ref 37–47)
HGB BLD-MCNC: 12.3 GM/DL (ref 12–16)
IMM GRANULOCYTES # BLD AUTO: 0 THOU/MM3 (ref 0–0.07)
IMM GRANULOCYTES NFR BLD AUTO: 0 %
LYMPHOCYTES ABSOLUTE: 1.8 THOU/MM3 (ref 1–4.8)
LYMPHOCYTES NFR BLD AUTO: 40.2 %
MCH RBC QN AUTO: 30.9 PG (ref 26–33)
MCHC RBC AUTO-ENTMCNC: 31.4 GM/DL (ref 32.2–35.5)
MCV RBC AUTO: 98.5 FL (ref 81–99)
MONOCYTES ABSOLUTE: 0.5 THOU/MM3 (ref 0.4–1.3)
MONOCYTES NFR BLD AUTO: 10.6 %
NEUTROPHILS ABSOLUTE: 2 THOU/MM3 (ref 1.8–7.7)
NEUTROPHILS NFR BLD AUTO: 45.6 %
NRBC BLD AUTO-RTO: 0 /100 WBC
OSMOLALITY SERPL CALC.SUM OF ELEC: 275.4 MOSMOL/KG (ref 275–300)
PATHOLOGIST REVIEW: ABNORMAL
PLATELET # BLD AUTO: 193 THOU/MM3 (ref 130–400)
PLATELET BLD QL SMEAR: ADEQUATE
PMV BLD AUTO: 10.3 FL (ref 9.4–12.4)
POTASSIUM SERPL-SCNC: 4.6 MEQ/L (ref 3.5–5.2)
RBC # BLD AUTO: 3.98 MILL/MM3 (ref 4.2–5.4)
SCAN OF BLOOD SMEAR: NORMAL
SODIUM SERPL-SCNC: 137 MEQ/L (ref 135–145)
VARIANT LYMPHS BLD QL SMEAR: ABNORMAL %
WBC # BLD AUTO: 4.4 THOU/MM3 (ref 4.8–10.8)

## 2025-04-23 PROCEDURE — 80048 BASIC METABOLIC PNL TOTAL CA: CPT

## 2025-04-23 PROCEDURE — 87081 CULTURE SCREEN ONLY: CPT

## 2025-04-23 PROCEDURE — 93970 EXTREMITY STUDY: CPT

## 2025-04-23 PROCEDURE — 85651 RBC SED RATE NONAUTOMATED: CPT

## 2025-04-23 PROCEDURE — 85025 COMPLETE CBC W/AUTO DIFF WBC: CPT

## 2025-04-23 PROCEDURE — 99284 EMERGENCY DEPT VISIT MOD MDM: CPT

## 2025-04-23 PROCEDURE — 86140 C-REACTIVE PROTEIN: CPT

## 2025-04-23 PROCEDURE — 36415 COLL VENOUS BLD VENIPUNCTURE: CPT

## 2025-04-23 RX ORDER — CEPHALEXIN 500 MG/1
500 CAPSULE ORAL 4 TIMES DAILY
Qty: 40 CAPSULE | Refills: 0 | Status: SHIPPED | OUTPATIENT
Start: 2025-04-23 | End: 2025-05-03

## 2025-04-23 RX ORDER — FLUCONAZOLE 150 MG/1
TABLET ORAL
Qty: 2 TABLET | Refills: 0 | Status: SHIPPED | OUTPATIENT
Start: 2025-04-23 | End: 2025-04-24

## 2025-04-23 ASSESSMENT — PAIN - FUNCTIONAL ASSESSMENT: PAIN_FUNCTIONAL_ASSESSMENT: 0-10

## 2025-04-23 ASSESSMENT — PAIN DESCRIPTION - ORIENTATION: ORIENTATION: RIGHT;LEFT

## 2025-04-23 ASSESSMENT — PAIN SCALES - GENERAL: PAINLEVEL_OUTOF10: 6

## 2025-04-23 ASSESSMENT — PAIN DESCRIPTION - LOCATION: LOCATION: LEG

## 2025-04-23 NOTE — DISCHARGE INSTRUCTIONS
Take your medications as prescribed.  Follow-up with your primary care provider at the next available opportunity.  Return if worsening cellulitis after 36 to 48 hours of starting the oral antibiotics.

## 2025-04-23 NOTE — TELEPHONE ENCOUNTER
Yolanda Mayorga called and stated that patient Debbie was in the emergency room today and told Yolanda and Debbie to call the infectious disease department for a appointment.  Patient has diagnosis of cellulitis in bilateral legs.  Appointment made on May 1 at 11:30.

## 2025-04-23 NOTE — ED PROVIDER NOTES
[facility] EMERGENCY DEPARTMENT      EMERGENCY MEDICINE     Pt Name: Debbie Zavaleta  MRN: [id number]  Birthdate 1960  Date of evaluation: 4/23/2025  Provider: LUBA Levine - CNP    CHIEF COMPLAINT       Chief Complaint   Patient presents with    Leg Pain     Bilateral     HISTORY OF PRESENT ILLNESS   Debbie Zavaleta is a pleasant 65 y.o. female with a past medical history of recurrent lower extremity cellulitis, lymphedema, rheumatoid arthritis.  She presents with concern for lower leg cellulitis bilaterally.  This has developed over the past couple of days.  Denies any fever or chills.  No chest pain or pressure.  No shortness of breath.  The patient was discharged from this facility on 4/17 after being hospitalized for pneumonia.  She had went on on 3 days of cefdinir.  She was also found to have a chronic appearing pulmonary embolism and was started on anticoagulants which she is compliant in taking.    PASTMEDICAL HISTORY     Past Medical History:   Diagnosis Date    B12 deficiency 12/2020    Depression     Lymphedema 2008    both legs    MD (muscular dystrophy) ([facility]) 2008    Dr. Hills at [facility] - no lung involvement per patient    Neuropathy     Obesity (BMI 30-39.9)     Overflow incontinence     Rheumatoid arthritis(714.0) 2008    Dr. 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

## 2025-04-23 NOTE — ED TRIAGE NOTES
Pt to ED from home c/o bilateral leg pain and swelling. Pt reports she has a past hx of blood clots and pneumonia. Pt reports she has been at home recovering and has noticed swelling and redness in bilateral lower legs. Pt reports hx of cellulitis in her legs.

## 2025-04-23 NOTE — TELEPHONE ENCOUNTER
Restorative Technician Note      Patient Name: Nicole Garvey     Kallie Chi  DPT, Restorative Technician             Pt on way home from [facility] ED main hospital for bilat leg cellulitis today. Given antibiotics but refused yeast infection med: told her she would have to get it from you. She usually needs the yeast med after she finishes the antibiotic. Uses [facility].    Not ready to make ED f/u appt with you until after she sees infectious disease specialist within the next 3 d as instructed by ED on discharge. Says they're trying to r/o MRSA first.    Pls call pt at  only if probs.    Last ov: 4/17/25 HFU

## 2025-04-24 LAB — MRSA SPEC QL CULT: NORMAL

## 2025-04-27 ENCOUNTER — HOSPITAL ENCOUNTER (EMERGENCY)
Age: 65
Discharge: HOME | End: 2025-04-27
Attending: EMERGENCY MEDICINE
Payer: COMMERCIAL

## 2025-04-27 VITALS
OXYGEN SATURATION: 100 % | HEART RATE: 70 BPM | RESPIRATION RATE: 16 BRPM | DIASTOLIC BLOOD PRESSURE: 80 MMHG | TEMPERATURE: 97.7 F | SYSTOLIC BLOOD PRESSURE: 150 MMHG | WEIGHT: 188 LBS | BODY MASS INDEX: 32.27 KG/M2

## 2025-04-27 DIAGNOSIS — L03.116 CELLULITIS OF LEFT LOWER EXTREMITY: Primary | ICD-10-CM

## 2025-04-27 PROCEDURE — 99282 EMERGENCY DEPT VISIT SF MDM: CPT

## 2025-04-27 ASSESSMENT — PAIN DESCRIPTION - LOCATION: LOCATION: LEG;ANKLE

## 2025-04-27 ASSESSMENT — PAIN SCALES - GENERAL: PAINLEVEL_OUTOF10: 4

## 2025-04-27 ASSESSMENT — PAIN DESCRIPTION - ORIENTATION: ORIENTATION: RIGHT;LEFT

## 2025-04-27 ASSESSMENT — PAIN DESCRIPTION - ONSET: ONSET: ON-GOING

## 2025-04-27 ASSESSMENT — PAIN DESCRIPTION - PAIN TYPE: TYPE: ACUTE PAIN

## 2025-04-27 NOTE — ED PROVIDER NOTES
[facility]  EMERGENCY MEDICINE ATTENDING ATTESTATION      Evaluation of Debbie Zavaleta.   Case discussed and care plan developed with resident physician.   I agree with the resident physician documentation and plan as documented by him, except if my documentation differs.   Patient seen, interviewed and examined by me.  I reviewed the medical, surgical, family and social history, medications and allergies.   I have reviewed and interpreted all available lab, radiology and ekg results available at the moment.  I have reviewed the nursing documentation.     Please see the resident physician completed note for final disposition except as documented on this attestation.   I have reviewed and interpreted all available lab, radiology and ekg results available at the moment.  Diagnosis, treatment and disposition plans were discussed and agreed upon by patient.   This transcription was electronically signed. It was dictated by use of voice recognition software and electronically transcribed. The transcription may contain errors not detected in proofreading.     I performed direct supervision and was present for the critical portion following procedures: None  Critical care time on this case: None    Electronically signed by Lucas Puentes MD on 4/27/25 at 10:43 AM EDT        Lucas Puentes MD  04/27/25 1049

## 2025-04-27 NOTE — ED PROVIDER NOTES
[facility] EMERGENCY DEPARTMENT      EMERGENCY MEDICINE     Pt Name: Debbie Zavaleta  MRN: [id number]  Birthdate 1960  Date of evaluation: 4/27/2025  Provider: Stevan Masterson DO  Supervising Physician: Lucas Puentes MD    CHIEF COMPLAINT       Chief Complaint   Patient presents with    red and swollen ankles     HISTORY OF PRESENT ILLNESS   Debbie Zavaleta is a 65 y.o. female with a history of lymphedema, venous insufficiency, recent admission and recent diagnosis of cellulitis who presents to the emergency department from home for evaluation of her cellulitis.  Patient reports that started about a week ago while she was admitted, was seen here 4 days ago and placed on Keflex.  Her friend wanted her to get checked out even though she thinks the cellulitis is actually improving.  Patient denies any fever, chills, nausea, vomiting.  She is not having a lot of pain associated with the cellulitis.    PASTMEDICAL HISTORY     Past Medical History:   Diagnosis Date    B12 deficiency 12/2020    Chronic pulmonary embolism without acute cor pulmonale ([facility])     Depression     Lymphedema 2008    both legs    MD (muscular dystrophy) ([facility]) 2008    Dr. Hills at [facility] - no lung involvement per patient    Neuropathy     Obesity (BMI 30-39.9)     Overflow incontinence     Rheumatoid arthritis(714.0) 2008    Dr. 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

## 2025-04-27 NOTE — DISCHARGE INSTRUCTIONS
You were seen here today for cellulitis. Continue to take Keflex as prescribed. Take Zyrtec as needed for itchiness. Follow up with your primary care doctor in the next 3 days for further evaluation. If you develop severe pain in your legs, fever, uncontrolled vomiting, or if the redness begins to spread, then return here for re-evaluation.

## 2025-04-27 NOTE — ED NOTES
Patient presents to the ED with friend after being advised to come back to be seen by for continued redness and swelling in her ankles. Patient states she was seen here a few days ago for cellulitis concerns and given an antibiotic to take and advised to return if redness and swelling did not get better. Patient states she was convinced by family and friends to come back to be seen. Patient rates pain at a 4/10. VSS. No other concerns at this time. Call light in reach.

## 2025-04-28 ENCOUNTER — TELEPHONE (OUTPATIENT)
Dept: FAMILY MEDICINE CLINIC | Age: 65
End: 2025-04-28

## 2025-05-01 ENCOUNTER — OFFICE VISIT (OUTPATIENT)
Dept: INFECTIOUS DISEASES | Age: 65
End: 2025-05-01
Payer: COMMERCIAL

## 2025-05-01 VITALS
RESPIRATION RATE: 18 BRPM | OXYGEN SATURATION: 95 % | SYSTOLIC BLOOD PRESSURE: 132 MMHG | TEMPERATURE: 97.4 F | HEART RATE: 56 BPM | WEIGHT: 174 LBS | BODY MASS INDEX: 28.99 KG/M2 | DIASTOLIC BLOOD PRESSURE: 70 MMHG | HEIGHT: 65 IN

## 2025-05-01 DIAGNOSIS — L03.90 RECURRENT CELLULITIS: Primary | ICD-10-CM

## 2025-05-01 DIAGNOSIS — L81.9 DISCOLORATION OF SKIN: ICD-10-CM

## 2025-05-01 DIAGNOSIS — M06.9 RHEUMATOID ARTHRITIS, INVOLVING UNSPECIFIED SITE, UNSPECIFIED WHETHER RHEUMATOID FACTOR PRESENT (HCC): ICD-10-CM

## 2025-05-01 DIAGNOSIS — G71.00 MUSCULAR DYSTROPHY (HCC): ICD-10-CM

## 2025-05-01 DIAGNOSIS — D84.9 IMMUNOCOMPROMISED: ICD-10-CM

## 2025-05-01 PROCEDURE — 99205 OFFICE O/P NEW HI 60 MIN: CPT | Performed by: STUDENT IN AN ORGANIZED HEALTH CARE EDUCATION/TRAINING PROGRAM

## 2025-05-01 PROCEDURE — 3017F COLORECTAL CA SCREEN DOC REV: CPT | Performed by: STUDENT IN AN ORGANIZED HEALTH CARE EDUCATION/TRAINING PROGRAM

## 2025-05-01 PROCEDURE — 1125F AMNT PAIN NOTED PAIN PRSNT: CPT | Performed by: STUDENT IN AN ORGANIZED HEALTH CARE EDUCATION/TRAINING PROGRAM

## 2025-05-01 PROCEDURE — G8399 PT W/DXA RESULTS DOCUMENT: HCPCS | Performed by: STUDENT IN AN ORGANIZED HEALTH CARE EDUCATION/TRAINING PROGRAM

## 2025-05-01 PROCEDURE — 1123F ACP DISCUSS/DSCN MKR DOCD: CPT | Performed by: STUDENT IN AN ORGANIZED HEALTH CARE EDUCATION/TRAINING PROGRAM

## 2025-05-01 PROCEDURE — 1090F PRES/ABSN URINE INCON ASSESS: CPT | Performed by: STUDENT IN AN ORGANIZED HEALTH CARE EDUCATION/TRAINING PROGRAM

## 2025-05-01 PROCEDURE — 1036F TOBACCO NON-USER: CPT | Performed by: STUDENT IN AN ORGANIZED HEALTH CARE EDUCATION/TRAINING PROGRAM

## 2025-05-01 PROCEDURE — 1159F MED LIST DOCD IN RCRD: CPT | Performed by: STUDENT IN AN ORGANIZED HEALTH CARE EDUCATION/TRAINING PROGRAM

## 2025-05-01 PROCEDURE — G8417 CALC BMI ABV UP PARAM F/U: HCPCS | Performed by: STUDENT IN AN ORGANIZED HEALTH CARE EDUCATION/TRAINING PROGRAM

## 2025-05-01 PROCEDURE — G8427 DOCREV CUR MEDS BY ELIG CLIN: HCPCS | Performed by: STUDENT IN AN ORGANIZED HEALTH CARE EDUCATION/TRAINING PROGRAM

## 2025-05-01 PROCEDURE — 1111F DSCHRG MED/CURRENT MED MERGE: CPT | Performed by: STUDENT IN AN ORGANIZED HEALTH CARE EDUCATION/TRAINING PROGRAM

## 2025-05-01 RX ORDER — CEPHALEXIN 500 MG/1
500 CAPSULE ORAL 2 TIMES DAILY
Qty: 120 CAPSULE | Refills: 1 | Status: SHIPPED | OUTPATIENT
Start: 2025-05-01

## 2025-05-01 NOTE — PROGRESS NOTES
Progress note: Infectious diseases    Patient - Debbie Zavaleta  Age - 65 y.o.      - 1960      Date- 2025        ASSESSMENT/PLAN   1. Recurrent cellulitis    On exam of lower extremity, there is evidence of darkened skin pigmentation with mild erythema involving the left leg.  Reportedly, this is largely resolved compared to when initiated on antimicrobials.  Given the possibility for strep and staph, I will extend Keflex to include prophylactic dosing at 500 mg twice daily.  This patient does meet criteria for recurrent cellulitis based on report.  There is also the possibility for venous stasis especially with swelling present though I will note that she is more than compliant with compression to her lower extremity to help relieve excess fluid.    I would recommend a trial of Keflex 500 twice daily for the next 1 to 2 months.  If this leads to no further recurrences, then it is reasonable to continue prophylaxis as long as patient is able to tolerate.  I did explain the potentials for chronic lymphedema and swelling predisposing to recurrent cellulitis episodes.    In regard to fungal etiologies, patient is currently using tinea related creams to help with treatment.  I have recommended making sure these areas are dry prior to placing compression wraps.    2. Muscular dystrophy (HCC)      3. Rheumatoid arthritis, involving unspecified site, unspecified whether rheumatoid factor present ([facility])      4. Immunocompromised      5. Discoloration of skin          SUBJECTIVE:     Patient is a 65-year-old female who I am consulted for possible lower extremity cellulitis and concerns for tinea infection.    Patient has struggled with recurrent episodes of lower extremity cellulitis that have been ongoing for the past 1 year.  Since then, she has had an additional 5 episodes which have alternated between left and right leg.

## 2025-05-08 ENCOUNTER — OFFICE VISIT (OUTPATIENT)
Dept: PSYCHIATRY | Age: 65
End: 2025-05-08
Payer: COMMERCIAL

## 2025-05-08 VITALS
OXYGEN SATURATION: 94 % | HEIGHT: 65 IN | WEIGHT: 186 LBS | DIASTOLIC BLOOD PRESSURE: 81 MMHG | BODY MASS INDEX: 30.99 KG/M2 | SYSTOLIC BLOOD PRESSURE: 146 MMHG | TEMPERATURE: 97.1 F

## 2025-05-08 DIAGNOSIS — Z51.81 THERAPEUTIC DRUG MONITORING: ICD-10-CM

## 2025-05-08 DIAGNOSIS — R79.89 ELEVATED LFTS: ICD-10-CM

## 2025-05-08 DIAGNOSIS — F34.0 CYCLOTHYMIC DISORDER: Primary | ICD-10-CM

## 2025-05-08 PROCEDURE — 1123F ACP DISCUSS/DSCN MKR DOCD: CPT | Performed by: NURSE PRACTITIONER

## 2025-05-08 PROCEDURE — 1036F TOBACCO NON-USER: CPT | Performed by: NURSE PRACTITIONER

## 2025-05-08 PROCEDURE — G8399 PT W/DXA RESULTS DOCUMENT: HCPCS | Performed by: NURSE PRACTITIONER

## 2025-05-08 PROCEDURE — 1111F DSCHRG MED/CURRENT MED MERGE: CPT | Performed by: NURSE PRACTITIONER

## 2025-05-08 PROCEDURE — 1090F PRES/ABSN URINE INCON ASSESS: CPT | Performed by: NURSE PRACTITIONER

## 2025-05-08 PROCEDURE — 3017F COLORECTAL CA SCREEN DOC REV: CPT | Performed by: NURSE PRACTITIONER

## 2025-05-08 PROCEDURE — G8417 CALC BMI ABV UP PARAM F/U: HCPCS | Performed by: NURSE PRACTITIONER

## 2025-05-08 PROCEDURE — G8427 DOCREV CUR MEDS BY ELIG CLIN: HCPCS | Performed by: NURSE PRACTITIONER

## 2025-05-08 PROCEDURE — 99214 OFFICE O/P EST MOD 30 MIN: CPT | Performed by: NURSE PRACTITIONER

## 2025-05-08 PROCEDURE — 1159F MED LIST DOCD IN RCRD: CPT | Performed by: NURSE PRACTITIONER

## 2025-05-08 RX ORDER — DIVALPROEX SODIUM 500 MG/1
500 TABLET, FILM COATED, EXTENDED RELEASE ORAL NIGHTLY
Qty: 30 TABLET | Refills: 5 | Status: SHIPPED | OUTPATIENT
Start: 2025-05-08

## 2025-05-08 ASSESSMENT — ANXIETY QUESTIONNAIRES
1. FEELING NERVOUS, ANXIOUS, OR ON EDGE: NOT AT ALL
5. BEING SO RESTLESS THAT IT IS HARD TO SIT STILL: NOT AT ALL
6. BECOMING EASILY ANNOYED OR IRRITABLE: NOT AT ALL
GAD7 TOTAL SCORE: 0
3. WORRYING TOO MUCH ABOUT DIFFERENT THINGS: NOT AT ALL
4. TROUBLE RELAXING: NOT AT ALL
2. NOT BEING ABLE TO STOP OR CONTROL WORRYING: NOT AT ALL
7. FEELING AFRAID AS IF SOMETHING AWFUL MIGHT HAPPEN: NOT AT ALL
IF YOU CHECKED OFF ANY PROBLEMS ON THIS QUESTIONNAIRE, HOW DIFFICULT HAVE THESE PROBLEMS MADE IT FOR YOU TO DO YOUR WORK, TAKE CARE OF THINGS AT HOME, OR GET ALONG WITH OTHER PEOPLE: NOT DIFFICULT AT ALL

## 2025-05-08 ASSESSMENT — PATIENT HEALTH QUESTIONNAIRE - PHQ9
8. MOVING OR SPEAKING SO SLOWLY THAT OTHER PEOPLE COULD HAVE NOTICED. OR THE OPPOSITE, BEING SO FIGETY OR RESTLESS THAT YOU HAVE BEEN MOVING AROUND A LOT MORE THAN USUAL: NOT AT ALL
4. FEELING TIRED OR HAVING LITTLE ENERGY: MORE THAN HALF THE DAYS
3. TROUBLE FALLING OR STAYING ASLEEP: NOT AT ALL
7. TROUBLE CONCENTRATING ON THINGS, SUCH AS READING THE NEWSPAPER OR WATCHING TELEVISION: NOT AT ALL
5. POOR APPETITE OR OVEREATING: NOT AT ALL
9. THOUGHTS THAT YOU WOULD BE BETTER OFF DEAD, OR OF HURTING YOURSELF: NOT AT ALL
SUM OF ALL RESPONSES TO PHQ QUESTIONS 1-9: 2
SUM OF ALL RESPONSES TO PHQ QUESTIONS 1-9: 2
1. LITTLE INTEREST OR PLEASURE IN DOING THINGS: NOT AT ALL
6. FEELING BAD ABOUT YOURSELF - OR THAT YOU ARE A FAILURE OR HAVE LET YOURSELF OR YOUR FAMILY DOWN: NOT AT ALL
SUM OF ALL RESPONSES TO PHQ QUESTIONS 1-9: 2
2. FEELING DOWN, DEPRESSED OR HOPELESS: NOT AT ALL
10. IF YOU CHECKED OFF ANY PROBLEMS, HOW DIFFICULT HAVE THESE PROBLEMS MADE IT FOR YOU TO DO YOUR WORK, TAKE CARE OF THINGS AT HOME, OR GET ALONG WITH OTHER PEOPLE: NOT DIFFICULT AT ALL
SUM OF ALL RESPONSES TO PHQ QUESTIONS 1-9: 2

## 2025-05-08 NOTE — PROGRESS NOTES
(CERVIX STATUS UNKNOWN)  2002    JOINT REPLACEMENT Left 2008    knee    JOINT REPLACEMENT Right 09/10/2013    right    TOTAL KNEE ARTHROPLASTY Left 6/08    TOTAL KNEE ARTHROPLASTY Right 09/10/2013    VARICOSE VEIN SURGERY Right 1998    laser       PREVIOUS MEDICATIONS:  Previous Medications    ALLOPURINOL (ZYLOPRIM) 100 MG TABLET    Take 1 tablet by mouth daily    APIXABAN (ELIQUIS) 2.5 MG TABS TABLET    Take 1 tablet by mouth 2 times daily    ASCORBIC ACID (RA VITAMIN C) 500 MG TABLET    take 1 tablet by mouth once daily    B COMPLEX VITAMINS CAPSULE    Take 1 capsule by mouth daily    CEPHALEXIN (KEFLEX) 500 MG CAPSULE    Take 1 capsule by mouth 2 times daily    CRANBERRY PO    Take by mouth daily    GABAPENTIN (NEURONTIN) 300 MG CAPSULE    take 1 capsule by mouth twice a day    HANDICAP PLACARD MISC    by Does not apply route Request parking placard due to medical conditions.  Duration of 5 years.    HYDROCODONE-ACETAMINOPHEN (NORCO) 5-325 MG PER TABLET    Take 1 tablet by mouth every 8 hours as needed for Pain for up to 30 days. Max Daily Amount: 3 tablets    INFLIXIMAB IV    Infuse intravenously Every 6 weeks    KETOCONAZOLE (NIZORAL) 2 % SHAMPOO    Apply topically daily as needed.    MISC. DEVICES (WALKER) MISC    Rollator, rolling walker with seats, brakes,  Basket.  G71.00Rollator, rolling walker with seats, brakes,  Basket  G71.00    OMEPRAZOLE (PRILOSEC) 40 MG CAPSULE    Take 1 capsule by mouth daily.    ONDANSETRON (ZOFRAN-ODT) 4 MG DISINTEGRATING TABLET    Take 1 tablet by mouth 3 times daily as needed for Nausea or Vomiting    OXYBUTYNIN (DITROPAN) 5 MG TABLET    Take 1 tablet by mouth 2 times daily    PROBIOTIC PRODUCT (PRO-BIOTIC BLEND PO)    Take by mouth daily    TERBINAFINE (LAMISIL AT ATHLETES FOOT) 1 % CREAM    Apply topically 2 times daily.    VITAMIN D3 125 MCG (5000 UT) TABS TABLET    Take 1 tablet by mouth daily       ALLERGIES:    Oxycontin [oxycodone hcl], Percocet

## 2025-05-12 ENCOUNTER — TELEPHONE (OUTPATIENT)
Dept: FAMILY MEDICINE CLINIC | Age: 65
End: 2025-05-12

## 2025-05-12 NOTE — TELEPHONE ENCOUNTER
Pt's daughter called in stating that she seen psych provider on Thursday and they were concerned about liver blood work for pt. Told to call pcp office for referral to liver specialist.   Per daughter when pt was in the hospital someone form [facility] came as seen the pt and talked to her about doing liver biopsies and other testing.   Daughter is wondering if the pt is going to need another referral of if she can continue seeing [facility] for this.   Pt daughter is also wanting to cancel appt for 5/15/25 because \"the only reason she is coming in if for a referral\"     Pt is rowena for an ED f/u for swelling of her legs and ankles

## 2025-05-13 ENCOUNTER — HOSPITAL ENCOUNTER (OUTPATIENT)
Age: 65
Discharge: HOME | End: 2025-05-13
Payer: COMMERCIAL

## 2025-05-13 DIAGNOSIS — Z51.81 THERAPEUTIC DRUG MONITORING: ICD-10-CM

## 2025-05-13 DIAGNOSIS — R79.89 ELEVATED LFTS: ICD-10-CM

## 2025-05-13 LAB
ALBUMIN SERPL BCG-MCNC: 4 G/DL (ref 3.4–4.9)
ALBUMIN SERPL BCG-MCNC: 4.1 G/DL (ref 3.4–4.9)
ALP SERPL-CCNC: 59 U/L (ref 38–126)
ALP SERPL-CCNC: 60 U/L (ref 38–126)
ALT SERPL W/O P-5'-P-CCNC: 22 U/L (ref 10–35)
ALT SERPL W/O P-5'-P-CCNC: 23 U/L (ref 10–35)
ANION GAP SERPL CALC-SCNC: 12 MEQ/L (ref 8–16)
AST SERPL-CCNC: 35 U/L (ref 10–35)
AST SERPL-CCNC: 36 U/L (ref 10–35)
BILIRUB CONJ SERPL-MCNC: < 0.1 MG/DL (ref 0–0.2)
BILIRUB SERPL-MCNC: 0.3 MG/DL (ref 0.3–1.2)
BILIRUB SERPL-MCNC: 0.3 MG/DL (ref 0.3–1.2)
BUN SERPL-MCNC: 16 MG/DL (ref 8–23)
CALCIUM SERPL-MCNC: 9.9 MG/DL (ref 8.8–10.2)
CHLORIDE SERPL-SCNC: 102 MEQ/L (ref 98–111)
CO2 SERPL-SCNC: 27 MEQ/L (ref 22–29)
CREAT SERPL-MCNC: 1.4 MG/DL (ref 0.5–0.9)
FERRITIN SERPL IA-MCNC: 89 NG/ML (ref 13–150)
GFR SERPL CREATININE-BSD FRML MDRD: 41 ML/MIN/1.73M2
GLUCOSE SERPL-MCNC: 103 MG/DL (ref 74–109)
POTASSIUM SERPL-SCNC: 4.1 MEQ/L (ref 3.5–5.2)
PROT SERPL-MCNC: 7 G/DL (ref 6.4–8.3)
PROT SERPL-MCNC: 7.2 G/DL (ref 6.4–8.3)
SODIUM SERPL-SCNC: 141 MEQ/L (ref 135–145)
VALPROATE SERPL-MCNC: 72 UG/ML (ref 50–100)

## 2025-05-13 PROCEDURE — 36415 COLL VENOUS BLD VENIPUNCTURE: CPT

## 2025-05-13 PROCEDURE — 80164 ASSAY DIPROPYLACETIC ACD TOT: CPT

## 2025-05-13 PROCEDURE — 80053 COMPREHEN METABOLIC PANEL: CPT

## 2025-05-13 PROCEDURE — 82728 ASSAY OF FERRITIN: CPT

## 2025-05-14 DIAGNOSIS — G71.00 MUSCULAR DYSTROPHY (HCC): ICD-10-CM

## 2025-05-14 RX ORDER — HYDROCODONE BITARTRATE AND ACETAMINOPHEN 5; 325 MG/1; MG/1
1 TABLET ORAL EVERY 8 HOURS PRN
Qty: 90 TABLET | Refills: 0 | Status: SHIPPED | OUTPATIENT
Start: 2025-05-14 | End: 2025-06-13

## 2025-05-14 NOTE — TELEPHONE ENCOUNTER
Ep'ed norco to pharm requested    Controlled Substance Monitoring:    Acute and Chronic Pain Monitoring:   RX Monitoring Periodic Controlled Substance Monitoring   5/14/2025   8:46 AM No signs of potential drug abuse or diversion identified.

## 2025-05-14 NOTE — TELEPHONE ENCOUNTER
Debbie Zavaleta called requesting a refill on the following medications:  Requested Prescriptions     Pending Prescriptions Disp Refills    HYDROcodone-acetaminophen (NORCO) 5-325 MG per tablet 90 tablet 0     Sig: Take 1 tablet by mouth every 8 hours as needed for Pain for up to 30 days. Max Daily Amount: 3 tablets       Date of last visit: 4/17/2025  Date of next visit (if applicable):Visit date not found  Date of last refill: 4/14/25  Pharmacy Name: [facility]

## 2025-05-16 ENCOUNTER — TRANSCRIBE ORDERS (OUTPATIENT)
Dept: ADMINISTRATIVE | Age: 65
End: 2025-05-16

## 2025-05-16 DIAGNOSIS — R74.01 TRANSAMINITIS: ICD-10-CM

## 2025-05-16 DIAGNOSIS — K76.9 HEPATIC DISORDER: Primary | ICD-10-CM

## 2025-05-20 ENCOUNTER — TRANSCRIBE ORDERS (OUTPATIENT)
Dept: ADMINISTRATIVE | Age: 65
End: 2025-05-20

## 2025-05-20 DIAGNOSIS — R74.01 TRANSAMINITIS: ICD-10-CM

## 2025-05-20 DIAGNOSIS — K76.89 HEPATIC CYST: Primary | ICD-10-CM

## 2025-05-21 ENCOUNTER — TELEPHONE (OUTPATIENT)
Dept: INFECTIOUS DISEASES | Age: 65
End: 2025-05-21

## 2025-05-21 ENCOUNTER — OFFICE VISIT (OUTPATIENT)
Dept: INFECTIOUS DISEASES | Age: 65
End: 2025-05-21
Payer: COMMERCIAL

## 2025-05-21 VITALS
DIASTOLIC BLOOD PRESSURE: 80 MMHG | HEIGHT: 65 IN | OXYGEN SATURATION: 96 % | WEIGHT: 184 LBS | BODY MASS INDEX: 30.66 KG/M2 | TEMPERATURE: 97.8 F | RESPIRATION RATE: 20 BRPM | SYSTOLIC BLOOD PRESSURE: 132 MMHG | HEART RATE: 71 BPM

## 2025-05-21 DIAGNOSIS — L03.90 RECURRENT CELLULITIS: Primary | ICD-10-CM

## 2025-05-21 DIAGNOSIS — G71.00 MUSCULAR DYSTROPHY (HCC): ICD-10-CM

## 2025-05-21 DIAGNOSIS — M06.9 RHEUMATOID ARTHRITIS, INVOLVING UNSPECIFIED SITE, UNSPECIFIED WHETHER RHEUMATOID FACTOR PRESENT (HCC): ICD-10-CM

## 2025-05-21 DIAGNOSIS — L81.9 DISCOLORATION OF SKIN: ICD-10-CM

## 2025-05-21 DIAGNOSIS — D84.9 IMMUNOCOMPROMISED: ICD-10-CM

## 2025-05-21 PROCEDURE — 1090F PRES/ABSN URINE INCON ASSESS: CPT | Performed by: STUDENT IN AN ORGANIZED HEALTH CARE EDUCATION/TRAINING PROGRAM

## 2025-05-21 PROCEDURE — 1123F ACP DISCUSS/DSCN MKR DOCD: CPT | Performed by: STUDENT IN AN ORGANIZED HEALTH CARE EDUCATION/TRAINING PROGRAM

## 2025-05-21 PROCEDURE — 99214 OFFICE O/P EST MOD 30 MIN: CPT | Performed by: STUDENT IN AN ORGANIZED HEALTH CARE EDUCATION/TRAINING PROGRAM

## 2025-05-21 PROCEDURE — 1036F TOBACCO NON-USER: CPT | Performed by: STUDENT IN AN ORGANIZED HEALTH CARE EDUCATION/TRAINING PROGRAM

## 2025-05-21 PROCEDURE — G8417 CALC BMI ABV UP PARAM F/U: HCPCS | Performed by: STUDENT IN AN ORGANIZED HEALTH CARE EDUCATION/TRAINING PROGRAM

## 2025-05-21 PROCEDURE — 1125F AMNT PAIN NOTED PAIN PRSNT: CPT | Performed by: STUDENT IN AN ORGANIZED HEALTH CARE EDUCATION/TRAINING PROGRAM

## 2025-05-21 PROCEDURE — 1159F MED LIST DOCD IN RCRD: CPT | Performed by: STUDENT IN AN ORGANIZED HEALTH CARE EDUCATION/TRAINING PROGRAM

## 2025-05-21 PROCEDURE — G8399 PT W/DXA RESULTS DOCUMENT: HCPCS | Performed by: STUDENT IN AN ORGANIZED HEALTH CARE EDUCATION/TRAINING PROGRAM

## 2025-05-21 PROCEDURE — G8427 DOCREV CUR MEDS BY ELIG CLIN: HCPCS | Performed by: STUDENT IN AN ORGANIZED HEALTH CARE EDUCATION/TRAINING PROGRAM

## 2025-05-21 PROCEDURE — 3017F COLORECTAL CA SCREEN DOC REV: CPT | Performed by: STUDENT IN AN ORGANIZED HEALTH CARE EDUCATION/TRAINING PROGRAM

## 2025-05-21 RX ORDER — FLUCONAZOLE 150 MG/1
150 TABLET ORAL ONCE
Qty: 1 TABLET | Refills: 0 | Status: SHIPPED | OUTPATIENT
Start: 2025-05-23 | End: 2025-05-23

## 2025-05-21 NOTE — TELEPHONE ENCOUNTER
Called Yolanda to inform her that Dr. Vincent will see patientDebbie at 1:30 today.  Yolanda stated, She will be there.  Address and suite number given.

## 2025-05-21 NOTE — TELEPHONE ENCOUNTER
Yolanda Mayorga, patient contact person called and stated Dr. Vincent advised them to call office and let him know if they think osteomyelitis is starting in her legs.  Yolanda stated, Dr. Vincent wants to see it in the beginning.    I told her that I would get in contact with Dr. Vincent and call her back on what he wants patient to do.

## 2025-05-22 NOTE — PROGRESS NOTES
Progress note: Infectious diseases    Patient - Debbie Zavaleta  Age - 65 y.o.      - 1960      Date- 2025        ASSESSMENT/PLAN   1. Recurrent cellulitis    Patient is a 65-year-old female who I am following for recurrent lower extremity cellulitis.  She was seen with her friend in room.    On exam, there is evidence of mild erythema involving both the right and left legs.  This does not clearly appear to be consistent with cellulitis and is more likely venous stasis.  I would not recommend expanding antimicrobial coverage at this time and would continue Keflex alone.  I have also recommended the patient perform compression with elevation of the lower extremity.  This may help with lower extremity edema as I have noted on my exam.  She is continuing creams though is making sure the legs are dry prior to replacing compression wraps.    Would not recommend additional antimicrobials at this time  Continue Keflex  Continue compression along with elevation of the lower extremity  Infectious disease will schedule follow-up in approximately 2 months    2. Muscular dystrophy (HCC)      3. Rheumatoid arthritis, involving unspecified site, unspecified whether rheumatoid factor present (HCC)      4. Immunocompromised      5. Discoloration of skin          SUBJECTIVE:     Patient is a 65-year-old female who I am consulted for lower extremity cellulitis.    I had recently evaluated patient on May 1 due to reported episodes of recurrent lower extremity cellulitis involving the bilateral lower extremity.  My office was notified on May 21 of another episode that appeared to be starting.  Patient requested urgent appointment for evaluation which I was agreeable to.  On my exam, she does not have evidence of systemic manifestations such as fevers or chills.  She does have mild right lower extremity pitting edema which is worse than

## 2025-06-09 ENCOUNTER — RESULTS FOLLOW-UP (OUTPATIENT)
Dept: PSYCHIATRY | Age: 65
End: 2025-06-09

## 2025-06-13 DIAGNOSIS — G71.00 MUSCULAR DYSTROPHY (HCC): ICD-10-CM

## 2025-06-13 RX ORDER — HYDROCODONE BITARTRATE AND ACETAMINOPHEN 5; 325 MG/1; MG/1
1 TABLET ORAL EVERY 8 HOURS PRN
Qty: 90 TABLET | Refills: 0 | Status: SHIPPED | OUTPATIENT
Start: 2025-06-13 | End: 2025-07-13

## 2025-06-13 NOTE — TELEPHONE ENCOUNTER
Debbie Zavaleta called requesting a refill on the following medications:  Requested Prescriptions     Pending Prescriptions Disp Refills    HYDROcodone-acetaminophen (NORCO) 5-325 MG per tablet 90 tablet 0     Sig: Take 1 tablet by mouth every 8 hours as needed for Pain for up to 30 days. Max Daily Amount: 3 tablets       Date of last visit: 4/17/2025  Date of next visit (if applicable):Visit date not found  Date of last refill: 5/14/25  Pharmacy Name: [facility] CMA

## 2025-06-13 NOTE — TELEPHONE ENCOUNTER
Ep'ed norco to pharm requested    Controlled Substance Monitoring:    Acute and Chronic Pain Monitoring:   RX Monitoring Periodic Controlled Substance Monitoring   6/13/2025   9:48 AM No signs of potential drug abuse or diversion identified.

## 2025-06-17 ENCOUNTER — HOSPITAL ENCOUNTER (OUTPATIENT)
Dept: MRI IMAGING | Age: 65
Discharge: HOME | End: 2025-06-17
Payer: COMMERCIAL

## 2025-06-17 DIAGNOSIS — R74.01 TRANSAMINITIS: ICD-10-CM

## 2025-06-17 DIAGNOSIS — K76.9 HEPATIC DISORDER: ICD-10-CM

## 2025-06-17 PROCEDURE — 6360000004 HC RX CONTRAST MEDICATION

## 2025-06-17 PROCEDURE — 74183 MRI ABD W/O CNTR FLWD CNTR: CPT

## 2025-06-17 PROCEDURE — A9579 GAD-BASE MR CONTRAST NOS,1ML: HCPCS

## 2025-06-17 RX ORDER — GADOTERIDOL 279.3 MG/ML
20 INJECTION INTRAVENOUS
Status: COMPLETED | OUTPATIENT
Start: 2025-06-17 | End: 2025-06-17

## 2025-06-17 RX ADMIN — GADOTERIDOL 20 ML: 279.3 INJECTION, SOLUTION INTRAVENOUS at 07:42

## 2025-06-18 RX ORDER — SODIUM CHLORIDE 9 MG/ML
INJECTION, SOLUTION INTRAVENOUS CONTINUOUS
Start: 2025-06-18

## 2025-06-18 RX ORDER — ACETAMINOPHEN 325 MG/1
650 TABLET ORAL ONCE
Status: CANCELLED
Start: 2025-06-18 | End: 2025-06-18

## 2025-06-18 RX ORDER — EPINEPHRINE 1 MG/ML
0.3 INJECTION, SOLUTION INTRAMUSCULAR; SUBCUTANEOUS PRN
OUTPATIENT
Start: 2025-06-18

## 2025-06-18 RX ORDER — SODIUM CHLORIDE 0.9 % (FLUSH) 0.9 %
5-40 SYRINGE (ML) INJECTION PRN
Status: CANCELLED | OUTPATIENT
Start: 2025-06-18

## 2025-06-18 RX ORDER — HEPARIN 100 UNIT/ML
500 SYRINGE INTRAVENOUS PRN
OUTPATIENT
Start: 2025-06-18

## 2025-06-18 RX ORDER — DIPHENHYDRAMINE HYDROCHLORIDE 50 MG/ML
50 INJECTION, SOLUTION INTRAMUSCULAR; INTRAVENOUS
OUTPATIENT
Start: 2025-06-18

## 2025-06-18 RX ORDER — SODIUM CHLORIDE 9 MG/ML
5-250 INJECTION, SOLUTION INTRAVENOUS PRN
OUTPATIENT
Start: 2025-06-18

## 2025-06-18 RX ORDER — SODIUM CHLORIDE 9 MG/ML
INJECTION, SOLUTION INTRAVENOUS CONTINUOUS
OUTPATIENT
Start: 2025-06-18

## 2025-06-18 RX ORDER — HYDROCORTISONE SODIUM SUCCINATE 100 MG/2ML
100 INJECTION INTRAMUSCULAR; INTRAVENOUS
OUTPATIENT
Start: 2025-06-18

## 2025-06-18 RX ORDER — DIPHENHYDRAMINE HCL 25 MG
25 TABLET ORAL ONCE
Status: CANCELLED
Start: 2025-06-18 | End: 2025-06-18

## 2025-06-18 RX ORDER — SODIUM CHLORIDE 9 MG/ML
INJECTION, SOLUTION INTRAVENOUS CONTINUOUS
Status: CANCELLED
Start: 2025-06-18

## 2025-06-25 ENCOUNTER — HOSPITAL ENCOUNTER (OUTPATIENT)
Dept: NURSING | Age: 65
Discharge: HOME | End: 2025-06-25
Payer: COMMERCIAL

## 2025-06-25 VITALS
WEIGHT: 187 LBS | RESPIRATION RATE: 16 BRPM | OXYGEN SATURATION: 97 % | TEMPERATURE: 97.6 F | SYSTOLIC BLOOD PRESSURE: 139 MMHG | BODY MASS INDEX: 31.6 KG/M2 | HEART RATE: 70 BPM | DIASTOLIC BLOOD PRESSURE: 74 MMHG

## 2025-06-25 DIAGNOSIS — L40.50 PSORIATIC ARTHRITIS (HCC): Primary | ICD-10-CM

## 2025-06-25 PROCEDURE — 6360000002 HC RX W HCPCS: Performed by: REGISTERED NURSE

## 2025-06-25 PROCEDURE — 2580000003 HC RX 258: Performed by: REGISTERED NURSE

## 2025-06-25 PROCEDURE — 96375 TX/PRO/DX INJ NEW DRUG ADDON: CPT

## 2025-06-25 PROCEDURE — 6370000000 HC RX 637 (ALT 250 FOR IP): Performed by: REGISTERED NURSE

## 2025-06-25 PROCEDURE — 2500000003 HC RX 250 WO HCPCS: Performed by: REGISTERED NURSE

## 2025-06-25 PROCEDURE — 99211 OFF/OP EST MAY X REQ PHY/QHP: CPT

## 2025-06-25 PROCEDURE — 96413 CHEMO IV INFUSION 1 HR: CPT

## 2025-06-25 RX ORDER — SODIUM CHLORIDE 9 MG/ML
INJECTION, SOLUTION INTRAVENOUS CONTINUOUS
Start: 2025-07-30

## 2025-06-25 RX ORDER — ACETAMINOPHEN 325 MG/1
650 TABLET ORAL ONCE
Start: 2025-07-30 | End: 2025-07-30

## 2025-06-25 RX ORDER — SODIUM CHLORIDE 0.9 % (FLUSH) 0.9 %
5-40 SYRINGE (ML) INJECTION PRN
Status: DISCONTINUED | OUTPATIENT
Start: 2025-06-25 | End: 2025-06-26 | Stop reason: HOSPADM

## 2025-06-25 RX ORDER — ACETAMINOPHEN 325 MG/1
650 TABLET ORAL ONCE
Status: COMPLETED | OUTPATIENT
Start: 2025-06-25 | End: 2025-06-25

## 2025-06-25 RX ORDER — DIPHENHYDRAMINE HCL 25 MG
25 TABLET ORAL ONCE
Status: COMPLETED | OUTPATIENT
Start: 2025-06-25 | End: 2025-06-25

## 2025-06-25 RX ORDER — EPINEPHRINE 1 MG/ML
0.3 INJECTION, SOLUTION INTRAMUSCULAR; SUBCUTANEOUS PRN
OUTPATIENT
Start: 2025-07-30

## 2025-06-25 RX ORDER — DIPHENHYDRAMINE HYDROCHLORIDE 50 MG/ML
50 INJECTION, SOLUTION INTRAMUSCULAR; INTRAVENOUS
OUTPATIENT
Start: 2025-07-30

## 2025-06-25 RX ORDER — SODIUM CHLORIDE 0.9 % (FLUSH) 0.9 %
5-40 SYRINGE (ML) INJECTION PRN
OUTPATIENT
Start: 2025-07-30

## 2025-06-25 RX ORDER — SODIUM CHLORIDE 9 MG/ML
5-250 INJECTION, SOLUTION INTRAVENOUS PRN
OUTPATIENT
Start: 2025-07-30

## 2025-06-25 RX ORDER — DIPHENHYDRAMINE HCL 25 MG
25 TABLET ORAL ONCE
Start: 2025-07-30 | End: 2025-07-30

## 2025-06-25 RX ORDER — HEPARIN 100 UNIT/ML
500 SYRINGE INTRAVENOUS PRN
OUTPATIENT
Start: 2025-07-30

## 2025-06-25 RX ORDER — SODIUM CHLORIDE 9 MG/ML
INJECTION, SOLUTION INTRAVENOUS CONTINUOUS
OUTPATIENT
Start: 2025-07-30

## 2025-06-25 RX ORDER — HYDROCORTISONE SODIUM SUCCINATE 100 MG/2ML
100 INJECTION INTRAMUSCULAR; INTRAVENOUS
OUTPATIENT
Start: 2025-07-30

## 2025-06-25 RX ADMIN — SODIUM CHLORIDE, PRESERVATIVE FREE 10 ML: 5 INJECTION INTRAVENOUS at 09:43

## 2025-06-25 RX ADMIN — ACETAMINOPHEN 650 MG: 325 TABLET ORAL at 09:42

## 2025-06-25 RX ADMIN — INFLIXIMAB 300 MG: 100 INJECTION, POWDER, LYOPHILIZED, FOR SOLUTION INTRAVENOUS at 10:09

## 2025-06-25 RX ADMIN — DIPHENHYDRAMINE HYDROCHLORIDE 25 MG: 25 TABLET ORAL at 09:42

## 2025-06-25 RX ADMIN — WATER 40 MG: 1 INJECTION INTRAMUSCULAR; INTRAVENOUS; SUBCUTANEOUS at 09:42

## 2025-06-25 ASSESSMENT — PAIN DESCRIPTION - LOCATION: LOCATION: GENERALIZED

## 2025-06-25 ASSESSMENT — PAIN DESCRIPTION - DESCRIPTORS: DESCRIPTORS: ACHING

## 2025-06-25 ASSESSMENT — PAIN SCALES - GENERAL: PAINLEVEL_OUTOF10: 7

## 2025-06-25 ASSESSMENT — PAIN DESCRIPTION - PAIN TYPE: TYPE: CHRONIC PAIN

## 2025-06-25 NOTE — DISCHARGE INSTRUCTIONS
REMICADE DISCHARGE INSTRUCTION SHEET      Can not have an active infection or be on an antibiotic at time of infusion.       SIDE EFFECTS: RASH, COUGH, JOINT PAIN, SWELLING TO HANDS/FEET, SORE THROAT, FEVER      Call your doctor or return to the nearest Emergency Room if you start having shortness of breath, chest tightness, wheezing      Next Remicade infusion is scheduled on: August 6th at 9 AM    Please call [phone] with any questions or concerns.

## 2025-06-25 NOTE — PROGRESS NOTES
0930: Patient arrived ambulatory with walker for Remicade infusion. Patient denies any antibiotic usage or infection at this time. Patient rights and responsibilities offered to patient.   Patient resting in chair, call light in reach.     0942: Pre-medication administered- patient tolerated well.     1009: Remicade infusion started- patient resting in chair, call light in reach.     1110: Infusion complete, patient tolerated well.   AVS reviewed with patient, voiced understanding. Patient discharged ambulatory with walker.                           _m___ Safety:       (Environmental)  [place] to environment  Ensure ID band is correct and in place/ allergy band as needed  Assess for fall risk  Initiate fall precautions as applicable (fall band, side rails, etc.)  Call light within reach  Bed in low position/ wheels locked     _m___ Pain:       Assess pain level and characteristics  Administer analgesics as ordered  Assess effectiveness of pain management and report to MD as needed     _m___ Knowledge Deficit:  Assess baseline knowledge  Provide teaching at level of understanding  Provide teaching via preferred learning method  Evaluate teaching effectiveness     _m___ Hemodynamic/Respiratory Status:                  (Pre and Post Procedure Monitoring)  Assess/Monitor vital signs and LOC  Assess Baseline SpO2 prior to any sedation  Obtain weight/height  Assess vital signs/ LOC until patient meets discharge criteria  Monitor procedure site and notify MD of any issues

## 2025-07-03 ENCOUNTER — HOSPITAL ENCOUNTER (EMERGENCY)
Age: 65
Discharge: HOME | End: 2025-07-03
Payer: COMMERCIAL

## 2025-07-03 ENCOUNTER — HOSPITAL ENCOUNTER (OUTPATIENT)
Age: 65
Discharge: HOME | End: 2025-07-03
Payer: COMMERCIAL

## 2025-07-03 VITALS
RESPIRATION RATE: 16 BRPM | DIASTOLIC BLOOD PRESSURE: 74 MMHG | OXYGEN SATURATION: 97 % | SYSTOLIC BLOOD PRESSURE: 158 MMHG | TEMPERATURE: 97.3 F | HEART RATE: 78 BPM

## 2025-07-03 DIAGNOSIS — Z23 NEED FOR TETANUS BOOSTER: ICD-10-CM

## 2025-07-03 DIAGNOSIS — S61.209A AVULSION OF FINGER, INITIAL ENCOUNTER: Primary | ICD-10-CM

## 2025-07-03 LAB
ALBUMIN SERPL BCG-MCNC: 4.3 G/DL (ref 3.4–4.9)
ALP SERPL-CCNC: 62 U/L (ref 38–126)
ALT SERPL W/O P-5'-P-CCNC: 16 U/L (ref 10–35)
AST SERPL-CCNC: 29 U/L (ref 10–35)
BILIRUB CONJ SERPL-MCNC: 0.2 MG/DL (ref 0–0.2)
BILIRUB SERPL-MCNC: 0.4 MG/DL (ref 0.3–1.2)
PROT SERPL-MCNC: 7 G/DL (ref 6.4–8.3)

## 2025-07-03 PROCEDURE — 99213 OFFICE O/P EST LOW 20 MIN: CPT

## 2025-07-03 PROCEDURE — 90471 IMMUNIZATION ADMIN: CPT

## 2025-07-03 PROCEDURE — 36415 COLL VENOUS BLD VENIPUNCTURE: CPT

## 2025-07-03 PROCEDURE — 6360000002 HC RX W HCPCS

## 2025-07-03 PROCEDURE — 90715 TDAP VACCINE 7 YRS/> IM: CPT

## 2025-07-03 PROCEDURE — 80076 HEPATIC FUNCTION PANEL: CPT

## 2025-07-03 RX ADMIN — TETANUS TOXOID, REDUCED DIPHTHERIA TOXOID AND ACELLULAR PERTUSSIS VACCINE, ADSORBED 0.5 ML: 5; 2.5; 8; 8; 2.5 SUSPENSION INTRAMUSCULAR at 18:16

## 2025-07-03 ASSESSMENT — PAIN DESCRIPTION - DESCRIPTORS: DESCRIPTORS: SHARP

## 2025-07-03 ASSESSMENT — PAIN SCALES - GENERAL: PAINLEVEL_OUTOF10: 8

## 2025-07-03 ASSESSMENT — PAIN DESCRIPTION - LOCATION: LOCATION: FINGER (COMMENT WHICH ONE)

## 2025-07-03 ASSESSMENT — PAIN - FUNCTIONAL ASSESSMENT
PAIN_FUNCTIONAL_ASSESSMENT: ACTIVITIES ARE NOT PREVENTED
PAIN_FUNCTIONAL_ASSESSMENT: 0-10

## 2025-07-03 ASSESSMENT — PAIN DESCRIPTION - PAIN TYPE: TYPE: ACUTE PAIN

## 2025-07-03 ASSESSMENT — PAIN DESCRIPTION - ORIENTATION: ORIENTATION: RIGHT

## 2025-07-03 ASSESSMENT — PAIN DESCRIPTION - FREQUENCY: FREQUENCY: INTERMITTENT

## 2025-07-03 NOTE — ED PROVIDER NOTES
[facility] EMERGENCY DEPARTMENT  Urgent Care Encounter       CHIEF COMPLAINT       Chief Complaint   Patient presents with    Laceration     Right distal tip of thumb  - more like an avulsion of the skin, cut with razor       Nurses Notes reviewed and I agree except as noted in the HPI.  HISTORY OF PRESENT ILLNESS   Debbie Zavaleta is a 65 y.o. female who presents with complaints of right finger laceration. Pt reports that she was reaching to grab her toothbrush and accidentally sliced her finger on a razor that she had loose in her bathroom. Patient reports that it would not stop bleeding due to being on blood thinners so she decided to come in.     The history is provided by the patient.       REVIEW OF SYSTEMS     Review of Systems   Skin:  Positive for wound.   All other systems reviewed and are negative.      PAST MEDICAL HISTORY         Diagnosis Date    B12 deficiency 12/2020    Chronic pulmonary embolism without acute cor pulmonale ([facility])     Depression     Lymphedema 2008    both legs    MD (muscular dystrophy) ([facility]) 2008    Dr. Hills at [facility] - no lung involvement per patient    Neuropathy     Obesity (BMI 30-39.9)     Overflow incontinence     Rheumatoid arthritis(714.0) 2008    Dr. Ruiz - [place]    Superficial thrombophlebitis 03/2018    right lower extremity    Venous insufficiency     h/o SVT - 3-4 in the past (has never been on Coumadin)       SURGICALHISTORY     Patient  has a past surgical history that includes Total knee arthroplasty (Left, 6/08); Varicose vein surgery (Right, 1998); Total knee arthroplasty (Right, 09/10/2013); joint replacement (Left, 2008); joint replacement (Right, 09/10/2013); Colonoscopy; and Hysterectomy (2002).    CURRENT MEDICATIONS       Previous Medications    APIXABAN (ELIQUIS) 2.5 MG TABS TABLET    Take 1 tablet by mouth 2 times daily    ASCORBIC ACID (RA VITAMIN C) 500 MG TABLET    take 1 tablet by mouth once daily    B COMPLEX VITAMINS

## 2025-07-03 NOTE — DISCHARGE INSTRUCTIONS
Keep clean and dry  Keep dressing on tonight  Wash with soap and water  Triple antibiotic ointment  Follow up with pcp as needed

## 2025-07-14 ENCOUNTER — TELEPHONE (OUTPATIENT)
Dept: FAMILY MEDICINE CLINIC | Age: 65
End: 2025-07-14

## 2025-07-14 DIAGNOSIS — G71.00 MUSCULAR DYSTROPHY (HCC): ICD-10-CM

## 2025-07-14 RX ORDER — HYDROCODONE BITARTRATE AND ACETAMINOPHEN 5; 325 MG/1; MG/1
1 TABLET ORAL EVERY 8 HOURS PRN
Qty: 90 TABLET | Refills: 0 | Status: SHIPPED | OUTPATIENT
Start: 2025-07-14 | End: 2025-08-13

## 2025-07-14 RX ORDER — HYDROCODONE BITARTRATE AND ACETAMINOPHEN 5; 325 MG/1; MG/1
TABLET ORAL
Qty: 90 TABLET | Refills: 0 | OUTPATIENT
Start: 2025-07-14

## 2025-07-14 NOTE — TELEPHONE ENCOUNTER
Debbie Zavaleta called requesting a refill on the following medications:  Requested Prescriptions     Pending Prescriptions Disp Refills    HYDROcodone-acetaminophen (NORCO) 5-325 MG per tablet 90 tablet 0     Sig: Take 1 tablet by mouth every 8 hours as needed for Pain for up to 30 days. Max Daily Amount: 3 tablets       Date of last visit: 4/17/2025 HFU    Date of next visit: date not found    Date of last refill: 6/13/25    Pharmacy Name: [facility]    Pt has #2 left.    Pls call pt at  only if probs.    Zip: [id number]    Dose confirmed by pt as stated above.    Thanks,  Ashley Casey

## 2025-07-14 NOTE — TELEPHONE ENCOUNTER
Debbie Zavaleta called requesting a refill on the following medications:  Requested Prescriptions     Pending Prescriptions Disp Refills    HYDROcodone-acetaminophen (NORCO) 5-325 MG per tablet [Pharmacy Med Name: HYDROCODONE BITARTRATE/ACETAMINOPHEN 5-325MG TABLET] 90 tablet 0     Sig: TAKE ONE (1) TABLET BY MOUTH EVERY EIGHT (8) HOURS AS NEEDED FOR PAIN. MAX DAILY AMOUNT: THREE (3) TABLETS       Date of last visit: 4/17/2025  Date of next visit (if applicable):Visit date not found  Date of last refill: 07/14/2025   Pharmacy Name: Geri Schmitz CMA ([facility])    Duplicate request, refused.

## 2025-07-14 NOTE — TELEPHONE ENCOUNTER
Ep'ed norco to pharm requested    Controlled Substance Monitoring:    Acute and Chronic Pain Monitoring:   RX Monitoring Periodic Controlled Substance Monitoring   7/14/2025  10:06 AM No signs of potential drug abuse or diversion identified.

## 2025-07-21 ENCOUNTER — HOSPITAL ENCOUNTER (OUTPATIENT)
Age: 65
Discharge: HOME | End: 2025-07-21
Payer: COMMERCIAL

## 2025-07-21 PROCEDURE — 84450 TRANSFERASE (AST) (SGOT): CPT

## 2025-07-21 PROCEDURE — 84460 ALANINE AMINO (ALT) (SGPT): CPT

## 2025-07-21 PROCEDURE — 36415 COLL VENOUS BLD VENIPUNCTURE: CPT

## 2025-07-21 PROCEDURE — 83883 ASSAY NEPHELOMETRY NOT SPEC: CPT

## 2025-07-21 PROCEDURE — 82977 ASSAY OF GGT: CPT

## 2025-07-21 PROCEDURE — 84520 ASSAY OF UREA NITROGEN: CPT

## 2025-07-25 LAB
A2 MACROGLOB SERPL-MCNC: 309 MG/DL (ref 131–293)
ALT SERPL-CCNC: 21 U/L (ref 5–40)
ANNOTATION COMMENT IMP: ABNORMAL
AST SERPL-CCNC: 32 U/L (ref 9–40)
BUN SERPL-MCNC: 18 MG/DL (ref 7–20)
FIBROSIS STAGE SERPL QL: ABNORMAL
GGT SERPL-CCNC: 10 U/L (ref 7–33)
LIVER FIBR SCORE SERPL CALC.FIBROMETER: 0.69
PATHOLOGY STUDY: ABNORMAL
PLATELET # BLD: 171 K/UL
PT INDEX PPP: 86 % (ref 90–120)
REF LAB TEST RESULTS: 0.15
REF LAB TEST RESULTS: 0.49
REF LAB TEST RESULTS: ABNORMAL

## 2025-08-04 ENCOUNTER — OFFICE VISIT (OUTPATIENT)
Dept: INFECTIOUS DISEASES | Age: 65
End: 2025-08-04
Payer: COMMERCIAL

## 2025-08-04 VITALS
BODY MASS INDEX: 29.99 KG/M2 | TEMPERATURE: 97.8 F | WEIGHT: 180 LBS | DIASTOLIC BLOOD PRESSURE: 70 MMHG | RESPIRATION RATE: 18 BRPM | OXYGEN SATURATION: 90 % | HEIGHT: 65 IN | HEART RATE: 73 BPM | SYSTOLIC BLOOD PRESSURE: 122 MMHG

## 2025-08-04 DIAGNOSIS — L03.90 RECURRENT CELLULITIS: Primary | ICD-10-CM

## 2025-08-04 DIAGNOSIS — M06.9 RHEUMATOID ARTHRITIS, INVOLVING UNSPECIFIED SITE, UNSPECIFIED WHETHER RHEUMATOID FACTOR PRESENT (HCC): ICD-10-CM

## 2025-08-04 DIAGNOSIS — D84.9 IMMUNOCOMPROMISED: ICD-10-CM

## 2025-08-04 DIAGNOSIS — G71.00 MUSCULAR DYSTROPHY (HCC): ICD-10-CM

## 2025-08-04 PROBLEM — K76.9 HEPATIC LESION: Status: ACTIVE | Noted: 2025-08-04

## 2025-08-04 PROCEDURE — 1123F ACP DISCUSS/DSCN MKR DOCD: CPT | Performed by: STUDENT IN AN ORGANIZED HEALTH CARE EDUCATION/TRAINING PROGRAM

## 2025-08-04 PROCEDURE — 99214 OFFICE O/P EST MOD 30 MIN: CPT | Performed by: STUDENT IN AN ORGANIZED HEALTH CARE EDUCATION/TRAINING PROGRAM

## 2025-08-04 PROCEDURE — G8427 DOCREV CUR MEDS BY ELIG CLIN: HCPCS | Performed by: STUDENT IN AN ORGANIZED HEALTH CARE EDUCATION/TRAINING PROGRAM

## 2025-08-04 PROCEDURE — 1125F AMNT PAIN NOTED PAIN PRSNT: CPT | Performed by: STUDENT IN AN ORGANIZED HEALTH CARE EDUCATION/TRAINING PROGRAM

## 2025-08-04 PROCEDURE — 1090F PRES/ABSN URINE INCON ASSESS: CPT | Performed by: STUDENT IN AN ORGANIZED HEALTH CARE EDUCATION/TRAINING PROGRAM

## 2025-08-04 PROCEDURE — G8399 PT W/DXA RESULTS DOCUMENT: HCPCS | Performed by: STUDENT IN AN ORGANIZED HEALTH CARE EDUCATION/TRAINING PROGRAM

## 2025-08-04 PROCEDURE — G8417 CALC BMI ABV UP PARAM F/U: HCPCS | Performed by: STUDENT IN AN ORGANIZED HEALTH CARE EDUCATION/TRAINING PROGRAM

## 2025-08-04 PROCEDURE — 1036F TOBACCO NON-USER: CPT | Performed by: STUDENT IN AN ORGANIZED HEALTH CARE EDUCATION/TRAINING PROGRAM

## 2025-08-04 PROCEDURE — 3017F COLORECTAL CA SCREEN DOC REV: CPT | Performed by: STUDENT IN AN ORGANIZED HEALTH CARE EDUCATION/TRAINING PROGRAM

## 2025-08-06 ENCOUNTER — HOSPITAL ENCOUNTER (OUTPATIENT)
Dept: NURSING | Age: 65
Setting detail: INFUSION SERIES
Discharge: HOME | End: 2025-08-06
Payer: COMMERCIAL

## 2025-08-06 VITALS
BODY MASS INDEX: 30.76 KG/M2 | SYSTOLIC BLOOD PRESSURE: 145 MMHG | TEMPERATURE: 96.6 F | OXYGEN SATURATION: 97 % | DIASTOLIC BLOOD PRESSURE: 68 MMHG | HEART RATE: 60 BPM | RESPIRATION RATE: 18 BRPM | WEIGHT: 182 LBS

## 2025-08-06 DIAGNOSIS — L40.50 PSORIATIC ARTHRITIS (HCC): Primary | ICD-10-CM

## 2025-08-06 PROCEDURE — 2580000003 HC RX 258: Performed by: REGISTERED NURSE

## 2025-08-06 PROCEDURE — 6360000002 HC RX W HCPCS: Performed by: REGISTERED NURSE

## 2025-08-06 PROCEDURE — 96413 CHEMO IV INFUSION 1 HR: CPT

## 2025-08-06 PROCEDURE — 96375 TX/PRO/DX INJ NEW DRUG ADDON: CPT

## 2025-08-06 PROCEDURE — 2500000003 HC RX 250 WO HCPCS: Performed by: REGISTERED NURSE

## 2025-08-06 PROCEDURE — 6370000000 HC RX 637 (ALT 250 FOR IP): Performed by: REGISTERED NURSE

## 2025-08-06 PROCEDURE — 99211 OFF/OP EST MAY X REQ PHY/QHP: CPT

## 2025-08-06 RX ORDER — SODIUM CHLORIDE 9 MG/ML
5-250 INJECTION, SOLUTION INTRAVENOUS PRN
OUTPATIENT
Start: 2025-09-10

## 2025-08-06 RX ORDER — HYDROCORTISONE SODIUM SUCCINATE 100 MG/2ML
100 INJECTION INTRAMUSCULAR; INTRAVENOUS
OUTPATIENT
Start: 2025-09-10

## 2025-08-06 RX ORDER — ACETAMINOPHEN 325 MG/1
650 TABLET ORAL ONCE
Start: 2025-09-10 | End: 2025-09-10

## 2025-08-06 RX ORDER — SODIUM CHLORIDE 9 MG/ML
INJECTION, SOLUTION INTRAVENOUS CONTINUOUS
Start: 2025-09-10

## 2025-08-06 RX ORDER — SODIUM CHLORIDE 9 MG/ML
INJECTION, SOLUTION INTRAVENOUS CONTINUOUS
OUTPATIENT
Start: 2025-09-10

## 2025-08-06 RX ORDER — DIPHENHYDRAMINE HYDROCHLORIDE 50 MG/ML
50 INJECTION, SOLUTION INTRAMUSCULAR; INTRAVENOUS
OUTPATIENT
Start: 2025-09-10

## 2025-08-06 RX ORDER — ACETAMINOPHEN 325 MG/1
650 TABLET ORAL ONCE
Status: COMPLETED | OUTPATIENT
Start: 2025-08-06 | End: 2025-08-06

## 2025-08-06 RX ORDER — HEPARIN 100 UNIT/ML
500 SYRINGE INTRAVENOUS PRN
OUTPATIENT
Start: 2025-09-10

## 2025-08-06 RX ORDER — DIPHENHYDRAMINE HCL 25 MG
25 TABLET ORAL ONCE
Start: 2025-09-10 | End: 2025-09-10

## 2025-08-06 RX ORDER — SODIUM CHLORIDE 0.9 % (FLUSH) 0.9 %
5-40 SYRINGE (ML) INJECTION PRN
Status: DISCONTINUED | OUTPATIENT
Start: 2025-08-06 | End: 2025-08-07 | Stop reason: HOSPADM

## 2025-08-06 RX ORDER — EPINEPHRINE 1 MG/ML
0.3 INJECTION, SOLUTION INTRAMUSCULAR; SUBCUTANEOUS PRN
OUTPATIENT
Start: 2025-09-10

## 2025-08-06 RX ORDER — DIPHENHYDRAMINE HCL 25 MG
25 TABLET ORAL ONCE
Status: COMPLETED | OUTPATIENT
Start: 2025-08-06 | End: 2025-08-06

## 2025-08-06 RX ORDER — SODIUM CHLORIDE 0.9 % (FLUSH) 0.9 %
5-40 SYRINGE (ML) INJECTION PRN
OUTPATIENT
Start: 2025-09-10

## 2025-08-06 RX ADMIN — DIPHENHYDRAMINE HYDROCHLORIDE 25 MG: 25 TABLET ORAL at 09:00

## 2025-08-06 RX ADMIN — ACETAMINOPHEN 650 MG: 325 TABLET ORAL at 09:00

## 2025-08-06 RX ADMIN — WATER 40 MG: 1 INJECTION INTRAMUSCULAR; INTRAVENOUS; SUBCUTANEOUS at 09:00

## 2025-08-06 RX ADMIN — INFLIXIMAB 300 MG: 100 INJECTION, POWDER, LYOPHILIZED, FOR SOLUTION INTRAVENOUS at 09:31

## 2025-08-13 DIAGNOSIS — G71.00 MUSCULAR DYSTROPHY (HCC): ICD-10-CM

## 2025-08-13 RX ORDER — HYDROCODONE BITARTRATE AND ACETAMINOPHEN 5; 325 MG/1; MG/1
1 TABLET ORAL EVERY 8 HOURS PRN
Qty: 90 TABLET | Refills: 0 | Status: SHIPPED | OUTPATIENT
Start: 2025-08-13 | End: 2025-09-12